# Patient Record
Sex: FEMALE | Race: WHITE | Employment: OTHER | URBAN - METROPOLITAN AREA
[De-identification: names, ages, dates, MRNs, and addresses within clinical notes are randomized per-mention and may not be internally consistent; named-entity substitution may affect disease eponyms.]

---

## 2018-07-12 ENCOUNTER — HOSPITAL ENCOUNTER (EMERGENCY)
Age: 68
Discharge: HOME | End: 2018-07-12
Payer: MEDICARE

## 2018-07-12 VITALS
DIASTOLIC BLOOD PRESSURE: 80 MMHG | HEART RATE: 78 BPM | RESPIRATION RATE: 14 BRPM | OXYGEN SATURATION: 97 % | HEIGHT: 66 IN | TEMPERATURE: 98.3 F | WEIGHT: 140 LBS | BODY MASS INDEX: 22.5 KG/M2 | SYSTOLIC BLOOD PRESSURE: 128 MMHG

## 2018-07-12 DIAGNOSIS — H10.33 ACUTE BACTERIAL CONJUNCTIVITIS OF BOTH EYES: Primary | ICD-10-CM

## 2018-07-12 PROCEDURE — 99282 EMERGENCY DEPT VISIT SF MDM: CPT

## 2018-07-12 RX ORDER — TOBRAMYCIN AND DEXAMETHASONE 3; 1 MG/ML; MG/ML
SUSPENSION/ DROPS OPHTHALMIC
Qty: 1 BOTTLE | Refills: 0 | Status: SHIPPED | OUTPATIENT
Start: 2018-07-12 | End: 2018-07-22

## 2018-07-13 NOTE — ED PROVIDER NOTES
[facility]       Department of Emergency Medicine   ED  Provider Note  Admit Date/RoomTime: 7/12/2018  8:01 PM  ED Room: 08/08  Chief Complaint:   Eye Problem (left eye redness )    History of Present Illness   Source of history provided by:  caregiver / friend. History/Exam Limitations: developmentally delayedAravind Neal is a 80 y.o. old female presenting to the emergency department by private vehicle and accompanied by caregiver, with sudden onset discharge and tearing to both eyes, which began 1 day(s) prior to arrival.  Since onset her symptoms have been worsening and mild in severity. Associated signs & symptoms of:  none. The patients tetanus status is unknown. Mechanism: NONE    []  FB Exposure     []  Chemical Exposure     []  Direct Trauma     []  High Speed Machinery Use     []  Welding      Circumstances:    []  Contact Lens Use     []  Recent URI Sx's     [x]  Spontaneous Onset     []  Close Contact w/similar Sx's     []  Work Related     History of: NONE    []   Glaucoma     []   Recent Eye Surgery     ROS    Pertinent positives and negatives are stated within HPI, all other systems reviewed and are negative. Past Surgical History:  has no past surgical history on file. Social History:  reports that she has never smoked. She has never used smokeless tobacco. She reports that she does not drink alcohol or use drugs. Family History: family history is not on file. Allergies: Zocor [simvastatin]    Physical Exam           ED Triage Vitals   BP Temp Temp Source Pulse Resp SpO2 Height Weight   07/12/18 2017 07/12/18 2017 07/12/18 2017 07/12/18 2017 07/12/18 2017 07/12/18 2017 07/12/18 2004 07/12/18 2004   128/80 98.3 °F (36.8 °C) Oral 78 14 97 % 5' 6\" (1.676 m) 140 lb (63.5 kg)      Oxygen Saturation Interpretation: Normal.    Constitutional:  Alert, development consistent with age. HENT:  NC/NT. Airway patent. Neck:  Normal ROM. Supple.   Eyes:         Pupils: equal, round, of 7/12/2018  9:06 PM      START taking these medications    Details   tobramycin-dexamethasone (TOBRADEX) 0.3-0.1 % ophthalmic suspension Use 2 drops to affected eyes every 4 hrs while wake x 5 days, Disp-1 Bottle, R-0Print           Electronically signed by Karyle Locker, APRN - CNP   DD: 7/12/18  **This report was transcribed using voice recognition software. Every effort was made to ensure accuracy; however, inadvertent computerized transcription errors may be present.   END OF ED PROVIDER NOTE       DIAMOND Lynn CNP  07/12/18 0100

## 2018-08-13 ENCOUNTER — APPOINTMENT (OUTPATIENT)
Dept: GENERAL RADIOLOGY | Age: 68
End: 2018-08-13
Payer: MEDICARE

## 2018-08-13 ENCOUNTER — HOSPITAL ENCOUNTER (EMERGENCY)
Age: 68
Discharge: HOME | End: 2018-08-13
Attending: EMERGENCY MEDICINE
Payer: MEDICARE

## 2018-08-13 VITALS
HEIGHT: 66 IN | SYSTOLIC BLOOD PRESSURE: 126 MMHG | WEIGHT: 140 LBS | HEART RATE: 74 BPM | DIASTOLIC BLOOD PRESSURE: 72 MMHG | TEMPERATURE: 97.6 F | BODY MASS INDEX: 22.5 KG/M2 | RESPIRATION RATE: 16 BRPM | OXYGEN SATURATION: 97 %

## 2018-08-13 DIAGNOSIS — R07.9 CHEST PAIN, UNSPECIFIED TYPE: Primary | ICD-10-CM

## 2018-08-13 LAB
ANION GAP SERPL CALCULATED.3IONS-SCNC: 6 MMOL/L (ref 7–16)
BASOPHILS ABSOLUTE: 0.03 E9/L (ref 0–0.2)
BASOPHILS RELATIVE PERCENT: 0.8 % (ref 0–2)
BUN BLDV-MCNC: 10 MG/DL (ref 8–23)
CALCIUM SERPL-MCNC: 8.6 MG/DL (ref 8.6–10.2)
CHLORIDE BLD-SCNC: 96 MMOL/L (ref 98–107)
CO2: 32 MMOL/L (ref 22–29)
CREAT SERPL-MCNC: 0.6 MG/DL (ref 0.5–1)
EKG ATRIAL RATE: 71 BPM
EKG P AXIS: 52 DEGREES
EKG P-R INTERVAL: 164 MS
EKG Q-T INTERVAL: 416 MS
EKG QRS DURATION: 104 MS
EKG QTC CALCULATION (BAZETT): 452 MS
EKG R AXIS: 28 DEGREES
EKG T AXIS: 13 DEGREES
EKG VENTRICULAR RATE: 71 BPM
EOSINOPHILS ABSOLUTE: 0.18 E9/L (ref 0.05–0.5)
EOSINOPHILS RELATIVE PERCENT: 4.6 % (ref 0–6)
GFR AFRICAN AMERICAN: >60
GFR NON-AFRICAN AMERICAN: >60 ML/MIN/1.73
GLUCOSE BLD-MCNC: 119 MG/DL (ref 74–109)
HCT VFR BLD CALC: 33.4 % (ref 34–48)
HEMOGLOBIN: 11.2 G/DL (ref 11.5–15.5)
IMMATURE GRANULOCYTES #: 0.01 E9/L
IMMATURE GRANULOCYTES %: 0.3 % (ref 0–5)
LYMPHOCYTES ABSOLUTE: 1.89 E9/L (ref 1.5–4)
LYMPHOCYTES RELATIVE PERCENT: 48.6 % (ref 20–42)
MCH RBC QN AUTO: 34.6 PG (ref 26–35)
MCHC RBC AUTO-ENTMCNC: 33.5 % (ref 32–34.5)
MCV RBC AUTO: 103.1 FL (ref 80–99.9)
MONOCYTES ABSOLUTE: 0.6 E9/L (ref 0.1–0.95)
MONOCYTES RELATIVE PERCENT: 15.4 % (ref 2–12)
NEUTROPHILS ABSOLUTE: 1.18 E9/L (ref 1.8–7.3)
NEUTROPHILS RELATIVE PERCENT: 30.3 % (ref 43–80)
PDW BLD-RTO: 13.4 FL (ref 11.5–15)
PLATELET # BLD: 121 E9/L (ref 130–450)
PMV BLD AUTO: 9.4 FL (ref 7–12)
POTASSIUM SERPL-SCNC: 4.5 MMOL/L (ref 3.5–5)
RBC # BLD: 3.24 E12/L (ref 3.5–5.5)
SODIUM BLD-SCNC: 134 MMOL/L (ref 132–146)
TROPONIN: <0.01 NG/ML (ref 0–0.03)
WBC # BLD: 3.9 E9/L (ref 4.5–11.5)

## 2018-08-13 PROCEDURE — 84484 ASSAY OF TROPONIN QUANT: CPT

## 2018-08-13 PROCEDURE — 71045 X-RAY EXAM CHEST 1 VIEW: CPT

## 2018-08-13 PROCEDURE — 80048 BASIC METABOLIC PNL TOTAL CA: CPT

## 2018-08-13 PROCEDURE — 99285 EMERGENCY DEPT VISIT HI MDM: CPT

## 2018-08-13 PROCEDURE — 85025 COMPLETE CBC W/AUTO DIFF WBC: CPT

## 2018-08-13 RX ORDER — GUAIFENESIN 100 MG/5ML
100 SYRUP ORAL 3 TIMES DAILY PRN
COMMUNITY

## 2018-08-13 RX ORDER — ESCITALOPRAM OXALATE 10 MG/1
10 TABLET ORAL DAILY
COMMUNITY

## 2018-08-13 NOTE — ED NOTES
Bed: 03  Expected date:   Expected time:   Means of arrival:   Comments:  EMS/chest pain     Jean Rivera RN  08/13/18 4161

## 2018-08-13 NOTE — ED PROVIDER NOTES
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 Per EMS, the patient lives at a group home and they state that the patient complained of chest pain. A complete review of systems and HPI are limited secondary to the patient mental status. The history is provided by the patient and the EMS personnel. No  was used. Chest Pain   Pain location:  Unable to specify  Onset quality:  Unable to specify  Timing:  Unable to specify      Review of Systems   Unable to perform ROS: Patient nonverbal   Cardiovascular: Positive for chest pain. Physical Exam   Constitutional: She appears well-developed and well-nourished. No distress. HENT:   Head: Normocephalic and atraumatic. Mouth/Throat: Oropharynx is clear and moist.   Eyes: Pupils are equal, round, and reactive to light. EOM are normal.   Neck: Normal range of motion. Neck supple. No JVD present. Cardiovascular: Normal rate and regular rhythm. No murmur heard. Pulmonary/Chest: Effort normal and breath sounds normal. No respiratory distress. She has no wheezes. She has no rales. Abdominal: Soft. She exhibits no distension. There is no tenderness. There is no rebound and no guarding. Musculoskeletal: She exhibits no edema. Lymphadenopathy:     She has no cervical adenopathy. Neurological:   Awake but nonverbal, moves all four extremities spontanously   Skin: Skin is warm and dry. Psychiatric: She has a normal mood and affect. Her behavior is normal. Thought content normal.   Nursing note and vitals reviewed. Procedures    MDM  Number of Diagnoses or Management Options  Chest pain, unspecified type:   Diagnosis management comments: This is a 80year old female with PMH of MR and Hypothyroidism who presented to the ED for chest pain after she told her nurse she had chest pain.  The patient was extremely well appearing and was

## 2018-08-13 NOTE — ED NOTES
Reviewed discharge instructions with patient, discussed medications and addressed all patient and caregiver questions/concerns. Caregiver verbalizes understanding.      Marci Rosenthal RN  08/13/18 4275

## 2018-09-08 ENCOUNTER — HOSPITAL ENCOUNTER (EMERGENCY)
Age: 68
Discharge: HOME | End: 2018-09-08
Attending: EMERGENCY MEDICINE
Payer: MEDICARE

## 2018-09-08 ENCOUNTER — APPOINTMENT (OUTPATIENT)
Dept: GENERAL RADIOLOGY | Age: 68
End: 2018-09-08
Payer: MEDICARE

## 2018-09-08 VITALS
BODY MASS INDEX: 24.21 KG/M2 | RESPIRATION RATE: 16 BRPM | SYSTOLIC BLOOD PRESSURE: 140 MMHG | HEART RATE: 67 BPM | DIASTOLIC BLOOD PRESSURE: 73 MMHG | WEIGHT: 150 LBS | TEMPERATURE: 97.2 F | OXYGEN SATURATION: 96 %

## 2018-09-08 DIAGNOSIS — S93.401A SPRAIN OF RIGHT ANKLE, UNSPECIFIED LIGAMENT, INITIAL ENCOUNTER: Primary | ICD-10-CM

## 2018-09-08 PROCEDURE — 73610 X-RAY EXAM OF ANKLE: CPT

## 2018-09-08 PROCEDURE — 6370000000 HC RX 637 (ALT 250 FOR IP): Performed by: EMERGENCY MEDICINE

## 2018-09-08 PROCEDURE — 99283 EMERGENCY DEPT VISIT LOW MDM: CPT

## 2018-09-08 RX ORDER — IBUPROFEN 400 MG/1
600 TABLET ORAL ONCE
Status: COMPLETED | OUTPATIENT
Start: 2018-09-08 | End: 2018-09-08

## 2018-09-08 RX ADMIN — IBUPROFEN 600 MG: 400 TABLET ORAL at 10:01

## 2018-09-08 ASSESSMENT — ENCOUNTER SYMPTOMS
NAUSEA: 0
SORE THROAT: 0
RHINORRHEA: 0
BLOOD IN STOOL: 0
VOMITING: 0
CHEST TIGHTNESS: 0
SHORTNESS OF BREATH: 0
DIARRHEA: 0
COUGH: 0
TROUBLE SWALLOWING: 0
WHEEZING: 0
CONSTIPATION: 0
ABDOMINAL PAIN: 0

## 2018-09-08 ASSESSMENT — PAIN SCALES - GENERAL: PAINLEVEL_OUTOF10: 6

## 2018-09-08 ASSESSMENT — PAIN DESCRIPTION - FREQUENCY: FREQUENCY: CONTINUOUS

## 2018-09-08 ASSESSMENT — PAIN DESCRIPTION - LOCATION: LOCATION: ANKLE

## 2018-09-08 ASSESSMENT — PAIN SCALES - WONG BAKER: WONGBAKER_NUMERICALRESPONSE: 6

## 2018-09-08 ASSESSMENT — PAIN DESCRIPTION - ORIENTATION: ORIENTATION: RIGHT

## 2018-09-08 ASSESSMENT — PAIN DESCRIPTION - PAIN TYPE: TYPE: ACUTE PAIN

## 2020-02-06 ENCOUNTER — APPOINTMENT (OUTPATIENT)
Dept: CT IMAGING | Age: 70
End: 2020-02-06
Payer: MEDICARE

## 2020-02-06 ENCOUNTER — HOSPITAL ENCOUNTER (OUTPATIENT)
Age: 70
Setting detail: OBSERVATION
Discharge: HOME | End: 2020-02-08
Attending: EMERGENCY MEDICINE | Admitting: INTERNAL MEDICINE
Payer: MEDICARE

## 2020-02-06 ENCOUNTER — APPOINTMENT (OUTPATIENT)
Dept: GENERAL RADIOLOGY | Age: 70
End: 2020-02-06
Payer: MEDICARE

## 2020-02-06 PROBLEM — R09.02 HYPOXIA: Status: ACTIVE | Noted: 2020-02-06

## 2020-02-06 PROBLEM — N17.9 AKI (ACUTE KIDNEY INJURY) (HCC): Status: ACTIVE | Noted: 2020-02-06

## 2020-02-06 LAB
ALBUMIN SERPL-MCNC: 3.2 G/DL (ref 3.5–5.2)
ALP BLD-CCNC: 85 U/L (ref 35–104)
ALT SERPL-CCNC: 25 U/L (ref 0–32)
ANION GAP SERPL CALCULATED.3IONS-SCNC: 10 MMOL/L (ref 7–16)
ANISOCYTOSIS: ABNORMAL
AST SERPL-CCNC: 89 U/L (ref 0–31)
ATYPICAL LYMPHOCYTE RELATIVE PERCENT: 0.9 % (ref 0–4)
BASOPHILS ABSOLUTE: 0 E9/L (ref 0–0.2)
BASOPHILS RELATIVE PERCENT: 0.2 % (ref 0–2)
BILIRUB SERPL-MCNC: <0.2 MG/DL (ref 0–1.2)
BUN BLDV-MCNC: 38 MG/DL (ref 8–23)
BURR CELLS: ABNORMAL
CALCIUM SERPL-MCNC: 8.2 MG/DL (ref 8.6–10.2)
CHLORIDE BLD-SCNC: 99 MMOL/L (ref 98–107)
CO2: 29 MMOL/L (ref 22–29)
CREAT SERPL-MCNC: 1.3 MG/DL (ref 0.5–1)
EOSINOPHILS ABSOLUTE: 0 E9/L (ref 0.05–0.5)
EOSINOPHILS RELATIVE PERCENT: 0 % (ref 0–6)
GFR AFRICAN AMERICAN: 49
GFR NON-AFRICAN AMERICAN: 41 ML/MIN/1.73
GLUCOSE BLD-MCNC: 120 MG/DL (ref 74–99)
HCT VFR BLD CALC: 31.8 % (ref 34–48)
HEMOGLOBIN: 11.7 G/DL (ref 11.5–15.5)
LACTIC ACID: 1 MMOL/L (ref 0.5–2.2)
LYMPHOCYTES ABSOLUTE: 1.61 E9/L (ref 1.5–4)
LYMPHOCYTES RELATIVE PERCENT: 34.2 % (ref 20–42)
MCH RBC QN AUTO: 39.8 PG (ref 26–35)
MCHC RBC AUTO-ENTMCNC: 36.8 % (ref 32–34.5)
MCV RBC AUTO: 108.2 FL (ref 80–99.9)
MONOCYTES ABSOLUTE: 0.51 E9/L (ref 0.1–0.95)
MONOCYTES RELATIVE PERCENT: 11.4 % (ref 2–12)
NEUTROPHILS ABSOLUTE: 2.48 E9/L (ref 1.8–7.3)
NEUTROPHILS RELATIVE PERCENT: 53.5 % (ref 43–80)
OVALOCYTES: ABNORMAL
PDW BLD-RTO: 14.8 FL (ref 11.5–15)
PLATELET # BLD: 57 E9/L (ref 130–450)
PLATELET CONFIRMATION: NORMAL
PMV BLD AUTO: 10.4 FL (ref 7–12)
POIKILOCYTES: ABNORMAL
POTASSIUM REFLEX MAGNESIUM: 5 MMOL/L (ref 3.5–5)
RBC # BLD: 2.94 E12/L (ref 3.5–5.5)
SODIUM BLD-SCNC: 138 MMOL/L (ref 132–146)
TOTAL PROTEIN: 6.3 G/DL (ref 6.4–8.3)
TROPONIN: <0.01 NG/ML (ref 0–0.03)
VALPROIC ACID LEVEL: 34 MCG/ML (ref 50–100)
WBC # BLD: 4.6 E9/L (ref 4.5–11.5)

## 2020-02-06 PROCEDURE — 71275 CT ANGIOGRAPHY CHEST: CPT

## 2020-02-06 PROCEDURE — 2580000003 HC RX 258: Performed by: NURSE PRACTITIONER

## 2020-02-06 PROCEDURE — 87502 INFLUENZA DNA AMP PROBE: CPT

## 2020-02-06 PROCEDURE — 84484 ASSAY OF TROPONIN QUANT: CPT

## 2020-02-06 PROCEDURE — 85025 COMPLETE CBC W/AUTO DIFF WBC: CPT

## 2020-02-06 PROCEDURE — 2580000003 HC RX 258: Performed by: PHYSICIAN ASSISTANT

## 2020-02-06 PROCEDURE — 83605 ASSAY OF LACTIC ACID: CPT

## 2020-02-06 PROCEDURE — 6370000000 HC RX 637 (ALT 250 FOR IP): Performed by: PHYSICIAN ASSISTANT

## 2020-02-06 PROCEDURE — 2580000003 HC RX 258: Performed by: RADIOLOGY

## 2020-02-06 PROCEDURE — 80053 COMPREHEN METABOLIC PANEL: CPT

## 2020-02-06 PROCEDURE — 36415 COLL VENOUS BLD VENIPUNCTURE: CPT

## 2020-02-06 PROCEDURE — 6360000002 HC RX W HCPCS: Performed by: PHYSICIAN ASSISTANT

## 2020-02-06 PROCEDURE — 71046 X-RAY EXAM CHEST 2 VIEWS: CPT

## 2020-02-06 PROCEDURE — G0378 HOSPITAL OBSERVATION PER HR: HCPCS

## 2020-02-06 PROCEDURE — 6360000004 HC RX CONTRAST MEDICATION: Performed by: RADIOLOGY

## 2020-02-06 PROCEDURE — 96374 THER/PROPH/DIAG INJ IV PUSH: CPT

## 2020-02-06 PROCEDURE — 80164 ASSAY DIPROPYLACETIC ACD TOT: CPT

## 2020-02-06 PROCEDURE — 93005 ELECTROCARDIOGRAM TRACING: CPT | Performed by: NURSE PRACTITIONER

## 2020-02-06 PROCEDURE — 99285 EMERGENCY DEPT VISIT HI MDM: CPT

## 2020-02-06 RX ORDER — LEVOTHYROXINE SODIUM 0.1 MG/1
100 TABLET ORAL DAILY
Status: DISCONTINUED | OUTPATIENT
Start: 2020-02-07 | End: 2020-02-08 | Stop reason: HOSPADM

## 2020-02-06 RX ORDER — SODIUM CHLORIDE 9 MG/ML
INJECTION, SOLUTION INTRAVENOUS CONTINUOUS
Status: DISCONTINUED | OUTPATIENT
Start: 2020-02-06 | End: 2020-02-08 | Stop reason: HOSPADM

## 2020-02-06 RX ORDER — GUAIFENESIN 100 MG/5ML
100 SOLUTION ORAL 3 TIMES DAILY PRN
Status: DISCONTINUED | OUTPATIENT
Start: 2020-02-06 | End: 2020-02-08 | Stop reason: HOSPADM

## 2020-02-06 RX ORDER — DIVALPROEX SODIUM 250 MG/1
500 TABLET, DELAYED RELEASE ORAL NIGHTLY
Status: DISCONTINUED | OUTPATIENT
Start: 2020-02-06 | End: 2020-02-08 | Stop reason: HOSPADM

## 2020-02-06 RX ORDER — ONDANSETRON 2 MG/ML
4 INJECTION INTRAMUSCULAR; INTRAVENOUS EVERY 6 HOURS PRN
Status: DISCONTINUED | OUTPATIENT
Start: 2020-02-06 | End: 2020-02-08 | Stop reason: HOSPADM

## 2020-02-06 RX ORDER — ESCITALOPRAM OXALATE 10 MG/1
10 TABLET ORAL DAILY
Status: DISCONTINUED | OUTPATIENT
Start: 2020-02-06 | End: 2020-02-08 | Stop reason: HOSPADM

## 2020-02-06 RX ORDER — 0.9 % SODIUM CHLORIDE 0.9 %
1000 INTRAVENOUS SOLUTION INTRAVENOUS ONCE
Status: COMPLETED | OUTPATIENT
Start: 2020-02-06 | End: 2020-02-06

## 2020-02-06 RX ORDER — DOXYCYCLINE HYCLATE 100 MG/1
100 CAPSULE ORAL 2 TIMES DAILY
Status: ON HOLD | COMMUNITY
End: 2020-02-08 | Stop reason: HOSPADM

## 2020-02-06 RX ORDER — SODIUM CHLORIDE 0.9 % (FLUSH) 0.9 %
10 SYRINGE (ML) INJECTION ONCE
Status: COMPLETED | OUTPATIENT
Start: 2020-02-06 | End: 2020-02-06

## 2020-02-06 RX ORDER — PREDNISONE 20 MG/1
40 TABLET ORAL DAILY
Status: DISCONTINUED | OUTPATIENT
Start: 2020-02-09 | End: 2020-02-08 | Stop reason: HOSPADM

## 2020-02-06 RX ORDER — IPRATROPIUM BROMIDE AND ALBUTEROL SULFATE 2.5; .5 MG/3ML; MG/3ML
1 SOLUTION RESPIRATORY (INHALATION)
Status: DISCONTINUED | OUTPATIENT
Start: 2020-02-07 | End: 2020-02-08 | Stop reason: HOSPADM

## 2020-02-06 RX ORDER — ACETAMINOPHEN 325 MG/1
650 TABLET ORAL EVERY 4 HOURS PRN
Status: DISCONTINUED | OUTPATIENT
Start: 2020-02-06 | End: 2020-02-08 | Stop reason: HOSPADM

## 2020-02-06 RX ORDER — SODIUM CHLORIDE 0.9 % (FLUSH) 0.9 %
10 SYRINGE (ML) INJECTION EVERY 12 HOURS SCHEDULED
Status: DISCONTINUED | OUTPATIENT
Start: 2020-02-06 | End: 2020-02-08 | Stop reason: HOSPADM

## 2020-02-06 RX ORDER — QUETIAPINE FUMARATE 200 MG/1
400 TABLET, FILM COATED ORAL NIGHTLY
Status: DISCONTINUED | OUTPATIENT
Start: 2020-02-06 | End: 2020-02-08 | Stop reason: HOSPADM

## 2020-02-06 RX ORDER — EZETIMIBE 10 MG/1
10 TABLET ORAL DAILY
Status: DISCONTINUED | OUTPATIENT
Start: 2020-02-06 | End: 2020-02-08 | Stop reason: HOSPADM

## 2020-02-06 RX ORDER — METHYLPREDNISOLONE SODIUM SUCCINATE 125 MG/2ML
40 INJECTION, POWDER, LYOPHILIZED, FOR SOLUTION INTRAMUSCULAR; INTRAVENOUS EVERY 8 HOURS
Status: COMPLETED | OUTPATIENT
Start: 2020-02-06 | End: 2020-02-08

## 2020-02-06 RX ORDER — SODIUM CHLORIDE 0.9 % (FLUSH) 0.9 %
10 SYRINGE (ML) INJECTION PRN
Status: DISCONTINUED | OUTPATIENT
Start: 2020-02-06 | End: 2020-02-08 | Stop reason: HOSPADM

## 2020-02-06 RX ADMIN — METHYLPREDNISOLONE SODIUM SUCCINATE 40 MG: 125 INJECTION, POWDER, FOR SOLUTION INTRAMUSCULAR; INTRAVENOUS at 22:04

## 2020-02-06 RX ADMIN — DIVALPROEX SODIUM 500 MG: 250 TABLET, DELAYED RELEASE ORAL at 22:04

## 2020-02-06 RX ADMIN — SODIUM CHLORIDE 1000 ML: 9 INJECTION, SOLUTION INTRAVENOUS at 14:15

## 2020-02-06 RX ADMIN — QUETIAPINE FUMARATE 400 MG: 200 TABLET ORAL at 22:04

## 2020-02-06 RX ADMIN — IOPAMIDOL 75 ML: 755 INJECTION, SOLUTION INTRAVENOUS at 16:17

## 2020-02-06 RX ADMIN — Medication 10 ML: at 16:17

## 2020-02-06 RX ADMIN — SODIUM CHLORIDE: 9 INJECTION, SOLUTION INTRAVENOUS at 22:04

## 2020-02-06 RX ADMIN — SODIUM CHLORIDE, PRESERVATIVE FREE 10 ML: 5 INJECTION INTRAVENOUS at 22:05

## 2020-02-06 RX ADMIN — ESCITALOPRAM 10 MG: 10 TABLET, FILM COATED ORAL at 22:04

## 2020-02-06 ASSESSMENT — PAIN SCALES - GENERAL
PAINLEVEL_OUTOF10: 0
PAINLEVEL_OUTOF10: 0

## 2020-02-06 NOTE — ED PROVIDER NOTES
Eosinophils % 0.0 0.0 - 6.0 %    Basophils % 0.2 0.0 - 2.0 %    Neutrophils Absolute 2.48 1.80 - 7.30 E9/L    Lymphocytes Absolute 1.61 1.50 - 4.00 E9/L    Monocytes Absolute 0.51 0.10 - 0.95 E9/L    Eosinophils Absolute 0.00 (L) 0.05 - 0.50 E9/L    Basophils Absolute 0.00 0.00 - 0.20 E9/L    Atypical Lymphocytes Relative 0.9 0.0 - 4.0 %    Anisocytosis 1+     Poikilocytes 2+     Primo Cells 1+     Ovalocytes 1+    Troponin   Result Value Ref Range    Troponin <0.01 0.00 - 0.03 ng/mL   Lactic Acid, Plasma   Result Value Ref Range    Lactic Acid 1.0 0.5 - 2.2 mmol/L   Valproic acid level, total   Result Value Ref Range    Valproic Acid Lvl 34 (L) 50 - 100 mcg/mL   Platelet Confirmation   Result Value Ref Range    Platelet Confirmation CONFIRMED    EKG 12 Lead   Result Value Ref Range    Ventricular Rate 75 BPM    Atrial Rate 75 BPM    P-R Interval 104 ms    QRS Duration 116 ms    Q-T Interval 442 ms    QTc Calculation (Bazett) 493 ms    P Axis 3 degrees    R Axis 44 degrees    T Axis 5 degrees       RADIOLOGY:  Interpreted by Radiologist.  CTA CHEST W CONTRAST   Final Result   No central pulmonary embolism or aortic dissection. Cardiomegaly with coronary artery calcification with the atelectasis   and groundglass opacities in the right perihilar region extending to   right upper lobe and right lower lobe likely mild CHF. 4 mm nodule in the left lower lobe. Consider surveillance according to   Fleischner Society guidelines. XR CHEST STANDARD (2 VW)   Final Result   Cardiomegaly. EKG:  This EKG is signed and interpreted by the EP. Time: 1421  Rate: 75  Rhythm: Sinus  Interpretation: Sinus rhythm with short RI with occasional PVCs  Comparison: None      ------------------------- NURSING NOTES AND VITALS REVIEWED ---------------------------   The nursing notes within the ED encounter and vital signs as below have been reviewed by myself.   /74   Pulse 71   Temp 96.8 °F (36 °C)   Resp 14   SpO2 96%   Oxygen Saturation Interpretation: Normal    The patients available past medical records and past encounters were reviewed. ------------------------------ ED COURSE/MEDICAL DECISION MAKING----------------------  Medications   0.9 % sodium chloride bolus (0 mLs Intravenous Stopped 2/6/20 1513)   sodium chloride flush 0.9 % injection 10 mL (10 mLs Intravenous Given 2/6/20 1617)   iopamidol (ISOVUE-370) 76 % injection 75 mL (75 mLs Intravenous Given 2/6/20 1617)         ED COURSE:   She has been examined by myself and Dr. Kimberli Morris. Labs, EKG, chest x-ray, CTA chest have been ordered    Medical Decision Making:     Caregiver states patient has been recently been being treated for a cough and cold. States she is currently taking antibiotic but she is not sure what the name of it is. This a.m. when she tried to get her up to go to the bathroom the patient was incontinent of urine on herself had an episode of dark looking diarrhea stool. Caregiver states this is not normal for the patient normally she is ambulatory verbal and able to assist with her ADLs. States when they checked her pulse ox at the nursing home she was 83% on room air and the patient does not normally wear oxygen at home. CTA of her chest shows no PE or aortic dissection. Does show a 4mm nodule in the left lower lobe has some atelectasis and groundglass opacities in the right perihilar region. 1720 I did speak to Joellen Ha, [facility], patient will be admitted observation telemetry under Dr. Quintana Other service. Caregiver has been updated on plan to admit patient for hypoxia and further evaluation.         This patient's ED course included: a personal history and physicial examination, re-evaluation prior to disposition, multiple bedside re-evaluations, cardiac monitoring and continuous pulse oximetry    This patient has remained hemodynamically stable, improved and been closely monitored during their ED course. Re-Evaluations:             1500  Re-evaluation. Patients symptoms are improving    Re-examination  2/6/20   3:03 PM          Vital Signs:   Vitals:    02/06/20 1211 02/06/20 1416   BP: 91/60 134/74   Pulse: 79 71   Resp: 16 14   Temp: 96.8 °F (36 °C)    SpO2: 99% 96%     Card/Pulm:  Rhythm: normal rate. Heart Sounds: Normal S1, S2 and no murmurs, gallops, or rubs. clear to auscultation, no wheezes or rales and unlabored breathing. Capillary Refill: normal.  Radial Pulse:  present 2+ and equal.  Skin:  Dry and Warm. Consultations:             none    Critical Care: none        Counseling: The emergency provider has spoken with the patient and caregiver and discussed todays results, in addition to providing specific details for the plan of care and counseling regarding the diagnosis and prognosis. Questions are answered at this time and they are agreeable with the plan.       --------------------------------- IMPRESSION AND DISPOSITION ---------------------------------    IMPRESSION  1. Hypoxia        DISPOSITION  Disposition: Admit to telemetry  Patient condition is stable    NOTE: This report was transcribed using voice recognition software.  Every effort was made to ensure accuracy; however, inadvertent computerized transcription errors may be present     DIAMOND Whittaker - ADRIANNA  02/06/20 9224

## 2020-02-06 NOTE — ED NOTES
Bed: 38  Expected date:   Expected time:   Means of arrival:   Comments:  EMS     Maty Alonso RN  02/06/20 6868

## 2020-02-07 ENCOUNTER — APPOINTMENT (OUTPATIENT)
Dept: GENERAL RADIOLOGY | Age: 70
End: 2020-02-07
Payer: MEDICARE

## 2020-02-07 LAB
ANION GAP SERPL CALCULATED.3IONS-SCNC: 12 MMOL/L (ref 7–16)
BASOPHILS ABSOLUTE: 0 E9/L (ref 0–0.2)
BASOPHILS RELATIVE PERCENT: 0 % (ref 0–2)
BUN BLDV-MCNC: 24 MG/DL (ref 8–23)
CALCIUM SERPL-MCNC: 8 MG/DL (ref 8.6–10.2)
CHLORIDE BLD-SCNC: 103 MMOL/L (ref 98–107)
CO2: 24 MMOL/L (ref 22–29)
CREAT SERPL-MCNC: 0.8 MG/DL (ref 0.5–1)
EKG ATRIAL RATE: 75 BPM
EKG P AXIS: 3 DEGREES
EKG P-R INTERVAL: 104 MS
EKG Q-T INTERVAL: 442 MS
EKG QRS DURATION: 116 MS
EKG QTC CALCULATION (BAZETT): 493 MS
EKG R AXIS: 44 DEGREES
EKG T AXIS: 5 DEGREES
EKG VENTRICULAR RATE: 75 BPM
EOSINOPHILS ABSOLUTE: 0 E9/L (ref 0.05–0.5)
EOSINOPHILS RELATIVE PERCENT: 0 % (ref 0–6)
GFR AFRICAN AMERICAN: >60
GFR NON-AFRICAN AMERICAN: >60 ML/MIN/1.73
GLUCOSE BLD-MCNC: 180 MG/DL (ref 74–99)
HCT VFR BLD CALC: 28.6 % (ref 34–48)
HEMOGLOBIN: 10.6 G/DL (ref 11.5–15.5)
IMMATURE GRANULOCYTES #: 0 E9/L
IMMATURE GRANULOCYTES %: 0 % (ref 0–5)
INFLUENZA A BY PCR: NOT DETECTED
INFLUENZA B BY PCR: DETECTED
LYMPHOCYTES ABSOLUTE: 0.94 E9/L (ref 1.5–4)
LYMPHOCYTES RELATIVE PERCENT: 44.5 % (ref 20–42)
MAGNESIUM: 2 MG/DL (ref 1.6–2.6)
MCH RBC QN AUTO: 39.3 PG (ref 26–35)
MCHC RBC AUTO-ENTMCNC: 37.1 % (ref 32–34.5)
MCV RBC AUTO: 105.9 FL (ref 80–99.9)
MONOCYTES ABSOLUTE: 0.1 E9/L (ref 0.1–0.95)
MONOCYTES RELATIVE PERCENT: 4.7 % (ref 2–12)
NEUTROPHILS ABSOLUTE: 1.07 E9/L (ref 1.8–7.3)
NEUTROPHILS RELATIVE PERCENT: 50.8 % (ref 43–80)
PDW BLD-RTO: 14.6 FL (ref 11.5–15)
PLATELET # BLD: 47 E9/L (ref 130–450)
PLATELET CONFIRMATION: NORMAL
PMV BLD AUTO: 10.3 FL (ref 7–12)
POTASSIUM REFLEX MAGNESIUM: 3.5 MMOL/L (ref 3.5–5)
PROCALCITONIN: 0.17 NG/ML (ref 0–0.08)
RBC # BLD: 2.7 E12/L (ref 3.5–5.5)
SODIUM BLD-SCNC: 139 MMOL/L (ref 132–146)
WBC # BLD: 2.1 E9/L (ref 4.5–11.5)

## 2020-02-07 PROCEDURE — G0378 HOSPITAL OBSERVATION PER HR: HCPCS

## 2020-02-07 PROCEDURE — 97161 PT EVAL LOW COMPLEX 20 MIN: CPT

## 2020-02-07 PROCEDURE — 6370000000 HC RX 637 (ALT 250 FOR IP): Performed by: PHYSICIAN ASSISTANT

## 2020-02-07 PROCEDURE — 84145 PROCALCITONIN (PCT): CPT

## 2020-02-07 PROCEDURE — 83735 ASSAY OF MAGNESIUM: CPT

## 2020-02-07 PROCEDURE — 96376 TX/PRO/DX INJ SAME DRUG ADON: CPT

## 2020-02-07 PROCEDURE — 71045 X-RAY EXAM CHEST 1 VIEW: CPT

## 2020-02-07 PROCEDURE — 6360000002 HC RX W HCPCS: Performed by: PHYSICIAN ASSISTANT

## 2020-02-07 PROCEDURE — 85025 COMPLETE CBC W/AUTO DIFF WBC: CPT

## 2020-02-07 PROCEDURE — 93010 ELECTROCARDIOGRAM REPORT: CPT | Performed by: INTERNAL MEDICINE

## 2020-02-07 PROCEDURE — 97165 OT EVAL LOW COMPLEX 30 MIN: CPT

## 2020-02-07 PROCEDURE — 94640 AIRWAY INHALATION TREATMENT: CPT

## 2020-02-07 PROCEDURE — 6370000000 HC RX 637 (ALT 250 FOR IP): Performed by: INTERNAL MEDICINE

## 2020-02-07 PROCEDURE — 80048 BASIC METABOLIC PNL TOTAL CA: CPT

## 2020-02-07 PROCEDURE — 36415 COLL VENOUS BLD VENIPUNCTURE: CPT

## 2020-02-07 PROCEDURE — 94664 DEMO&/EVAL PT USE INHALER: CPT

## 2020-02-07 PROCEDURE — 2580000003 HC RX 258: Performed by: PHYSICIAN ASSISTANT

## 2020-02-07 RX ORDER — OSELTAMIVIR PHOSPHATE 75 MG/1
75 CAPSULE ORAL 2 TIMES DAILY
Status: DISCONTINUED | OUTPATIENT
Start: 2020-02-07 | End: 2020-02-08 | Stop reason: HOSPADM

## 2020-02-07 RX ADMIN — SODIUM CHLORIDE, PRESERVATIVE FREE 10 ML: 5 INJECTION INTRAVENOUS at 09:02

## 2020-02-07 RX ADMIN — LEVOTHYROXINE SODIUM 100 MCG: 0.1 TABLET ORAL at 06:44

## 2020-02-07 RX ADMIN — OSELTAMIVIR PHOSPHATE 75 MG: 75 CAPSULE ORAL at 11:45

## 2020-02-07 RX ADMIN — METHYLPREDNISOLONE SODIUM SUCCINATE 40 MG: 125 INJECTION, POWDER, FOR SOLUTION INTRAMUSCULAR; INTRAVENOUS at 15:22

## 2020-02-07 RX ADMIN — IPRATROPIUM BROMIDE AND ALBUTEROL SULFATE 1 AMPULE: 2.5; .5 SOLUTION RESPIRATORY (INHALATION) at 21:46

## 2020-02-07 RX ADMIN — EZETIMIBE 10 MG: 10 TABLET ORAL at 09:02

## 2020-02-07 RX ADMIN — IPRATROPIUM BROMIDE AND ALBUTEROL SULFATE 1 AMPULE: 2.5; .5 SOLUTION RESPIRATORY (INHALATION) at 09:56

## 2020-02-07 RX ADMIN — IPRATROPIUM BROMIDE AND ALBUTEROL SULFATE 1 AMPULE: 2.5; .5 SOLUTION RESPIRATORY (INHALATION) at 18:23

## 2020-02-07 RX ADMIN — IPRATROPIUM BROMIDE AND ALBUTEROL SULFATE 1 AMPULE: 2.5; .5 SOLUTION RESPIRATORY (INHALATION) at 14:14

## 2020-02-07 RX ADMIN — ESCITALOPRAM 10 MG: 10 TABLET, FILM COATED ORAL at 09:02

## 2020-02-07 RX ADMIN — METHYLPREDNISOLONE SODIUM SUCCINATE 40 MG: 125 INJECTION, POWDER, FOR SOLUTION INTRAMUSCULAR; INTRAVENOUS at 03:44

## 2020-02-07 ASSESSMENT — PAIN SCALES - GENERAL
PAINLEVEL_OUTOF10: 0
PAINLEVEL_OUTOF10: 0

## 2020-02-07 NOTE — PROGRESS NOTES
t [id number] 5 0   Neuro Re-ed 36520     Group Therapy      Orthotic manage/training  69937     Non-Billable Time     Total Timed Treatment 5 0         [place], [place], OTR/L #[id number]

## 2020-02-07 NOTE — H&P
Miguel Palacio M.D. History and Physical      CHIEF COMPLAINT:  Hypoxia     Reason for Admission:  As above     History Obtained From: emr     HISTORY OF PRESENT ILLNESS:      The patient is a 77 y.o. female of Cedric Clemens DO with significant past medical history of htn and hypothyroid  who presents with low pulse ox at the group home. found to have a saturation of 83% on room air. Patient denies chest pain, shortness of breath, or abdominal pain but she is not very forthcoming with any information. She denies nausea, vomiting, diarrhea. On my eval, patient  does answer some questions, appears comfortable present on 1 L no caretakers at the bedside. BP (!) 115/55   Pulse 72   Temp 97.8 °F (36.6 °C) (Temporal)   Resp 18   Ht 5' 5\" (1.651 m) Comment: just a guess  Wt 156 lb (70.8 kg)   SpO2 98%   BMI 25.96 kg/m²      Past Medical History:        Diagnosis Date    Anxiety     Corneal scarring     Fetal alcohol syndrome     Hypercholesteremia     Hypothyroidism     Onychomycosis      Past Surgical History:    No past surgical history on file. Medications Prior to Admission:    Medications Prior to Admission: doxycycline hyclate (VIBRAMYCIN) 100 MG capsule, Take 100 mg by mouth 2 times daily Started 2/5/20: Prescribed 7 days Only taken 2 pills. guaiFENesin (ROBITUSSIN) 100 MG/5ML syrup, Take 100 mg by mouth 3 times daily as needed for Cough  escitalopram (LEXAPRO) 10 MG tablet, Take 10 mg by mouth daily  Fe Cbn-Fe Gluc-FA-B12-C-DSS (FERRALET 90 PO), Take 90 tablets by mouth Daily  acetaminophen (TYLENOL) 325 MG tablet, Take 650 mg by mouth every 4 hours as needed. Indications: Headache, Pain, elevated temp of 100 or above, menstrual cramps, headache  loperamide (IMODIUM) 1 MG/5ML solution, Take  by mouth See Admin Instructions.  20 ml prn after 1st loose BM, 10 ml prn after 2nd/3rd loose BM  bisacodyl (DULCOLAX) 5 MG EC tablet, Take 10 mg by mouth every morning. divalproex (DEPAKOTE) 500 MG DR tablet, Take 500 mg by mouth nightly. niacin (NIASPAN) 500 MG CR tablet, Take 1,000 mg by mouth nightly. SA  ezetimibe (ZETIA) 10 MG tablet, Take 10 mg by mouth daily. QUEtiapine (SEROQUEL) 200 MG tablet, Take 400 mg by mouth nightly. ARTIFICIAL TEAR OP, Apply 1-2 drops to eye as needed. Right or left eye   Indications: Dry Eyes (Inactive)  Bismuth Subsalicylate (PINK BISMUTH PO), Take 30 mLs by mouth See Admin Instructions. Every hour as needed x 4. Call if vomits   Indications: Nausea, upset stomach  azelastine (ASTELIN) 137 MCG/SPRAY nasal spray, 2 sprays by Each Nare route 2 times daily. levothyroxine (SYNTHROID) 100 MCG tablet, Take 100 mcg by mouth Daily. esomeprazole (NEXIUM) 40 MG capsule, Take 40 mg by mouth daily. B Complex-C-Folic Acid (VOL-CARE RX PO), Take 1 tablet by mouth daily  multivitamin (THERAGRAN) per tablet, Take 1 tablet by mouth nightly. Nutritional Supplements (ENSURE PLUS) LIQD, Take 1 Can by mouth 3 times daily. After meals    Allergies:  Zocor [simvastatin]    Social History:   TOBACCO:   reports that she has never smoked. She has never used smokeless tobacco.  ETOH:   reports no history of alcohol use. MARITAL STATUS:    OCCUPATION:      Family History:   No family history on file.     REVIEW OF SYSTEMS:    General ROS:unable   Hematological and Lymphatic ROS: negative  Endocrine ROS: negative  Respiratory ROS: no cough, shortness of breath, or wheezing  Cardiovascular ROS: no chest pain or dyspnea on exertion  Gastrointestinal ROS: no abdominal pain, change in bowel habits, or black or bloody stools  Genito-Urinary ROS: no dysuria, trouble voiding, or hematuria  Neurological ROS: no TIA or stroke symptoms  negative    Vitals:  BP (!) 115/55   Pulse 72   Temp 97.8 °F (36.6 °C) (Temporal)   Resp 18   Ht 5' 5\" (1.651 m) Comment: just a guess  Wt 156 lb (70.8 kg)   SpO2 98%   BMI 25.96 kg/m²     PHYSICAL EXAM:  General:  Awake, alert, oriented X 3. Well developed, well nourished, well groomed. No apparent distress. HEENT:  Normocephalic, atraumatic. Pupils equal, round, reactive to light. No scleral icterus. No conjunctival injection. Normal lips, teeth, and gums. No nasal discharge. Neck:  Supple  Heart:  RRR, no murmurs, gallops, rubs, carotid upstroke normal, no carotid bruits  Lungs:  CTA bilaterally, bilat symmetrical expansion, no wheeze, rales, or rhonchi  Abdomen: Bowel sounds present, soft, nontender, no masses, no organomegaly, no peritoneal signs  Extremities:  No clubbing, cyanosis, or edema  Skin:  Warm and dry, no open lesions or rash  Neuro:  Cranial nerves 2-12 intact, no focal deficits  Breast: deferred  Rectal: deferred  Genitalia:  deferred      DATA:     Recent Labs     02/06/20  1245 02/07/20  0526   WBC 4.6 2.1*   HGB 11.7 10.6*   PLT 57* 47*     Recent Labs     02/06/20  1245 02/07/20  0526    139   K 5.0 3.5   BUN 38* 24*   CREATININE 1.3* 0.8     Recent Labs     02/06/20  1245   PROT 6.3*     Recent Labs     02/06/20  1245   AST 89*   ALT 25   ALKPHOS 85   BILITOT <0.2     No results for input(s): BNP in the last 72 hours. Recent Labs     02/06/20  1245   TROPONINI <0.01       ASSESSMENT:      Principal Problem:    Hypoxia  Active Problems:    DARIO (acute kidney injury) ([facility] [address]  Resolved Problems:    * No resolved hospital problems.  *        PLAN:    Admit to tele [facility] eval of hypoxemia   CTA chest with opacities   influenza a - start tamiflu 75 p.o. twice daily  Empiric abx therapy   Antitussive   supportive care   Check strep / legionella   Pulmonology following  Insulin sliding scale    DVT Proph   Pt/ot  Dc plan           Arianne Ortez MD  2/7/2020  8:12 AM

## 2020-02-07 NOTE — CONSULTS
Leno Pedroza M.D.,[facility]  Makayla Mosquera D.O., F.A.C.O.I., uJne Rabago M.D. Louvella Romberg, M.D., Teresa France M.D. Patient:  Halie Saini 77 y.o. female MRN: [id number]     Date of Service: 2/7/2020      PULMONARY CONSULTATION    Reason for Consultation: hypoxia  Referring Physician: Bon Lu PA-C  Communication with the referring physician will be sent via the electronic medical record. Chief Complaint: AMS    CODE STATUS: FULL    SUBJECTIVE:  HPI:  Halie Saini is a 77 y.o.  female who we are asked to see in consultation today for acute respiratory failure with hypoxia. She was found at her group home lethargic laying supine. Oxygen levels 83% on room air. She was placed on 6 liters oxygen NC with improvement in respiratory symptoms. She has a past medical hx significant for MRDD non verbal, therefore information has been obtained from extensive review of her medical record. She carries diagnosis of fetal alcohol syndrome, HTN, hypothyroidism. No prior hx of asthma or copd. No hx of blood clots or stroke. She takes no medication at home for her lungs. She presented to the ED on 2/6/2020 via EMS for further evaluation. Rapid flu testing  +influenza B infection. No fevers documented. CXR obtained with evidence of cardiomegaly. No effusions identified. CTA chest with no central pulmonary embolism or aortic dissection. 4 mm nodule identified in the left lower lobe. No pleural effusions. Bibasilar atelectasis identified with ground glass opacity in the right upper and lower lobe noted. WBCs 2.1, platelets today 09,616. BUN 24, creat down to 0.8 from 1.3 after IV hydration, LA 1.0,  Troponin <0.01.  Oxygen levels have improved since admission, currently weaned to 1 liters NC 93-94% at rest.         Past Medical History:   Diagnosis Date    Anxiety     Corneal scarring     Fetal alcohol syndrome     Hypercholesteremia     Hypothyroidism     Onychomycosis MG/5ML syrup, Take 100 mg by mouth 3 times daily as needed for Cough  escitalopram (LEXAPRO) 10 MG tablet, Take 10 mg by mouth daily  Fe Cbn-Fe Gluc-FA-B12-C-DSS (FERRALET 90 PO), Take 90 tablets by mouth Daily  acetaminophen (TYLENOL) 325 MG tablet, Take 650 mg by mouth every 4 hours as needed. Indications: Headache, Pain, elevated temp of 100 or above, menstrual cramps, headache  loperamide (IMODIUM) 1 MG/5ML solution, Take  by mouth See Admin Instructions. 20 ml prn after 1st loose BM, 10 ml prn after 2nd/3rd loose BM  bisacodyl (DULCOLAX) 5 MG EC tablet, Take 10 mg by mouth every morning. divalproex (DEPAKOTE) 500 MG DR tablet, Take 500 mg by mouth nightly. niacin (NIASPAN) 500 MG CR tablet, Take 1,000 mg by mouth nightly. SA  ezetimibe (ZETIA) 10 MG tablet, Take 10 mg by mouth daily. QUEtiapine (SEROQUEL) 200 MG tablet, Take 400 mg by mouth nightly. ARTIFICIAL TEAR OP, Apply 1-2 drops to eye as needed. Right or left eye   Indications: Dry Eyes (Inactive)  Bismuth Subsalicylate (PINK BISMUTH PO), Take 30 mLs by mouth See Admin Instructions. Every hour as needed x 4. Call if vomits   Indications: Nausea, upset stomach  azelastine (ASTELIN) 137 MCG/SPRAY nasal spray, 2 sprays by Each Nare route 2 times daily. levothyroxine (SYNTHROID) 100 MCG tablet, Take 100 mcg by mouth Daily. esomeprazole (NEXIUM) 40 MG capsule, Take 40 mg by mouth daily. B Complex-C-Folic Acid (VOL-CARE RX PO), Take 1 tablet by mouth daily  multivitamin (THERAGRAN) per tablet, Take 1 tablet by mouth nightly. Nutritional Supplements (ENSURE PLUS) LIQD, Take 1 Can by mouth 3 times daily.  After meals    CURRENT MEDS :  Scheduled Meds:   oseltamivir  75 mg Oral BID    divalproex  500 mg Oral Nightly    escitalopram  10 mg Oral Daily    ezetimibe  10 mg Oral Daily    levothyroxine  100 mcg Oral Daily    QUEtiapine  400 mg Oral Nightly    sodium chloride flush  10 mL Intravenous 2 times per day    enoxaparin  40 mg Subcutaneous respiratory viral infection  3. 4 mm lung nodule left lower lobe  4. Mild DARIO, dehydration   5. Neutropenia ,- MILD CLASS 1 - secondary to infection   6. thrombocytopenia - secondary to infection   7. HTN  8. Hypothyroidism  9. MRDD non verbal  10. Fetal alcohol syndrome    Plan:  1. Oxygen therapy down to 1-2  liters NC wean to keep >92%  2. Ambulatory O2 testing prior to dc  3. Tamiflu x 5 days  4. Await final cultures sputum culture, bacterial urine Ags, procalcitonin,  hold abx for now  5. Continue IV fluids for now, monitor renal function improved today  6. Corticosteroids with taper to oral as symptoms improve   7. Bronchodilators-Duonebs every 4 hrs w/a  8. DVT, GI prophylaxis   9. Speech therapy eval    Thank you for allowing me to participate in the care of [facility]. Please feel free to call with questions. This plan of care was reviewed in collaboration with Dr. Yasmine Jennings    Electronically signed by DIAMOND Whitley CNP on 2/7/2020 at 10:58 AM    I personally saw, examined, and cared for the patient. Labs, medications, radiographs reviewed. I agree with history exam and plans detailed in NP note. Droplet and contact isolation   CXR illustrates no pneumonia so there is no reason to treat for concomitant HAP. Stop lovenox and add SCD as the platelet count has dropped < 50. Doubt HIT   Check peripheral smear. The neutropenia and thrombocytopenia is likely from the viral infection. Monitor closely   Follow cxr, aspiration precautions   BDR and steroids     Kaylynn Howe M.D.    Pulmonary/Critical Care Medicine

## 2020-02-07 NOTE — PROGRESS NOTES
New admission received from ED. VS, Initial assessment performed and documented. Skin assessment was completed and confirmed with another RN. Skin assessment paperwork was completed and placed in the soft chart. Orientated patient to room and nurse call light. 2010 Unable to reach Legal guardian or roxana ford in the contact information for pt med list. Left VM with legal guardian. Attempted to call facility unable to reach correct phone number. [address] number: [phone]   [phone] [facility]  Will fax information in the AM.  Reached updated med list and no advance directives per [facility]. Full code. Will confirm with legal guardian.

## 2020-02-07 NOTE — PROGRESS NOTES
Messaged Dr. Pa Portillo covering for Dr. Brigido Austin re: new consult from Dr. Franca Chavira.        Lindsay Walker

## 2020-02-08 VITALS
RESPIRATION RATE: 18 BRPM | HEART RATE: 83 BPM | HEIGHT: 65 IN | TEMPERATURE: 98.7 F | WEIGHT: 156 LBS | BODY MASS INDEX: 25.99 KG/M2 | SYSTOLIC BLOOD PRESSURE: 122 MMHG | OXYGEN SATURATION: 92 % | DIASTOLIC BLOOD PRESSURE: 59 MMHG

## 2020-02-08 PROBLEM — J96.01 ACUTE HYPOXEMIC RESPIRATORY FAILURE (HCC): Status: ACTIVE | Noted: 2020-02-06

## 2020-02-08 PROBLEM — J11.1 INFLUENZA: Status: ACTIVE | Noted: 2020-02-08

## 2020-02-08 PROBLEM — D61.818 PANCYTOPENIA (HCC): Status: ACTIVE | Noted: 2020-02-08

## 2020-02-08 LAB
ANION GAP SERPL CALCULATED.3IONS-SCNC: 15 MMOL/L (ref 7–16)
ANISOCYTOSIS: ABNORMAL
BASOPHILS ABSOLUTE: 0 E9/L (ref 0–0.2)
BASOPHILS RELATIVE PERCENT: 0 % (ref 0–2)
BUN BLDV-MCNC: 20 MG/DL (ref 8–23)
CALCIUM SERPL-MCNC: 8 MG/DL (ref 8.6–10.2)
CHLORIDE BLD-SCNC: 107 MMOL/L (ref 98–107)
CO2: 22 MMOL/L (ref 22–29)
CREAT SERPL-MCNC: 0.8 MG/DL (ref 0.5–1)
EOSINOPHILS ABSOLUTE: 0 E9/L (ref 0.05–0.5)
EOSINOPHILS RELATIVE PERCENT: 0 % (ref 0–6)
GFR AFRICAN AMERICAN: >60
GFR NON-AFRICAN AMERICAN: >60 ML/MIN/1.73
GLUCOSE BLD-MCNC: 171 MG/DL (ref 74–99)
HCT VFR BLD CALC: 29.7 % (ref 34–48)
HEMOGLOBIN: 10.9 G/DL (ref 11.5–15.5)
L. PNEUMOPHILA SEROGP 1 UR AG: NORMAL
LYMPHOCYTES ABSOLUTE: 1.02 E9/L (ref 1.5–4)
LYMPHOCYTES RELATIVE PERCENT: 35 % (ref 20–42)
MCH RBC QN AUTO: 38.9 PG (ref 26–35)
MCHC RBC AUTO-ENTMCNC: 36.7 % (ref 32–34.5)
MCV RBC AUTO: 106.1 FL (ref 80–99.9)
MONOCYTES ABSOLUTE: 0.09 E9/L (ref 0.1–0.95)
MONOCYTES RELATIVE PERCENT: 3 % (ref 2–12)
NEUTROPHILS ABSOLUTE: 1.8 E9/L (ref 1.8–7.3)
NEUTROPHILS RELATIVE PERCENT: 62 % (ref 43–80)
PDW BLD-RTO: 14.5 FL (ref 11.5–15)
PLATELET # BLD: 45 E9/L (ref 130–450)
PLATELET CONFIRMATION: NORMAL
PMV BLD AUTO: 10.2 FL (ref 7–12)
POTASSIUM SERPL-SCNC: 3.4 MMOL/L (ref 3.5–5)
RBC # BLD: 2.8 E12/L (ref 3.5–5.5)
SODIUM BLD-SCNC: 144 MMOL/L (ref 132–146)
STREP PNEUMONIAE ANTIGEN, URINE: NORMAL
WBC # BLD: 2.9 E9/L (ref 4.5–11.5)

## 2020-02-08 PROCEDURE — 87450 HC DIRECT STREP B ANTIGEN: CPT

## 2020-02-08 PROCEDURE — G0378 HOSPITAL OBSERVATION PER HR: HCPCS

## 2020-02-08 PROCEDURE — 85025 COMPLETE CBC W/AUTO DIFF WBC: CPT

## 2020-02-08 PROCEDURE — 36415 COLL VENOUS BLD VENIPUNCTURE: CPT

## 2020-02-08 PROCEDURE — 6370000000 HC RX 637 (ALT 250 FOR IP): Performed by: PHYSICIAN ASSISTANT

## 2020-02-08 PROCEDURE — 92610 EVALUATE SWALLOWING FUNCTION: CPT | Performed by: SPEECH-LANGUAGE PATHOLOGIST

## 2020-02-08 PROCEDURE — 6360000002 HC RX W HCPCS: Performed by: PHYSICIAN ASSISTANT

## 2020-02-08 PROCEDURE — 94760 N-INVAS EAR/PLS OXIMETRY 1: CPT

## 2020-02-08 PROCEDURE — 96376 TX/PRO/DX INJ SAME DRUG ADON: CPT

## 2020-02-08 PROCEDURE — 6370000000 HC RX 637 (ALT 250 FOR IP): Performed by: INTERNAL MEDICINE

## 2020-02-08 PROCEDURE — 80048 BASIC METABOLIC PNL TOTAL CA: CPT

## 2020-02-08 PROCEDURE — 94640 AIRWAY INHALATION TREATMENT: CPT

## 2020-02-08 PROCEDURE — 2580000003 HC RX 258: Performed by: PHYSICIAN ASSISTANT

## 2020-02-08 RX ORDER — OSELTAMIVIR PHOSPHATE 75 MG/1
75 CAPSULE ORAL 2 TIMES DAILY
Qty: 6 CAPSULE | Refills: 0 | Status: SHIPPED | OUTPATIENT
Start: 2020-02-08 | End: 2020-02-11

## 2020-02-08 RX ORDER — PREDNISONE 20 MG/1
40 TABLET ORAL DAILY
Qty: 6 TABLET | Refills: 0 | Status: SHIPPED | OUTPATIENT
Start: 2020-02-09 | End: 2020-02-12

## 2020-02-08 RX ADMIN — SODIUM CHLORIDE, PRESERVATIVE FREE 10 ML: 5 INJECTION INTRAVENOUS at 00:57

## 2020-02-08 RX ADMIN — ESCITALOPRAM 10 MG: 10 TABLET, FILM COATED ORAL at 10:09

## 2020-02-08 RX ADMIN — LEVOTHYROXINE SODIUM 100 MCG: 0.1 TABLET ORAL at 06:42

## 2020-02-08 RX ADMIN — QUETIAPINE FUMARATE 400 MG: 200 TABLET ORAL at 00:56

## 2020-02-08 RX ADMIN — OSELTAMIVIR PHOSPHATE 75 MG: 75 CAPSULE ORAL at 00:56

## 2020-02-08 RX ADMIN — OSELTAMIVIR PHOSPHATE 75 MG: 75 CAPSULE ORAL at 10:09

## 2020-02-08 RX ADMIN — METHYLPREDNISOLONE SODIUM SUCCINATE 40 MG: 125 INJECTION, POWDER, FOR SOLUTION INTRAMUSCULAR; INTRAVENOUS at 06:42

## 2020-02-08 RX ADMIN — METHYLPREDNISOLONE SODIUM SUCCINATE 40 MG: 125 INJECTION, POWDER, FOR SOLUTION INTRAMUSCULAR; INTRAVENOUS at 00:57

## 2020-02-08 RX ADMIN — METHYLPREDNISOLONE SODIUM SUCCINATE 40 MG: 125 INJECTION, POWDER, FOR SOLUTION INTRAMUSCULAR; INTRAVENOUS at 13:00

## 2020-02-08 RX ADMIN — DIVALPROEX SODIUM 500 MG: 250 TABLET, DELAYED RELEASE ORAL at 00:56

## 2020-02-08 RX ADMIN — ACETAMINOPHEN 650 MG: 325 TABLET, FILM COATED ORAL at 01:03

## 2020-02-08 RX ADMIN — EZETIMIBE 10 MG: 10 TABLET ORAL at 10:09

## 2020-02-08 RX ADMIN — IPRATROPIUM BROMIDE AND ALBUTEROL SULFATE 1 AMPULE: 2.5; .5 SOLUTION RESPIRATORY (INHALATION) at 16:39

## 2020-02-08 RX ADMIN — IPRATROPIUM BROMIDE AND ALBUTEROL SULFATE 1 AMPULE: 2.5; .5 SOLUTION RESPIRATORY (INHALATION) at 11:35

## 2020-02-08 RX ADMIN — SODIUM CHLORIDE: 9 INJECTION, SOLUTION INTRAVENOUS at 09:09

## 2020-02-08 ASSESSMENT — PAIN SCALES - GENERAL
PAINLEVEL_OUTOF10: 5
PAINLEVEL_OUTOF10: 0

## 2020-02-08 NOTE — DISCHARGE SUMMARY
Physician Discharge Summary     Patient ID:  Chao Moran  [id number]  17 y.o.  1950    Admit date: 2/6/2020    Discharge date and time:  02/08/20     Admission Diagnoses:   Patient Active Problem List   Diagnosis    Acute [facility]emic [facility] failure ([facility]    DARIO (acute kidney injury) ([facility]    Pancytopenia ([facility]    Influenza       Discharge Diagnoses:   Principal Problem:    Acute hypoxemic respiratory failure ([facility]  Active Problems:    DARIO (acute kidney injury) ([facility]    Pancytopenia ([facility]    Influenza  Resolved Problems:    * No resolved hospital problems. *       Consults: pulmonary/intensive care    Procedures: none    Hospital Course:   Patient presented with influenza and hypoxemia. She was treated with tamiflu. Weaned off O2 successfully. She's had mild pancytopenia (WBC 2.9, Hgb 10.9, Plt 45) which may be viral bone marrow suppression. The cytopenias are improving and she is not neutropenic. I will discharge her back to her group home and have her complete 5 days of tamiflu and prednisone 40 mg/day.     Discharge Exam:  Vitals:    02/07/20 1518 02/07/20 2117 02/08/20 0431 02/08/20 0900   BP: (!) 157/71 138/62 138/64 (!) 122/59   Pulse: 104 76 75 83   Resp: 19 20 18 18   Temp: 98.7 °F (37.1 °C) 98.2 °F (36.8 °C) 98.9 °F (37.2 °C) 98.7 °F (37.1 °C)   TempSrc: Temporal Temporal Temporal Temporal   SpO2: 96% 92% 93% 92%   Weight:       Height:            Gen: Super pleasant female in NAD  Cardiac: RRR no m/r/g  Lungs: WOB normal, breathing comfortably on RA, CTAB  Abd: Soft NT ND no R/G    Condition:  Stable    Disposition: home    Patient Instructions:   Current Discharge Medication List      START taking these medications    Details   oseltamivir (TAMIFLU) 75 MG capsule Take 1 capsule by mouth 2 times daily for 3 days  Qty: 6 capsule, Refills: 0      predniSONE (DELTASONE) 20 MG tablet Take 2 tablets by mouth daily for 3 days  Qty: 6 tablet, Refills: 0         CONTINUE these medications which have NOT CHANGED    Details   guaiFENesin (ROBITUSSIN) 100 MG/5ML syrup Take 100 mg by mouth 3 times daily as needed for Cough      escitalopram (LEXAPRO) 10 MG tablet Take 10 mg by mouth daily      Fe Cbn-Fe Gluc-FA-B12-C-DSS (FERRALET 90 PO) Take 90 tablets by mouth Daily      acetaminophen (TYLENOL) 325 MG tablet Take 650 mg by mouth every 4 hours as needed. Indications: Headache, Pain, elevated temp of 100 or above, menstrual cramps, headache      loperamide (IMODIUM) 1 MG/5ML solution Take  by mouth See Admin Instructions. 20 ml prn after 1st loose BM, 10 ml prn after 2nd/3rd loose BM      bisacodyl (DULCOLAX) 5 MG EC tablet Take 10 mg by mouth every morning. divalproex (DEPAKOTE) 500 MG DR tablet Take 500 mg by mouth nightly. niacin (NIASPAN) 500 MG CR tablet Take 1,000 mg by mouth nightly. SA      ezetimibe (ZETIA) 10 MG tablet Take 10 mg by mouth daily. QUEtiapine (SEROQUEL) 200 MG tablet Take 400 mg by mouth nightly. ARTIFICIAL TEAR OP Apply 1-2 drops to eye as needed. Right or left eye   Indications: Dry Eyes (Inactive)      Bismuth Subsalicylate (PINK BISMUTH PO) Take 30 mLs by mouth See Admin Instructions. Every hour as needed x 4. Call if vomits   Indications: Nausea, upset stomach      azelastine (ASTELIN) 137 MCG/SPRAY nasal spray 2 sprays by Each Nare route 2 times daily. levothyroxine (SYNTHROID) 100 MCG tablet Take 100 mcg by mouth Daily. esomeprazole (NEXIUM) 40 MG capsule Take 40 mg by mouth daily. B Complex-C-Folic Acid (VOL-CARE RX PO) Take 1 tablet by mouth daily      multivitamin (THERAGRAN) per tablet Take 1 tablet by mouth nightly. Nutritional Supplements (ENSURE PLUS) LIQD Take 1 Can by mouth 3 times daily. After meals         STOP taking these medications       doxycycline hyclate (VIBRAMYCIN) 100 MG capsule Comments:   Reason for Stopping:             Activity: activity as tolerated  Diet: regular diet    Follow-up with PCP in 1 week.     Note that

## 2020-02-08 NOTE — PROGRESS NOTES
SPEECH/LANGUAGE PATHOLOGY  CLINICAL ASSESSMENT OF SWALLOWING FUNCTION    PATIENT NAME:  Travis Adhikari      :  1950      TODAY'S DATE:  2020    SUMMARY OF EVALUATION     DYSPHAGIA DIAGNOSIS:  Oral dysphagia-no overt s/s of aspiration observed, however silent aspiration can not be r/o bedside      DIET RECOMMENDATIONS:  Dysphagia 2,  Mechanical Soft (Minced & Moist) solids with  thin liquids as tolerated     FEEDING RECOMMENDATIONS:       Assistance level:  Stand by assistance is needed during all oral intake      Compensatory strategies recommended: Small bites/sips and Alternate solids and liquids    THERAPY RECOMMENDATIONS:      Dysphagia therapy is recommended 3-5 times per week with emphasis on the following and To monitor oral intake during meals and make recommendations for diet changes as appropriate                  PROCEDURE     Consistencies Administered During the Evaluation   Liquids: thin liquid   Solids:  pureed foods and solid foods      Method of Intake:   cup, straw, spoon  Self fed, Fed by clinician      Position:   Seated, upright                  RESULTS     Oral Stage:         Decreased mastication due to:  poor/missing dentition   and decreased lingual control, Delayed A-P transit due to: reduced lingual strength  and cognitive function  and Oral residuals were noted :  throughout the oral cavity. Impulsivity and speaking during oral phase noted. Pharyngeal Stage:      No signs of aspiration were noted during this evaluation however, silent aspiration cannot be ruled out at bedside. If silent aspiration is suspected, a Videofluoroscopic Study of Swallowing (MBS) is recommended and requires a physician order. Prognosis for improvements is fair  This plan will be re-evaluated and revised in 1 week  if warranted.    Patient stated goals: Did not state  Treatment goals discussed with Patient      [x]The admitting diagnosis and active problem list, as listed below

## 2020-02-08 NOTE — DISCHARGE INSTR - COC
Continuity of Care Form    Patient Name: Shine Houes   :  1950  MRN:  [id number]    Admit date:  2020  Discharge date:  2020    Code Status Order: Full Code   Advance Directives:   [facility] Documentation     Date/Time Healthcare Directive Type of Healthcare Directive Copy in [facility] [address] Agent's Name Healthcare Agent's Phone Number    [phone]  No, patient does not have an advance directive for healthcare treatment -- -- -- -- --          Admitting Physician:  Jessica Arias MD  PCP: Ranjit Yancey DO    Discharging Nurse: June Molina Unit/Room#: 7168/9565-W  Discharging Unit Phone Number: [phone]    Emergency Contact:   Extended Emergency Contact Information  Primary Emergency Contact: Saman Voss  Address: [address] [place] [address], [address] [place] [address] Phone: [phone]  Mobile Phone: [phone]  Relation: Legal Guardian  Secondary Emergency Contact: Sarah Massey  Address: Tera Giraldo, [address] [place] [address] Phone: [phone]  Mobile Phone: [phone]  Relation: Other    Past Surgical History:  No past surgical history on file. Immunization History: There is no immunization history on file for this patient.     Active Problems:  Patient Active Problem List   Diagnosis Code    Acute hypoxemic respiratory failure (HCC) J96.01    DARIO (acute kidney injury) ([facility] N17.9    Pancytopenia ([facility] D61.818    Influenza J11.1       Isolation/Infection:   Isolation          Droplet        Patient Infection Status     Infection Onset Added Last Indicated Last Indicated By Review Planned Expiration Resolved Resolved By    INFLUENZA 20 Rapid influenza A/B antigens 20            Nurse Assessment:  Last Vital Signs: BP (!) 122/59   Pulse 83   Temp 98.7 °F (37.1 °C) (Temporal)   Resp 18   Ht 5' 5\" (1.651 m)

## 2020-02-08 NOTE — CARE COORDINATION
Patient for discharge today. Attempted to reach Susana twice and left two voicemails. Called group home and spoke with aide. She cannot arrange transportation but will try to reach out to [facility] as well. Spoke with , Ron Pete, they will be here at 4pm to transport patient back home. I did let group home know patient will not require oxygen. No other report needed per Ron Pete. Kyra Mario notified of discharge via voicemail.

## 2020-02-08 NOTE — PROGRESS NOTES
Daily    ezetimibe  10 mg Oral Daily    levothyroxine  100 mcg Oral Daily    QUEtiapine  400 mg Oral Nightly    sodium chloride flush  10 mL Intravenous 2 times per day    ipratropium-albuterol  1 ampule Inhalation Q4H WA    methylPREDNISolone  40 mg Intravenous Q8H    Followed by   Carlos Gregory ON 2/9/2020] predniSONE  40 mg Oral Daily       Physical Exam:  General Appearance: appears comfortable in no acute distress. HEENT: Normocephalic atraumatic without obvious abnormality   Neck: Lips, mucosa, and tongue normal.  Supple, symmetrical, trachea midline, no adenopathy;thyroid:  no enlargement/tenderness/nodules or JVD. Lung: Breath sounds CTA, diminished. Respirations   unlabored. Symmetrical expansion. Heart: RRR, normal S1, S2. No MRG  Abdomen: Soft, NT, ND. BS present x 4 quadrants. No bruit or organomegaly. Extremities: Pedal pulses 2+ symmetric b/l. Extremities normal, no cyanosis, clubbing, or edema. Musculokeletal: No joint swelling, no muscle tenderness. ROM normal in all joints of extremities. Neurologic: Mental status: Alert and Oriented X3 . Pertinent/ New Labs and Imaging Studies     Imaging Personally Reviewed:  2/7  No evidence of organized pneumonia or pleural effusion, and   cardiomegaly is present without evidence of acute decompensation.       There is emphysematous pulmonary hyperinflation with mild   peribronchial interstitial density in the lower lungs.          ECHO  None on file    Labs:  Lab Results   Component Value Date    WBC 2.9 02/08/2020    HGB 10.9 02/08/2020    HCT 29.7 02/08/2020    .1 02/08/2020    MCH 38.9 02/08/2020    MCHC 36.7 02/08/2020    RDW 14.5 02/08/2020    PLT 45 02/08/2020    MPV 10.2 02/08/2020     Lab Results   Component Value Date     02/08/2020    K 3.4 02/08/2020    K 3.5 02/07/2020     02/08/2020    CO2 22 02/08/2020    BUN 20 02/08/2020    CREATININE 0.8 02/08/2020    LABALBU 3.2 02/06/2020    CALCIUM 8.0 02/08/2020    GFRAA >60 02/08/2020    LABGLOM >60 02/08/2020     No results found for: PROTIME, INR  No results for input(s): PROBNP in the last 72 hours. Recent Labs     02/07/20  0526   PROCAL 0.17*     This SmartLink has not been configured with any valid records. Micro:  No results for input(s): CULTRESP in the last 72 hours. No results for input(s): LABGRAM in the last 72 hours. No results for input(s): LEGUR in the last 72 hours. Recent Labs     02/08/20  0400   STREPNEUMAGU Presumptive NEGATIVE for Pneumococcal pneumonia, suggesting  no current or recent pneumococcal infection. Infection due  to S. pneumoniae cannot be ruled out since the antigen present  in the sample may be below the detection limit of the test.       Recent Labs     02/08/20  0400   LP1UAG Presumptive Negative -suggesting no recent or current infections  with Legionella pneumophila serogroup 1. Infection to Legionella cannot be ruled out since other serogroups  and species may cause infection, antigen may not be present in  early infection, or level of antigen may be below the  detection limit. Normal Range: Presumptive Negative            Assessment:    1. Acute respiratory failure with hypoxia  2. Influenza B respiratory viral infection  3. 4 mm lung nodule left lower lobe  4. Mild DARIO, dehydration   5. Neutropenia ,- MILD CLASS 1 - secondary to infection   6. thrombocytopenia - secondary to infection   7. HTN  8. Hypothyroidism  9. MRDD non verbal  10. Fetal alcohol syndrome      Plan:   1. Currently on room air, oxygen therapy as needed  2. Will need ambulatory pulse ox testing prior to discharge  3. tamiflu x5 days  4. Sputum culture: pending, bacterial antigens: negative , procalcitonin 0.17  5. Taper steroids with improvement  6. Continue bronchodiltaors: Duonebs q4 hours  7. GI/DVT prophylaxis  8.  Speech eval: recommend dysphagia therapy      This plan of care was reviewed in collaboration with    Electronically signed by Maxcine Osgood, APRN - CNP on 2/8/2020 at 10:29 AM    I personally saw, examined, and cared for the patient. Labs, medications, radiographs reviewed.  I agree with history exam and plans detailed in NP note with the following additions:    Okay for d/c  Lungs clear    Electronically signed by Mark Roberto MD on 2/8/2020 at 2:06 PM

## 2022-08-15 ENCOUNTER — APPOINTMENT (OUTPATIENT)
Dept: CT IMAGING | Age: 72
End: 2022-08-15
Payer: MEDICARE

## 2022-08-15 ENCOUNTER — HOSPITAL ENCOUNTER (EMERGENCY)
Age: 72
Discharge: HOME | End: 2022-08-15
Attending: EMERGENCY MEDICINE
Payer: MEDICARE

## 2022-08-15 VITALS
SYSTOLIC BLOOD PRESSURE: 127 MMHG | OXYGEN SATURATION: 94 % | HEART RATE: 90 BPM | DIASTOLIC BLOOD PRESSURE: 66 MMHG | BODY MASS INDEX: 25.63 KG/M2 | WEIGHT: 154 LBS | RESPIRATION RATE: 16 BRPM | TEMPERATURE: 97.8 F

## 2022-08-15 DIAGNOSIS — S09.90XA CLOSED HEAD INJURY, INITIAL ENCOUNTER: Primary | ICD-10-CM
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
## 2022-08-15 PROCEDURE — 72125 CT NECK SPINE W/O DYE: CPT

## 2022-08-15 PROCEDURE — 70450 CT HEAD/BRAIN W/O DYE: CPT

## 2022-08-15 PROCEDURE — 99284 EMERGENCY DEPT VISIT MOD MDM: CPT

## 2022-08-15 ASSESSMENT — PAIN - FUNCTIONAL ASSESSMENT: PAIN_FUNCTIONAL_ASSESSMENT: NONE - DENIES PAIN

## 2022-08-15 NOTE — ED PROVIDER NOTES
HPI:  8/15/22,   Time: 5:47 PM EDT       Amanda Antonio is a 71 y.o. female presenting to the ED for mech fall/hit head, beginning 1 hr ago. The complaint has been intermittent, mild in severity, and worsened by nothing. Mech fall, no loc, lump on back of head. Bib private vehicle. Hx from caregiver, limited due to chronic med issues. No thinners    Review of Systems:   Pertinent positives and negatives are stated within HPI, all other systems reviewed and are negative.          --------------------------------------------- PAST HISTORY ---------------------------------------------  Past Medical History:  has a past medical history of Anxiety, Corneal scarring, Fetal alcohol syndrome, Hypercholesteremia, Hypothyroidism, and Onychomycosis. Past Surgical History:  has no past surgical history on file. Social History:  reports that she has never smoked. She has never used smokeless tobacco. She reports that she does not drink alcohol and does not use drugs. Family History: family history is not on file. The patients home medications have been reviewed. Allergies: Zocor [simvastatin]        ---------------------------------------------------PHYSICAL EXAM--------------------------------------    Constitutional/General: Alert, non verbal  Head: Normocephalic and occipital hematoma  Eyes: PERRL, EOMI, conjunctive normal, sclera non icteric  Mouth: Oropharynx clear, handling secretions,   Neck: Supple, full ROM, s  Respiratory:  Not in respiratory distress  Cardiovascular:  . 2+ distal pulses  Chest: No chest wall tenderness  GI:  Abdomen Soft, Non tender, Non distended. +BS. No organomegaly, no palpable masses,  No rebound, guarding, or rigidity. Musculoskeletal: Moves all extremities x 4. Warm and well perfused,   Integument: skin warm and dry. No rashes.    Lymphatic: no lymphadenopathy noted  Neurologic: GCS 11, no focal deficits,       -------------------------------------------------- RESULTS -------------------------------------------------  I have personally reviewed all laboratory and imaging results for this patient. Results are listed below. LABS:  No results found for this visit on 08/15/22. RADIOLOGY:  Interpreted by Radiologist.  CT Head WO Contrast   Final Result   No acute intracranial abnormality. There is age-appropriate atrophy and   small-vessel ischemic disease. CT cervical spine. There is no acute displaced fracture in the cervical spine. The prevertebral   soft tissues are normal.  Degenerative changes are identified from C3-T1 with   osteophytes and multilevel disc bulges. There is calcification and stenosis   of the carotid arteries. Impression      No acute fractures. Diffuse degenerative changes from C3-T1. CT Cervical Spine WO Contrast   Final Result   No acute intracranial abnormality. There is age-appropriate atrophy and   small-vessel ischemic disease. CT cervical spine. There is no acute displaced fracture in the cervical spine. The prevertebral   soft tissues are normal.  Degenerative changes are identified from C3-T1 with   osteophytes and multilevel disc bulges. There is calcification and stenosis   of the carotid arteries. Impression      No acute fractures. Diffuse degenerative changes from C3-T1. EKG:  This EKG is signed and interpreted by the EP. Time:   Rate:   Rhythm:   Interpretation:   Comparison:       ------------------------- NURSING NOTES AND VITALS REVIEWED ---------------------------   The nursing notes within the ED encounter and vital signs as below have been reviewed by myself. /66   Pulse 90   Temp 97.8 °F (36.6 °C)   Resp 16   Wt 154 lb (69.9 kg)   SpO2 94%   BMI 25.63 kg/m²   Oxygen Saturation Interpretation: Normal    The patients available past medical records and past encounters were reviewed.         ------------------------------ ED COURSE/MEDICAL DECISION MAKING----------------------  Medications - No data to display      ED COURSE:       Medical Decision Making:    Imaging jonathan barron with [facility]      This patient's ED course included: a personal history and physicial examination    This patient has remained hemodynamically stable during their ED course. Counseling: The emergency provider has spoken with the caregiver and discussed todays results, in addition to providing specific details for the plan of care and counseling regarding the diagnosis and prognosis. Questions are answered at this time and they are agreeable with the plan.       --------------------------------- IMPRESSION AND DISPOSITION ---------------------------------    IMPRESSION  1. Closed head injury, initial encounter    2. Fall on same level from slipping, tripping or stumbling, initial encounter        DISPOSITION  Disposition: Discharge to group home  Patient condition is stable    NOTE: This report was transcribed using voice recognition software.  Every effort was made to ensure accuracy; however, inadvertent computerized transcription errors may be present        Charla Barakat MD  08/15/22 2025

## 2023-08-01 ENCOUNTER — APPOINTMENT (OUTPATIENT)
Dept: CT IMAGING | Age: 73
End: 2023-08-01
Payer: MEDICARE

## 2023-08-01 ENCOUNTER — HOSPITAL ENCOUNTER (INPATIENT)
Age: 73
LOS: 3 days | Discharge: HOME | End: 2023-08-04
Attending: EMERGENCY MEDICINE | Admitting: INTERNAL MEDICINE
Payer: MEDICARE

## 2023-08-01 ENCOUNTER — APPOINTMENT (OUTPATIENT)
Dept: GENERAL RADIOLOGY | Age: 73
End: 2023-08-01
Payer: MEDICARE

## 2023-08-01 DIAGNOSIS — S72.002A CLOSED FRACTURE OF NECK OF LEFT FEMUR, INITIAL ENCOUNTER (HCC): Primary | ICD-10-CM

## 2023-08-01 PROBLEM — E03.9 HYPOTHYROID: Status: ACTIVE | Noted: 2023-08-01

## 2023-08-01 PROBLEM — M25.552 LEFT HIP PAIN: Status: ACTIVE | Noted: 2023-08-01

## 2023-08-01 LAB
ALBUMIN SERPL-MCNC: 3.6 G/DL (ref 3.5–5.2)
ALP SERPL-CCNC: 111 U/L (ref 35–104)
ALT SERPL-CCNC: 20 U/L (ref 0–32)
ANION GAP SERPL CALCULATED.3IONS-SCNC: 8 MMOL/L (ref 7–16)
AST SERPL-CCNC: 32 U/L (ref 0–31)
BASOPHILS # BLD: 0.03 K/UL (ref 0–0.2)
BASOPHILS NFR BLD: 1 % (ref 0–2)
BILIRUB SERPL-MCNC: 0.3 MG/DL (ref 0–1.2)
BUN SERPL-MCNC: 21 MG/DL (ref 6–23)
CALCIUM SERPL-MCNC: 8.7 MG/DL (ref 8.6–10.2)
CHLORIDE SERPL-SCNC: 96 MMOL/L (ref 98–107)
CO2 SERPL-SCNC: 30 MMOL/L (ref 22–29)
CREAT SERPL-MCNC: 0.8 MG/DL (ref 0.5–1)
EOSINOPHIL # BLD: 0.15 K/UL (ref 0.05–0.5)
EOSINOPHILS RELATIVE PERCENT: 4 % (ref 0–6)
ERYTHROCYTE [DISTWIDTH] IN BLOOD BY AUTOMATED COUNT: 14.6 % (ref 11.5–15)
GFR SERPL CREATININE-BSD FRML MDRD: >60 ML/MIN/1.73M2
GLUCOSE SERPL-MCNC: 130 MG/DL (ref 74–99)
HCT VFR BLD AUTO: 31.4 % (ref 34–48)
HGB BLD-MCNC: 10.1 G/DL (ref 11.5–15.5)
IMM GRANULOCYTES # BLD AUTO: <0.03 K/UL (ref 0–0.58)
IMM GRANULOCYTES NFR BLD: 0 % (ref 0–5)
LYMPHOCYTES NFR BLD: 1.49 K/UL (ref 1.5–4)
LYMPHOCYTES RELATIVE PERCENT: 40 % (ref 20–42)
MCH RBC QN AUTO: 35.8 PG (ref 26–35)
MCHC RBC AUTO-ENTMCNC: 32.2 G/DL (ref 32–34.5)
MCV RBC AUTO: 111.3 FL (ref 80–99.9)
MONOCYTES NFR BLD: 0.5 K/UL (ref 0.1–0.95)
MONOCYTES NFR BLD: 13 % (ref 2–12)
NEUTROPHILS NFR BLD: 42 % (ref 43–80)
NEUTS SEG NFR BLD: 1.56 K/UL (ref 1.8–7.3)
PLATELET CONFIRMATION: NORMAL
PLATELET ESTIMATE: ABNORMAL
PLATELET, FLUORESCENCE: 91 K/UL (ref 130–450)
PMV BLD AUTO: 10.2 FL (ref 7–12)
POTASSIUM SERPL-SCNC: 3.8 MMOL/L (ref 3.5–5)
PROT SERPL-MCNC: 5.8 G/DL (ref 6.4–8.3)
RBC # BLD AUTO: 2.82 M/UL (ref 3.5–5.5)
RBC # BLD: ABNORMAL 10*6/UL
REASON FOR REJECTION: NORMAL
REASON FOR REJECTION: NORMAL
SODIUM SERPL-SCNC: 134 MMOL/L (ref 132–146)
SPECIMEN SOURCE: NORMAL
SPECIMEN SOURCE: NORMAL
TROPONIN I SERPL HS-MCNC: 24 NG/L (ref 0–9)
WBC OTHER # BLD: 4 K/UL (ref 4.5–11.5)
ZZ NTE CLEAN UP: ORDERED TEST: NORMAL
ZZ NTE CLEAN UP: ORDERED TEST: NORMAL

## 2023-08-01 PROCEDURE — 2580000003 HC RX 258: Performed by: NURSE PRACTITIONER

## 2023-08-01 PROCEDURE — 6360000002 HC RX W HCPCS

## 2023-08-01 PROCEDURE — 1200000000 HC SEMI PRIVATE

## 2023-08-01 PROCEDURE — 85025 COMPLETE CBC W/AUTO DIFF WBC: CPT

## 2023-08-01 PROCEDURE — 96374 THER/PROPH/DIAG INJ IV PUSH: CPT

## 2023-08-01 PROCEDURE — 84484 ASSAY OF TROPONIN QUANT: CPT

## 2023-08-01 PROCEDURE — 80053 COMPREHEN METABOLIC PANEL: CPT

## 2023-08-01 PROCEDURE — 72125 CT NECK SPINE W/O DYE: CPT

## 2023-08-01 PROCEDURE — 99222 1ST HOSP IP/OBS MODERATE 55: CPT | Performed by: STUDENT IN AN ORGANIZED HEALTH CARE EDUCATION/TRAINING PROGRAM

## 2023-08-01 PROCEDURE — 70450 CT HEAD/BRAIN W/O DYE: CPT

## 2023-08-01 PROCEDURE — 6370000000 HC RX 637 (ALT 250 FOR IP): Performed by: NURSE PRACTITIONER

## 2023-08-01 PROCEDURE — 93005 ELECTROCARDIOGRAM TRACING: CPT

## 2023-08-01 PROCEDURE — 72192 CT PELVIS W/O DYE: CPT

## 2023-08-01 PROCEDURE — 73502 X-RAY EXAM HIP UNI 2-3 VIEWS: CPT

## 2023-08-01 PROCEDURE — APPSS60 APP SPLIT SHARED TIME 46-60 MINUTES: Performed by: NURSE PRACTITIONER

## 2023-08-01 PROCEDURE — 99285 EMERGENCY DEPT VISIT HI MDM: CPT

## 2023-08-01 RX ORDER — ACETAMINOPHEN 650 MG/1
650 SUPPOSITORY RECTAL EVERY 6 HOURS PRN
Status: DISCONTINUED | OUTPATIENT
Start: 2023-08-01 | End: 2023-08-04 | Stop reason: HOSPADM

## 2023-08-01 RX ORDER — SODIUM CHLORIDE 0.9 % (FLUSH) 0.9 %
5-40 SYRINGE (ML) INJECTION PRN
Status: DISCONTINUED | OUTPATIENT
Start: 2023-08-01 | End: 2023-08-04 | Stop reason: HOSPADM

## 2023-08-01 RX ORDER — ONDANSETRON 2 MG/ML
4 INJECTION INTRAMUSCULAR; INTRAVENOUS EVERY 6 HOURS PRN
Status: DISCONTINUED | OUTPATIENT
Start: 2023-08-01 | End: 2023-08-04 | Stop reason: HOSPADM

## 2023-08-01 RX ORDER — SENNOSIDES A AND B 8.6 MG/1
1 TABLET, FILM COATED ORAL EVERY MORNING
COMMUNITY

## 2023-08-01 RX ORDER — SODIUM CHLORIDE 9 MG/ML
INJECTION, SOLUTION INTRAVENOUS CONTINUOUS
Status: DISCONTINUED | OUTPATIENT
Start: 2023-08-01 | End: 2023-08-04

## 2023-08-01 RX ORDER — LANOLIN ALCOHOL/MO/W.PET/CERES
1000 CREAM (GRAM) TOPICAL NIGHTLY
Status: DISCONTINUED | OUTPATIENT
Start: 2023-08-01 | End: 2023-08-04 | Stop reason: HOSPADM

## 2023-08-01 RX ORDER — ATORVASTATIN CALCIUM 10 MG/1
10 TABLET, FILM COATED ORAL NIGHTLY
Status: DISCONTINUED | OUTPATIENT
Start: 2023-08-01 | End: 2023-08-04 | Stop reason: HOSPADM

## 2023-08-01 RX ORDER — DIVALPROEX SODIUM 250 MG/1
500 TABLET, DELAYED RELEASE ORAL NIGHTLY
Status: DISCONTINUED | OUTPATIENT
Start: 2023-08-01 | End: 2023-08-04 | Stop reason: HOSPADM

## 2023-08-01 RX ORDER — DOCUSATE SODIUM 100 MG/1
100 CAPSULE, LIQUID FILLED ORAL EVERY MORNING
COMMUNITY

## 2023-08-01 RX ORDER — ATORVASTATIN CALCIUM 10 MG/1
10 TABLET, FILM COATED ORAL NIGHTLY
COMMUNITY

## 2023-08-01 RX ORDER — POLYETHYLENE GLYCOL 3350 17 G/17G
17 POWDER, FOR SOLUTION ORAL DAILY PRN
Status: DISCONTINUED | OUTPATIENT
Start: 2023-08-01 | End: 2023-08-02

## 2023-08-01 RX ORDER — OMEPRAZOLE 20 MG/1
20 CAPSULE, DELAYED RELEASE ORAL EVERY MORNING
COMMUNITY

## 2023-08-01 RX ORDER — SODIUM CHLORIDE 9 MG/ML
INJECTION, SOLUTION INTRAVENOUS PRN
Status: DISCONTINUED | OUTPATIENT
Start: 2023-08-01 | End: 2023-08-04 | Stop reason: HOSPADM

## 2023-08-01 RX ORDER — FENTANYL CITRATE 50 UG/ML
25 INJECTION, SOLUTION INTRAMUSCULAR; INTRAVENOUS ONCE
Status: COMPLETED | OUTPATIENT
Start: 2023-08-01 | End: 2023-08-01

## 2023-08-01 RX ORDER — SENNOSIDES A AND B 8.6 MG/1
1 TABLET, FILM COATED ORAL EVERY MORNING
Status: DISCONTINUED | OUTPATIENT
Start: 2023-08-02 | End: 2023-08-04 | Stop reason: HOSPADM

## 2023-08-01 RX ORDER — LEVOTHYROXINE SODIUM 0.1 MG/1
100 TABLET ORAL
Status: DISCONTINUED | OUTPATIENT
Start: 2023-08-02 | End: 2023-08-04 | Stop reason: HOSPADM

## 2023-08-01 RX ORDER — EZETIMIBE 10 MG/1
10 TABLET ORAL EVERY MORNING
Status: DISCONTINUED | OUTPATIENT
Start: 2023-08-02 | End: 2023-08-04 | Stop reason: HOSPADM

## 2023-08-01 RX ORDER — ERGOCALCIFEROL 1.25 MG/1
50000 CAPSULE ORAL WEEKLY
Status: DISCONTINUED | OUTPATIENT
Start: 2023-08-04 | End: 2023-08-04 | Stop reason: HOSPADM

## 2023-08-01 RX ORDER — QUETIAPINE FUMARATE 100 MG/1
400 TABLET, FILM COATED ORAL NIGHTLY
Status: DISCONTINUED | OUTPATIENT
Start: 2023-08-01 | End: 2023-08-04 | Stop reason: HOSPADM

## 2023-08-01 RX ORDER — ONDANSETRON 4 MG/1
4 TABLET, ORALLY DISINTEGRATING ORAL EVERY 8 HOURS PRN
Status: DISCONTINUED | OUTPATIENT
Start: 2023-08-01 | End: 2023-08-04 | Stop reason: HOSPADM

## 2023-08-01 RX ORDER — ESCITALOPRAM OXALATE 10 MG/1
10 TABLET ORAL NIGHTLY
Status: DISCONTINUED | OUTPATIENT
Start: 2023-08-01 | End: 2023-08-04 | Stop reason: HOSPADM

## 2023-08-01 RX ORDER — M-VIT,TX,IRON,MINS/CALC/FOLIC 27MG-0.4MG
1 TABLET ORAL EVERY EVENING
Status: DISCONTINUED | OUTPATIENT
Start: 2023-08-01 | End: 2023-08-04 | Stop reason: HOSPADM

## 2023-08-01 RX ORDER — DOCUSATE SODIUM 100 MG/1
100 CAPSULE, LIQUID FILLED ORAL EVERY MORNING
Status: DISCONTINUED | OUTPATIENT
Start: 2023-08-02 | End: 2023-08-04 | Stop reason: HOSPADM

## 2023-08-01 RX ORDER — VIT B COMP NO.3/FOLIC/C/BIOTIN 1 MG-60 MG
1 TABLET ORAL NIGHTLY
COMMUNITY

## 2023-08-01 RX ORDER — NEPHROCAP 1 MG
1 CAP ORAL NIGHTLY
Status: DISCONTINUED | OUTPATIENT
Start: 2023-08-01 | End: 2023-08-04 | Stop reason: HOSPADM

## 2023-08-01 RX ORDER — CHLORPHENIRAMINE MALEATE 4 MG/1
4 TABLET ORAL EVERY 12 HOURS PRN
COMMUNITY

## 2023-08-01 RX ORDER — ACETAMINOPHEN 325 MG/1
650 TABLET ORAL EVERY 6 HOURS PRN
Status: DISCONTINUED | OUTPATIENT
Start: 2023-08-01 | End: 2023-08-04 | Stop reason: HOSPADM

## 2023-08-01 RX ORDER — SODIUM CHLORIDE 0.9 % (FLUSH) 0.9 %
5-40 SYRINGE (ML) INJECTION EVERY 12 HOURS SCHEDULED
Status: DISCONTINUED | OUTPATIENT
Start: 2023-08-01 | End: 2023-08-04 | Stop reason: HOSPADM

## 2023-08-01 RX ORDER — ERGOCALCIFEROL 1.25 MG/1
50000 CAPSULE ORAL WEEKLY
COMMUNITY

## 2023-08-01 RX ORDER — CHLORPHENIRAMINE MALEATE 4 MG/1
4 TABLET ORAL EVERY 12 HOURS PRN
Status: DISCONTINUED | OUTPATIENT
Start: 2023-08-01 | End: 2023-08-04 | Stop reason: HOSPADM

## 2023-08-01 RX ORDER — PANTOPRAZOLE SODIUM 40 MG/1
40 TABLET, DELAYED RELEASE ORAL
Status: DISCONTINUED | OUTPATIENT
Start: 2023-08-02 | End: 2023-08-04 | Stop reason: HOSPADM

## 2023-08-01 RX ORDER — AZELASTINE 1 MG/ML
2 SPRAY, METERED NASAL 2 TIMES DAILY
Status: DISCONTINUED | OUTPATIENT
Start: 2023-08-01 | End: 2023-08-01 | Stop reason: RX

## 2023-08-01 RX ORDER — NIACIN 1000 MG
1000 TABLET, EXTENDED RELEASE ORAL NIGHTLY
COMMUNITY

## 2023-08-01 RX ADMIN — DIVALPROEX SODIUM 500 MG: 250 TABLET, DELAYED RELEASE ORAL at 20:48

## 2023-08-01 RX ADMIN — SODIUM CHLORIDE: 9 INJECTION, SOLUTION INTRAVENOUS at 22:17

## 2023-08-01 RX ADMIN — Medication 1000 MG: at 20:49

## 2023-08-01 RX ADMIN — ESCITALOPRAM OXALATE 10 MG: 10 TABLET ORAL at 20:48

## 2023-08-01 RX ADMIN — QUETIAPINE FUMARATE 400 MG: 100 TABLET ORAL at 20:48

## 2023-08-01 RX ADMIN — MULTIPLE VITAMINS W/ MINERALS TAB 1 TABLET: TAB at 20:49

## 2023-08-01 RX ADMIN — NEPHROCAP 1 MG: 1 CAP ORAL at 20:48

## 2023-08-01 RX ADMIN — SODIUM CHLORIDE, PRESERVATIVE FREE 10 ML: 5 INJECTION INTRAVENOUS at 22:18

## 2023-08-01 RX ADMIN — FENTANYL CITRATE 25 MCG: 50 INJECTION, SOLUTION INTRAMUSCULAR; INTRAVENOUS at 13:53

## 2023-08-01 RX ADMIN — ATORVASTATIN CALCIUM 10 MG: 10 TABLET, FILM COATED ORAL at 20:48

## 2023-08-01 ASSESSMENT — PAIN SCALES - GENERAL: PAINLEVEL_OUTOF10: 2

## 2023-08-01 NOTE — PROGRESS NOTES
Orthopedic Surgery Quick Note    Consult noted  Admit to IM   Pain control per primary   NPO after midnight   Hold anticoagulation   Formal consult to follow     [facility]

## 2023-08-01 NOTE — ED PROVIDER NOTES
Ed Shana is a 68 y.o. female    HPI  Ed Shana is a 68 y.o. female presenting to the ED for Fall (MR gann was at adult  when she fell landing on left hip ) and Hip Pain (Left hip pain from fall, leg appears shorter than other leg)    History comes primarily from EMS and the Medical Record. Patient has a history of MR and was at her adult  when she reportedly fell landing on her left hip. She has been complaining of left hip pain. Per EMS, the patient's left leg appears shorter than the right. Patient is unable to provide history but does complain that she has left hip/leg pain. No other concerns were noted. Patient's guardian,  and/or other contacts have been unable to be reached at this time. ROS  Full review of systems completed. Pertinent positives and negatives per the HPI, unless otherwise stated ROS is negative. Physical Exam  Vitals and nursing note reviewed. Constitutional:       General: She is not in acute distress. Appearance: Normal appearance. She is normal weight. She is not ill-appearing. HENT:      Head: Normocephalic and atraumatic. Right Ear: External ear normal.      Left Ear: External ear normal.      Nose: Nose normal. No rhinorrhea. Mouth/Throat:      Mouth: Mucous membranes are moist.      Pharynx: Oropharynx is clear. Eyes:      Extraocular Movements: Extraocular movements intact. Conjunctiva/sclera: Conjunctivae normal.      Pupils: Pupils are equal, round, and reactive to light. Cardiovascular:      Rate and Rhythm: Normal rate and regular rhythm. Pulses: Normal pulses. Heart sounds: Normal heart sounds. No murmur heard. Pulmonary:      Effort: Pulmonary effort is normal. No respiratory distress. Breath sounds: Normal breath sounds. No wheezing. Abdominal:      General: Bowel sounds are normal. There is no distension. Palpations: Abdomen is soft. Tenderness:  There is no abdominal

## 2023-08-01 NOTE — ED NOTES
Employee from [facility] at bedside at this time.  Updated on pt condition     Ilsa Grant  08/01/23 7375

## 2023-08-02 ENCOUNTER — APPOINTMENT (OUTPATIENT)
Dept: GENERAL RADIOLOGY | Age: 73
End: 2023-08-02
Payer: MEDICARE

## 2023-08-02 ENCOUNTER — ANESTHESIA (OUTPATIENT)
Dept: OPERATING ROOM | Age: 73
End: 2023-08-02
Payer: MEDICARE

## 2023-08-02 ENCOUNTER — ANESTHESIA EVENT (OUTPATIENT)
Dept: OPERATING ROOM | Age: 73
End: 2023-08-02
Payer: MEDICARE

## 2023-08-02 LAB
ANION GAP SERPL CALCULATED.3IONS-SCNC: 13 MMOL/L (ref 7–16)
BASOPHILS # BLD: 0.01 K/UL (ref 0–0.2)
BASOPHILS NFR BLD: 0 % (ref 0–2)
BILIRUB UR QL STRIP: NEGATIVE
BUN SERPL-MCNC: 18 MG/DL (ref 6–23)
CALCIUM SERPL-MCNC: 8.5 MG/DL (ref 8.6–10.2)
CHLORIDE SERPL-SCNC: 99 MMOL/L (ref 98–107)
CLARITY UR: CLEAR
CO2 SERPL-SCNC: 26 MMOL/L (ref 22–29)
COLOR UR: YELLOW
CREAT SERPL-MCNC: 0.7 MG/DL (ref 0.5–1)
EKG ATRIAL RATE: 83 BPM
EKG P AXIS: 61 DEGREES
EKG P-R INTERVAL: 152 MS
EKG Q-T INTERVAL: 394 MS
EKG QRS DURATION: 112 MS
EKG QTC CALCULATION (BAZETT): 462 MS
EKG R AXIS: 30 DEGREES
EKG T AXIS: 38 DEGREES
EKG VENTRICULAR RATE: 83 BPM
EOSINOPHIL # BLD: 0.02 K/UL (ref 0.05–0.5)
EOSINOPHILS RELATIVE PERCENT: 1 % (ref 0–6)
ERYTHROCYTE [DISTWIDTH] IN BLOOD BY AUTOMATED COUNT: 14.3 % (ref 11.5–15)
GFR SERPL CREATININE-BSD FRML MDRD: >60 ML/MIN/1.73M2
GLUCOSE SERPL-MCNC: 126 MG/DL (ref 74–99)
GLUCOSE UR STRIP-MCNC: NEGATIVE MG/DL
HCT VFR BLD AUTO: 26 % (ref 34–48)
HGB BLD-MCNC: 10.3 G/DL (ref 11.5–15.5)
HGB UR QL STRIP.AUTO: NEGATIVE
IMM GRANULOCYTES # BLD AUTO: <0.03 K/UL (ref 0–0.58)
IMM GRANULOCYTES NFR BLD: 0 % (ref 0–5)
KETONES UR STRIP-MCNC: 15 MG/DL
LEUKOCYTE ESTERASE UR QL STRIP: NEGATIVE
LYMPHOCYTES NFR BLD: 1.27 K/UL (ref 1.5–4)
LYMPHOCYTES RELATIVE PERCENT: 30 % (ref 20–42)
MCH RBC QN AUTO: 43.8 PG (ref 26–35)
MCHC RBC AUTO-ENTMCNC: 39.6 G/DL (ref 32–34.5)
MCV RBC AUTO: 110.6 FL (ref 80–99.9)
MONOCYTES NFR BLD: 0.42 K/UL (ref 0.1–0.95)
MONOCYTES NFR BLD: 10 % (ref 2–12)
NEUTROPHILS NFR BLD: 59 % (ref 43–80)
NEUTS SEG NFR BLD: 2.53 K/UL (ref 1.8–7.3)
NITRITE UR QL STRIP: NEGATIVE
PH UR STRIP: 7 [PH] (ref 5–9)
PLATELET # BLD AUTO: 65 K/UL (ref 130–450)
PLATELET CONFIRMATION: NORMAL
PMV BLD AUTO: 9.5 FL (ref 7–12)
POTASSIUM SERPL-SCNC: 4 MMOL/L (ref 3.5–5)
PROT UR STRIP-MCNC: NEGATIVE MG/DL
RBC # BLD AUTO: 2.35 M/UL (ref 3.5–5.5)
RBC #/AREA URNS HPF: ABNORMAL /HPF
SODIUM SERPL-SCNC: 138 MMOL/L (ref 132–146)
SP GR UR STRIP: 1.01 (ref 1–1.03)
UROBILINOGEN UR STRIP-ACNC: >8 EU/DL (ref 0–1)
WBC #/AREA URNS HPF: ABNORMAL /HPF
WBC OTHER # BLD: 4.3 K/UL (ref 4.5–11.5)

## 2023-08-02 PROCEDURE — 2500000003 HC RX 250 WO HCPCS: Performed by: STUDENT IN AN ORGANIZED HEALTH CARE EDUCATION/TRAINING PROGRAM

## 2023-08-02 PROCEDURE — 2580000003 HC RX 258: Performed by: ORTHOPAEDIC SURGERY

## 2023-08-02 PROCEDURE — 0QS704Z REPOSITION LEFT UPPER FEMUR WITH INTERNAL FIXATION DEVICE, OPEN APPROACH: ICD-10-PCS | Performed by: STUDENT IN AN ORGANIZED HEALTH CARE EDUCATION/TRAINING PROGRAM

## 2023-08-02 PROCEDURE — 3600000014 HC SURGERY LEVEL 4 ADDTL 15MIN: Performed by: STUDENT IN AN ORGANIZED HEALTH CARE EDUCATION/TRAINING PROGRAM

## 2023-08-02 PROCEDURE — 51702 INSERT TEMP BLADDER CATH: CPT

## 2023-08-02 PROCEDURE — 6360000002 HC RX W HCPCS: Performed by: NURSE ANESTHETIST, CERTIFIED REGISTERED

## 2023-08-02 PROCEDURE — 6360000002 HC RX W HCPCS: Performed by: INTERNAL MEDICINE

## 2023-08-02 PROCEDURE — 2580000003 HC RX 258: Performed by: NURSE PRACTITIONER

## 2023-08-02 PROCEDURE — 2500000003 HC RX 250 WO HCPCS: Performed by: ORTHOPAEDIC SURGERY

## 2023-08-02 PROCEDURE — 6370000000 HC RX 637 (ALT 250 FOR IP): Performed by: NURSE PRACTITIONER

## 2023-08-02 PROCEDURE — 80048 BASIC METABOLIC PNL TOTAL CA: CPT

## 2023-08-02 PROCEDURE — 51798 US URINE CAPACITY MEASURE: CPT

## 2023-08-02 PROCEDURE — 3600000004 HC SURGERY LEVEL 4 BASE: Performed by: STUDENT IN AN ORGANIZED HEALTH CARE EDUCATION/TRAINING PROGRAM

## 2023-08-02 PROCEDURE — 2580000003 HC RX 258: Performed by: NURSE ANESTHETIST, CERTIFIED REGISTERED

## 2023-08-02 PROCEDURE — 2580000003 HC RX 258: Performed by: STUDENT IN AN ORGANIZED HEALTH CARE EDUCATION/TRAINING PROGRAM

## 2023-08-02 PROCEDURE — 2500000003 HC RX 250 WO HCPCS: Performed by: NURSE ANESTHETIST, CERTIFIED REGISTERED

## 2023-08-02 PROCEDURE — 99222 1ST HOSP IP/OBS MODERATE 55: CPT | Performed by: INTERNAL MEDICINE

## 2023-08-02 PROCEDURE — 7100000001 HC PACU RECOVERY - ADDTL 15 MIN: Performed by: STUDENT IN AN ORGANIZED HEALTH CARE EDUCATION/TRAINING PROGRAM

## 2023-08-02 PROCEDURE — 81001 URINALYSIS AUTO W/SCOPE: CPT

## 2023-08-02 PROCEDURE — 6370000000 HC RX 637 (ALT 250 FOR IP): Performed by: ORTHOPAEDIC SURGERY

## 2023-08-02 PROCEDURE — 6360000002 HC RX W HCPCS: Performed by: STUDENT IN AN ORGANIZED HEALTH CARE EDUCATION/TRAINING PROGRAM

## 2023-08-02 PROCEDURE — 93010 ELECTROCARDIOGRAM REPORT: CPT | Performed by: INTERNAL MEDICINE

## 2023-08-02 PROCEDURE — 85025 COMPLETE CBC W/AUTO DIFF WBC: CPT

## 2023-08-02 PROCEDURE — 2720000010 HC SURG SUPPLY STERILE: Performed by: STUDENT IN AN ORGANIZED HEALTH CARE EDUCATION/TRAINING PROGRAM

## 2023-08-02 PROCEDURE — 2709999900 HC NON-CHARGEABLE SUPPLY: Performed by: STUDENT IN AN ORGANIZED HEALTH CARE EDUCATION/TRAINING PROGRAM

## 2023-08-02 PROCEDURE — 7100000000 HC PACU RECOVERY - FIRST 15 MIN: Performed by: STUDENT IN AN ORGANIZED HEALTH CARE EDUCATION/TRAINING PROGRAM

## 2023-08-02 PROCEDURE — C1713 ANCHOR/SCREW BN/BN,TIS/BN: HCPCS | Performed by: STUDENT IN AN ORGANIZED HEALTH CARE EDUCATION/TRAINING PROGRAM

## 2023-08-02 PROCEDURE — 3700000000 HC ANESTHESIA ATTENDED CARE: Performed by: STUDENT IN AN ORGANIZED HEALTH CARE EDUCATION/TRAINING PROGRAM

## 2023-08-02 PROCEDURE — 27236 TREAT THIGH FRACTURE: CPT | Performed by: STUDENT IN AN ORGANIZED HEALTH CARE EDUCATION/TRAINING PROGRAM

## 2023-08-02 PROCEDURE — C1769 GUIDE WIRE: HCPCS | Performed by: STUDENT IN AN ORGANIZED HEALTH CARE EDUCATION/TRAINING PROGRAM

## 2023-08-02 PROCEDURE — C9399 UNCLASSIFIED DRUGS OR BIOLOG: HCPCS | Performed by: NURSE ANESTHETIST, CERTIFIED REGISTERED

## 2023-08-02 PROCEDURE — 6360000002 HC RX W HCPCS: Performed by: ANESTHESIOLOGY

## 2023-08-02 PROCEDURE — 3700000001 HC ADD 15 MINUTES (ANESTHESIA): Performed by: STUDENT IN AN ORGANIZED HEALTH CARE EDUCATION/TRAINING PROGRAM

## 2023-08-02 PROCEDURE — 6360000002 HC RX W HCPCS: Performed by: NURSE PRACTITIONER

## 2023-08-02 PROCEDURE — 2060000000 HC ICU INTERMEDIATE R&B

## 2023-08-02 DEVICE — SCREW BONE L40MM DIA5MM ST LCK W/ T25 STARDRV: Type: IMPLANTABLE DEVICE | Site: HIP | Status: FUNCTIONAL

## 2023-08-02 DEVICE — PLATE BONE 1 H TI ALLOY FOR FEM NK SYS: Type: IMPLANTABLE DEVICE | Site: HIP | Status: FUNCTIONAL

## 2023-08-02 DEVICE — SCREW BONE L85MM DIA6.4MM BLU TI ALLOY ANTIROTATION T25: Type: IMPLANTABLE DEVICE | Site: HIP | Status: FUNCTIONAL

## 2023-08-02 DEVICE — BOLT BONE L85MM DIA10MM GLD TI ALLOY FOR FEM NK SYS: Type: IMPLANTABLE DEVICE | Site: HIP | Status: FUNCTIONAL

## 2023-08-02 RX ORDER — OXYCODONE HYDROCHLORIDE AND ACETAMINOPHEN 5; 325 MG/1; MG/1
1 TABLET ORAL EVERY 6 HOURS PRN
Qty: 28 TABLET | Refills: 0 | Status: SHIPPED | OUTPATIENT
Start: 2023-08-02 | End: 2023-08-09

## 2023-08-02 RX ORDER — MORPHINE SULFATE 2 MG/ML
2 INJECTION, SOLUTION INTRAMUSCULAR; INTRAVENOUS
Status: DISCONTINUED | OUTPATIENT
Start: 2023-08-02 | End: 2023-08-02

## 2023-08-02 RX ORDER — MORPHINE SULFATE 2 MG/ML
2 INJECTION, SOLUTION INTRAMUSCULAR; INTRAVENOUS EVERY 4 HOURS PRN
Status: DISCONTINUED | OUTPATIENT
Start: 2023-08-02 | End: 2023-08-04 | Stop reason: HOSPADM

## 2023-08-02 RX ORDER — ASPIRIN 325 MG
325 TABLET ORAL 2 TIMES DAILY
Qty: 60 TABLET | Refills: 0 | Status: SHIPPED | OUTPATIENT
Start: 2023-08-02 | End: 2023-09-01

## 2023-08-02 RX ORDER — MORPHINE SULFATE 2 MG/ML
2 INJECTION, SOLUTION INTRAMUSCULAR; INTRAVENOUS ONCE
Status: COMPLETED | OUTPATIENT
Start: 2023-08-02 | End: 2023-08-02

## 2023-08-02 RX ORDER — ROCURONIUM BROMIDE 10 MG/ML
INJECTION, SOLUTION INTRAVENOUS PRN
Status: DISCONTINUED | OUTPATIENT
Start: 2023-08-02 | End: 2023-08-02 | Stop reason: SDUPTHER

## 2023-08-02 RX ORDER — ASPIRIN 325 MG
325 TABLET, DELAYED RELEASE (ENTERIC COATED) ORAL 2 TIMES DAILY
Status: DISCONTINUED | OUTPATIENT
Start: 2023-08-03 | End: 2023-08-04 | Stop reason: HOSPADM

## 2023-08-02 RX ORDER — SODIUM CHLORIDE 0.9 % (FLUSH) 0.9 %
5-40 SYRINGE (ML) INJECTION PRN
Status: DISCONTINUED | OUTPATIENT
Start: 2023-08-02 | End: 2023-08-02 | Stop reason: HOSPADM

## 2023-08-02 RX ORDER — OXYCODONE HYDROCHLORIDE AND ACETAMINOPHEN 5; 325 MG/1; MG/1
1 TABLET ORAL EVERY 6 HOURS PRN
Qty: 28 TABLET | Refills: 0 | Status: SHIPPED | OUTPATIENT
Start: 2023-08-02 | End: 2023-08-02 | Stop reason: SDUPTHER

## 2023-08-02 RX ORDER — ASPIRIN 325 MG
325 TABLET ORAL 2 TIMES DAILY
Qty: 60 TABLET | Refills: 0 | Status: SHIPPED | OUTPATIENT
Start: 2023-08-02 | End: 2023-08-02 | Stop reason: SDUPTHER

## 2023-08-02 RX ORDER — PROPOFOL 10 MG/ML
INJECTION, EMULSION INTRAVENOUS PRN
Status: DISCONTINUED | OUTPATIENT
Start: 2023-08-02 | End: 2023-08-02 | Stop reason: SDUPTHER

## 2023-08-02 RX ORDER — LIDOCAINE HYDROCHLORIDE 20 MG/ML
INJECTION, SOLUTION EPIDURAL; INFILTRATION; INTRACAUDAL; PERINEURAL PRN
Status: DISCONTINUED | OUTPATIENT
Start: 2023-08-02 | End: 2023-08-02 | Stop reason: SDUPTHER

## 2023-08-02 RX ORDER — FENTANYL CITRATE 50 UG/ML
25 INJECTION, SOLUTION INTRAMUSCULAR; INTRAVENOUS EVERY 5 MIN PRN
Status: DISCONTINUED | OUTPATIENT
Start: 2023-08-02 | End: 2023-08-02 | Stop reason: HOSPADM

## 2023-08-02 RX ORDER — OXYCODONE HYDROCHLORIDE 5 MG/1
5 TABLET ORAL EVERY 6 HOURS PRN
Status: DISCONTINUED | OUTPATIENT
Start: 2023-08-02 | End: 2023-08-04 | Stop reason: HOSPADM

## 2023-08-02 RX ORDER — FENTANYL CITRATE 50 UG/ML
INJECTION, SOLUTION INTRAMUSCULAR; INTRAVENOUS PRN
Status: DISCONTINUED | OUTPATIENT
Start: 2023-08-02 | End: 2023-08-02 | Stop reason: SDUPTHER

## 2023-08-02 RX ORDER — SODIUM CHLORIDE 0.9 % (FLUSH) 0.9 %
5-40 SYRINGE (ML) INJECTION EVERY 12 HOURS SCHEDULED
Status: DISCONTINUED | OUTPATIENT
Start: 2023-08-02 | End: 2023-08-02 | Stop reason: HOSPADM

## 2023-08-02 RX ORDER — SODIUM CHLORIDE 9 MG/ML
INJECTION, SOLUTION INTRAVENOUS CONTINUOUS PRN
Status: DISCONTINUED | OUTPATIENT
Start: 2023-08-02 | End: 2023-08-02 | Stop reason: SDUPTHER

## 2023-08-02 RX ORDER — 0.9 % SODIUM CHLORIDE 0.9 %
500 INTRAVENOUS SOLUTION INTRAVENOUS ONCE
Status: COMPLETED | OUTPATIENT
Start: 2023-08-03 | End: 2023-08-03

## 2023-08-02 RX ORDER — FENTANYL CITRATE 50 UG/ML
50 INJECTION, SOLUTION INTRAMUSCULAR; INTRAVENOUS EVERY 5 MIN PRN
Status: DISCONTINUED | OUTPATIENT
Start: 2023-08-02 | End: 2023-08-02 | Stop reason: HOSPADM

## 2023-08-02 RX ORDER — POLYETHYLENE GLYCOL 3350 17 G/17G
17 POWDER, FOR SOLUTION ORAL DAILY PRN
Status: DISCONTINUED | OUTPATIENT
Start: 2023-08-02 | End: 2023-08-04 | Stop reason: HOSPADM

## 2023-08-02 RX ORDER — SODIUM CHLORIDE 9 MG/ML
INJECTION, SOLUTION INTRAVENOUS PRN
Status: DISCONTINUED | OUTPATIENT
Start: 2023-08-02 | End: 2023-08-02 | Stop reason: HOSPADM

## 2023-08-02 RX ADMIN — SUGAMMADEX 200 MG: 100 INJECTION, SOLUTION INTRAVENOUS at 17:38

## 2023-08-02 RX ADMIN — ROCURONIUM BROMIDE 30 MG: 10 INJECTION, SOLUTION INTRAVENOUS at 16:31

## 2023-08-02 RX ADMIN — PHENYLEPHRINE HYDROCHLORIDE 100 MCG: 10 INJECTION INTRAVENOUS at 17:23

## 2023-08-02 RX ADMIN — SODIUM CHLORIDE: 9 INJECTION, SOLUTION INTRAVENOUS at 16:20

## 2023-08-02 RX ADMIN — TRANEXAMIC ACID 1000 MG: 100 INJECTION, SOLUTION INTRAVENOUS at 16:59

## 2023-08-02 RX ADMIN — ESCITALOPRAM OXALATE 10 MG: 10 TABLET ORAL at 20:53

## 2023-08-02 RX ADMIN — SODIUM CHLORIDE 500 ML: 9 INJECTION, SOLUTION INTRAVENOUS at 23:54

## 2023-08-02 RX ADMIN — FENTANYL CITRATE 25 MCG: 50 INJECTION, SOLUTION INTRAMUSCULAR; INTRAVENOUS at 16:54

## 2023-08-02 RX ADMIN — Medication 1000 MG: at 20:55

## 2023-08-02 RX ADMIN — FENTANYL CITRATE 25 MCG: 50 INJECTION, SOLUTION INTRAMUSCULAR; INTRAVENOUS at 16:47

## 2023-08-02 RX ADMIN — PHENYLEPHRINE HYDROCHLORIDE 100 MCG: 10 INJECTION INTRAVENOUS at 17:13

## 2023-08-02 RX ADMIN — LIDOCAINE HYDROCHLORIDE 40 MG: 20 INJECTION, SOLUTION EPIDURAL; INFILTRATION; INTRACAUDAL; PERINEURAL at 16:31

## 2023-08-02 RX ADMIN — SODIUM CHLORIDE, PRESERVATIVE FREE 10 ML: 5 INJECTION INTRAVENOUS at 20:56

## 2023-08-02 RX ADMIN — ATORVASTATIN CALCIUM 10 MG: 10 TABLET, FILM COATED ORAL at 20:54

## 2023-08-02 RX ADMIN — MORPHINE SULFATE 2 MG: 2 INJECTION, SOLUTION INTRAMUSCULAR; INTRAVENOUS at 03:40

## 2023-08-02 RX ADMIN — MORPHINE SULFATE 2 MG: 2 INJECTION, SOLUTION INTRAMUSCULAR; INTRAVENOUS at 13:20

## 2023-08-02 RX ADMIN — WATER 2000 MG: 1 INJECTION INTRAMUSCULAR; INTRAVENOUS; SUBCUTANEOUS at 16:30

## 2023-08-02 RX ADMIN — FENTANYL CITRATE 50 MCG: 50 INJECTION, SOLUTION INTRAMUSCULAR; INTRAVENOUS at 18:09

## 2023-08-02 RX ADMIN — NEPHROCAP 1 MG: 1 CAP ORAL at 20:55

## 2023-08-02 RX ADMIN — DIVALPROEX SODIUM 500 MG: 250 TABLET, DELAYED RELEASE ORAL at 20:54

## 2023-08-02 RX ADMIN — FENTANYL CITRATE 50 MCG: 50 INJECTION, SOLUTION INTRAMUSCULAR; INTRAVENOUS at 17:46

## 2023-08-02 RX ADMIN — OXYCODONE HYDROCHLORIDE 5 MG: 5 TABLET ORAL at 20:53

## 2023-08-02 RX ADMIN — PROPOFOL 100 MG: 10 INJECTION, EMULSION INTRAVENOUS at 16:31

## 2023-08-02 RX ADMIN — TRANEXAMIC ACID 1000 MG: 100 INJECTION, SOLUTION INTRAVENOUS at 19:25

## 2023-08-02 RX ADMIN — SODIUM CHLORIDE: 9 INJECTION, SOLUTION INTRAVENOUS at 20:59

## 2023-08-02 RX ADMIN — MULTIPLE VITAMINS W/ MINERALS TAB 1 TABLET: TAB at 20:54

## 2023-08-02 RX ADMIN — QUETIAPINE FUMARATE 400 MG: 100 TABLET ORAL at 20:53

## 2023-08-02 ASSESSMENT — PAIN SCALES - PAIN ASSESSMENT IN ADVANCED DEMENTIA (PAINAD)
NEGVOCALIZATION: 1
FACIALEXPRESSION: 0
BODYLANGUAGE: 2
NEGVOCALIZATION: 2
BREATHING: 1
BODYLANGUAGE: 1
CONSOLABILITY: 0
TOTALSCORE: 2
BREATHING: 2
FACIALEXPRESSION: 2
TOTALSCORE: 10
FACIALEXPRESSION: 2
BREATHING: 2
NEGVOCALIZATION: 0
BODYLANGUAGE: 2
CONSOLABILITY: 2
CONSOLABILITY: 2
TOTALSCORE: 9

## 2023-08-02 ASSESSMENT — PAIN SCALES - GENERAL
PAINLEVEL_OUTOF10: 9
PAINLEVEL_OUTOF10: 0
PAINLEVEL_OUTOF10: 2
PAINLEVEL_OUTOF10: 10
PAINLEVEL_OUTOF10: 7

## 2023-08-02 ASSESSMENT — PAIN DESCRIPTION - ORIENTATION: ORIENTATION: LEFT

## 2023-08-02 ASSESSMENT — PAIN DESCRIPTION - LOCATION: LOCATION: HIP

## 2023-08-02 ASSESSMENT — PAIN - FUNCTIONAL ASSESSMENT: PAIN_FUNCTIONAL_ASSESSMENT: WONG-BAKER FACES

## 2023-08-02 NOTE — ACP (ADVANCE CARE PLANNING)
Advance Care Planning   Healthcare Decision Maker:    Primary Decision Maker: Gabriel Hernandez - [phone]    Click here to complete Healthcare Decision Makers including selection of the Healthcare Decision Maker Relationship (ie \"Primary\"). Today we documented Decision Maker(s) consistent with ACP documents on file.

## 2023-08-02 NOTE — PROGRESS NOTES
4 Eyes Skin Assessment     NAME:  Casi Cisse  YOB: 1950  MEDICAL RECORD NUMBER:  [id number]    The patient is being assessed for  Admission    I agree that at least one RN has performed a thorough Head to Toe Skin Assessment on the patient. ALL assessment sites listed below have been assessed. Areas assessed by both nurses:    Head, Face, Ears, Shoulders, Back, Chest, Arms, Elbows, Hands, Sacrum. Buttock, Coccyx, Ischium, and Legs. Feet and Heels        Does the Patient have a Wound?  No noted wound(s)       Gamal Prevention initiated by RN: Yes  Wound Care Orders initiated by RN: No    Pressure Injury (Stage 3,4, Unstageable, DTI, NWPT, and Complex wounds) if present, place Wound referral order by RN under : No    New Ostomies, if present place, Ostomy referral order under : No     Nurse 1 eSignature: Electronically signed by Cassie Hernandez RN on 8/1/23 at 10:36 PM EDT    **SHARE this note so that the co-signing nurse can place an eSignature**    Nurse 2 eSignature: Electronically signed by Chica Mccauley RN on 8/1/23 at 11:16 PM EDT

## 2023-08-02 NOTE — CARE COORDINATION
Social Work/Discharge Planning:  Called patient Jose Liu (ph: [phone]) with [facility] and completed initial assessment. Explained Social Work role and discussed transition of care/discharge planning. Patient lives with another individual in a one story house at [facility] with twenty-four hour caregivers. PTA she was independent with no adaptive device. Patient admitted with left femoral neck fracture and to have surgery. Karson Gooden states discharge planner will need to have discussion with Melody Kim, Director at [facility] to confirm if patient can return to the home or will need rehab at discharge following surgery. Karson Gooden does not have a facility preference at this time if patient will require rehab at discharge. Informed Analia that  will follow up her to further discuss discharge planning when patient is medically stable. Will continue to follow and assist with discharge planning.   Electronically signed by NORIS Edmondson on 8/2/2023 at 2:40 PM

## 2023-08-02 NOTE — BRIEF OP NOTE
Brief Postoperative Note      Patient: Pamela So  YOB: 1950  MRN: [id number]    Date of Procedure: 8/2/2023    Pre-Op Diagnosis Codes:     1. Closed nondisplaced fracture of the left femoral neck    Post-Op Diagnosis: Same       Procedure(s):  OPEN REDUCTION INTERNAL FIXATION LEFT HIP    Surgeon(s):  Azul Carnes DO    Assistant:  Resident: Vandana Leslie DO; Amira Moya DO    Anesthesia: General    Estimated Blood Loss (mL): 809     Complications: None    Specimens:   * No specimens in log *    Implants:  Implant Name Type Inv. Item Serial No.  Lot No. LRB No. Used Action   PLATE BONE 1 H TI ALLOY FOR FEM NK SYS - [id number]  PLATE BONE 1 H TI ALLOY FOR FEM NK SYS  DEPUY LE TOTE [id number] Left 1 Implanted   BOLT BONE L85MM [id number] GLD TI ALLOY FOR FEM NK SYS - [id number]  BOLT BONE L85MM [id number] GLD TI ALLOY FOR FEM NK SYS  DEPEmgo [id number] Left 1 Implanted   SCREW BONE L40MM DIA5MM ST LCK W/ T25 STARDRV - [id number]  SCREW BONE L40MM DIA5MM ST LCK W/ T25 STARDRV  DEPUY SYNTHES USA-WD [id number] Left 1 Implanted   SCREW BONE L85MM DIA6.4MM JULIA TI ALLOY ANTIROTATION T25 - [id number]  SCREW BONE L85MM DIA6.4MM JULIA TI ALLOY ANTIROTATION T25  DEPUY SYNTHES USA-WD [id number] Left 1 Implanted         Drains:   Urinary Catheter 08/02/23 2 Way (Active)   $ Urethral catheter insertion $ Not inserted for procedure 08/02/23 0650   Catheter Indications Urinary retention (acute or chronic), continuous bladder irrigation or bladder outlet obstruction 08/02/23 1000   Site Assessment No urethral drainage 08/02/23 1000   Urine Color Yellow 08/02/23 1000   Urine Appearance Clear 08/02/23 1000   Collection Container Standard 08/02/23 1000   Securement Method Leg strap 08/02/23 1000   Catheter Care  Soap and water 08/02/23 1000   Catheter Best Practices  Drainage tube clipped to bed;Catheter secured to thigh; Tamper seal intact; Bag below bladder;Drainage bag less than half full;Lack of

## 2023-08-02 NOTE — PLAN OF CARE
Problem: ABCDS Injury Assessment  Goal: Absence of physical injury  Outcome: Progressing     Problem: Discharge Planning  Goal: Discharge to home or other facility with appropriate resources  Outcome: Progressing  Flowsheets (Taken 8/1/2023 2045)  Discharge to home or other facility with appropriate resources: Refer to discharge planning if patient needs post-hospital services based on physician order or complex needs related to functional status, cognitive ability or social support system     Problem: Confusion  Goal: Confusion, delirium, dementia, or psychosis is improved or at baseline  Description: INTERVENTIONS:  1. Assess for possible contributors to thought disturbance, including medications, impaired vision or hearing, underlying metabolic abnormalities, dehydration, psychiatric diagnoses, and notify attending LIP  2. [place] high risk fall precautions, as indicated  3. Provide frequent short contacts to provide reality reorientation, refocusing and direction  4. Decrease environmental stimuli, including noise as appropriate  5. Monitor and intervene to maintain adequate nutrition, hydration, elimination, sleep and activity  6. If unable to ensure safety without constant attention obtain sitter and review sitter guidelines with assigned personnel  7. Initiate Psychosocial CNS and Spiritual Care consult, as indicated  Outcome: Progressing  Flowsheets (Taken 8/1/2023 2045)  Effect of thought disturbance (confusion, delirium, dementia, or psychosis) are managed with adequate functional status: Decrease environmental stimuli, including noise as appropriate     Problem: Pain  Goal: Verbalizes/displays adequate comfort level or baseline comfort level  Outcome: Progressing     Problem: Skin/Tissue Integrity  Goal: Absence of new skin breakdown  Description: 1. Monitor for areas of redness and/or skin breakdown  2. Assess vascular access sites hourly  3.   Every 4-6 hours minimum:  Change oxygen saturation

## 2023-08-02 NOTE — PROGRESS NOTES
Orthopedic surgery interval H&P update:    Patient seen and examined at bedside. She is still alert to person and in no acute distress. Minus was placed on her left hip. Informed signs obtained and signed placed in chart. Plan today is open reduction internal fixation of left minimally displaced femoral neck fracture with an FNS. Her platelets are around 65 which is above the cutoff of 50 for minimally invasive surgery. She is at a risk of bleeding. Risks, benefits, and alternatives were discussed with the patient and their family. Risks include but are not limited to infection, blood loss, damage to neurovascular and musculoskeletal structures, malunion, nonunion, symptomatic hardware, need for further surgery, possible arthritis despite surgical stabilization, stiffness, and catastrophic anesthesia complications. They understand and wish to proceed. There is a significant risk of hardware failure and avascular necrosis requiring conversion to arthroplasty but given her mental status and her medical comorbidities I think attending to fix this fracture and allowing her to weight-bear as best option. We will see how this heals. She will follow-up in my office 2 weeks after surgery. She will receive tranexamic acid preoperatively to help with bleeding.     [facility]   Orthopaedic Surgery   8/2/2023  4:18 PM

## 2023-08-02 NOTE — PROGRESS NOTES
Pharmacy Note    This patient was ordered Azelastine nasal spray. Per the [facility], this medication is non-formulary and not stocked by pharmacy for the reason indicated below. The medication can be reordered at discharge.      Medications in which risks outweigh benefits during hospitalization:           -  oral bisphosphonates         -  raloxifene (Evista)        -  SGLT2 inhibitors (ordered in the hospital for an indication other than heart failure or chronic kidney disease)    Medications that lack necessity during an acute hospital stay:        -  nasal antihistamines        -  nasal ipratropium 0.03% and 0.06%        -  nasal miacalcin        -  acyclovir topical cream/ointment orders for herpes labialis (cold sores)    Thsnk you

## 2023-08-02 NOTE — PROGRESS NOTES
Comprehensive Nutrition Assessment    Type and Reason for Visit:  Initial, Positive Nutrition Screen    Nutrition Recommendations/Plan:    Continue NPO. Recommend to ADAT to Minced & Moist per home per caregiver request.  Will add Standard ONS BID once diet advanced as discussed w/caregivers. Will monitor while inpatient. Malnutrition Assessment:  Malnutrition Status: At risk for malnutrition (Comment) (select eater- poor intake nutrition quality foods.) (08/02/23 3691)    Context:  Acute Illness     Findings of the 6 clinical characteristics of malnutrition:  Energy Intake:  75% or less of estimated energy requirements for 7 or more days  Weight Loss:  Unable to assess (no actual wts <1 yr in EMR)     Body Fat Loss:  No significant body fat loss     Muscle Mass Loss:  No significant muscle mass loss    Fluid Accumulation:  No significant fluid accumulation     Strength:  Not Performed    Nutrition Assessment:    Comes from home w/24 hr caregivers. Pt in OR-ORIF of left minimally displaced femoral neck fracture planned. Pt seen at lunch with 2 caregivers at bedside. 14-23# unintentional wt loss  & appetite reported on admission. Caregiver states pt select eater  preference for sweets & coffee- not other nut'l foods. Currently NPO for surgery. She was on a [facility] soft diet (Minced & moist) & they are in agreement for STD ONS BID to optimize nutrient intake. Rec ADAT when medically feasible. Will monitor.     Nutrition Related Findings:    Alert, poor judgment, edentulous (she is on mech soft diet at home), active BS,  weakness, tr edema LLE, no difficulty swallowing per caregiver, select eater (prefers sweets & coffee), , AST 32, low BMI Wound Type: None (ecchymosis)       Current Nutrition Intake & Therapies:    Average Meal Intake: NPO  Average Supplements Intake: None Ordered  Diet NPO    Anthropometric Measures:  Height: 5' 5\" (165.1 cm)  Ideal Body Weight (IBW): 125 lbs (57 kg)    Admission

## 2023-08-02 NOTE — PROGRESS NOTES
Phone consent given to myself and Junior Moss RN by [facility] medical specialist for [facility] @ [phone]. Dr. Ashish Lozada notified that phone consent will be needed for Anesthesiology as well.

## 2023-08-02 NOTE — PROGRESS NOTES
Pt seen and examined by night physician who admitted pt and billed earlier today.      Chart reviewed and updated by nursing

## 2023-08-02 NOTE — PATIENT CARE CONFERENCE
P Quality Flow/Interdisciplinary Rounds Progress Note        Quality Flow Rounds held on August 2, 2023    Disciplines Attending:  Bedside Nurse, , , and Nursing Unit Leadership    Hany Moreland was admitted on 8/1/2023 12:18 PM    Anticipated Discharge Date:       Disposition:    Gamal Score:  Gamal Scale Score: 14    Readmission Risk              Risk of Unplanned Readmission:  15           Discussed patient goal for the day, patient clinical progression, and barriers to discharge. The following Goal(s) of the Day/Commitment(s) have been identified:  awaiting left hip orif for today. Manage pain and safety.  Discharge planning for home vs rehab      Gloria Haq RN  August 2, 2023

## 2023-08-02 NOTE — OP NOTE
Operative Note      Patient: Mary Beth Anderson  YOB: 1950  MRN: [id number]    Date of Procedure: 8/2/2023    Pre-Op Diagnosis Codes:     1. Closed nondisplaced fracture of the left femoral neck    Post-Op Diagnosis: Same       Procedure(s):  OPEN REDUCTION INTERNAL FIXATION LEFT HIP    Surgeon(s):  Irvin Murphy DO    Assistant:   Resident: Vanessa Anne DO; Lolis Jorge DO    Anesthesia: General    Estimated Blood Loss (mL): 097     Complications: None    Specimens:   * No specimens in log *    Implants:  Implant Name Type Inv. Item Serial No.  Lot No. LRB No. Used Action   PLATE BONE 1 H TI ALLOY FOR FEM NK SYS - [id number]  PLATE BONE 1 H TI ALLOY FOR FEM NK SYS  DEPUY SYNTHES USA-WD [id number] Left 1 Implanted   BOLT BONE L85MM [id number] GLD TI ALLOY FOR FEM NK SYS - [id number]  BOLT BONE L85MM [id number] GLD TI ALLOY FOR FEM NK SYS  DEPUY Magnet SystemsWD [id number] Left 1 Implanted   SCREW BONE L40MM DIA5MM ST LCK W/ T25 STARDRV - [id number]  SCREW BONE L40MM DIA5MM ST LCK W/ T25 STARDRV  DEPUY SYNTHES USA-WD [id number] Left 1 Implanted   SCREW BONE L85MM DIA6.4MM JULIA TI ALLOY ANTIROTATION T25 - [id number]  SCREW BONE L85MM DIA6.4MM JULIA TI ALLOY ANTIROTATION T25  DEPUY SYNTHES USA-WD [id number] Left 1 Implanted         Drains:   Urinary Catheter 08/02/23 2 Way (Active)   $ Urethral catheter insertion $ Not inserted for procedure 08/02/23 0650   Catheter Indications Urinary retention (acute or chronic), continuous bladder irrigation or bladder outlet obstruction 08/02/23 1000   Site Assessment No urethral drainage 08/02/23 1000   Urine Color Yellow 08/02/23 1000   Urine Appearance Clear 08/02/23 1000   Collection Container Standard 08/02/23 1000   Securement Method Leg strap 08/02/23 1000   Catheter Care  Soap and water 08/02/23 1000   Catheter Best Practices  Drainage tube clipped to bed;Catheter secured to thigh; Tamper seal intact; Bag below bladder;Drainage bag less than half full;Lack of dependent

## 2023-08-02 NOTE — ANESTHESIA POSTPROCEDURE EVALUATION
Department of Anesthesiology  Postprocedure Note    Patient: Breanne De  MRN: [id number]  YOB: 1950  Date of evaluation: 8/2/2023      Procedure Summary     Date: 08/02/23 Room / Location: [facility] OR 02 / [facility]    Anesthesia Start: 1627 Anesthesia Stop: 8679    Procedure: OPEN REDUCTION INTERNAL FIXATION LEFT HIP (Left: Hip) Diagnosis:       Closed nondisplaced subtrochanteric fracture of left femur, initial encounter ([address])      (Closed nondisplaced subtrochanteric fracture of left femur, initial encounter ([address]) [S52.34RH])    Surgeons: Apple Quinones DO Responsible Provider: Marlene Lawrence MD    Anesthesia Type: general ASA Status: 3          Anesthesia Type: No value filed.     Gen Phase I: Gen Score: 9    Gen Phase II:        Anesthesia Post Evaluation    Patient location during evaluation: PACU  Patient participation: complete - patient participated  Level of consciousness: awake  Pain score: 1  Airway patency: patent  Nausea & Vomiting: no nausea and no vomiting  Complications: no  Cardiovascular status: hemodynamically stable  Respiratory status: acceptable, spontaneous ventilation and nonlabored ventilation  Hydration status: euvolemic  Pain management: adequate

## 2023-08-03 ENCOUNTER — APPOINTMENT (OUTPATIENT)
Dept: GENERAL RADIOLOGY | Age: 73
End: 2023-08-03
Payer: MEDICARE

## 2023-08-03 LAB
ANION GAP SERPL CALCULATED.3IONS-SCNC: 13 MMOL/L (ref 7–16)
BASOPHILS # BLD: 0.03 K/UL (ref 0–0.2)
BASOPHILS NFR BLD: 1 % (ref 0–2)
BUN SERPL-MCNC: 23 MG/DL (ref 6–23)
CALCIUM SERPL-MCNC: 8 MG/DL (ref 8.6–10.2)
CHLORIDE SERPL-SCNC: 103 MMOL/L (ref 98–107)
CO2 SERPL-SCNC: 23 MMOL/L (ref 22–29)
CREAT SERPL-MCNC: 0.8 MG/DL (ref 0.5–1)
EOSINOPHIL # BLD: 0.05 K/UL (ref 0.05–0.5)
EOSINOPHILS RELATIVE PERCENT: 1 % (ref 0–6)
ERYTHROCYTE [DISTWIDTH] IN BLOOD BY AUTOMATED COUNT: 14.7 % (ref 11.5–15)
GFR SERPL CREATININE-BSD FRML MDRD: >60 ML/MIN/1.73M2
GLUCOSE SERPL-MCNC: 90 MG/DL (ref 74–99)
HCT VFR BLD AUTO: 27.1 % (ref 34–48)
HGB BLD-MCNC: 8.9 G/DL (ref 11.5–15.5)
IMM GRANULOCYTES # BLD AUTO: <0.03 K/UL (ref 0–0.58)
IMM GRANULOCYTES NFR BLD: 1 % (ref 0–5)
LYMPHOCYTES NFR BLD: 1.26 K/UL (ref 1.5–4)
LYMPHOCYTES RELATIVE PERCENT: 33 % (ref 20–42)
MCH RBC QN AUTO: 37.7 PG (ref 26–35)
MCHC RBC AUTO-ENTMCNC: 32.8 G/DL (ref 32–34.5)
MCV RBC AUTO: 114.8 FL (ref 80–99.9)
MONOCYTES NFR BLD: 0.46 K/UL (ref 0.1–0.95)
MONOCYTES NFR BLD: 12 % (ref 2–12)
NEUTROPHILS NFR BLD: 53 % (ref 43–80)
NEUTS SEG NFR BLD: 2.01 K/UL (ref 1.8–7.3)
PLATELET # BLD AUTO: 64 K/UL (ref 130–450)
PLATELET CONFIRMATION: NORMAL
PMV BLD AUTO: 9.6 FL (ref 7–12)
POTASSIUM SERPL-SCNC: 4.4 MMOL/L (ref 3.5–5)
RBC # BLD AUTO: 2.36 M/UL (ref 3.5–5.5)
RBC # BLD: ABNORMAL 10*6/UL
SODIUM SERPL-SCNC: 139 MMOL/L (ref 132–146)
WBC OTHER # BLD: 3.8 K/UL (ref 4.5–11.5)

## 2023-08-03 PROCEDURE — 2580000003 HC RX 258: Performed by: ORTHOPAEDIC SURGERY

## 2023-08-03 PROCEDURE — 2060000000 HC ICU INTERMEDIATE R&B

## 2023-08-03 PROCEDURE — 80048 BASIC METABOLIC PNL TOTAL CA: CPT

## 2023-08-03 PROCEDURE — 51798 US URINE CAPACITY MEASURE: CPT

## 2023-08-03 PROCEDURE — 2700000000 HC OXYGEN THERAPY PER DAY

## 2023-08-03 PROCEDURE — 97530 THERAPEUTIC ACTIVITIES: CPT

## 2023-08-03 PROCEDURE — 6360000002 HC RX W HCPCS: Performed by: ORTHOPAEDIC SURGERY

## 2023-08-03 PROCEDURE — 97161 PT EVAL LOW COMPLEX 20 MIN: CPT

## 2023-08-03 PROCEDURE — 6370000000 HC RX 637 (ALT 250 FOR IP): Performed by: ORTHOPAEDIC SURGERY

## 2023-08-03 PROCEDURE — 6370000000 HC RX 637 (ALT 250 FOR IP): Performed by: NURSE PRACTITIONER

## 2023-08-03 PROCEDURE — 97165 OT EVAL LOW COMPLEX 30 MIN: CPT

## 2023-08-03 PROCEDURE — 99232 SBSQ HOSP IP/OBS MODERATE 35: CPT | Performed by: INTERNAL MEDICINE

## 2023-08-03 PROCEDURE — 51701 INSERT BLADDER CATHETER: CPT

## 2023-08-03 PROCEDURE — 73502 X-RAY EXAM HIP UNI 2-3 VIEWS: CPT

## 2023-08-03 PROCEDURE — 2580000003 HC RX 258: Performed by: NURSE PRACTITIONER

## 2023-08-03 PROCEDURE — 85025 COMPLETE CBC W/AUTO DIFF WBC: CPT

## 2023-08-03 PROCEDURE — 97110 THERAPEUTIC EXERCISES: CPT

## 2023-08-03 RX ADMIN — Medication 1000 MG: at 21:45

## 2023-08-03 RX ADMIN — LEVOTHYROXINE SODIUM 100 MCG: 100 TABLET ORAL at 06:14

## 2023-08-03 RX ADMIN — NEPHROCAP 1 MG: 1 CAP ORAL at 21:45

## 2023-08-03 RX ADMIN — WATER 1000 MG: 1 INJECTION INTRAMUSCULAR; INTRAVENOUS; SUBCUTANEOUS at 01:34

## 2023-08-03 RX ADMIN — ASPIRIN 325 MG: 325 TABLET, COATED ORAL at 09:04

## 2023-08-03 RX ADMIN — ESCITALOPRAM OXALATE 10 MG: 10 TABLET ORAL at 21:43

## 2023-08-03 RX ADMIN — MORPHINE SULFATE 2 MG: 2 INJECTION, SOLUTION INTRAMUSCULAR; INTRAVENOUS at 20:33

## 2023-08-03 RX ADMIN — SENNOSIDES 8.6 MG: 8.6 TABLET, FILM COATED ORAL at 09:04

## 2023-08-03 RX ADMIN — MULTIPLE VITAMINS W/ MINERALS TAB 1 TABLET: TAB at 17:55

## 2023-08-03 RX ADMIN — OXYCODONE HYDROCHLORIDE 5 MG: 5 TABLET ORAL at 17:03

## 2023-08-03 RX ADMIN — DOCUSATE SODIUM 100 MG: 100 CAPSULE, LIQUID FILLED ORAL at 09:04

## 2023-08-03 RX ADMIN — SODIUM CHLORIDE, PRESERVATIVE FREE 10 ML: 5 INJECTION INTRAVENOUS at 20:34

## 2023-08-03 RX ADMIN — PANTOPRAZOLE SODIUM 40 MG: 40 TABLET, DELAYED RELEASE ORAL at 06:14

## 2023-08-03 RX ADMIN — OXYCODONE HYDROCHLORIDE 5 MG: 5 TABLET ORAL at 08:01

## 2023-08-03 RX ADMIN — DIVALPROEX SODIUM 500 MG: 250 TABLET, DELAYED RELEASE ORAL at 21:45

## 2023-08-03 RX ADMIN — EZETIMIBE 10 MG: 10 TABLET ORAL at 09:04

## 2023-08-03 RX ADMIN — WATER 1000 MG: 1 INJECTION INTRAMUSCULAR; INTRAVENOUS; SUBCUTANEOUS at 09:04

## 2023-08-03 RX ADMIN — QUETIAPINE FUMARATE 400 MG: 100 TABLET ORAL at 21:43

## 2023-08-03 RX ADMIN — ATORVASTATIN CALCIUM 10 MG: 10 TABLET, FILM COATED ORAL at 21:44

## 2023-08-03 RX ADMIN — ASPIRIN 325 MG: 325 TABLET, COATED ORAL at 21:43

## 2023-08-03 ASSESSMENT — PAIN SCALES - GENERAL
PAINLEVEL_OUTOF10: 10
PAINLEVEL_OUTOF10: 7
PAINLEVEL_OUTOF10: 10
PAINLEVEL_OUTOF10: 8
PAINLEVEL_OUTOF10: 1
PAINLEVEL_OUTOF10: 7

## 2023-08-03 ASSESSMENT — PAIN SCALES - PAIN ASSESSMENT IN ADVANCED DEMENTIA (PAINAD)
BODYLANGUAGE: 0
NEGVOCALIZATION: 2
BREATHING: 2
TOTALSCORE: 1
NEGVOCALIZATION: 0
CONSOLABILITY: 0
CONSOLABILITY: 2
TOTALSCORE: 10
FACIALEXPRESSION: 2
BODYLANGUAGE: 2
BREATHING: 1
FACIALEXPRESSION: 0

## 2023-08-03 ASSESSMENT — PAIN DESCRIPTION - LOCATION: LOCATION: HIP

## 2023-08-03 ASSESSMENT — PAIN DESCRIPTION - ORIENTATION: ORIENTATION: LEFT

## 2023-08-03 ASSESSMENT — PAIN - FUNCTIONAL ASSESSMENT: PAIN_FUNCTIONAL_ASSESSMENT: ACTIVITIES ARE NOT PREVENTED

## 2023-08-03 ASSESSMENT — PAIN DESCRIPTION - DESCRIPTORS: DESCRIPTORS: DISCOMFORT

## 2023-08-03 NOTE — PLAN OF CARE
Problem: ABCDS Injury Assessment  Goal: Absence of physical injury  Outcome: Progressing     Problem: Discharge Planning  Goal: Discharge to home or other facility with appropriate resources  Outcome: Progressing     Problem: Confusion  Goal: Confusion, delirium, dementia, or psychosis is improved or at baseline  Description: INTERVENTIONS:  1. Assess for possible contributors to thought disturbance, including medications, impaired vision or hearing, underlying metabolic abnormalities, dehydration, psychiatric diagnoses, and notify attending LIP  2. [place] high risk fall precautions, as indicated  3. Provide frequent short contacts to provide reality reorientation, refocusing and direction  4. Decrease environmental stimuli, including noise as appropriate  5. Monitor and intervene to maintain adequate nutrition, hydration, elimination, sleep and activity  6. If unable to ensure safety without constant attention obtain sitter and review sitter guidelines with assigned personnel  7. Initiate Psychosocial CNS and Spiritual Care consult, as indicated  Outcome: Progressing     Problem: Pain  Goal: Verbalizes/displays adequate comfort level or baseline comfort level  Outcome: Progressing     Problem: Skin/Tissue Integrity  Goal: Absence of new skin breakdown  Description: 1. Monitor for areas of redness and/or skin breakdown  2. Assess vascular access sites hourly  3. Every 4-6 hours minimum:  Change oxygen saturation probe site  4. Every 4-6 hours:  If on nasal continuous positive airway pressure, respiratory therapy assess nares and determine need for appliance change or resting period.   Outcome: Progressing     Problem: Safety - Adult  Goal: Free from fall injury  Outcome: Progressing

## 2023-08-03 NOTE — PROGRESS NOTES
Post ambulation hip x-rays do not demonstrate any signs of complication. Maintained alignment of her femoral neck fracture with appropriate hardware fixation.       [facility]   Orthopaedic Surgery   8/3/2023  11:15 AM

## 2023-08-03 NOTE — PROGRESS NOTES
Physical Therapy  Facility/Department: [facility] MED SURG  Physical Therapy Initial Assessment    Name: Nikole Singh  : 1950  MRN: [id number]  Date of Service: 8/3/2023      Attending Provider:  Chelle Calero MD    Evaluating PT:  Hazel Bauer. Antoinette Burkitt., P.T. Room #:  2641/7249-O  Diagnosis:  Closed fracture of neck of left femur, initial encounter ([address]) [S72.002A]  Nondisplaced fracture of neck of left femur ([address]) [S72.002A]  Pertinent PMHx/PSHx:  developmental delay  Procedure/Surgery:  23 L hip ORIF  Precautions:  WBAT LLE, falls, bed/chair alarm  Equipment Needs: TBA    SUBJECTIVE:  Per chart as pt was unable to answer questions appropriately at this time:   Pt lives with another person and has 24 hour care givers. Pt ambulated with no AD PTA. OBJECTIVE:   Initial Evaluation  Date: 8/3/23 Treatment Short Term/ Long Term   Goals   Was pt agreeable to Eval/treatment? yes     Does pt have pain? No c/o pain at rest, but c/o L hip pain with movement      Bed Mobility  Rolling: MOD A  Supine to sit: MAX A  Sit to supine: NA  Scooting: MAX A  supervision   Transfers Sit to stand: MOD A  Stand to sit: MOD A  Stand pivot: MOD A with ww  SBA   Ambulation   3 feet with ww MOD A  150 feet with ww SBA   Stair negotiation: ascended and descended NA  4 steps with 2 rails SBA   AM-PAC 6 Clicks 76/05       BLE ROM is WFL, except L hip and knee limited by pain. RLE strength is grossly 4/5 and LLE is grossly 2-/5 to 3-/5. Balance: sitting is CGA and standing with ww is MIN A/MOD A  Endurance: fair-    Vitals:  RN checked pt's BP prior to getting OOB and before giving her pain medication and it was 137/70 mmHg. Therapeutic Exercises:  Pt was instructed in/performed supine exercises with the LLE: ankle PF/DF AAROM 2x15 reps, heel slides and quad sets AAROM 2x10 reps, hip ABD/ADD AAROM 2x10 reps. Small ROM with heel slides and hip ABD/ADD due to pain.      Patient education  Pt educated on above

## 2023-08-03 NOTE — PROGRESS NOTES
OCCUPATIONAL THERAPY INITIAL EVALUATION    [address]   [address] [facility], [address] [place] [address]        [id number]                                                  Patient Name: Theodore Quezada    MRN: [id number]    : 1950    Room: [address] [place] [address]A     Evaluating OT: Holli Cueva OTR/L #[id number]    Referring Provider: Jl Velarde DO     Specific Provider Orders/Date: OT Eval and Treat, 8/3/23     Diagnosis:   1.  Closed fracture of neck of left femur, initial encounter [facility])         Surgery: S/p OPEN REDUCTION INTERNAL FIXATION LEFT HIP 23     Pertinent Medical History:       Past Medical History:   Diagnosis Date    Anxiety     Corneal scarring     Fetal alcohol syndrome     Hypercholesteremia     Hypothyroidism     Onychomycosis          Past Surgical History:   Procedure Laterality Date    HIP FRACTURE SURGERY Left 2023    OPEN REDUCTION INTERNAL FIXATION LEFT HIP performed by Jl Velarde DO at [facility] OR      Precautions:  Fall Risk, weight bearing as tolerated left LE, cognition      Assessment of current deficits    [x] Functional mobility  [x]ADLs  [x] Strength               [x]Cognition    [x] Functional transfers   [x] IADLs         [x] Safety Awareness   [x]Endurance    [] Fine Coordination              [x] Balance      [] Vision/perception   []Sensation     []Gross Motor Coordination  [] ROM  [] Delirium                   [] Motor Control     OT PLAN OF CARE   OT POC based on physician orders, patient diagnosis and results of clinical assessment    Frequency/Duration 2-5 days/wk for 2 weeks PRN     Specific OT Treatment Interventions to include:   * Instruction/training on adapted ADL techniques and AE recommendations to increase functional independence within precautions       * Training on energy conservation strategies, correct breathing pattern and techniques to improve independence/tolerance for self-care routine  * Functional established goals. Patient / Family Goal: N/A      Patient and/or family were instructed on functional diagnosis, prognosis/goals and OT plan of care. Demonstrated poor understanding. Eval Complexity: Low    Time In: 11:33 AM   Time Out: 11:48 AM   Total Treatment Time: 0       Min Units   OT Eval Low 97165  X  1    OT Eval Medium 95593      OT Eval High 94502      OT Re-Eval [id number]            ADL/Self Care 87870     Therapeutic Activities 06240       Therapeutic Ex 52607       Orthotic Management 61463       Manual 41009     Neuro Re-Ed 58397       Non-Billable Time        Evaluation Time additionally includes thorough review of current medical information, gathering information on past medical history/social history and prior level of function, interpretation of standardized testing/informal observation of tasks, assessment of data and development of plan of care and goals.         Evaluating OT: Pino Warren OTR/L #[id number]

## 2023-08-03 NOTE — CARE COORDINATION
Social Work/Discharge Planning:  Called patient Reji Bolivar (ph: [phone]) with [facility], but she is not in office today. Spoke with Mojgan at [facility] and she is agreeable to this worker calling [employer] to assist with snf choices for staff to visit patient. Ngoc Cantu, Director at [facility], (ph: [phone]) and she advised  to call N \[facility]\"" to assist with snf choices. Called \[facility]\"" (ph: [phone]) and provided her with an update. \[facility]\"" states she does not want a [facility] facility. Reviewed facility choices and she prefers [facility]. Informed \[facility]\"" that will check bed availability and confirm facility with [facility] tomorrow when she returns to office. \[facility]\"" is requesting an update with accepting facility. Called Jina Alvarez with [facility] and placed referral.  Will continue to follow. Electronically signed by NORIS Ding on 8/3/2023 at 11:54 AM    Addendum:  Jina Alvarez with [facility] facility unable to accept patient. Vianca Block with [facility] and no beds available. Called Celena with [facility] and no beds available. Called Marika with [facility] an confirmed no beds available. Neoma Beat with [facility] states no beds available. Neoma Beat with [facility] will review patient information for [facility] location. Referral made to [address] with [facility] and facility reviewing patient information. Will continue to follow. Electronically signed by NORIS Ding on 8/3/2023 at 3:31 PM    Addendum:  [address] [facility]. Celena from [facility] no bed available until Monday 8/7. Referral made to [facility] and she will review patient information for [employer] and [employer]. Will continue to follow.    Electronically signed by NORIS Ding on 8/3/2023 at 3:33 PM

## 2023-08-03 NOTE — PROGRESS NOTES
P Quality Flow/Interdisciplinary Rounds Progress Note        Quality Flow Rounds held on August 3, 2023    Disciplines Attending:  Bedside Nurse, , , and Nursing Unit Leadership    Nilesh Campa was admitted on 8/1/2023 12:18 PM    Anticipated Discharge Date:       Disposition:    Gamal Score:  Gamal Scale Score: 12    Readmission Risk              Risk of Unplanned Readmission:  16           Discussed patient goal for the day, patient clinical progression, and barriers to discharge.   The following Goal(s) of the Day/Commitment(s) have been identified:   Discharge John Lima RN  August 3, 2023

## 2023-08-04 VITALS
WEIGHT: 130.8 LBS | RESPIRATION RATE: 17 BRPM | HEART RATE: 95 BPM | HEIGHT: 65 IN | DIASTOLIC BLOOD PRESSURE: 59 MMHG | TEMPERATURE: 97.6 F | SYSTOLIC BLOOD PRESSURE: 129 MMHG | BODY MASS INDEX: 21.79 KG/M2 | OXYGEN SATURATION: 93 %

## 2023-08-04 LAB
ANION GAP SERPL CALCULATED.3IONS-SCNC: 8 MMOL/L (ref 7–16)
BASOPHILS # BLD: 0.02 K/UL (ref 0–0.2)
BASOPHILS NFR BLD: 0 % (ref 0–2)
BUN SERPL-MCNC: 27 MG/DL (ref 6–23)
CALCIUM SERPL-MCNC: 8.2 MG/DL (ref 8.6–10.2)
CHLORIDE SERPL-SCNC: 103 MMOL/L (ref 98–107)
CO2 SERPL-SCNC: 26 MMOL/L (ref 22–29)
CREAT SERPL-MCNC: 0.8 MG/DL (ref 0.5–1)
EOSINOPHIL # BLD: 0.13 K/UL (ref 0.05–0.5)
EOSINOPHILS RELATIVE PERCENT: 3 % (ref 0–6)
ERYTHROCYTE [DISTWIDTH] IN BLOOD BY AUTOMATED COUNT: 14.9 % (ref 11.5–15)
GFR SERPL CREATININE-BSD FRML MDRD: >60 ML/MIN/1.73M2
GLUCOSE SERPL-MCNC: 109 MG/DL (ref 74–99)
HCT VFR BLD AUTO: 25 % (ref 34–48)
HGB BLD-MCNC: 8 G/DL (ref 11.5–15.5)
IMM GRANULOCYTES # BLD AUTO: <0.03 K/UL (ref 0–0.58)
IMM GRANULOCYTES NFR BLD: 0 % (ref 0–5)
LYMPHOCYTES NFR BLD: 2.01 K/UL (ref 1.5–4)
LYMPHOCYTES RELATIVE PERCENT: 40 % (ref 20–42)
MCH RBC QN AUTO: 36 PG (ref 26–35)
MCHC RBC AUTO-ENTMCNC: 32 G/DL (ref 32–34.5)
MCV RBC AUTO: 112.6 FL (ref 80–99.9)
MONOCYTES NFR BLD: 0.76 K/UL (ref 0.1–0.95)
MONOCYTES NFR BLD: 15 % (ref 2–12)
NEUTROPHILS NFR BLD: 42 % (ref 43–80)
NEUTS SEG NFR BLD: 2.15 K/UL (ref 1.8–7.3)
PLATELET CONFIRMATION: NORMAL
PLATELET, FLUORESCENCE: 74 K/UL (ref 130–450)
PMV BLD AUTO: 9.5 FL (ref 7–12)
POTASSIUM SERPL-SCNC: 4.1 MMOL/L (ref 3.5–5)
RBC # BLD AUTO: 2.22 M/UL (ref 3.5–5.5)
RBC # BLD: ABNORMAL 10*6/UL
SODIUM SERPL-SCNC: 137 MMOL/L (ref 132–146)
WBC OTHER # BLD: 5.1 K/UL (ref 4.5–11.5)

## 2023-08-04 PROCEDURE — 97530 THERAPEUTIC ACTIVITIES: CPT

## 2023-08-04 PROCEDURE — 51798 US URINE CAPACITY MEASURE: CPT

## 2023-08-04 PROCEDURE — 2580000003 HC RX 258: Performed by: INTERNAL MEDICINE

## 2023-08-04 PROCEDURE — 99232 SBSQ HOSP IP/OBS MODERATE 35: CPT | Performed by: INTERNAL MEDICINE

## 2023-08-04 PROCEDURE — 6370000000 HC RX 637 (ALT 250 FOR IP): Performed by: NURSE PRACTITIONER

## 2023-08-04 PROCEDURE — 85025 COMPLETE CBC W/AUTO DIFF WBC: CPT

## 2023-08-04 PROCEDURE — 80048 BASIC METABOLIC PNL TOTAL CA: CPT

## 2023-08-04 PROCEDURE — 6370000000 HC RX 637 (ALT 250 FOR IP): Performed by: ORTHOPAEDIC SURGERY

## 2023-08-04 RX ORDER — SODIUM CHLORIDE 9 MG/ML
INJECTION, SOLUTION INTRAVENOUS CONTINUOUS
Status: DISCONTINUED | OUTPATIENT
Start: 2023-08-04 | End: 2023-08-04 | Stop reason: HOSPADM

## 2023-08-04 RX ADMIN — EZETIMIBE 10 MG: 10 TABLET ORAL at 08:44

## 2023-08-04 RX ADMIN — ASPIRIN 325 MG: 325 TABLET, COATED ORAL at 08:44

## 2023-08-04 RX ADMIN — DOCUSATE SODIUM 100 MG: 100 CAPSULE, LIQUID FILLED ORAL at 08:44

## 2023-08-04 RX ADMIN — SENNOSIDES 8.6 MG: 8.6 TABLET, FILM COATED ORAL at 08:44

## 2023-08-04 RX ADMIN — PANTOPRAZOLE SODIUM 40 MG: 40 TABLET, DELAYED RELEASE ORAL at 05:59

## 2023-08-04 RX ADMIN — OXYCODONE HYDROCHLORIDE 5 MG: 5 TABLET ORAL at 10:31

## 2023-08-04 RX ADMIN — ERGOCALCIFEROL 50000 UNITS: 1.25 CAPSULE ORAL at 08:44

## 2023-08-04 RX ADMIN — SODIUM CHLORIDE: 9 INJECTION, SOLUTION INTRAVENOUS at 08:41

## 2023-08-04 RX ADMIN — LEVOTHYROXINE SODIUM 100 MCG: 100 TABLET ORAL at 05:59

## 2023-08-04 ASSESSMENT — PAIN DESCRIPTION - ONSET: ONSET: ON-GOING

## 2023-08-04 ASSESSMENT — PAIN SCALES - GENERAL
PAINLEVEL_OUTOF10: 4
PAINLEVEL_OUTOF10: 5

## 2023-08-04 ASSESSMENT — PAIN DESCRIPTION - FREQUENCY: FREQUENCY: INTERMITTENT

## 2023-08-04 ASSESSMENT — PAIN DESCRIPTION - LOCATION: LOCATION: HIP

## 2023-08-04 ASSESSMENT — PAIN DESCRIPTION - PAIN TYPE: TYPE: SURGICAL PAIN

## 2023-08-04 ASSESSMENT — PAIN DESCRIPTION - DESCRIPTORS: DESCRIPTORS: ACHING;SORE

## 2023-08-04 ASSESSMENT — PAIN DESCRIPTION - ORIENTATION: ORIENTATION: LEFT

## 2023-08-04 ASSESSMENT — PAIN - FUNCTIONAL ASSESSMENT: PAIN_FUNCTIONAL_ASSESSMENT: ACTIVITIES ARE NOT PREVENTED

## 2023-08-04 NOTE — PROGRESS NOTES
Physical Therapy  Facility/Department: [facility] INTERMDIATE MED SURG  Treatment Note    Name: Harvey Laws  : 1950  MRN: [id number]  Date of Service: 2023      Attending Provider:  Vianca Sales MD    Evaluating PT:  Brenden Shelton, P.T. Room #:  5586/6636-S  Diagnosis:  Closed fracture of neck of left femur, initial encounter ([address]) [S72.002A]  Nondisplaced fracture of neck of left femur ([address]) [S72.002A]  Pertinent PMHx/PSHx:  developmental delay  Procedure/Surgery:  23 L hip ORIF  Precautions:  WBAT LLE, falls, bed/chair alarm  Equipment Needs: TBA    SUBJECTIVE:  Per chart as pt was unable to answer questions appropriately at this time:   Pt lives with another person and has 24 hour care givers. Pt ambulated with no AD PTA. OBJECTIVE:   Initial Evaluation  Date: 8/3/23 Treatment Date: 23 Short Term/ Long Term   Goals   Was pt agreeable to Eval/treatment? yes Yes    Does pt have pain? No c/o pain at rest, but c/o L hip pain with movement  No complaints/indications of pain    Bed Mobility  Rolling: MOD A  Supine to sit: MAX A  Sit to supine: NA  Scooting: MAX A Rolling: NT  Supine to sit: Max A  Sit to supine: NA  Scooting: Max A to EOB supervision   Transfers Sit to stand: MOD A  Stand to sit: MOD A  Stand pivot: MOD A with ww Sit to stand: Mod A  Stand to sit: Mod A  Stand pivot: Max A with [employer] SBA   Ambulation   3 feet with ww MOD A 3 feet to chair with [employer] with Max A 150 feet with ww SBA   Stair negotiation: ascended and descended NA NT 4 steps with 2 rails SBA   AM-PAC 6 Clicks  34/      Pt is A & O x: Pt's speech is difficult to understand. Pt is confused but can follow simple commands. Sensation: NT  Edema: NT    Patient education  Pt educated on hand placement during transfers.     Patient response to education:   Pt verbalized understanding Pt demonstrated skill Pt requires further education in this area   No With cues Reinforce      ASSESSMENT:    Comments:    Pt was in bed sleeping upon room entry; agreeable to PT treatment. Pt's speech is difficult to understand at times. Pt is confused but follows commands with visual and tactile cues. Pt completed all mobility noted above. Pt required heavy assist for all mobility. Pt was able to stand pivot to chair but required heavy assist for management of [employer]. Tactile cues used to assist with weightshifting. Pt was seated in chair. O2 sat was 97% on 2 L O2/min. Nursing arrived and instructed PT to remove pt from O2 (only wears at night). O2 sat was 97% on RA after activity. Alarm was activated. RN aware that pt is up in chair. Pt was left in chair with all needs met at conclusion of session. Treatment:  Patient practiced and was instructed in the following treatment:    Therapeutic activities:  Bed mobility: Pt was cued for technique during bed mobility transfers. Transfers: Pt was cued for hand placement during sit <> stand transfers. Pt completed multiple transfers from different surface heights (EOB x1, chair x1). Ambulation: Pt was cued for [employer] technique and sequencing when ambulating to chair. Vitals and symptoms were closely monitored throughout session. PLAN:    Patient is making Fair progress towards established goals. Will continue with current POC.       Time in: 0756  Time out: 0820    Total Treatment Time 24 minutes     CPT codes:  [] Gait training 62701 0 minutes  [] Manual therapy 77386 0 minutes  [x] Therapeutic activities 82871 24 minutes  [] Therapeutic exercises 91455 0 minutes  [] Neuromuscular reeducation 73680 0 minutes      Robbie Farias PT, DPT   [id number]

## 2023-08-04 NOTE — PLAN OF CARE
Problem: ABCDS Injury Assessment  Goal: Absence of physical injury  Outcome: Progressing     Problem: Discharge Planning  Goal: Discharge to home or other facility with appropriate resources  Outcome: Progressing     Problem: Confusion  Goal: Confusion, delirium, dementia, or psychosis is improved or at baseline  Description: INTERVENTIONS:  1. Assess for possible contributors to thought disturbance, including medications, impaired vision or hearing, underlying metabolic abnormalities, dehydration, psychiatric diagnoses, and notify attending LIP  2. [place] high risk fall precautions, as indicated  3. Provide frequent short contacts to provide reality reorientation, refocusing and direction  4. Decrease environmental stimuli, including noise as appropriate  5. Monitor and intervene to maintain adequate nutrition, hydration, elimination, sleep and activity  6. If unable to ensure safety without constant attention obtain sitter and review sitter guidelines with assigned personnel  7. Initiate Psychosocial CNS and Spiritual Care consult, as indicated  Outcome: Progressing     Problem: Pain  Goal: Verbalizes/displays adequate comfort level or baseline comfort level  Outcome: Progressing     Problem: Skin/Tissue Integrity  Goal: Absence of new skin breakdown  Description: 1. Monitor for areas of redness and/or skin breakdown  2. Assess vascular access sites hourly  3. Every 4-6 hours minimum:  Change oxygen saturation probe site  4. Every 4-6 hours:  If on nasal continuous positive airway pressure, respiratory therapy assess nares and determine need for appliance change or resting period.   Outcome: Progressing     Problem: Safety - Adult  Goal: Free from fall injury  Outcome: Progressing

## 2023-08-04 NOTE — PROGRESS NOTES
Report called to [facility] pt [facility] and on the way to the facility.

## 2023-08-04 NOTE — DISCHARGE INSTR - COC
Continuity of Care Form    Patient Name: Pedro Hoang   :  1950  MRN:  [id number]    Admit date:  2023  Discharge date:  ***    Code Status Order: Full Code   Advance Directives:   Advance Care Flowsheet Documentation       Date/Time Healthcare Directive Type of Healthcare Directive Copy in [address] Agent's Name Healthcare Agent's Phone Number    23 1453 No, patient does not have an advance directive for healthcare treatment -- -- -- -- --            Admitting Physician:  Dominga Dunne MD  PCP: India Skinner DO    Discharging Nurse: [facility] Unit/Room#: 0850/8209-E  Discharging Unit Phone Number: ***    Emergency Contact:   Extended Emergency Contact Information  Primary Emergency Contact: Analia Avila  Address: [facility], [address] [place] [address] of [address] [place] Phone: [phone]  Work Phone: [phone]  Relation: Legal Guardian  Secondary Emergency Contact: LUCI MARTINEZ  Address: [employer] [facility],  [address] [place] [address], [address] [facility]  Work Phone: [phone]  Relation: Other  Preferred language: [address] needed? No    Past Surgical History:  Past Surgical History:   Procedure Laterality Date    HIP FRACTURE SURGERY Left 2023    OPEN REDUCTION INTERNAL FIXATION LEFT HIP performed by Tye Slater DO at [facility] OR       Immunization History: There is no immunization history on file for this patient.     Active Problems:  Patient Active Problem List   Diagnosis Code    Acute hypoxemic respiratory failure (HCC) J96.01    DARIO (acute kidney injury) ([address]) N17.9    Pancytopenia (HCC) D61.818    Influenza J11.1    Nondisplaced fracture of neck of left femur ([address]) S72.002A    Left hip pain M25.552    Hypothyroid E03.9    Closed fracture of neck of left femur ([address]) S72.002A       Isolation/Infection:   Isolation

## 2023-08-04 NOTE — PROGRESS NOTES
P Quality Flow/Interdisciplinary Rounds Progress Note        Quality Flow Rounds held on August 4, 2023    Disciplines Attending:  Bedside Nurse, , , and Nursing Unit Leadership    Prasanna Waite was admitted on 8/1/2023 12:18 PM    Anticipated Discharge Date:       Disposition:    Gamal Score:  Gamal Scale Score: 19    Readmission Risk              Risk of Unplanned Readmission:  18           Discussed patient goal for the day, patient clinical progression, and barriers to discharge.   The following Goal(s) of the Day/Commitment(s) have been identified:   Discharge John Lima RN  August 4, 2023

## 2023-08-04 NOTE — PROGRESS NOTES
motor endurance, ROM, and functional strength for ADLs/functional transfers  * Therapeutic activities to facilitate/challenge dynamic balance, stand tolerance for increased safety and independence with ADLs  * Therapeutic activities to facilitate gross/fine motor skills for increased independence with ADLs  * Positioning to improve skin integrity, interaction with environment and functional independence     Recommended Adaptive Equipment: TBD       Home Living: Pt resides at a group home, 1 story. Prior Level of Function: Unable to obtain accurate PLOF from patient. Per chart, patient has 24/7 care and was independent without device for mobility. Pain Level: Pt indicating pain with movement. Cognition: Awake and alert. Upset and resistive to movement. States \"Leave me alone! \" And \"Good night! \" Limited direction following. Functional Assessment: AM-PAC Daily Activity Raw Score: 11/24    Initial Eval Status  Date: 8/3/23    Treatment Status  Date: 8/4/23  STGs = LTGs  Time frame: 10-14 days   Feeding Minimal Assist       Supervision    Grooming Moderate Assist     Max A   Supervision    UB Dressing Maximal Assist    Max A   Supervision    LB Dressing Dependent       Moderate Assist    Bathing Maximal Assist       Moderate Assist    Toileting Dependent       Moderate Assist    Bed Mobility  Supine to sit: Maximal Assist   Sit to supine: Maximal Assist   Rolling: Attempted, patient declined     Max A sit to supine   Supine to sit: Independent   Sit to supine: Independent    Functional Transfers Sit to stand: Moderate Assist   Stand to sit: Moderate Assist   Stand pivot: Moderate Assist      Transfer training with verbal cues for hand placement to improve safety. Mod A sit to stand from chair. Stand by Assist     Functional Mobility Moderate Assist with wheeled walker to improve balance 2-3 steps along side of bed. Mod A pivot using w/w for support. Poor safety awareness. Stand by Assist with use of wheeled walker    Balance Sitting:     Static: fair-    Dynamic: fair-  Standing: poor     Poor stand balance for ADL. Sitting:     Static: good    Dynamic: good  Standing: fair plus with AD    Activity Tolerance fair  minus / poor  Poor   Increase standing tolerance >2-3 minutes for improved engagement with functional transfers and indep in ADLs     Comments:  Pt sitting in the chair and wanting to return to bed. Assisted to stand and pivot to the bed. Increased pain with movement then pt somewhat agitated and wanting staff to leave her alone. Max A for positioning. Pt calmed once positioned and staff leaving the room. Education/treatment:  ADL retraining with facilitation of movement to increase self care skills. Therapeutic activity to address balance and endurance for ADL and transfers. Pt education of daily orientation and transfer safety. Pt has made limited  progress towards set goals.        Time In: 10:26  Time Out: 10:36     Min Units   Therapeutic Ex 52926     Therapeutic Activities 48878 5    ADL/Self Care 73496 5    Orthotic Management 12739     Neuro Re-Ed 79414     Non-Billable Time     TOTAL TIMED TREATMENT 10 [facility] ALYCIA/L [address]161

## 2023-08-04 NOTE — PROGRESS NOTES
Patient examined at bedside for dressing check. Patient is postop day 2 s/p open reduction internal fixation left hip on 8/2. Dressing is C/D/I. Nursing notes no overnight events or issues. She was able to ambulate a few steps yesterday which was limited due to pain. Plan:  Patient will most likely require placement in a rehab facility. She is cleared for discharge from an orthopedic standpoint. We will sign off on this patient. All discharge instructions and medications are in the chart.     Electronically signed by Rodger Mccabe DO on 8/4/2023 at 7:20 AM

## 2023-08-04 NOTE — CARE COORDINATION
Social Work/Discharge Planning:  Theresa Marrero with [address] states facility can not accept patient.  made referral to [facility] and they are unable to accept. Nabila Villarreal states they don't think patient can participate with three hours of therapy a day. Quyn[place] states [place] or [facility] can accept patient. Called patient Cris Doss (ph: [phone]) and she is off today.  spoke with covering [employer] and provided her with an update. Deonte Hakeem states she prefers [facility]. Notified Alyson Ham with [facility] and they can accept patient today. Discharge order noted. [facility] and arranged ambulance transport for 2:00pm.  Notified [employer] at [address] (ph: [phone]) and liaison Alyson Ham with [facility] of discharge time. [address]81 completed, electronic N-17 in epic and ambulance form in soft chart.   Electronically signed by NORIS Scott on 8/4/2023 at 11:32 AM

## 2023-08-08 NOTE — PROGRESS NOTES
Physician Progress Note      PATIENT:               Hany Morleand  CSN #:                  [id number]  :                       1950  ADMIT DATE:       2023 12:18 PM  [facility] DATE:        2023 2:50 PM  RESPONDING  PROVIDER #:        Misbah Pepper MD          QUERY TEXT:    Internal Medicine,    Pt admitted with left femoral neck fracture, s/p ORIF* and has blood loss   anemia documented. If possible, please document in progress notes and   discharge summary further specificity regarding the acuity of anemia:    The medical record reflects the following:  Risk Factors: s/p lt femur ORIF  Clinical Indicators: documentation of blood loss anemia  Treatment: labs, IVF, supportive care    Thank you,  Lyly Abreu RN CDS  [phone]  Options provided:  -- Anemia due to acute blood loss  -- Anemia due to postoperative blood loss  -- Other - I will add my own diagnosis  -- Disagree - Not applicable / Not valid  -- Disagree - Clinically unable to determine / Unknown  -- Refer to Clinical Documentation Reviewer    PROVIDER RESPONSE TEXT:    Patient had anemia of chronic disease. The drop in hemoglobin after surgery   represents expected blood loss due to the hip surgery she had. Query created by: Lyly Abreu on 2023 11:05 AM      QUERY TEXT:    Internal Medicine,    Patient admitted with left femoral neck fracture and has  documentation of   MRDD/developmental disability. 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 Or DSM-5: Lives independently w/ minimum   support.   Moderate criteria

## 2023-08-31 ENCOUNTER — OFFICE VISIT (OUTPATIENT)
Dept: ORTHOPEDIC SURGERY | Age: 73
End: 2023-08-31

## 2023-08-31 VITALS — WEIGHT: 130 LBS | BODY MASS INDEX: 21.66 KG/M2 | HEIGHT: 65 IN

## 2023-08-31 DIAGNOSIS — S72.25XA CLOSED NONDISPLACED SUBTROCHANTERIC FRACTURE OF LEFT FEMUR, INITIAL ENCOUNTER (HCC): Primary | ICD-10-CM

## 2023-08-31 PROCEDURE — 99024 POSTOP FOLLOW-UP VISIT: CPT | Performed by: STUDENT IN AN ORGANIZED HEALTH CARE EDUCATION/TRAINING PROGRAM

## 2023-08-31 NOTE — PROGRESS NOTES
Follow Up Post Operative Visit     Surgery: Open reduction internal fixation left femoral neck fracture  Date: 8/2/2023    Subjective:    Roula Bar is here for follow up visit s/p above procedure. She missed her postoperative follow-up appointment at 2 weeks. She has been taking aspirin for DVT prophylaxis. She is developmentally delayed and lives with someone from her facility. She is in a wheelchair today but it is unclear if she has been walking. Controlled Substances Monitoring:        Physical Exam:    Height: 5' 5\" (1.651 m), Weight - Scale: 130 lb (59 kg)    General: Alert and oriented x3, no acute distress  Cardiovascular/pulmonary: No labored breathing, peripheral perfusion intact  Musculoskeletal:    Left hip: Mild pain with range of motion. Incision well approximated with no signs of infection. Thigh soft and compressible. Unable to participate with the remainder of the exam      Imaging: AP pelvis and 2 views of the left hip and pill interpreted myself and discussed with patient. Fracture still visible. There has been no implant failure or screw cut out. No change in alignment at this point    Assessment and Plan: 1 month status post open duction internal fixation left femoral neck fracture  -Unclear if she has been walking. She can continue to be weightbearing as tolerated. She can discontinue her DVT prophylaxis at this point. We will continue to follow this along closely. There is a high risk of failure for this type of fracture. Given her medical comorbidities and mental status I thought that minimally invasive fixation would be more appropriate than hemiarthroplasty to start. If this fails she will require conversion to its some sort of hip arthroplasty. Recommend continue PT and Occupational Therapy. We will see her back in 4 weeks to repeat imaging and see how she is doing.     Mikaela Poole, DO   Orthopaedic Surgery   8/31/23   1:31 PM EDT

## 2023-08-31 NOTE — PATIENT INSTRUCTIONS
INSTRUCTIONS FOR FACILITY      Weight Bearing: Weight bearing as tolerated left lower extremity. Use assistive devices when needed. Therapy: Continue PT/OT    ROM, Strengthening, and Flexibility    Pain control: per facility physician        DVT Prophylaxis: Finish the full 30 days of aspirin. Okay to discontinue after the prescription runs out  Follow up:     Future Appointments   Date Time Provider [address] [place] [address]   8/31/2023  1:40 PM Rusty Guadalupe  [facility]       Call office with any questions or concerns.

## 2023-10-05 ENCOUNTER — OFFICE VISIT (OUTPATIENT)
Dept: ORTHOPEDIC SURGERY | Age: 73
End: 2023-10-05

## 2023-10-05 DIAGNOSIS — S72.25XA CLOSED NONDISPLACED SUBTROCHANTERIC FRACTURE OF LEFT FEMUR, INITIAL ENCOUNTER (HCC): Primary | ICD-10-CM

## 2023-10-05 PROCEDURE — 99024 POSTOP FOLLOW-UP VISIT: CPT | Performed by: STUDENT IN AN ORGANIZED HEALTH CARE EDUCATION/TRAINING PROGRAM

## 2023-10-05 NOTE — PROGRESS NOTES
Follow Up Post Operative Visit     Surgery: Open reduction internal fixation left femoral neck fracture  Date: 8/2/2023    Subjective:    Theodore Quezada is here for follow up visit s/p above procedure. She is developmentally delayed and lives in a facility. According to the caregiver she is not walking very much. She is in a wheelchair today. She does not appear to be in any pain. She has not been doing any physical therapy. Controlled Substances Monitoring:        Physical Exam:    No data recorded    General: Alert and oriented x3, no acute distress  Cardiovascular/pulmonary: No labored breathing, peripheral perfusion intact  Musculoskeletal:    Left hip: Mild pain with range of motion. Incision well approximated with no signs of infection. Thigh soft and compressible. Unable to participate with the remainder of the exam      Imaging: AP pelvis and 2 views of the left hip and pelvis interpreted myself and discussed with patient. Fracture appears to be healing in stable position. There is no abnormal lucencies. No screw cut out. No change in alignment    Assessment and Plan: 2 month status post open duction internal fixation left femoral neck fracture  -Continue weightbearing as tolerated. Again I think this is the best option for this patient. Is unclear how much she is ambulating. If this were to fail she would be revised to hemiarthroplasty. Given her condition I think this minimally invasive surgery was still the best option. She appears to have good pain control and doing well. I will see her back in 3 months for final x-ray. I would recommend aggressive physical therapy to work on strength and range of motion.   Again I think this fracture is healing and she should be able to ambulate without difficulty and get back to her baseline at some point    [address] [facility]   10/5/23  9:22 AM

## 2023-11-30 ENCOUNTER — OFFICE VISIT (OUTPATIENT)
Dept: ORTHOPEDIC SURGERY | Age: 73
End: 2023-11-30
Payer: MEDICARE

## 2023-11-30 DIAGNOSIS — S72.25XA CLOSED NONDISPLACED SUBTROCHANTERIC FRACTURE OF LEFT FEMUR, INITIAL ENCOUNTER (HCC): Primary | ICD-10-CM

## 2023-11-30 PROCEDURE — G8400 PT W/DXA NO RESULTS DOC: HCPCS | Performed by: STUDENT IN AN ORGANIZED HEALTH CARE EDUCATION/TRAINING PROGRAM

## 2023-11-30 PROCEDURE — 1090F PRES/ABSN URINE INCON ASSESS: CPT | Performed by: STUDENT IN AN ORGANIZED HEALTH CARE EDUCATION/TRAINING PROGRAM

## 2023-11-30 PROCEDURE — G8420 CALC BMI NORM PARAMETERS: HCPCS | Performed by: STUDENT IN AN ORGANIZED HEALTH CARE EDUCATION/TRAINING PROGRAM

## 2023-11-30 PROCEDURE — 99213 OFFICE O/P EST LOW 20 MIN: CPT | Performed by: STUDENT IN AN ORGANIZED HEALTH CARE EDUCATION/TRAINING PROGRAM

## 2023-11-30 PROCEDURE — 1123F ACP DISCUSS/DSCN MKR DOCD: CPT | Performed by: STUDENT IN AN ORGANIZED HEALTH CARE EDUCATION/TRAINING PROGRAM

## 2023-11-30 PROCEDURE — G8484 FLU IMMUNIZE NO ADMIN: HCPCS | Performed by: STUDENT IN AN ORGANIZED HEALTH CARE EDUCATION/TRAINING PROGRAM

## 2023-11-30 PROCEDURE — 3017F COLORECTAL CA SCREEN DOC REV: CPT | Performed by: STUDENT IN AN ORGANIZED HEALTH CARE EDUCATION/TRAINING PROGRAM

## 2023-11-30 PROCEDURE — G8427 DOCREV CUR MEDS BY ELIG CLIN: HCPCS | Performed by: STUDENT IN AN ORGANIZED HEALTH CARE EDUCATION/TRAINING PROGRAM

## 2023-11-30 PROCEDURE — 1036F TOBACCO NON-USER: CPT | Performed by: STUDENT IN AN ORGANIZED HEALTH CARE EDUCATION/TRAINING PROGRAM

## 2023-11-30 NOTE — PROGRESS NOTES
Follow Up Post Operative Visit     Surgery: Open reduction internal fixation left femoral neck fracture  Date: 8/2/2023    Subjective:    Barbara Zepeda is here for follow up visit s/p above procedure. She is developmentally delayed and lives in a facility. She is out of her wheelchair and walking normally now. She does not have any pain. Controlled Substances Monitoring:        Physical Exam:    No data recorded    General: Alert and oriented x3, no acute distress  Cardiovascular/pulmonary: No labored breathing, peripheral perfusion intact  Musculoskeletal:    Left hip: No pain pain with range of motion. She is ambulating normally incision well approximated with no signs of infection. Thigh soft and compressible. Unable to participate with the remainder of the exam      Imaging: AP pelvis and 2 views of the left hip and pelvis interpreted myself and discussed with patient. Fracture appears to be healing in stable position. There is no abnormal lucencies. No screw cut out. No change in alignment    Assessment and Plan: approximately 4 months status post open duction internal fixation left femoral neck fracture  -Appears to have healed her fracture and is rehabbed appropriately. She is back to ambulating. 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 She can follow-up with me on an as-needed basis. Major Salinas DO   Orthopaedic Surgery   11/30/23  10:22 AM    Note: This report was completed using [employer] voiced recognition software. Every effort has been made to ensure accuracy; however, inadvertent computerized transcription errors may be present.

## 2023-12-11 ENCOUNTER — APPOINTMENT (OUTPATIENT)
Dept: GENERAL RADIOLOGY | Age: 73
End: 2023-12-11
Payer: MEDICARE

## 2023-12-11 ENCOUNTER — APPOINTMENT (OUTPATIENT)
Dept: CT IMAGING | Age: 73
End: 2023-12-11
Payer: MEDICARE

## 2023-12-11 ENCOUNTER — HOSPITAL ENCOUNTER (INPATIENT)
Age: 73
LOS: 4 days | Discharge: HOME | End: 2023-12-15
Attending: EMERGENCY MEDICINE | Admitting: STUDENT IN AN ORGANIZED HEALTH CARE EDUCATION/TRAINING PROGRAM
Payer: MEDICARE

## 2023-12-11 DIAGNOSIS — I60.9 SUBARACHNOID HEMORRHAGE (HCC): Primary | ICD-10-CM
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
## 2023-12-11 PROCEDURE — 85576 BLOOD PLATELET AGGREGATION: CPT

## 2023-12-11 PROCEDURE — 96374 THER/PROPH/DIAG INJ IV PUSH: CPT

## 2023-12-11 PROCEDURE — 72131 CT LUMBAR SPINE W/O DYE: CPT

## 2023-12-11 PROCEDURE — 73551 X-RAY EXAM OF FEMUR 1: CPT

## 2023-12-11 PROCEDURE — 2060000000 HC ICU INTERMEDIATE R&B

## 2023-12-11 PROCEDURE — 80053 COMPREHEN METABOLIC PANEL: CPT

## 2023-12-11 PROCEDURE — 72125 CT NECK SPINE W/O DYE: CPT

## 2023-12-11 PROCEDURE — 72128 CT CHEST SPINE W/O DYE: CPT

## 2023-12-11 PROCEDURE — 71045 X-RAY EXAM CHEST 1 VIEW: CPT

## 2023-12-11 PROCEDURE — 85384 FIBRINOGEN ACTIVITY: CPT

## 2023-12-11 PROCEDURE — 85347 COAGULATION TIME ACTIVATED: CPT

## 2023-12-11 PROCEDURE — 85610 PROTHROMBIN TIME: CPT

## 2023-12-11 PROCEDURE — 85730 THROMBOPLASTIN TIME PARTIAL: CPT

## 2023-12-11 PROCEDURE — 70486 CT MAXILLOFACIAL W/O DYE: CPT

## 2023-12-11 PROCEDURE — 85025 COMPLETE CBC W/AUTO DIFF WBC: CPT

## 2023-12-11 PROCEDURE — 6360000002 HC RX W HCPCS

## 2023-12-11 PROCEDURE — 85390 FIBRINOLYSINS SCREEN I&R: CPT

## 2023-12-11 PROCEDURE — 70450 CT HEAD/BRAIN W/O DYE: CPT

## 2023-12-11 PROCEDURE — 72170 X-RAY EXAM OF PELVIS: CPT

## 2023-12-11 PROCEDURE — 99285 EMERGENCY DEPT VISIT HI MDM: CPT

## 2023-12-11 RX ORDER — LEVETIRACETAM 500 MG/5ML
500 INJECTION, SOLUTION, CONCENTRATE INTRAVENOUS EVERY 12 HOURS
Status: DISCONTINUED | OUTPATIENT
Start: 2023-12-12 | End: 2023-12-12

## 2023-12-11 RX ORDER — LEVETIRACETAM 500 MG/5ML
1000 INJECTION, SOLUTION, CONCENTRATE INTRAVENOUS ONCE
Status: COMPLETED | OUTPATIENT
Start: 2023-12-11 | End: 2023-12-11

## 2023-12-11 RX ORDER — DIVALPROEX SODIUM 500 MG/1
500 TABLET, DELAYED RELEASE ORAL NIGHTLY
Status: CANCELLED | OUTPATIENT
Start: 2023-12-12

## 2023-12-11 RX ADMIN — LEVETIRACETAM 1000 MG: 100 INJECTION, SOLUTION INTRAVENOUS at 23:46

## 2023-12-11 ASSESSMENT — LIFESTYLE VARIABLES
HOW MANY STANDARD DRINKS CONTAINING ALCOHOL DO YOU HAVE ON A TYPICAL DAY: PATIENT DOES NOT DRINK
HOW OFTEN DO YOU HAVE A DRINK CONTAINING ALCOHOL: NEVER

## 2023-12-12 ENCOUNTER — APPOINTMENT (OUTPATIENT)
Dept: GENERAL RADIOLOGY | Age: 73
End: 2023-12-12
Payer: MEDICARE

## 2023-12-12 ENCOUNTER — APPOINTMENT (OUTPATIENT)
Dept: CT IMAGING | Age: 73
End: 2023-12-12
Payer: MEDICARE

## 2023-12-12 LAB
ALBUMIN SERPL-MCNC: 3.9 G/DL (ref 3.5–5.2)
ALP SERPL-CCNC: 102 U/L (ref 35–104)
ALT SERPL-CCNC: 20 U/L (ref 0–32)
ANION GAP SERPL CALCULATED.3IONS-SCNC: 11 MMOL/L (ref 7–16)
ANION GAP SERPL CALCULATED.3IONS-SCNC: 11 MMOL/L (ref 7–16)
AST SERPL-CCNC: 79 U/L (ref 0–31)
BASOPHILS # BLD: 0.03 K/UL (ref 0–0.2)
BASOPHILS # BLD: 0.03 K/UL (ref 0–0.2)
BASOPHILS NFR BLD: 1 % (ref 0–2)
BASOPHILS NFR BLD: 1 % (ref 0–2)
BILIRUB SERPL-MCNC: 0.2 MG/DL (ref 0–1.2)
BUN SERPL-MCNC: 16 MG/DL (ref 6–23)
BUN SERPL-MCNC: 19 MG/DL (ref 6–23)
CALCIUM SERPL-MCNC: 7.3 MG/DL (ref 8.6–10.2)
CALCIUM SERPL-MCNC: 9 MG/DL (ref 8.6–10.2)
CHLORIDE SERPL-SCNC: 103 MMOL/L (ref 98–107)
CHLORIDE SERPL-SCNC: 109 MMOL/L (ref 98–107)
CLOT ANGLE.KAOLIN INDUCED BLD RES TEG: 75.6 DEG (ref 53–70)
CO2 SERPL-SCNC: 22 MMOL/L (ref 22–29)
CO2 SERPL-SCNC: 27 MMOL/L (ref 22–29)
CREAT SERPL-MCNC: 0.6 MG/DL (ref 0.5–1)
CREAT SERPL-MCNC: 0.8 MG/DL (ref 0.5–1)
EOSINOPHIL # BLD: 0.08 K/UL (ref 0.05–0.5)
EOSINOPHIL # BLD: 0.11 K/UL (ref 0.05–0.5)
EOSINOPHILS RELATIVE PERCENT: 2 % (ref 0–6)
EOSINOPHILS RELATIVE PERCENT: 2 % (ref 0–6)
EPL-TEG: 0 % (ref 0–15)
ERYTHROCYTE [DISTWIDTH] IN BLOOD BY AUTOMATED COUNT: 15.7 % (ref 11.5–15)
ERYTHROCYTE [DISTWIDTH] IN BLOOD BY AUTOMATED COUNT: 16.1 % (ref 11.5–15)
G-TEG: 7.1 KDYN/CM2 (ref 4.5–11)
GFR SERPL CREATININE-BSD FRML MDRD: >60 ML/MIN/1.73M2
GFR SERPL CREATININE-BSD FRML MDRD: >60 ML/MIN/1.73M2
GLUCOSE SERPL-MCNC: 131 MG/DL (ref 74–99)
GLUCOSE SERPL-MCNC: 98 MG/DL (ref 74–99)
HCT VFR BLD AUTO: 27.4 % (ref 34–48)
HCT VFR BLD AUTO: 30.3 % (ref 34–48)
HGB BLD-MCNC: 11.2 G/DL (ref 11.5–15.5)
HGB BLD-MCNC: 9.9 G/DL (ref 11.5–15.5)
IMM GRANULOCYTES # BLD AUTO: <0.03 K/UL (ref 0–0.58)
IMM GRANULOCYTES # BLD AUTO: <0.03 K/UL (ref 0–0.58)
IMM GRANULOCYTES NFR BLD: 0 % (ref 0–5)
IMM GRANULOCYTES NFR BLD: 0 % (ref 0–5)
INR PPP: 1
KINETICS TEG: 1.2 MIN (ref 1–3)
LY30 (LYSIS) TEG: 0 % (ref 0–8)
LYMPHOCYTES NFR BLD: 1.39 K/UL (ref 1.5–4)
LYMPHOCYTES NFR BLD: 1.65 K/UL (ref 1.5–4)
LYMPHOCYTES RELATIVE PERCENT: 26 % (ref 20–42)
LYMPHOCYTES RELATIVE PERCENT: 36 % (ref 20–42)
MA (MAX CLOT) TEG: 58.6 MM (ref 50–70)
MCH RBC QN AUTO: 40.9 PG (ref 26–35)
MCH RBC QN AUTO: 41.9 PG (ref 26–35)
MCHC RBC AUTO-ENTMCNC: 36.1 G/DL (ref 32–34.5)
MCHC RBC AUTO-ENTMCNC: 37 G/DL (ref 32–34.5)
MCV RBC AUTO: 113.2 FL (ref 80–99.9)
MCV RBC AUTO: 113.5 FL (ref 80–99.9)
MONOCYTES NFR BLD: 0.43 K/UL (ref 0.1–0.95)
MONOCYTES NFR BLD: 0.43 K/UL (ref 0.1–0.95)
MONOCYTES NFR BLD: 8 % (ref 2–12)
MONOCYTES NFR BLD: 9 % (ref 2–12)
NEUTROPHILS NFR BLD: 52 % (ref 43–80)
NEUTROPHILS NFR BLD: 64 % (ref 43–80)
NEUTS SEG NFR BLD: 2.38 K/UL (ref 1.8–7.3)
NEUTS SEG NFR BLD: 3.44 K/UL (ref 1.8–7.3)
PARTIAL THROMBOPLASTIN TIME: 26.6 SEC (ref 24.5–35.1)
PLATELET # BLD AUTO: 110 K/UL (ref 130–450)
PLATELET # BLD AUTO: 75 K/UL (ref 130–450)
PLATELET CONFIRMATION: NORMAL
PMV BLD AUTO: 10.5 FL (ref 7–12)
PMV BLD AUTO: 9.9 FL (ref 7–12)
POTASSIUM SERPL-SCNC: 3.6 MMOL/L (ref 3.5–5)
POTASSIUM SERPL-SCNC: 5.2 MMOL/L (ref 3.5–5)
PROT SERPL-MCNC: 6.5 G/DL (ref 6.4–8.3)
PROTHROMBIN TIME: 10.5 SEC (ref 9.3–12.4)
RBC # BLD AUTO: 2.42 M/UL (ref 3.5–5.5)
RBC # BLD AUTO: 2.67 M/UL (ref 3.5–5.5)
RBC # BLD: ABNORMAL 10*6/UL
REACTION TIME TEG: 3.1 MIN (ref 5–10)
SODIUM SERPL-SCNC: 141 MMOL/L (ref 132–146)
SODIUM SERPL-SCNC: 142 MMOL/L (ref 132–146)
WBC OTHER # BLD: 4.6 K/UL (ref 4.5–11.5)
WBC OTHER # BLD: 5.4 K/UL (ref 4.5–11.5)

## 2023-12-12 PROCEDURE — 6360000002 HC RX W HCPCS

## 2023-12-12 PROCEDURE — 2580000003 HC RX 258

## 2023-12-12 PROCEDURE — 70450 CT HEAD/BRAIN W/O DYE: CPT

## 2023-12-12 PROCEDURE — 85025 COMPLETE CBC W/AUTO DIFF WBC: CPT

## 2023-12-12 PROCEDURE — 74018 RADEX ABDOMEN 1 VIEW: CPT

## 2023-12-12 PROCEDURE — 80048 BASIC METABOLIC PNL TOTAL CA: CPT

## 2023-12-12 PROCEDURE — 6370000000 HC RX 637 (ALT 250 FOR IP)

## 2023-12-12 PROCEDURE — 2060000000 HC ICU INTERMEDIATE R&B

## 2023-12-12 PROCEDURE — 99223 1ST HOSP IP/OBS HIGH 75: CPT | Performed by: STUDENT IN AN ORGANIZED HEALTH CARE EDUCATION/TRAINING PROGRAM

## 2023-12-12 PROCEDURE — 92610 EVALUATE SWALLOWING FUNCTION: CPT | Performed by: SPEECH-LANGUAGE PATHOLOGIST

## 2023-12-12 PROCEDURE — 6370000000 HC RX 637 (ALT 250 FOR IP): Performed by: SURGERY

## 2023-12-12 RX ORDER — SODIUM CHLORIDE 0.9 % (FLUSH) 0.9 %
10 SYRINGE (ML) INJECTION EVERY 12 HOURS SCHEDULED
Status: DISCONTINUED | OUTPATIENT
Start: 2023-12-12 | End: 2023-12-15 | Stop reason: HOSPADM

## 2023-12-12 RX ORDER — LIDOCAINE HYDROCHLORIDE 20 MG/ML
JELLY TOPICAL ONCE
Status: DISCONTINUED | OUTPATIENT
Start: 2023-12-12 | End: 2023-12-14

## 2023-12-12 RX ORDER — LEVOTHYROXINE SODIUM 0.05 MG/1
100 TABLET ORAL
Status: DISCONTINUED | OUTPATIENT
Start: 2023-12-12 | End: 2023-12-15 | Stop reason: HOSPADM

## 2023-12-12 RX ORDER — EZETIMIBE 10 MG/1
10 TABLET ORAL EVERY MORNING
Status: DISCONTINUED | OUTPATIENT
Start: 2023-12-12 | End: 2023-12-15 | Stop reason: HOSPADM

## 2023-12-12 RX ORDER — SODIUM CHLORIDE 9 MG/ML
INJECTION, SOLUTION INTRAVENOUS PRN
Status: DISCONTINUED | OUTPATIENT
Start: 2023-12-12 | End: 2023-12-15 | Stop reason: HOSPADM

## 2023-12-12 RX ORDER — ERGOCALCIFEROL 1.25 MG/1
50000 CAPSULE ORAL WEEKLY
Status: DISCONTINUED | OUTPATIENT
Start: 2023-12-15 | End: 2023-12-15 | Stop reason: HOSPADM

## 2023-12-12 RX ORDER — DOCUSATE SODIUM 100 MG/1
100 CAPSULE, LIQUID FILLED ORAL EVERY MORNING
Status: DISCONTINUED | OUTPATIENT
Start: 2023-12-12 | End: 2023-12-15 | Stop reason: HOSPADM

## 2023-12-12 RX ORDER — MAGNESIUM SULFATE IN WATER 40 MG/ML
2000 INJECTION, SOLUTION INTRAVENOUS PRN
Status: DISCONTINUED | OUTPATIENT
Start: 2023-12-12 | End: 2023-12-12

## 2023-12-12 RX ORDER — SENNOSIDES A AND B 8.6 MG/1
1 TABLET, FILM COATED ORAL EVERY MORNING
Status: DISCONTINUED | OUTPATIENT
Start: 2023-12-12 | End: 2023-12-15 | Stop reason: HOSPADM

## 2023-12-12 RX ORDER — SODIUM CHLORIDE 9 MG/ML
INJECTION, SOLUTION INTRAVENOUS CONTINUOUS
Status: DISCONTINUED | OUTPATIENT
Start: 2023-12-12 | End: 2023-12-13

## 2023-12-12 RX ORDER — POTASSIUM CHLORIDE 7.45 MG/ML
10 INJECTION INTRAVENOUS PRN
Status: DISCONTINUED | OUTPATIENT
Start: 2023-12-12 | End: 2023-12-12

## 2023-12-12 RX ORDER — ASPIRIN 325 MG
325 TABLET ORAL DAILY
Status: ON HOLD | COMMUNITY
End: 2023-12-15 | Stop reason: HOSPADM

## 2023-12-12 RX ORDER — OXYMETAZOLINE HYDROCHLORIDE 0.05 G/100ML
2 SPRAY NASAL ONCE
Status: DISCONTINUED | OUTPATIENT
Start: 2023-12-12 | End: 2023-12-14

## 2023-12-12 RX ORDER — ATORVASTATIN CALCIUM 10 MG/1
10 TABLET, FILM COATED ORAL NIGHTLY
Status: DISCONTINUED | OUTPATIENT
Start: 2023-12-12 | End: 2023-12-15 | Stop reason: HOSPADM

## 2023-12-12 RX ORDER — NEPHROCAP 1 MG
1 CAP ORAL NIGHTLY
Status: DISCONTINUED | OUTPATIENT
Start: 2023-12-12 | End: 2023-12-15 | Stop reason: HOSPADM

## 2023-12-12 RX ORDER — MORPHINE SULFATE 2 MG/ML
2 INJECTION, SOLUTION INTRAMUSCULAR; INTRAVENOUS
Status: DISCONTINUED | OUTPATIENT
Start: 2023-12-12 | End: 2023-12-12

## 2023-12-12 RX ORDER — HALOPERIDOL 5 MG/ML
2.5 INJECTION INTRAMUSCULAR ONCE
Status: COMPLETED | OUTPATIENT
Start: 2023-12-12 | End: 2023-12-12

## 2023-12-12 RX ORDER — ONDANSETRON 4 MG/1
4 TABLET, ORALLY DISINTEGRATING ORAL EVERY 8 HOURS PRN
Status: DISCONTINUED | OUTPATIENT
Start: 2023-12-12 | End: 2023-12-15 | Stop reason: HOSPADM

## 2023-12-12 RX ORDER — ACETAMINOPHEN 650 MG/1
650 SUPPOSITORY RECTAL EVERY 4 HOURS PRN
Status: DISCONTINUED | OUTPATIENT
Start: 2023-12-12 | End: 2023-12-15 | Stop reason: HOSPADM

## 2023-12-12 RX ORDER — CODEINE PHOSPHATE AND GUAIFENESIN 10; 100 MG/5ML; MG/5ML
5 SOLUTION ORAL 3 TIMES DAILY PRN
COMMUNITY

## 2023-12-12 RX ORDER — LEVETIRACETAM 500 MG/1
500 TABLET ORAL 2 TIMES DAILY
Status: DISCONTINUED | OUTPATIENT
Start: 2023-12-12 | End: 2023-12-13

## 2023-12-12 RX ORDER — ONDANSETRON 2 MG/ML
4 INJECTION INTRAMUSCULAR; INTRAVENOUS EVERY 6 HOURS PRN
Status: DISCONTINUED | OUTPATIENT
Start: 2023-12-12 | End: 2023-12-15 | Stop reason: HOSPADM

## 2023-12-12 RX ORDER — HALOPERIDOL 5 MG/ML
INJECTION INTRAMUSCULAR
Status: COMPLETED
Start: 2023-12-12 | End: 2023-12-12

## 2023-12-12 RX ORDER — PANTOPRAZOLE SODIUM 40 MG/1
40 TABLET, DELAYED RELEASE ORAL
Status: DISCONTINUED | OUTPATIENT
Start: 2023-12-12 | End: 2023-12-15 | Stop reason: HOSPADM

## 2023-12-12 RX ORDER — POTASSIUM CHLORIDE 29.8 MG/ML
20 INJECTION INTRAVENOUS PRN
Status: DISCONTINUED | OUTPATIENT
Start: 2023-12-12 | End: 2023-12-12

## 2023-12-12 RX ORDER — ESCITALOPRAM OXALATE 10 MG/1
10 TABLET ORAL NIGHTLY
Status: DISCONTINUED | OUTPATIENT
Start: 2023-12-12 | End: 2023-12-15 | Stop reason: HOSPADM

## 2023-12-12 RX ORDER — DIVALPROEX SODIUM 500 MG/1
500 TABLET, DELAYED RELEASE ORAL NIGHTLY
Status: DISCONTINUED | OUTPATIENT
Start: 2023-12-12 | End: 2023-12-15 | Stop reason: HOSPADM

## 2023-12-12 RX ORDER — QUETIAPINE FUMARATE 200 MG/1
400 TABLET, FILM COATED ORAL NIGHTLY
Status: DISCONTINUED | OUTPATIENT
Start: 2023-12-12 | End: 2023-12-15 | Stop reason: HOSPADM

## 2023-12-12 RX ORDER — SODIUM CHLORIDE 0.9 % (FLUSH) 0.9 %
10 SYRINGE (ML) INJECTION PRN
Status: DISCONTINUED | OUTPATIENT
Start: 2023-12-12 | End: 2023-12-15 | Stop reason: HOSPADM

## 2023-12-12 RX ADMIN — HALOPERIDOL LACTATE 2.5 MG: 5 INJECTION, SOLUTION INTRAMUSCULAR at 02:44

## 2023-12-12 RX ADMIN — HALOPERIDOL 2.5 MG: 5 INJECTION INTRAMUSCULAR at 02:44

## 2023-12-12 RX ADMIN — SODIUM CHLORIDE: 9 INJECTION, SOLUTION INTRAVENOUS at 08:52

## 2023-12-12 RX ADMIN — ESCITALOPRAM OXALATE 10 MG: 10 TABLET ORAL at 22:24

## 2023-12-12 RX ADMIN — SODIUM CHLORIDE, PRESERVATIVE FREE 10 ML: 5 INJECTION INTRAVENOUS at 22:25

## 2023-12-12 RX ADMIN — LEVETIRACETAM 500 MG: 500 TABLET, FILM COATED ORAL at 22:24

## 2023-12-12 RX ADMIN — DIVALPROEX SODIUM 500 MG: 500 TABLET, DELAYED RELEASE ORAL at 22:24

## 2023-12-12 RX ADMIN — NEPHROCAP 1 MG: 1 CAP ORAL at 22:25

## 2023-12-12 RX ADMIN — ATORVASTATIN CALCIUM 10 MG: 10 TABLET, FILM COATED ORAL at 22:25

## 2023-12-12 RX ADMIN — QUETIAPINE FUMARATE 400 MG: 200 TABLET ORAL at 22:24

## 2023-12-12 ASSESSMENT — PAIN SCALES - GENERAL: PAINLEVEL_OUTOF10: 0

## 2023-12-12 NOTE — PROGRESS NOTES
Patient was non-compliant and combative. I was unable to obtain lateral views of both femurs. I was able to obtain bi-lateral AP views of both femurs.
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
## 2023-12-12 NOTE — ED NOTES
Attempted to wake patient for bedside swallow, patient unable to remain awake/alert enough to attempt swallow.  Provider aware     Manjula Pascal RN  12/12/23 2288

## 2023-12-12 NOTE — PROGRESS NOTES
Trauma Tertiary Survey    Admit Date: 12/11/2023  Hospital day 1    CC:  SAH    Alcohol pre-screening:  Men: How many times in the past year have you had 5 or more drinks in a day?  none  Women: How many times in the past year have you had 4 or more drinks in a day? none  How much do you drink on a daily basis? Drug Pre-screening:    How many times in the past year have you used a recreational drug or used a prescription medication for non medical reasons? No    Mood Prescreening:    During the past two weeks, have you been bothered by little interest or pleasure doing things? No  During the past two weeks, have you been bothered by feeling down, depressed or hopeless? No      Scheduled Meds:   atorvastatin  10 mg Oral Nightly    Virt-Caps  1 capsule Oral Nightly    docusate sodium  100 mg Oral QAM    escitalopram  10 mg Oral Nightly    ezetimibe  10 mg Oral QAM    levothyroxine  100 mcg Oral QAM AC    pantoprazole  40 mg Oral BID AC    QUEtiapine  400 mg Oral Nightly    senna  1 tablet Oral QAM    [START ON 12/15/2023] vitamin D  50,000 Units Oral Weekly    sodium chloride flush  10 mL IntraVENous 2 times per day    divalproex  500 mg Oral Nightly    levETIRAcetam  500 mg IntraVENous Q12H     Continuous Infusions:   sodium chloride Stopped (12/12/23 0303)    sodium chloride       PRN Meds:sodium chloride flush, sodium chloride, potassium chloride **OR** potassium chloride, magnesium sulfate, ondansetron **OR** ondansetron    Subjective:     Patient sleepy but following commands, needed Haldol overnight secondary to agitation. GCS 13 which appears to be her baseline. Repeat CT head stable. Patient not alert enough for bedside swallow this morning. Objective:   Patient Vitals for the past 8 hrs:   BP Pulse Resp SpO2 Weight   12/12/23 0554 120/78 70 14 95 % 54.4 kg (120 lb)   12/12/23 0336 118/68 78 14 94 % --   12/12/23 0004 (!) 109/95 88 14 91 % --       No intake/output data recorded.   No intake/output

## 2023-12-12 NOTE — CONSULTS
CHIEF COMPLAINT: Evaluation for TSAH     HISTORY OF PRESENT ILLNESS: Ms. Clayton Umanzor is a 67 y/o woman with a past medical history of developmental delay who reportedly had a witnessed fall in her group home striking her head. No reported LOC. Patient taken to [address] [facility] for evaluation and trauma and NSG called for findings on imaging. No reported staring episodes rhythmic movement or any other changes suggestive of an acute event. PAST MEDICAL HISTORY:  developmental delay    PAST SURGICAL HISTORY:   as documented   SOCIAL HISTORY: lives in group home  REVIEW OF SYSTEMS: As above otherwise negative      NEUROLOGICAL EXAMINATION:   Appearance                    Orbits symmetric, no rigding over sagittal, metopic, coronoal or lambdoidal suture bilaterally  Mental Status                  Awake, alert, attentive, interactive, follows commands  Speech                           fluent   Cranial Nerves               Pupils equal and reactive, extra-ocular muscles intact, face equal, oropharynx clear with symmetric rise, tongue midline  Motor: Moving all extremities x 4 with good bulk and tone, no rigidity noted  REFLEXES:    Symmetric, +2 bilaterally  SENSORY EXAMINATION: intact to light touch C5-T1 L3-S1     XRAYS/DIAGNOSTIC STUDIES:  I reviewed the patient's axial non contrast HCT which reveals L frontal TSAH with no contusion, no IPH, no IVH seen. No midline shift, no mass effect.      ASSESSMENT: 67 y/o woman with fall complicated by small L frontal TSAH     CLINCIAL IMPRESSION AND RECOMMENDATIONS:   -keppra load then 500 BID x 7 days  -repeat HCT if neuro exam changes  -HOB > 30  -frequent neuro examinations overnight  -Trauma aware

## 2023-12-12 NOTE — ED NOTES
Lakeshia Mcclure, legal guardian called to give consent. Verified by this RN and Wendie Moritz, NP.      Leif Galdamez RN  12/11/23 7122

## 2023-12-12 NOTE — PROGRESS NOTES
SPEECH/LANGUAGE PATHOLOGY  CLINICAL ASSESSMENT OF SWALLOWING FUNCTION   and PLAN OF CARE    PATIENT NAME:  Capri Shetty  (female)     MRN:  [id number]    :  1950  (68 y.o.)  STATUS:  Inpatient: Room     TODAY'S DATE:  2023  REFERRING PROVIDER:   Dr. Kimberly Mas: SLP swallowing-dysphagia evaluation and treatment Date of order:  23  REASON FOR REFERRAL: Assess oropharyngeal swallow function   EVALUATING THERAPIST: DERIK Perry                 RESULTS:    DYSPHAGIA DIAGNOSIS:   Clinical indicators of questionable pharyngeal phase dysphagia       DIET RECOMMENDATIONS:  NPO with ongoing PO analysis by SLP only to determine if PO diet can be initiated         FEEDING RECOMMENDATIONS:     Assistance level:  Not applicable      Compensatory strategies recommended: Not applicable      Discussed recommendations with nursing and/or faxed report to referring provider: Yes    SPEECH THERAPY  PLAN OF CARE   The dysphagia POC is established based on physician order, dysphagia diagnosis and results of clinical assessment     Skilled SLP intervention for dysphagia management on acute care up to 5 x per week until goals met, pt plateaus in function and/or discharged from hospital    Conditions Requiring Skilled Therapeutic Intervention for dysphagia:    Patient is performing below functional baseline d/t  current acute condition, respiratory compromise, multiple medications, and/or increased dependency upon caregivers.     Specific dysphagia interventions to include:     ongoing evaluation of swallow function to determine when PO diet can be safely initiated    Specific instructions for next treatment:  ongoing skilled PO analysis to determine if PO diet can be initiated   Patient Treatment Goals:    Short Term Goals:  Pt will participate in ongoing evaluation of swallow function to determine when PO diet can be safely initiated    Long Term Goals:   Pt will improve

## 2023-12-13 LAB
ANION GAP SERPL CALCULATED.3IONS-SCNC: 10 MMOL/L (ref 7–16)
BASOPHILS # BLD: 0.03 K/UL (ref 0–0.2)
BASOPHILS NFR BLD: 1 % (ref 0–2)
BUN SERPL-MCNC: 13 MG/DL (ref 6–23)
CALCIUM SERPL-MCNC: 8.3 MG/DL (ref 8.6–10.2)
CHLORIDE SERPL-SCNC: 104 MMOL/L (ref 98–107)
CO2 SERPL-SCNC: 23 MMOL/L (ref 22–29)
CREAT SERPL-MCNC: 0.6 MG/DL (ref 0.5–1)
EOSINOPHIL # BLD: 0.09 K/UL (ref 0.05–0.5)
EOSINOPHILS RELATIVE PERCENT: 3 % (ref 0–6)
ERYTHROCYTE [DISTWIDTH] IN BLOOD BY AUTOMATED COUNT: 15.2 % (ref 11.5–15)
GFR SERPL CREATININE-BSD FRML MDRD: >60 ML/MIN/1.73M2
GLUCOSE SERPL-MCNC: 78 MG/DL (ref 74–99)
HCT VFR BLD AUTO: 25.3 % (ref 34–48)
HGB BLD-MCNC: 9 G/DL (ref 11.5–15.5)
LYMPHOCYTES NFR BLD: 1.18 K/UL (ref 1.5–4)
LYMPHOCYTES RELATIVE PERCENT: 35 % (ref 20–42)
MCH RBC QN AUTO: 39.5 PG (ref 26–35)
MCHC RBC AUTO-ENTMCNC: 35.6 G/DL (ref 32–34.5)
MCV RBC AUTO: 111 FL (ref 80–99.9)
MONOCYTES NFR BLD: 0.3 K/UL (ref 0.1–0.95)
MONOCYTES NFR BLD: 9 % (ref 2–12)
NEUTROPHILS NFR BLD: 53 % (ref 43–80)
NEUTS SEG NFR BLD: 1.8 K/UL (ref 1.8–7.3)
PLATELET # BLD AUTO: 65 K/UL (ref 130–450)
PLATELET CONFIRMATION: NORMAL
PMV BLD AUTO: 9.8 FL (ref 7–12)
POTASSIUM SERPL-SCNC: 3.6 MMOL/L (ref 3.5–5)
RBC # BLD AUTO: 2.28 M/UL (ref 3.5–5.5)
SODIUM SERPL-SCNC: 137 MMOL/L (ref 132–146)
WBC OTHER # BLD: 3.4 K/UL (ref 4.5–11.5)

## 2023-12-13 PROCEDURE — 97165 OT EVAL LOW COMPLEX 30 MIN: CPT

## 2023-12-13 PROCEDURE — 2060000000 HC ICU INTERMEDIATE R&B

## 2023-12-13 PROCEDURE — 97530 THERAPEUTIC ACTIVITIES: CPT

## 2023-12-13 PROCEDURE — 36415 COLL VENOUS BLD VENIPUNCTURE: CPT

## 2023-12-13 PROCEDURE — 99232 SBSQ HOSP IP/OBS MODERATE 35: CPT | Performed by: SURGERY

## 2023-12-13 PROCEDURE — 80048 BASIC METABOLIC PNL TOTAL CA: CPT

## 2023-12-13 PROCEDURE — 85025 COMPLETE CBC W/AUTO DIFF WBC: CPT

## 2023-12-13 PROCEDURE — 92526 ORAL FUNCTION THERAPY: CPT | Performed by: SPEECH-LANGUAGE PATHOLOGIST

## 2023-12-13 PROCEDURE — 97535 SELF CARE MNGMENT TRAINING: CPT

## 2023-12-13 PROCEDURE — 6370000000 HC RX 637 (ALT 250 FOR IP)

## 2023-12-13 PROCEDURE — 97161 PT EVAL LOW COMPLEX 20 MIN: CPT

## 2023-12-13 RX ORDER — LEVETIRACETAM 100 MG/ML
500 SOLUTION ORAL 2 TIMES DAILY
Status: DISCONTINUED | OUTPATIENT
Start: 2023-12-13 | End: 2023-12-14

## 2023-12-13 RX ORDER — LEVETIRACETAM 100 MG/ML
500 SOLUTION ORAL 2 TIMES DAILY
Status: DISCONTINUED | OUTPATIENT
Start: 2023-12-13 | End: 2023-12-13

## 2023-12-13 RX ADMIN — QUETIAPINE FUMARATE 400 MG: 200 TABLET ORAL at 21:22

## 2023-12-13 RX ADMIN — PANTOPRAZOLE SODIUM 40 MG: 40 TABLET, DELAYED RELEASE ORAL at 18:23

## 2023-12-13 RX ADMIN — ESCITALOPRAM OXALATE 10 MG: 10 TABLET ORAL at 21:22

## 2023-12-13 RX ADMIN — LEVOTHYROXINE SODIUM 100 MCG: 0.05 TABLET ORAL at 06:04

## 2023-12-13 RX ADMIN — SENNOSIDES 8.6 MG: 8.6 TABLET, FILM COATED ORAL at 08:41

## 2023-12-13 RX ADMIN — LEVETIRACETAM 500 MG: 100 SOLUTION ORAL at 21:22

## 2023-12-13 RX ADMIN — EZETIMIBE 10 MG: 10 TABLET ORAL at 08:41

## 2023-12-13 RX ADMIN — ATORVASTATIN CALCIUM 10 MG: 10 TABLET, FILM COATED ORAL at 21:22

## 2023-12-13 ASSESSMENT — PAIN SCALES - GENERAL: PAINLEVEL_OUTOF10: 0

## 2023-12-13 NOTE — PROGRESS NOTES
Comprehensive Nutrition Assessment    Type and Reason for Visit:  Initial, Consult (TF recs)    Nutrition Recommendations/Plan:   Continue NPO. Recommend to modify TF to better meet est needs. Will monitor. Recommend:   Standard Formula w/ Fiber (Anais Velazco@[employer] x23 hr/d (hold 30mins before and after synthroid) + 1 pro mod once daily. Will provide: 805ml TV, 1208kcal, 52g pro, (1312kcal, 78g pro total w/ pro mod x1) 612ml free water. Flush per neuro management. Regimen at goal meets 100% est calorie/protein needs. Monitor electrolytes and replace PRN. Malnutrition Assessment:  Malnutrition Status: At risk for malnutrition (Comment) (12/13/23 1406)    Context:  Acute Illness     Findings of the 6 clinical characteristics of malnutrition:  Energy Intake:  Mild decrease in energy intake (Comment)  Weight Loss:  No significant weight loss     Body Fat Loss:  No significant body fat loss     Muscle Mass Loss:  No significant muscle mass loss    Fluid Accumulation:  No significant fluid accumulation     Strength:  Not Performed    Nutrition Assessment:    Pt. admitted from group home s/p fall w/ L frontal SAH-NSY following. Noted no surgical intervention indicated at this time. Hx of MRDD, fetal alcohol syndrome, hypothyroidism. Pt. is NPO 2/2 to failed swallow w/ NG placed for TF. Will provide TF recs and monitor.     Nutrition Related Findings:    Oriented to person, No I/O data, BS/GI WDL, NG (clamped), no edema, Wound Type: None       Current Nutrition Intake & Therapies:    Average Meal Intake: NPO  Average Supplements Intake: NPO  Diet NPO  ADULT TUBE FEEDING; Nasogastric; Standard with Fiber; Continuous; 10; Yes; 15; Q 4 hours; 40; 30; Q 4 hours  Current Tube Feeding (TF) Orders:  Feeding Route: Nasogastric  Formula: Standard with Fiber  Schedule: Continuous  Feeding Regimen: @40ml/hr goal  Additives/Modulars: None  Water Flushes: 30ml q1rx=652qh  Current TF & Flush Orders Provides:

## 2023-12-13 NOTE — CARE COORDINATION
Care coordination  Pt. admitted from group home s/p fall w/ L frontal SAH- Noted no surgical intervention indicated at this time. Hx of MRDD, fetal alcohol syndrome, hypothyroidism. Pt. is NPO 2/2 to failed swallow w/ NG placed  and MBSS ordered. Patient is restless with minimal verbalizations. Has been agitated and now has sitter at bedside. PT OT have been ordered but patient has refused. She is a resident of a group home  through [facility]. The nursing contact there is Jaylon Lundy [phone]. Left voice  mail for call back to obtain prior functional levels and the care capacity of the home. Patient has a legal Misael Sees [phone]. She is aware of admission and following. CM/SW to complete assessment and discharge planning after consultation with caregivers.

## 2023-12-13 NOTE — PROGRESS NOTES
SPEECH/LANGUAGE PATHOLOGY  Clinical Re-Assessment of Swallow Function    PATIENT NAME:  Mariella Alvarez  (female)     MRN:  [id number]    :  1950  (68 y.o.)      TODAY'S DATE:  2023    EVALUATING THERAPIST: DERIK Rodrigues                 RESULTS:    DYSPHAGIA DIAGNOSIS:   Clinical indicators of inconsistent indicators of pharyngeal phase dysphagia       DIET RECOMMENDATIONS:  NPO until MBSS can be completed        FEEDING RECOMMENDATIONS:     Assistance level:  Not applicable      Compensatory strategies recommended:  Thorough oral care to prevent colonization of oral bacteria       Discussed recommendations with nursing and/or faxed report to referring provider: Yes    RN cleared patient for participation in assessment     yes       PROCEDURE:  Consistencies Administered During the Evaluation   Liquids: Not appropriate   Solids:  pureed foods      Method of Intake:   spoon  Fed by clinician      Position:   Sitting in bed with head elevated above 75 degrees    CLINICAL ASSESSMENT:  Oral Stage:       Reduced oral acceptance from spoon  Delayed A-P transit due to: decreased ability for initiation    and cognitive function   Lingual pumping present      Pharyngeal Stage:    Wet/gurgly vocal quality was noted after presentation of pureed foods  Delayed initiation of the pharyngeal swallow noted    Cognition:   Confusion noted    Oral Peripheral Examination   Adequate lingual/labial strength     Current Respiratory Status    room air     Parameters of Speech Production  Respiration:  Adequate for speech production  Quality:   Presbyphonic (Normal for age/premorbid functioning)  Intensity: Quiet    Volitional Swallow: DNT    Volitional Cough:   DNT        08811  dysphagia tx    David Harley M.S., CCC-SLP  Speech-Language Pathologist  [id number]  2023

## 2023-12-13 NOTE — PROGRESS NOTES
Physical Therapy Initial Assessment     Name: Milton Dietrich  : 1950  MRN: [id number]      Date of Service: 2023    Evaluating PT:  Samuel Ames PT, DPT [id number]    Room #:  6567/9169-L  Diagnosis:  Subarachnoid hemorrhage ([address]) [I60.9]  SAH (subarachnoid hemorrhage) ([address]) [I60.9]  Fall, initial encounter [W19. XXXA]  PMHx/PSHx:    Past Medical History:   Diagnosis Date    Anxiety     Corneal scarring     Fetal alcohol syndrome     Hypercholesteremia     Hypothyroidism     Onychomycosis      Procedure/Surgery:  none this admission  Reason for admission: Subarachnoid hemorrhage ([address]) [I60.9]  SAH (subarachnoid hemorrhage) ([address]) [I60.9]  Fall, initial encounter [W19. XXXA]  Precautions:  fall risk, cognition, TSM, incontinence, NG tube (feeding tube)   Equipment Needs:  TBD, FWW    SUBJECTIVE:  Pt unable to answer subjective questions, minimally verbal this session. Per previous notes pt lives in group home and required assist for ADLs. OBJECTIVE:   Initial Evaluation  Date: 23 Treatment Short Term/ Long Term   Goals   AM-PAC 6 Clicks 41/04     Was pt agreeable to Eval/treatment? Yes     Does pt have pain?  None noted      Bed Mobility  Rolling: NT  Supine to sit: NT  Sit to supine: NT  Scooting: NT  Rolling: sup  Supine to sit: sup  Sit to supine: sup  Scooting: sup   Transfers Sit to stand: CGA  Stand to sit: CGA  Stand pivot: CGA FWW  Sit to stand: sup  Stand to sit: sup  Stand pivot: sup FWW   Ambulation    40 feet with FWW CGA  150 feet with FWW sup   Stair negotiation: ascended and descended  NT  12 steps with one rail sup   ROM BUE:  Refer to OT eval   BLE:  WNL     Strength BUE:  Refer to OT eval   BLE:  unable to formally assess d/t poor command following   >4/5 grossly    Balance Sitting EOB:  NT  Dynamic Standing:  CGA FWW  Sitting EOB:  Sup  Dynamic Standing:  sup FWW     Pt is A & O x 1, follows one step commands occasionally  Sensation:  Pt denies numbness and tingling to

## 2023-12-13 NOTE — PROGRESS NOTES
Physical Therapy    PT evaluation orders received and chart review completed. Met with pt in room who refused participation in mobility despite cues and encouragement. Pt gripping blankets maximally not allowing this therapist to assist pt to mobilize. Will follow and re-attempt as appropriate. Thank you.     Red Polanco PT, DPT  [id number]

## 2023-12-13 NOTE — PROGRESS NOTES
4 Eyes Skin Assessment     NAME:  Leora Martinez  YOB: 1950  MEDICAL RECORD NUMBER:  [id number]    The patient is being assessed for  Admission    I agree that at least one RN has performed a thorough Head to Toe Skin Assessment on the patient. ALL assessment sites listed below have been assessed. Areas assessed by both nurses:    Head, Face, Ears, Shoulders, Back, Chest, Arms, Elbows, Hands, Sacrum. Buttock, Coccyx, Ischium, and Legs. Feet and Heels        Does the Patient have a Wound?  No noted wound(s)       Gamal Prevention initiated by RN: Yes  Wound Care Orders initiated by RN: No    Pressure Injury (Stage 3,4, Unstageable, DTI, NWPT, and Complex wounds) if present, place Wound referral order by RN under : No    New Ostomies, if present place, Ostomy referral order under : No     Nurse 1 eSignature: Electronically signed by Deyvi Vazquez RN on 12/13/23 at 6:13 AM EST    **SHARE this note so that the co-signing nurse can place an eSignature**    Nurse 2 eSignature: {Esignature:[id number]}

## 2023-12-13 NOTE — PROGRESS NOTES
[place] [address] [address], [place]      LLUR:                                                  Patient Name: Willi Live  MRN: [id number]  : 1950  Room: [address] [place] [address]    Evaluating OT: WILMA Medina, OTR/L  # [id number]    Referring Provider:  Jesse Vasquez DO   Specific Provider Orders:  \"OT Eval and Treat\"  23    Diagnosis: Subarachnoid hemorrhage ([address]) [I60.9]  SAH (subarachnoid hemorrhage) ([address]) [I60.9]  Fall, initial encounter [W19. XXXA]    Pt was admitted after falling at group home (+) [facility]    Pertinent Medical History:  Pt has a past medical history of Anxiety, Corneal scarring, Fetal alcohol syndrome, Hypercholesteremia, Hypothyroidism, and Onychomycosis. ,  has a past surgical history that includes Hip fracture surgery (Left, 2023).     Surgeries this admission: None     Precautions:  Fall Risk  Cognition - Hx of Developmental Delay  , (+) Alarms  NPO - pending Swallow assessment  NG Tube  External catheter    Assessment of current deficits   [x] Functional mobility  [x]ADLs  [x] Strength               [x]Cognition   [x] Functional transfers   [x] IADLs         [x] Safety Awareness   [x]Endurance   [x] Fine Coordination              [x] Balance     [] Vision/perception   []Sensation    [x]Gross Motor Coordination  [] ROM  [] Delirium                  [] Motor Control       OT PLAN OF CARE   OT POC based on physician orders, patient diagnosis and results of clinical assessment    Frequency/Duration 1-3 days/wk for 2 weeks PRN   Specific OT Treatment to include:   * Instruction/training on adapted ADL techniques and AE recommendations to increase functional independence within precautions       * Training on energy conservation strategies, correct breathing pattern and techniques to improve independence/tolerance for self-care routine  * Functional able to state her name only   Able to Follow Simple Commands w/ Mod -Max VCs/encouragement   Memory:  poor   Sequencing:  poor(+)   Problem solving:  poor(+)   Judgement/safety:  poor(+)  Additional Comments:  Pt was pleasant and cooperative w/ Moderate Encouragement    Vitals/Lab Values:  WFL Room air         Functional Assessment:  AM-PAC Daily Activity Raw Score: 13/24     Initial Eval Status  Date: 12-13-23   Treatment Status  Date: STGs = LTGs  Time frame: 10-14 days   Feeding NPO currently      Set up - pending assessment   Grooming Mod A/Set up  Mod VCs    Light ax seated in chair - much caution r/t NG Tube  Unsafe to ambulate into bathroom w/ Lines/Tubes/ 2 IV Poles, limited room in environment    SUP/set up  Standing at the Sink   UB Dressing Mod A/set up  Max VCs    Donning/doffing gown seated in chair    Min A/set up     LB Dressing Max A/Set up  Max VCs    Simulated for safety - sitting/standing  Pt ed for safe/adaptive techs    Mod A/Set up     Bathing Max A/Set up  Max VCs    Simulated sponge-bathing    Mod A/Set up      Toileting Max A/Set up  Max VCs    Use of Bed-hennessy on chair  Max A for Hygiene/simulated clothing adjustment w/ Min A for safety w/ standing balance during task    Mod A     Bed Mobility  Supine to sit: Min A   Sit to supine:  Min A     Mod VCs for safety    Supine to sit: SUP  Sit to supine: SUP     Functional Transfers Min A    EOB 2x, Chair 2x  Pt ed for safety/hand placement    SUP     Functional Mobility Mod A w/ [employer]    Insisted on carrying purse  Min A for balance + Min A for walker mgmt, Mod VCs for safety  Household distance in room, hallway  Pt ed for safety/improved safety awareness, walker safety    SUP w/ [employer]     Balance Sitting:     Static:  Close SUP    Dynamic:  Close SUP w/ functional ax      Standing:     Static:  Min A w/ [employer]    Dynamic:  Min A w/ functional ax/mobility w/ [employer]    Sitting:     Static:  IND    Dynamic:  IND w/ functional ax    Standing:     Static:  SUP w/

## 2023-12-13 NOTE — DISCHARGE INSTRUCTIONS
TRAUMA SERVICES DISCHARGE INSTRUCTIONS    Call [phone], option 2, for any questions/concerns and for follow-up appointment in 1 week(s). Please follow the instructions checked below:      ACTIVITY INSTRUCTIONS  Increase activity as tolerated  No heavy lifting or strenuous activity  Take your incentive spirometer home and use 4-6 times/day   [x]  No driving until cleared by trauma clinic      MEDICATION INSTRUCTIONS  Take medication as prescribed. When taking pain medications, you may experience dizziness or drowsiness. Do not drink alcohol or drive when taking these medications. You may experience constipation while taking pain medication. You may take over the counter stool softeners such as docusate (Colace), sennosides S (Senokot-S), or Miralax. [x]  You may take Ibuprofen (over the counter) as directed for mild pain. --You may take up to 800mg every 8 hours for pain, please take with food or milk. [x]  You may take acetaminophen (Tylenol) products. Do NOT take more than 4000mg of Tylenol in 24h. [x]  Do not take any other acetaminophen (Tylenol) products if you are taking Percocet or Norco, as these contain Tylenol. --Do NOT take more than 4000mg of Tylenol in 24h. OPIOID MEDICATION INSTRUCTIONS  Read the medication guide that is included with your prescription. Take your medication exactly as prescribed. Store medication away from children and in a safe place. Do NOT share your medication with others. Do NOT take medication unless it is prescribed for you. Do NOT drink alcohol while taking opioids (I.e., Norco, Percocet, Oxycodone, etc). Discuss with the Trauma Clinic staff if the dose of medication you are taking does not control your pain and any side effects that you may be having. CALL 911 OR YOUR LOCAL EMERGENCY SERVICE:  --If you take too much medication  --If you have trouble breathing or shortness of breath  --A child has taken this medication.     WORK:  You may not return to work until you receive follow-up with the [address] [place] [address] or clearance by all consultants. Call the trauma clinic for any of the following or for questions/concerns;  --fever over 101F  --redness, swelling, hardness or warmth at the wound site(s). --Unrelieved nausea/vomiting  --Foul smelling or cloudy drainage at the wound site(s)  --Unrelieved pain or increase in pain  --Increase in shortness of breath    Follow-up:  Trauma Clinic: [phone] option 387 [facility], [facility]      [employer] is a secure online portal that allows you to access your electronic medical record and send messages to your doctor directly without going to the clinic or picking up the phone. You can also access your test results, communicate with your doctor, pay online, manage your appointments, and request prescription refills. To sign up for [employer], please scan the QR code below. SPECIAL CONSIDERATIONS FOR OUR PATIENTS OVER THE AGE OF 65Y    Getting around your home safely can be a challenge if you have injuries or health problems that make it easy for you to fall. Loose rugs and furniture in walkways are among the dangers for many older people who have problems walking or who have poor eyesight. People who have conditions such as arthritis, osteoporosis, or dementia also must be careful not to fall. You can make your home safer with a few simple measures. Follow-up care is a key part of your treatment and safety. Be sure to make and go to all appointments, and call your doctor or nurse call line if you are having problems. It's also a good idea to know your test results and keep a list of the medicines you take. How can you care for yourself at home? Taking care of yourself  You may get dizzy if you do not drink enough water.  To prevent dehydration, drink plenty of fluids, enough so that your urine is light yellow or clear like

## 2023-12-14 ENCOUNTER — APPOINTMENT (OUTPATIENT)
Dept: GENERAL RADIOLOGY | Age: 73
End: 2023-12-14
Payer: MEDICARE

## 2023-12-14 LAB
ANION GAP SERPL CALCULATED.3IONS-SCNC: 12 MMOL/L (ref 7–16)
BASOPHILS # BLD: 0 K/UL (ref 0–0.2)
BASOPHILS NFR BLD: 0 % (ref 0–2)
BUN SERPL-MCNC: 11 MG/DL (ref 6–23)
CALCIUM SERPL-MCNC: 8.4 MG/DL (ref 8.6–10.2)
CHLORIDE SERPL-SCNC: 104 MMOL/L (ref 98–107)
CO2 SERPL-SCNC: 24 MMOL/L (ref 22–29)
CREAT SERPL-MCNC: 0.6 MG/DL (ref 0.5–1)
EOSINOPHIL # BLD: 0.14 K/UL (ref 0.05–0.5)
EOSINOPHILS RELATIVE PERCENT: 3 % (ref 0–6)
ERYTHROCYTE [DISTWIDTH] IN BLOOD BY AUTOMATED COUNT: 14.9 % (ref 11.5–15)
GFR SERPL CREATININE-BSD FRML MDRD: >60 ML/MIN/1.73M2
GLUCOSE SERPL-MCNC: 124 MG/DL (ref 74–99)
HCT VFR BLD AUTO: 31.2 % (ref 34–48)
HGB BLD-MCNC: 10.1 G/DL (ref 11.5–15.5)
LYMPHOCYTES NFR BLD: 1.66 K/UL (ref 1.5–4)
LYMPHOCYTES RELATIVE PERCENT: 31 % (ref 20–42)
MCH RBC QN AUTO: 34.4 PG (ref 26–35)
MCHC RBC AUTO-ENTMCNC: 32.4 G/DL (ref 32–34.5)
MCV RBC AUTO: 106.1 FL (ref 80–99.9)
MONOCYTES NFR BLD: 0.47 K/UL (ref 0.1–0.95)
MONOCYTES NFR BLD: 9 % (ref 2–12)
NEUTROPHILS NFR BLD: 58 % (ref 43–80)
NEUTS SEG NFR BLD: 3.13 K/UL (ref 1.8–7.3)
NUCLEATED RED BLOOD CELLS: 2 PER 100 WBC
PLATELET # BLD AUTO: 78 K/UL (ref 130–450)
PLATELET CONFIRMATION: NORMAL
PMV BLD AUTO: 9.9 FL (ref 7–12)
POTASSIUM SERPL-SCNC: 3.9 MMOL/L (ref 3.5–5)
RBC # BLD AUTO: 2.94 M/UL (ref 3.5–5.5)
RBC # BLD: ABNORMAL 10*6/UL
SODIUM SERPL-SCNC: 140 MMOL/L (ref 132–146)
WBC OTHER # BLD: 5.8 K/UL (ref 4.5–11.5)

## 2023-12-14 PROCEDURE — 6370000000 HC RX 637 (ALT 250 FOR IP)

## 2023-12-14 PROCEDURE — 2060000000 HC ICU INTERMEDIATE R&B

## 2023-12-14 PROCEDURE — 92611 MOTION FLUOROSCOPY/SWALLOW: CPT | Performed by: SPEECH-LANGUAGE PATHOLOGIST

## 2023-12-14 PROCEDURE — 6360000002 HC RX W HCPCS: Performed by: SURGERY

## 2023-12-14 PROCEDURE — 80048 BASIC METABOLIC PNL TOTAL CA: CPT

## 2023-12-14 PROCEDURE — 36415 COLL VENOUS BLD VENIPUNCTURE: CPT

## 2023-12-14 PROCEDURE — 85025 COMPLETE CBC W/AUTO DIFF WBC: CPT

## 2023-12-14 PROCEDURE — 2580000003 HC RX 258

## 2023-12-14 PROCEDURE — 74230 X-RAY XM SWLNG FUNCJ C+: CPT

## 2023-12-14 RX ORDER — LEVETIRACETAM 100 MG/ML
500 SOLUTION ORAL 2 TIMES DAILY
Status: DISCONTINUED | OUTPATIENT
Start: 2023-12-14 | End: 2023-12-15 | Stop reason: HOSPADM

## 2023-12-14 RX ORDER — ENOXAPARIN SODIUM 100 MG/ML
30 INJECTION SUBCUTANEOUS 2 TIMES DAILY
Status: DISCONTINUED | OUTPATIENT
Start: 2023-12-14 | End: 2023-12-15 | Stop reason: HOSPADM

## 2023-12-14 RX ADMIN — LEVETIRACETAM 500 MG: 100 SOLUTION ORAL at 09:49

## 2023-12-14 RX ADMIN — EZETIMIBE 10 MG: 10 TABLET ORAL at 12:02

## 2023-12-14 RX ADMIN — SODIUM CHLORIDE, PRESERVATIVE FREE 10 ML: 5 INJECTION INTRAVENOUS at 09:00

## 2023-12-14 RX ADMIN — ENOXAPARIN SODIUM 30 MG: 100 INJECTION SUBCUTANEOUS at 12:00

## 2023-12-14 RX ADMIN — LEVOTHYROXINE SODIUM 100 MCG: 0.05 TABLET ORAL at 05:22

## 2023-12-14 RX ADMIN — SENNOSIDES 8.6 MG: 8.6 TABLET, FILM COATED ORAL at 09:49

## 2023-12-14 RX ADMIN — PANTOPRAZOLE SODIUM 40 MG: 40 TABLET, DELAYED RELEASE ORAL at 18:48

## 2023-12-14 ASSESSMENT — PAIN SCALES - GENERAL: PAINLEVEL_OUTOF10: 0

## 2023-12-14 NOTE — CARE COORDINATION
, Pedro Loya called back this am. Patient was ambulatory with no assistance prior to admission, staff did assist her at times with dressing, bathing, grooming. They will provide transportation as long as patient is ambulatory when stable for discharge. Patient just released from [facility], would like to use them again if homecare needed at discharge. Discussed with her that patient failed BSS and MBSS is pending. She stated if patient unable to tolerate oral diet at discharge will need TRACY. Patient does have a walker at home, she refuses to use it for staff. No steps to enter her home. Patient on minced and moist and thin liquids at group home prior to admission. Will follow up after barium swallow today. 1218 Barium swallow completed, nectar thick liquids/pureed. Spoke with guardian, she feels group home will be able to manage diet plans for return. Discussed homecare, she does not have a preference deferred to . [facility], they do not have availability at this time and do not have a speech therapist. Message left with Luo Carton to see if she has alternate preference and to also update her on swallow results. [place] with , [facility], no preference on homecare. Referral to [facility] for PT/OT and ST. Also discussed with manager that patient will need nectar thick liquids and pureed diet at discharge. Call [facility] when released for transport home, she will need few hours notice to arrange transport [phone]. Will need homecare orders prior to discharge. [facility] called back and accepted, will need homecare orders for PT/OT/Speech prior to discharge. For questions I can be reached at [phone].  [address], [place]    The Plan for Transition of Care is related to the following treatment goals: discharge planning when stable     The Patient and/or patient representative Pedro Loya was provided with a choice of provider and agrees with the discharge plan. [x] Yes [] No    Freedom of choice list was provided with basic dialogue that supports the patient's individualized plan of care/goals, treatment preferences and shares the quality data associated with the providers.  [x] Yes [] No

## 2023-12-14 NOTE — PROGRESS NOTES
Continuous; 10; Yes; 15; Q 4 hours; 35; 30; Q 4 hours    PROCEDURE:  Consistencies Administered During the Evaluation   Liquids: thin liquid and nectar thick liquid   Solids:  pureed foods      Method of Intake:   cup, spoon  Self fed, Fed by clinician      Position:   Sitting in the [facility] chair with head elevated above 75 degrees    INSTRUMENTAL ASSESSMENT:    ORAL PREP/ ORAL PHASE:    Delayed A-P transit due to: reduced lingual strength  and cognitive function   Lingual pumping present  Decreased bolus formation resulting in observed premature pharyngeal spillage     PHARYNGEAL PHASE:     ONSET TIME       Delayed initiation of the pharyngeal swallow was noted with swallow reflex triggered at the level of the pyriform sinus        PHARYNGEAL RESIDUALS        Vallecula/Pharyngeal Wall           No significant residuals were noted in the vallecula      Pyriform Sinuses      No significant residuals were noted in the pyriform sinuses     LARYNGEAL PENETRATION   Laryngeal penetration occurred in the absence of aspiration DURING the swallow for thin liquid due to  delayed laryngeal closure which penetrated deep into the laryngeal vestibule (to the level of the true vocal folds).  Laryngeal penetration was moderate-severe and occurred consistently   an absent cough/throat clear was noted    ASPIRATION    Aspiration  was not present during this evaluation however there is high risk for aspiration of thin liquid  due to laryngeal penetration    PENETRATION-ASPIRATION SCALE (PAS):  THIN 5 = Material enters the airway, contacts the vocal folds, and is not ejected from the airway  MILDLY THICK 1 = Material does not enter the airway  MODERATELY THICK item not administered  PUREE 1 = Material does not enter the airway  HARD SOLID item not administered       COMPENSATORY STRATEGIES    Compensatory strategies that were beneficial included Thorough oral care to prevent colonization of oral bacteria , Upright in bed/ chair as tolerated, Fully alert for all PO, Small bites/sips, Small, SINGLE cup sips only, Alternate solids and liquids, Check for oral pocketing, and NO STRAW      STRUCTURAL/FUNCTIONAL ANOMALIES   No structural/functional anomalies were noted    CERVICAL ESOPHAGEAL STAGE :     The cervical esophagus appeared adequate          ___________    Cognition:   Within functional limits for this exam    Oral Peripheral Examination   Generalized oral weakness    Current Respiratory Status   room air     Parameters of Speech Production  Respiration:  Adequate for speech production  Quality:   Within functional limits  Intensity: Within functional limits    Pain: No pain reported. EDUCATION:   The Speech Language Pathologist (SLP) completed education regarding results of evaluation and that intervention is warranted at this time. Learner: Patient  Education: Reviewed results and recommendations of this evaluation, Reviewed diet and strategies, and Reviewed signs, symptoms and risks of aspiration  Evaluation of Education:  No evidence of learning    This plan may be re-evaluated and revised as warranted. Evaluation Time includes thorough review of current medical information, gathering information on past medical history/social history and prior level of function, completion of standardized testing/informal observation of tasks, assessment of data and education on plan of care and goals. [x]The admitting diagnosis and active problem list, have been reviewed prior to initiation of this evaluation.     CPT Code: 73499  dysphagia study    ACTIVE PROBLEM LIST:   Patient Active Problem List   Diagnosis    Acute hypoxemic respiratory failure ([address])    DARIO (acute kidney injury) ([address])    Pancytopenia (HCC)    Influenza    Nondisplaced fracture of neck of left femur (HCC)    Left hip pain    Hypothyroid    Closed fracture of neck of left femur (HCC)    Subarachnoid hemorrhage ([address])       Keith Foley M.S.,

## 2023-12-14 NOTE — PROGRESS NOTES
PCP is Karin Blankenship DO  Office notified of admission.       Electronically signed by Rodríguez Saleem RN on 12/14/2023 at 2:54 PM

## 2023-12-15 VITALS
HEART RATE: 68 BPM | RESPIRATION RATE: 18 BRPM | SYSTOLIC BLOOD PRESSURE: 113 MMHG | BODY MASS INDEX: 21.93 KG/M2 | DIASTOLIC BLOOD PRESSURE: 60 MMHG | WEIGHT: 131.61 LBS | TEMPERATURE: 98.3 F | HEIGHT: 65 IN | OXYGEN SATURATION: 98 %

## 2023-12-15 LAB
ANION GAP SERPL CALCULATED.3IONS-SCNC: 10 MMOL/L (ref 7–16)
BASOPHILS # BLD: 0.05 K/UL (ref 0–0.2)
BASOPHILS NFR BLD: 1 % (ref 0–2)
BUN SERPL-MCNC: 11 MG/DL (ref 6–23)
CALCIUM SERPL-MCNC: 9 MG/DL (ref 8.6–10.2)
CHLORIDE SERPL-SCNC: 102 MMOL/L (ref 98–107)
CO2 SERPL-SCNC: 27 MMOL/L (ref 22–29)
CREAT SERPL-MCNC: 0.6 MG/DL (ref 0.5–1)
EOSINOPHIL # BLD: 0.18 K/UL (ref 0.05–0.5)
EOSINOPHILS RELATIVE PERCENT: 3 % (ref 0–6)
ERYTHROCYTE [DISTWIDTH] IN BLOOD BY AUTOMATED COUNT: 15.2 % (ref 11.5–15)
GFR SERPL CREATININE-BSD FRML MDRD: >60 ML/MIN/1.73M2
GLUCOSE SERPL-MCNC: 94 MG/DL (ref 74–99)
HCT VFR BLD AUTO: 35 % (ref 34–48)
HGB BLD-MCNC: 11.3 G/DL (ref 11.5–15.5)
IMM GRANULOCYTES # BLD AUTO: <0.03 K/UL (ref 0–0.58)
IMM GRANULOCYTES NFR BLD: 0 % (ref 0–5)
LYMPHOCYTES NFR BLD: 2.88 K/UL (ref 1.5–4)
LYMPHOCYTES RELATIVE PERCENT: 50 % (ref 20–42)
MCH RBC QN AUTO: 34.6 PG (ref 26–35)
MCHC RBC AUTO-ENTMCNC: 32.3 G/DL (ref 32–34.5)
MCV RBC AUTO: 107 FL (ref 80–99.9)
MONOCYTES NFR BLD: 0.57 K/UL (ref 0.1–0.95)
MONOCYTES NFR BLD: 10 % (ref 2–12)
NEUTROPHILS NFR BLD: 36 % (ref 43–80)
NEUTS SEG NFR BLD: 2.03 K/UL (ref 1.8–7.3)
PLATELET # BLD AUTO: 92 K/UL (ref 130–450)
PLATELET CONFIRMATION: NORMAL
PMV BLD AUTO: 10 FL (ref 7–12)
POTASSIUM SERPL-SCNC: 4.1 MMOL/L (ref 3.5–5)
RBC # BLD AUTO: 3.27 M/UL (ref 3.5–5.5)
RBC # BLD: ABNORMAL 10*6/UL
SODIUM SERPL-SCNC: 139 MMOL/L (ref 132–146)
WBC OTHER # BLD: 5.7 K/UL (ref 4.5–11.5)

## 2023-12-15 PROCEDURE — 6370000000 HC RX 637 (ALT 250 FOR IP)

## 2023-12-15 PROCEDURE — 36415 COLL VENOUS BLD VENIPUNCTURE: CPT

## 2023-12-15 PROCEDURE — 80048 BASIC METABOLIC PNL TOTAL CA: CPT

## 2023-12-15 PROCEDURE — 2580000003 HC RX 258

## 2023-12-15 PROCEDURE — 6360000002 HC RX W HCPCS: Performed by: SURGERY

## 2023-12-15 PROCEDURE — 6370000000 HC RX 637 (ALT 250 FOR IP): Performed by: SURGERY

## 2023-12-15 PROCEDURE — 85025 COMPLETE CBC W/AUTO DIFF WBC: CPT

## 2023-12-15 RX ORDER — LEVETIRACETAM 500 MG/1
500 TABLET ORAL 2 TIMES DAILY
Qty: 10 TABLET | Refills: 0 | Status: SHIPPED | OUTPATIENT
Start: 2023-12-15 | End: 2023-12-20

## 2023-12-15 RX ADMIN — ENOXAPARIN SODIUM 30 MG: 100 INJECTION SUBCUTANEOUS at 09:21

## 2023-12-15 RX ADMIN — SENNOSIDES 8.6 MG: 8.6 TABLET, FILM COATED ORAL at 09:21

## 2023-12-15 RX ADMIN — EZETIMIBE 10 MG: 10 TABLET ORAL at 09:24

## 2023-12-15 RX ADMIN — SODIUM CHLORIDE, PRESERVATIVE FREE 10 ML: 5 INJECTION INTRAVENOUS at 10:51

## 2023-12-15 RX ADMIN — ERGOCALCIFEROL 50000 UNITS: 1.25 CAPSULE ORAL at 09:24

## 2023-12-15 RX ADMIN — LEVETIRACETAM 500 MG: 100 SOLUTION ORAL at 09:22

## 2023-12-15 RX ADMIN — DOCUSATE SODIUM 100 MG: 100 CAPSULE, LIQUID FILLED ORAL at 09:21

## 2023-12-15 NOTE — DISCHARGE SUMMARY
Date: 12/12/2023  EXAMINATION: ONE XRAY VIEW OF THE CHEST 12/11/2023 11:57 pm COMPARISON: February 7, 2020 HISTORY: ORDERING SYSTEM PROVIDED HISTORY: pain TECHNOLOGIST PROVIDED HISTORY: Reason for exam:->pain What reading provider will be dictating this exam?->CRC FINDINGS: Mild cardiomegaly. Lungs clear. No pneumothorax or effusion. Body wall soft tissues unremarkable. Osseous thorax intact. No acute disease. RECOMMENDATION: Careful clinical correlation and follow up recommended. CT THORACIC SPINE WO CONTRAST    Result Date: 12/11/2023  EXAMINATION: CT OF THE THORACIC SPINE WITHOUT CONTRAST  12/11/2023 9:25 pm: TECHNIQUE: CT of the thoracic spine was performed without the administration of intravenous contrast. Multiplanar reformatted images are provided for review. Automated exposure control, iterative reconstruction, and/or weight based adjustment of the mA/kV was utilized to reduce the radiation dose to as low as reasonably achievable. COMPARISON: None. HISTORY: ORDERING SYSTEM PROVIDED HISTORY: fall TECHNOLOGIST PROVIDED HISTORY: Reason for exam:->fall What reading provider will be dictating this exam?->CRC FINDINGS: BONES/ALIGNMENT: Osteoporosis with prior compression deformities. No traumatic malalignment or acute fracture is identified. Mild scoliosis DEGENERATIVE CHANGES: No gross spinal canal stenosis or bony neural foraminal narrowing of the thoracic spine. SOFT TISSUES: No paraspinal mass is seen. No acute fracture is identified. CT LUMBAR SPINE WO CONTRAST    Result Date: 12/11/2023  EXAMINATION: CT OF THE LUMBAR SPINE WITHOUT CONTRAST  12/11/2023 TECHNIQUE: CT of the lumbar spine was performed without the administration of intravenous contrast. Multiplanar reformatted images are provided for review. Adjustment of mA and/or kV according to patient size was utilized.   Automated exposure control, iterative reconstruction, and/or weight based adjustment of the mA/kV was utilized to balance. Sleeping pills or sedatives can affect your balance. Limit the amount of alcohol you drink. Alcohol can impair your balance and other senses. Ask your doctor whether calluses or corns on your feet need to be removed. If you wear loose-fitting shoes because of calluses or corns, you can lose your balance and fall. Talk to your doctor if you have numbness in your feet. Preventing falls at home  Remove raised doorway thresholds, throw rugs, and clutter. Repair loose carpet or raised areas in the floor. Move furniture and electrical cords to keep them out of walking paths. Use non-skid floor wax, and wipe up spills right away, especially on ceramic tile floors. If you use a walker or cane, put rubber tips on it. If you use crutches, clean the bottoms of them regularly with an abrasive pad, such as steel wool. Keep your house well lit, especially stairways, porches, and outside walkways. Use night-lights in areas such as hallways and washrooms. Add extra light switches or use remote switches (such as switches that go on or off when you clap your hands) to make it easier to turn lights on if you have to get up during the night. Install sturdy handrails on stairways. Move items in your cabinets so that the things you use a lot are on the lower shelves (about waist level). Keep a cordless phone and a flashlight with new batteries by your bed. If possible, put a phone in each of the main rooms of your house, or carry a cell phone in case you fall and cannot reach a phone. Or, you can wear a device around your neck or wrist. You push a button that sends a signal for help. Wear low-heeled shoes that fit well and give your feet good support. Use footwear with non-skid soles. Check the heels and soles of your shoes for wear. Repair or replace worn heels or soles. Do not wear socks without shoes on wood floors. Walk on the grass when the sidewalks are slippery.  If you live in an area that gets snow and

## 2023-12-15 NOTE — CARE COORDINATION
Care Coordination:   Discharge order noted. Spoke to CAROLYNN Dugan at [facility]. She will arrange transport and call us back. She requested nurse to nurse to her  phone   [phone]. She also requests any new medications be  sent to their [facility]  [phone]. Home care orders are in. [facility]  notified of discharge . Guardian informed of discharge.

## 2023-12-16 ENCOUNTER — APPOINTMENT (OUTPATIENT)
Dept: CT IMAGING | Age: 73
End: 2023-12-16
Payer: MEDICARE

## 2023-12-16 ENCOUNTER — APPOINTMENT (OUTPATIENT)
Dept: GENERAL RADIOLOGY | Age: 73
End: 2023-12-16
Payer: MEDICARE

## 2023-12-16 ENCOUNTER — HOSPITAL ENCOUNTER (EMERGENCY)
Age: 73
Discharge: HOME | End: 2023-12-16
Attending: EMERGENCY MEDICINE
Payer: MEDICARE

## 2023-12-16 VITALS
WEIGHT: 131.61 LBS | DIASTOLIC BLOOD PRESSURE: 89 MMHG | TEMPERATURE: 97.6 F | BODY MASS INDEX: 21.93 KG/M2 | SYSTOLIC BLOOD PRESSURE: 112 MMHG | HEIGHT: 65 IN | OXYGEN SATURATION: 97 % | RESPIRATION RATE: 16 BRPM | HEART RATE: 72 BPM

## 2023-12-16 DIAGNOSIS — S80.01XA CONTUSION OF RIGHT KNEE, INITIAL ENCOUNTER: ICD-10-CM
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
## 2023-12-16 DIAGNOSIS — H10.33 ACUTE BACTERIAL CONJUNCTIVITIS OF BOTH EYES: ICD-10-CM

## 2023-12-16 PROCEDURE — 6370000000 HC RX 637 (ALT 250 FOR IP): Performed by: EMERGENCY MEDICINE

## 2023-12-16 PROCEDURE — 72170 X-RAY EXAM OF PELVIS: CPT

## 2023-12-16 PROCEDURE — 71045 X-RAY EXAM CHEST 1 VIEW: CPT

## 2023-12-16 PROCEDURE — 70450 CT HEAD/BRAIN W/O DYE: CPT

## 2023-12-16 PROCEDURE — 72125 CT NECK SPINE W/O DYE: CPT

## 2023-12-16 PROCEDURE — 73562 X-RAY EXAM OF KNEE 3: CPT

## 2023-12-16 RX ORDER — ERYTHROMYCIN 5 MG/G
OINTMENT OPHTHALMIC EVERY 6 HOURS SCHEDULED
Status: DISCONTINUED | OUTPATIENT
Start: 2023-12-16 | End: 2023-12-16 | Stop reason: HOSPADM

## 2023-12-16 RX ORDER — ERYTHROMYCIN 5 MG/G
1 OINTMENT OPHTHALMIC NIGHTLY
Qty: 1 EACH | Refills: 0 | Status: SHIPPED | OUTPATIENT
Start: 2023-12-16

## 2023-12-16 RX ADMIN — ERYTHROMYCIN: 5 OINTMENT OPHTHALMIC at 11:38

## 2023-12-16 ASSESSMENT — PAIN - FUNCTIONAL ASSESSMENT: PAIN_FUNCTIONAL_ASSESSMENT: NONE - DENIES PAIN

## 2023-12-16 NOTE — ED NOTES
Ongoing SW/CM Assessment/Plan of Care Note     See SW/CM flowsheets for goals and other objective data.    Patient/Family discharge goal (s):  Goal #1: Psychosocial needs assessed  Goal #2: Extended Care Facility discharge arranged  Goal #3: Transportation arranged or issues addressed    PT Recommendation:          OT Recommendation:  Recommendation for Discharge Support: OT WI: Intermittent assist daily       SLP Recommendation:       Disposition:       Progress note:   SW following for discharge planning. Pt accepted to [facility]. [facility] accepted pt as well. Per chart review pt had status chance and is having CT done this date. Anticipate discharge by next week to [facility] vs [facility]. SW to follow and assist with discharge planning as medically appropriate.          Discharge instructions given. Patient verbalizes understanding. No other noted or stated problems at this time. Patient will follow up with primary care.      Jeanmarie Gutierres RN  12/16/23 [id number]

## 2024-01-10 ENCOUNTER — TELEPHONE (OUTPATIENT)
Dept: NEUROSURGERY | Age: 74
End: 2024-01-10

## 2024-01-10 DIAGNOSIS — I60.9 SUBARACHNOID HEMORRHAGE (HCC): Primary | ICD-10-CM

## 2024-01-10 NOTE — TELEPHONE ENCOUNTER
Imaging Order faxed to Callie Ray at [employer]; Documentation is scanned to patients chart with fax conf.

## 2024-01-18 ENCOUNTER — HOSPITAL ENCOUNTER (OUTPATIENT)
Dept: CT IMAGING | Age: 74
Discharge: HOME | End: 2024-01-20
Payer: MEDICARE

## 2024-01-18 DIAGNOSIS — I60.9 SUBARACHNOID HEMORRHAGE (HCC): ICD-10-CM

## 2024-01-18 PROCEDURE — 70450 CT HEAD/BRAIN W/O DYE: CPT

## 2024-04-22 ENCOUNTER — TELEPHONE (OUTPATIENT)
Dept: NEUROSURGERY | Age: 74
End: 2024-04-22

## 2024-05-06 ENCOUNTER — HOSPITAL ENCOUNTER (EMERGENCY)
Age: 74
Discharge: HOME | End: 2024-05-06
Attending: STUDENT IN AN ORGANIZED HEALTH CARE EDUCATION/TRAINING PROGRAM
Payer: MEDICARE

## 2024-05-06 ENCOUNTER — APPOINTMENT (OUTPATIENT)
Dept: CT IMAGING | Age: 74
End: 2024-05-06
Payer: MEDICARE

## 2024-05-06 VITALS
TEMPERATURE: 97.6 F | DIASTOLIC BLOOD PRESSURE: 59 MMHG | HEART RATE: 93 BPM | OXYGEN SATURATION: 98 % | SYSTOLIC BLOOD PRESSURE: 104 MMHG | RESPIRATION RATE: 20 BRPM

## 2024-05-06 DIAGNOSIS — R53.1 GENERALIZED WEAKNESS: Primary | ICD-10-CM

## 2024-05-06 LAB
ALBUMIN SERPL-MCNC: 3.5 G/DL (ref 3.5–5.2)
ALP SERPL-CCNC: 118 U/L (ref 35–104)
ALT SERPL-CCNC: 7 U/L (ref 0–32)
ANION GAP SERPL CALCULATED.3IONS-SCNC: 14 MMOL/L (ref 7–16)
AST SERPL-CCNC: 32 U/L (ref 0–31)
BASOPHILS # BLD: 0.03 K/UL (ref 0–0.2)
BASOPHILS NFR BLD: 1 % (ref 0–2)
BILIRUB SERPL-MCNC: 0.2 MG/DL (ref 0–1.2)
BILIRUB UR QL STRIP: NEGATIVE
BUN SERPL-MCNC: 9 MG/DL (ref 6–23)
CALCIUM SERPL-MCNC: 8.8 MG/DL (ref 8.6–10.2)
CHLORIDE SERPL-SCNC: 102 MMOL/L (ref 98–107)
CLARITY UR: CLEAR
CO2 SERPL-SCNC: 25 MMOL/L (ref 22–29)
COLOR UR: YELLOW
CREAT SERPL-MCNC: 0.8 MG/DL (ref 0.5–1)
EOSINOPHIL # BLD: 0.12 K/UL (ref 0.05–0.5)
EOSINOPHILS RELATIVE PERCENT: 3 % (ref 0–6)
ERYTHROCYTE [DISTWIDTH] IN BLOOD BY AUTOMATED COUNT: 14.8 % (ref 11.5–15)
GFR, ESTIMATED: 84 ML/MIN/1.73M2
GLUCOSE SERPL-MCNC: 95 MG/DL (ref 74–99)
GLUCOSE UR STRIP-MCNC: NEGATIVE MG/DL
HCT VFR BLD AUTO: 30.9 % (ref 34–48)
HGB BLD-MCNC: 10.9 G/DL (ref 11.5–15.5)
HGB UR QL STRIP.AUTO: NEGATIVE
IMM GRANULOCYTES # BLD AUTO: <0.03 K/UL (ref 0–0.58)
IMM GRANULOCYTES NFR BLD: 0 % (ref 0–5)
KETONES UR STRIP-MCNC: NEGATIVE MG/DL
LEUKOCYTE ESTERASE UR QL STRIP: NEGATIVE
LYMPHOCYTES NFR BLD: 2.3 K/UL (ref 1.5–4)
LYMPHOCYTES RELATIVE PERCENT: 55 % (ref 20–42)
MCH RBC QN AUTO: 39.6 PG (ref 26–35)
MCHC RBC AUTO-ENTMCNC: 35.3 G/DL (ref 32–34.5)
MCV RBC AUTO: 112.4 FL (ref 80–99.9)
MONOCYTES NFR BLD: 0.48 K/UL (ref 0.1–0.95)
MONOCYTES NFR BLD: 11 % (ref 2–12)
NEUTROPHILS NFR BLD: 30 % (ref 43–80)
NEUTS SEG NFR BLD: 1.27 K/UL (ref 1.8–7.3)
NITRITE UR QL STRIP: NEGATIVE
PH UR STRIP: 7 [PH] (ref 5–9)
PLATELET # BLD AUTO: 82 K/UL (ref 130–450)
PLATELET CONFIRMATION: NORMAL
PMV BLD AUTO: 10.6 FL (ref 7–12)
POTASSIUM SERPL-SCNC: 4 MMOL/L (ref 3.5–5)
PROT SERPL-MCNC: 5.9 G/DL (ref 6.4–8.3)
PROT UR STRIP-MCNC: NEGATIVE MG/DL
RBC # BLD AUTO: 2.75 M/UL (ref 3.5–5.5)
SP GR UR STRIP: 1.01 (ref 1–1.03)
UROBILINOGEN UR STRIP-ACNC: 1 EU/DL (ref 0–1)
WBC #/AREA URNS HPF: NORMAL /HPF
WBC OTHER # BLD: 4.2 K/UL (ref 4.5–11.5)

## 2024-05-06 PROCEDURE — 80053 COMPREHEN METABOLIC PANEL: CPT

## 2024-05-06 PROCEDURE — 81001 URINALYSIS AUTO W/SCOPE: CPT

## 2024-05-06 PROCEDURE — 99284 EMERGENCY DEPT VISIT MOD MDM: CPT

## 2024-05-06 PROCEDURE — 70450 CT HEAD/BRAIN W/O DYE: CPT

## 2024-05-06 PROCEDURE — 93005 ELECTROCARDIOGRAM TRACING: CPT

## 2024-05-06 PROCEDURE — 85025 COMPLETE CBC W/AUTO DIFF WBC: CPT

## 2024-05-06 PROCEDURE — 2580000003 HC RX 258

## 2024-05-06 RX ORDER — 0.9 % SODIUM CHLORIDE 0.9 %
500 INTRAVENOUS SOLUTION INTRAVENOUS ONCE
Status: COMPLETED | OUTPATIENT
Start: 2024-05-06 | End: 2024-05-06

## 2024-05-06 RX ADMIN — SODIUM CHLORIDE 500 ML: 9 INJECTION, SOLUTION INTRAVENOUS at 11:00

## 2024-05-06 NOTE — DISCHARGE INSTRUCTIONS
Please call and follow-up with family physician as soon as possible and return to emergency department if symptoms persist or worsen

## 2024-05-06 NOTE — ED PROVIDER NOTES
35.3 (*)     Platelets 82 (*)     All other components within normal limits   COMPREHENSIVE METABOLIC PANEL - Abnormal; Notable for the following components:    Total Protein 5.9 (*)     Alkaline Phosphatase 118 (*)     AST 32 (*)     All other components within normal limits   URINALYSIS WITH MICROSCOPIC   PLATELET CONFIRMATION       As interpreted by me, the above displayed labs are abnormal. All other labs obtained during this visit were within normal range or not returned as of this dictation.    EKG Interpretation  Interpreted by emergency department resident physician, Yovani Gonzalez DO  *Please see ED Course for EKG interpretation*    RADIOLOGY:   Interpretation per the Radiologist below, if available at the time of this note:    CT Head W/O Contrast   Final Result   Stable atrophy and chronic changes seen within the brain with no acute   intracranial abnormality.           No results found.    No results found.    PROCEDURES   Unless otherwise noted below, none    EMERGENCY DEPARTMENT COURSE    Vitals:    Vitals:    05/06/24 1000 05/06/24 1010   BP: (!) 76/67 (!) 104/59   Pulse: 93    Resp: 20    Temp: 97.6 °F (36.4 °C)    TempSrc: Temporal    SpO2: 98%        Patient was given the following medications:  Medications   sodium chloride 0.9 % bolus 500 mL (500 mLs IntraVENous New Bag 5/6/24 1100)       Medical Decision Making/Differential Diagnosis:  CC/HPI Summary, Social Determinants of health, Records Reviewed, DDx, testing done/not done, ED Course, Reassessment, disposition considerations/shared decision making with patient, consults, disposition:      CC/HPI Summary, DDx, ED Course, Reassessment, Tests Considered, Patient expectation:   74-year-old female presenting to emergency department chief complaint of increased weakness.  On arrival due to patient's mildly soft blood pressure patient given 500 cc of normal saline.  Patient's blood pressure was retaken prior to diet has improved.  CBC with mild

## 2024-05-07 LAB
EKG ATRIAL RATE: 114 BPM
EKG P AXIS: 64 DEGREES
EKG P-R INTERVAL: 158 MS
EKG Q-T INTERVAL: 332 MS
EKG QRS DURATION: 106 MS
EKG QTC CALCULATION (BAZETT): 457 MS
EKG R AXIS: 19 DEGREES
EKG T AXIS: 8 DEGREES
EKG VENTRICULAR RATE: 114 BPM

## 2024-05-07 PROCEDURE — 93010 ELECTROCARDIOGRAM REPORT: CPT | Performed by: INTERNAL MEDICINE

## 2025-03-19 ENCOUNTER — APPOINTMENT (OUTPATIENT)
Dept: GENERAL RADIOLOGY | Age: 75
DRG: 871 | End: 2025-03-19
Payer: MEDICARE

## 2025-03-19 ENCOUNTER — HOSPITAL ENCOUNTER (INPATIENT)
Age: 75
LOS: 8 days | Discharge: HOME | DRG: 871 | End: 2025-03-28
Attending: EMERGENCY MEDICINE | Admitting: HOSPITALIST
Payer: MEDICARE

## 2025-03-19 ENCOUNTER — APPOINTMENT (OUTPATIENT)
Dept: CT IMAGING | Age: 75
DRG: 871 | End: 2025-03-19
Payer: MEDICARE

## 2025-03-19 DIAGNOSIS — R41.82 ALTERED MENTAL STATUS, UNSPECIFIED ALTERED MENTAL STATUS TYPE: ICD-10-CM

## 2025-03-19 DIAGNOSIS — A41.9 SEPTIC SHOCK (HCC): Primary | ICD-10-CM

## 2025-03-19 DIAGNOSIS — R65.21 SEPTIC SHOCK (HCC): Primary | ICD-10-CM

## 2025-03-19 DIAGNOSIS — R01.1 HEART MURMUR: ICD-10-CM

## 2025-03-19 DIAGNOSIS — J18.9 PNEUMONIA OF LEFT LOWER LOBE DUE TO INFECTIOUS ORGANISM: ICD-10-CM

## 2025-03-19 DIAGNOSIS — J96.01 ACUTE HYPOXEMIC RESPIRATORY FAILURE: ICD-10-CM

## 2025-03-19 DIAGNOSIS — N17.9 ACUTE KIDNEY INJURY: ICD-10-CM

## 2025-03-19 LAB
ALBUMIN SERPL-MCNC: 3.1 G/DL (ref 3.5–5.2)
ALP SERPL-CCNC: 72 U/L (ref 35–104)
ALT SERPL-CCNC: 38 U/L (ref 0–32)
ANION GAP SERPL CALCULATED.3IONS-SCNC: 16 MMOL/L (ref 7–16)
AST SERPL-CCNC: 61 U/L (ref 0–31)
ATYPICAL LYMPHOCYTE ABSOLUTE COUNT: 0.07 K/UL (ref 0–0.46)
ATYPICAL LYMPHOCYTES: 1 % (ref 0–4)
B.E.: -0.7 MMOL/L (ref -3–3)
BASOPHILS # BLD: 0 K/UL (ref 0–0.2)
BASOPHILS NFR BLD: 0 % (ref 0–2)
BILIRUB SERPL-MCNC: 0.5 MG/DL (ref 0–1.2)
BUN SERPL-MCNC: 69 MG/DL (ref 6–23)
CALCIUM SERPL-MCNC: 8 MG/DL (ref 8.6–10.2)
CHLORIDE SERPL-SCNC: 100 MMOL/L (ref 98–107)
CO2 SERPL-SCNC: 23 MMOL/L (ref 22–29)
COHB: 0.3 % (ref 0–1.5)
CREAT SERPL-MCNC: 1.8 MG/DL (ref 0.5–1)
CRITICAL: ABNORMAL
DATE ANALYZED: ABNORMAL
DATE LAST DOSE: NORMAL
DATE OF COLLECTION: ABNORMAL
EOSINOPHIL # BLD: 0 K/UL (ref 0.05–0.5)
EOSINOPHILS RELATIVE PERCENT: 0 % (ref 0–6)
ERYTHROCYTE [DISTWIDTH] IN BLOOD BY AUTOMATED COUNT: 15 % (ref 11.5–15)
GFR, ESTIMATED: 30 ML/MIN/1.73M2
GLUCOSE SERPL-MCNC: 120 MG/DL (ref 74–99)
HCO3: 23.3 MMOL/L (ref 22–26)
HCT VFR BLD AUTO: 27.6 % (ref 34–48)
HGB BLD-MCNC: 9.1 G/DL (ref 11.5–15.5)
HHB: 3.5 % (ref 0–5)
LAB: ABNORMAL
LACTATE BLDV-SCNC: 1.3 MMOL/L (ref 0.5–1.9)
LYMPHOCYTES NFR BLD: 1.01 K/UL (ref 1.5–4)
LYMPHOCYTES RELATIVE PERCENT: 13 % (ref 20–42)
Lab: 2235
MAGNESIUM SERPL-MCNC: 1.9 MG/DL (ref 1.6–2.6)
MCH RBC QN AUTO: 37.4 PG (ref 26–35)
MCHC RBC AUTO-ENTMCNC: 33 G/DL (ref 32–34.5)
MCV RBC AUTO: 113.6 FL (ref 80–99.9)
METHB: 0.3 % (ref 0–1.5)
MODE: ABNORMAL
MONOCYTES NFR BLD: 0.07 K/UL (ref 0.1–0.95)
MONOCYTES NFR BLD: 1 % (ref 2–12)
MYELOCYTES ABSOLUTE COUNT: 0.07 K/UL
MYELOCYTES: 1 %
NEUTROPHILS NFR BLD: 84 % (ref 43–80)
NEUTS SEG NFR BLD: 6.48 K/UL (ref 1.8–7.3)
O2 SATURATION: 96.5 % (ref 92–98.5)
O2HB: 95.9 % (ref 94–97)
OPERATOR ID: 3214
PARTIAL THROMBOPLASTIN TIME: 29.5 SEC (ref 24.5–35.1)
PATIENT TEMP: 37 C
PCO2: 35.9 MMHG (ref 35–45)
PH BLOOD GAS: 7.43 (ref 7.35–7.45)
PLATELET # BLD AUTO: 59 K/UL (ref 130–450)
PLATELET CONFIRMATION: NORMAL
PMV BLD AUTO: 10 FL (ref 7–12)
PO2: 94.9 MMHG (ref 75–100)
POTASSIUM SERPL-SCNC: 2.8 MMOL/L (ref 3.5–5)
PROT SERPL-MCNC: 5.2 G/DL (ref 6.4–8.3)
RBC # BLD AUTO: 2.43 M/UL (ref 3.5–5.5)
RBC # BLD: ABNORMAL 10*6/UL
SODIUM SERPL-SCNC: 139 MMOL/L (ref 132–146)
SOURCE, BLOOD GAS: ABNORMAL
THB: 10.4 G/DL (ref 11.5–16.5)
TIME ANALYZED: 2242
TME LAST DOSE: NORMAL H
TROPONIN I SERPL HS-MCNC: 108 NG/L (ref 0–9)
TROPONIN I SERPL HS-MCNC: 117 NG/L (ref 0–9)
VALPROATE SERPL-MCNC: 58 UG/ML (ref 50–100)
VANCOMYCIN DOSE: NORMAL MG
WBC OTHER # BLD: 7.7 K/UL (ref 4.5–11.5)

## 2025-03-19 PROCEDURE — 70450 CT HEAD/BRAIN W/O DYE: CPT

## 2025-03-19 PROCEDURE — 82533 TOTAL CORTISOL: CPT

## 2025-03-19 PROCEDURE — 84484 ASSAY OF TROPONIN QUANT: CPT

## 2025-03-19 PROCEDURE — 06HY33Z INSERTION OF INFUSION DEVICE INTO LOWER VEIN, PERCUTANEOUS APPROACH: ICD-10-PCS | Performed by: INTERNAL MEDICINE

## 2025-03-19 PROCEDURE — 85025 COMPLETE CBC W/AUTO DIFF WBC: CPT

## 2025-03-19 PROCEDURE — 93005 ELECTROCARDIOGRAM TRACING: CPT

## 2025-03-19 PROCEDURE — 83735 ASSAY OF MAGNESIUM: CPT

## 2025-03-19 PROCEDURE — 96365 THER/PROPH/DIAG IV INF INIT: CPT

## 2025-03-19 PROCEDURE — 0202U NFCT DS 22 TRGT SARS-COV-2: CPT

## 2025-03-19 PROCEDURE — 6360000004 HC RX CONTRAST MEDICATION: Performed by: RADIOLOGY

## 2025-03-19 PROCEDURE — 71045 X-RAY EXAM CHEST 1 VIEW: CPT

## 2025-03-19 PROCEDURE — 99223 1ST HOSP IP/OBS HIGH 75: CPT | Performed by: HOSPITALIST

## 2025-03-19 PROCEDURE — 87040 BLOOD CULTURE FOR BACTERIA: CPT

## 2025-03-19 PROCEDURE — 80053 COMPREHEN METABOLIC PANEL: CPT

## 2025-03-19 PROCEDURE — 6360000002 HC RX W HCPCS

## 2025-03-19 PROCEDURE — 2580000003 HC RX 258

## 2025-03-19 PROCEDURE — 83605 ASSAY OF LACTIC ACID: CPT

## 2025-03-19 PROCEDURE — 85730 THROMBOPLASTIN TIME PARTIAL: CPT

## 2025-03-19 PROCEDURE — 96375 TX/PRO/DX INJ NEW DRUG ADDON: CPT

## 2025-03-19 PROCEDURE — 70496 CT ANGIOGRAPHY HEAD: CPT

## 2025-03-19 PROCEDURE — 70498 CT ANGIOGRAPHY NECK: CPT

## 2025-03-19 PROCEDURE — 99285 EMERGENCY DEPT VISIT HI MDM: CPT

## 2025-03-19 PROCEDURE — 82805 BLOOD GASES W/O2 SATURATION: CPT

## 2025-03-19 PROCEDURE — 80164 ASSAY DIPROPYLACETIC ACD TOT: CPT

## 2025-03-19 RX ORDER — POTASSIUM CHLORIDE 7.45 MG/ML
10 INJECTION INTRAVENOUS
Status: DISPENSED | OUTPATIENT
Start: 2025-03-19 | End: 2025-03-20

## 2025-03-19 RX ORDER — HYDROCORTISONE SODIUM SUCCINATE 100 MG/2ML
100 INJECTION INTRAMUSCULAR; INTRAVENOUS ONCE
Status: COMPLETED | OUTPATIENT
Start: 2025-03-19 | End: 2025-03-19

## 2025-03-19 RX ORDER — SODIUM CHLORIDE, SODIUM LACTATE, POTASSIUM CHLORIDE, AND CALCIUM CHLORIDE .6; .31; .03; .02 G/100ML; G/100ML; G/100ML; G/100ML
1000 INJECTION, SOLUTION INTRAVENOUS ONCE
Status: COMPLETED | OUTPATIENT
Start: 2025-03-19 | End: 2025-03-20

## 2025-03-19 RX ORDER — 0.9 % SODIUM CHLORIDE 0.9 %
1000 INTRAVENOUS SOLUTION INTRAVENOUS ONCE
Status: COMPLETED | OUTPATIENT
Start: 2025-03-19 | End: 2025-03-20

## 2025-03-19 RX ORDER — IOPAMIDOL 755 MG/ML
75 INJECTION, SOLUTION INTRAVASCULAR
Status: COMPLETED | OUTPATIENT
Start: 2025-03-19 | End: 2025-03-19

## 2025-03-19 RX ORDER — 0.9 % SODIUM CHLORIDE 0.9 %
500 INTRAVENOUS SOLUTION INTRAVENOUS ONCE
Status: COMPLETED | OUTPATIENT
Start: 2025-03-19 | End: 2025-03-20

## 2025-03-19 RX ADMIN — SODIUM CHLORIDE, SODIUM LACTATE, POTASSIUM CHLORIDE, AND CALCIUM CHLORIDE 1000 ML: .6; .31; .03; .02 INJECTION, SOLUTION INTRAVENOUS at 23:13

## 2025-03-19 RX ADMIN — SODIUM CHLORIDE 500 ML: 0.9 INJECTION, SOLUTION INTRAVENOUS at 23:33

## 2025-03-19 RX ADMIN — PIPERACILLIN AND TAZOBACTAM 4500 MG: 4; .5 INJECTION, POWDER, LYOPHILIZED, FOR SOLUTION INTRAVENOUS; PARENTERAL at 23:50

## 2025-03-19 RX ADMIN — HYDROCORTISONE SODIUM SUCCINATE 100 MG: 100 INJECTION, POWDER, FOR SOLUTION INTRAMUSCULAR; INTRAVENOUS at 23:44

## 2025-03-19 RX ADMIN — IOPAMIDOL 75 ML: 755 INJECTION, SOLUTION INTRAVENOUS at 22:16

## 2025-03-19 RX ADMIN — SODIUM CHLORIDE 1000 ML: 0.9 INJECTION, SOLUTION INTRAVENOUS at 23:33

## 2025-03-20 ENCOUNTER — APPOINTMENT (OUTPATIENT)
Age: 75
DRG: 871 | End: 2025-03-20
Payer: MEDICARE

## 2025-03-20 ENCOUNTER — APPOINTMENT (OUTPATIENT)
Dept: CT IMAGING | Age: 75
DRG: 871 | End: 2025-03-20
Payer: MEDICARE

## 2025-03-20 PROBLEM — E87.6 HYPOKALEMIA: Status: ACTIVE | Noted: 2025-03-20

## 2025-03-20 PROBLEM — E78.2 MIXED HYPERLIPIDEMIA: Status: ACTIVE | Noted: 2025-03-20

## 2025-03-20 PROBLEM — A41.9 SEVERE SEPSIS WITH SEPTIC SHOCK (HCC): Status: ACTIVE | Noted: 2025-03-20

## 2025-03-20 PROBLEM — A41.9 SEPSIS WITH ACUTE RENAL FAILURE AND SEPTIC SHOCK (HCC): Status: ACTIVE | Noted: 2025-03-20

## 2025-03-20 PROBLEM — R79.89 ELEVATED TROPONIN: Status: ACTIVE | Noted: 2025-03-20

## 2025-03-20 PROBLEM — I65.23 STENOSIS OF BOTH INTERNAL CAROTID ARTERIES: Status: ACTIVE | Noted: 2025-03-20

## 2025-03-20 PROBLEM — D69.6 THROMBOCYTOPENIA: Status: ACTIVE | Noted: 2025-03-20

## 2025-03-20 PROBLEM — G93.41 ACUTE METABOLIC ENCEPHALOPATHY: Status: ACTIVE | Noted: 2025-03-20

## 2025-03-20 PROBLEM — R65.21 SEVERE SEPSIS WITH SEPTIC SHOCK (HCC): Status: ACTIVE | Noted: 2025-03-20

## 2025-03-20 PROBLEM — J18.9 PNEUMONIA OF LEFT LOWER LOBE DUE TO INFECTIOUS ORGANISM: Status: ACTIVE | Noted: 2025-03-20

## 2025-03-20 PROBLEM — N17.9 SEPSIS WITH ACUTE RENAL FAILURE AND SEPTIC SHOCK (HCC): Status: ACTIVE | Noted: 2025-03-20

## 2025-03-20 PROBLEM — R65.21 SEPSIS WITH ACUTE RENAL FAILURE AND SEPTIC SHOCK (HCC): Status: ACTIVE | Noted: 2025-03-20

## 2025-03-20 PROBLEM — D53.9 MACROCYTIC ANEMIA: Status: ACTIVE | Noted: 2025-03-20

## 2025-03-20 LAB
25(OH)D3 SERPL-MCNC: 36 NG/ML (ref 30–100)
AMPHET UR QL SCN: NEGATIVE
ANION GAP SERPL CALCULATED.3IONS-SCNC: 15 MMOL/L (ref 7–16)
APAP SERPL-MCNC: <5 UG/ML (ref 10–30)
B PARAP IS1001 DNA NPH QL NAA+NON-PROBE: NOT DETECTED
B PERT DNA SPEC QL NAA+PROBE: NOT DETECTED
BACTERIA URNS QL MICRO: ABNORMAL
BARBITURATES UR QL SCN: NEGATIVE
BENZODIAZ UR QL: NEGATIVE
BILIRUB UR QL STRIP: ABNORMAL
BUN SERPL-MCNC: 66 MG/DL (ref 6–23)
BUPRENORPHINE UR QL: NEGATIVE
C PNEUM DNA NPH QL NAA+NON-PROBE: NOT DETECTED
CA-I BLD-SCNC: 1.14 MMOL/L (ref 1.15–1.33)
CALCIUM SERPL-MCNC: 8 MG/DL (ref 8.6–10.2)
CANNABINOIDS UR QL SCN: NEGATIVE
CASTS #/AREA URNS LPF: ABNORMAL /LPF
CASTS #/AREA URNS LPF: ABNORMAL /LPF
CHLORIDE SERPL-SCNC: 100 MMOL/L (ref 98–107)
CK SERPL-CCNC: 105 U/L (ref 20–180)
CLARITY UR: CLEAR
CO2 SERPL-SCNC: 20 MMOL/L (ref 22–29)
COCAINE UR QL SCN: NEGATIVE
COLOR UR: YELLOW
CORTIS SERPL-MCNC: 28.2 UG/DL (ref 2.7–18.4)
CORTISOL COLLECTION INFO: ABNORMAL
CREAT SERPL-MCNC: 1.6 MG/DL (ref 0.5–1)
CREAT UR-MCNC: 75.6 MG/DL (ref 29–226)
ECHO AV AREA PEAK VELOCITY: 2 CM2
ECHO AV AREA VTI: 2.2 CM2
ECHO AV AREA/BSA PEAK VELOCITY: 1.2 CM2/M2
ECHO AV AREA/BSA VTI: 1.3 CM2/M2
ECHO AV CUSP MM: 1.3 CM
ECHO AV MEAN GRADIENT: 6 MMHG
ECHO AV MEAN VELOCITY: 1.2 M/S
ECHO AV PEAK GRADIENT: 11 MMHG
ECHO AV PEAK VELOCITY: 1.7 M/S
ECHO AV VELOCITY RATIO: 0.76
ECHO AV VTI: 32.8 CM
ECHO BSA: 1.64 M2
ECHO LA DIAMETER INDEX: 2.13 CM/M2
ECHO LA DIAMETER: 3.5 CM
ECHO LA VOL A-L A2C: 51 ML (ref 22–52)
ECHO LA VOL A-L A4C: 72 ML (ref 22–52)
ECHO LA VOL BP: 61 ML (ref 22–52)
ECHO LA VOL MOD A2C: 50 ML (ref 22–52)
ECHO LA VOL MOD A4C: 62 ML (ref 22–52)
ECHO LA VOL/BSA BIPLANE: 37 ML/M2 (ref 16–34)
ECHO LA VOLUME AREA LENGTH: 67 ML
ECHO LA VOLUME INDEX A-L A2C: 31 ML/M2 (ref 16–34)
ECHO LA VOLUME INDEX A-L A4C: 44 ML/M2 (ref 16–34)
ECHO LA VOLUME INDEX AREA LENGTH: 41 ML/M2 (ref 16–34)
ECHO LA VOLUME INDEX MOD A2C: 30 ML/M2 (ref 16–34)
ECHO LA VOLUME INDEX MOD A4C: 38 ML/M2 (ref 16–34)
ECHO LV EF PHYSICIAN: 30 %
ECHO LV ISOVOLUMETRIC RELAXATION TIME (IVRT): 38.1 MS
ECHO LVOT AREA: 2.5 CM2
ECHO LVOT AV VTI INDEX: 0.82
ECHO LVOT DIAM: 1.8 CM
ECHO LVOT MEAN GRADIENT: 4 MMHG
ECHO LVOT PEAK GRADIENT: 6 MMHG
ECHO LVOT PEAK VELOCITY: 1.3 M/S
ECHO LVOT STROKE VOLUME INDEX: 41.6 ML/M2
ECHO LVOT SV: 68.2 ML
ECHO LVOT VTI: 26.8 CM
ECHO MV "A" WAVE DURATION: 91.3 MSEC
ECHO MV A VELOCITY: 1.2 M/S
ECHO MV AREA PHT: 4.7 CM2
ECHO MV AREA VTI: 2.7 CM2
ECHO MV E DECELERATION TIME (DT): 133.2 MS
ECHO MV E VELOCITY: 0.9 M/S
ECHO MV E/A RATIO: 0.75
ECHO MV LVOT VTI INDEX: 0.95
ECHO MV MAX VELOCITY: 1.4 M/S
ECHO MV MEAN GRADIENT: 3 MMHG
ECHO MV MEAN VELOCITY: 0.9 M/S
ECHO MV PEAK GRADIENT: 7 MMHG
ECHO MV PRESSURE HALF TIME (PHT): 46.9 MS
ECHO MV REGURGITANT RADIUS PISA: 0.83 CM
ECHO MV REGURGITANT VELOCITY PISA: 5.1 M/S
ECHO MV REGURGITANT VTIA: 151.5 CM
ECHO MV VTI: 25.5 CM
ECHO RV LONGITUDINAL DIMENSION: 5.9 CM
ECHO RV MID DIMENSION: 2.5 CM
ETHANOLAMINE SERPL-MCNC: <10 MG/DL (ref 0–0.08)
FENTANYL UR QL: NEGATIVE
FERRITIN SERPL-MCNC: 341 NG/ML
FLUAV RNA NPH QL NAA+NON-PROBE: NOT DETECTED
FLUBV RNA NPH QL NAA+NON-PROBE: NOT DETECTED
FOLATE SERPL-MCNC: >20 NG/ML (ref 4.8–24.2)
GFR, ESTIMATED: 33 ML/MIN/1.73M2
GLUCOSE SERPL-MCNC: 147 MG/DL (ref 74–99)
GLUCOSE UR STRIP-MCNC: NEGATIVE MG/DL
HADV DNA NPH QL NAA+NON-PROBE: NOT DETECTED
HCOV 229E RNA NPH QL NAA+NON-PROBE: NOT DETECTED
HCOV HKU1 RNA NPH QL NAA+NON-PROBE: NOT DETECTED
HCOV NL63 RNA NPH QL NAA+NON-PROBE: NOT DETECTED
HCOV OC43 RNA NPH QL NAA+NON-PROBE: NOT DETECTED
HGB UR QL STRIP.AUTO: ABNORMAL
HMPV RNA NPH QL NAA+NON-PROBE: NOT DETECTED
HPIV1 RNA NPH QL NAA+NON-PROBE: NOT DETECTED
HPIV2 RNA NPH QL NAA+NON-PROBE: NOT DETECTED
HPIV3 RNA NPH QL NAA+NON-PROBE: NOT DETECTED
HPIV4 RNA NPH QL NAA+NON-PROBE: NOT DETECTED
IRON SATN MFR SERPL: 6 % (ref 15–50)
IRON SERPL-MCNC: 9 UG/DL (ref 37–145)
KETONES UR STRIP-MCNC: NEGATIVE MG/DL
LEUKOCYTE ESTERASE UR QL STRIP: NEGATIVE
M PNEUMO DNA NPH QL NAA+NON-PROBE: NOT DETECTED
MAGNESIUM SERPL-MCNC: 1.9 MG/DL (ref 1.6–2.6)
METHADONE UR QL: NEGATIVE
MICROORGANISM SPEC CULT: ABNORMAL
NITRITE UR QL STRIP: NEGATIVE
OPIATES UR QL SCN: NEGATIVE
OSMOLALITY UR: 514 MOSM/KG (ref 300–900)
OXYCODONE UR QL SCN: NEGATIVE
PCP UR QL SCN: NEGATIVE
PH UR STRIP: 5.5 [PH] (ref 5–8)
PHOSPHATE SERPL-MCNC: 4.4 MG/DL (ref 2.5–4.5)
POTASSIUM SERPL-SCNC: 3.8 MMOL/L (ref 3.5–5)
POTASSIUM, UR: 41 MMOL/L
PROCALCITONIN SERPL-MCNC: 15.98 NG/ML (ref 0–0.08)
PROT UR STRIP-MCNC: 30 MG/DL
RBC #/AREA URNS HPF: ABNORMAL /HPF
RSV RNA NPH QL NAA+NON-PROBE: NOT DETECTED
RV+EV RNA NPH QL NAA+NON-PROBE: NOT DETECTED
SALICYLATES SERPL-MCNC: <0.3 MG/DL (ref 0–30)
SARS-COV-2 RNA NPH QL NAA+NON-PROBE: NOT DETECTED
SERVICE CMNT-IMP: ABNORMAL
SODIUM SERPL-SCNC: 135 MMOL/L (ref 132–146)
SODIUM UR-SCNC: <20 MMOL/L
SP GR UR STRIP: 1.02 (ref 1–1.03)
SPECIMEN DESCRIPTION: ABNORMAL
SPECIMEN DESCRIPTION: NORMAL
T4 FREE SERPL-MCNC: 0.9 NG/DL (ref 0.9–1.7)
TEST INFORMATION: NORMAL
TIBC SERPL-MCNC: 159 UG/DL (ref 250–450)
TOXIC TRICYCLIC SC,BLOOD: NEGATIVE
TROPONIN I SERPL HS-MCNC: 84 NG/L (ref 0–9)
TSH SERPL DL<=0.05 MIU/L-ACNC: 0.3 UIU/ML (ref 0.27–4.2)
UROBILINOGEN UR STRIP-ACNC: 1 EU/DL (ref 0–1)
UUN UR-MCNC: 606 MG/DL (ref 800–1666)
VIT B12 SERPL-MCNC: 989 PG/ML (ref 211–946)
WBC #/AREA URNS HPF: ABNORMAL /HPF

## 2025-03-20 PROCEDURE — 84484 ASSAY OF TROPONIN QUANT: CPT

## 2025-03-20 PROCEDURE — 94640 AIRWAY INHALATION TREATMENT: CPT

## 2025-03-20 PROCEDURE — 83735 ASSAY OF MAGNESIUM: CPT

## 2025-03-20 PROCEDURE — 6370000000 HC RX 637 (ALT 250 FOR IP): Performed by: HOSPITALIST

## 2025-03-20 PROCEDURE — 2580000003 HC RX 258: Performed by: HOSPITALIST

## 2025-03-20 PROCEDURE — 83540 ASSAY OF IRON: CPT

## 2025-03-20 PROCEDURE — 2580000003 HC RX 258

## 2025-03-20 PROCEDURE — 80179 DRUG ASSAY SALICYLATE: CPT

## 2025-03-20 PROCEDURE — 82728 ASSAY OF FERRITIN: CPT

## 2025-03-20 PROCEDURE — 87086 URINE CULTURE/COLONY COUNT: CPT

## 2025-03-20 PROCEDURE — 84443 ASSAY THYROID STIM HORMONE: CPT

## 2025-03-20 PROCEDURE — 6360000002 HC RX W HCPCS: Performed by: HOSPITALIST

## 2025-03-20 PROCEDURE — 82607 VITAMIN B-12: CPT

## 2025-03-20 PROCEDURE — 2500000003 HC RX 250 WO HCPCS: Performed by: HOSPITALIST

## 2025-03-20 PROCEDURE — 93306 TTE W/DOPPLER COMPLETE: CPT

## 2025-03-20 PROCEDURE — 80307 DRUG TEST PRSMV CHEM ANLYZR: CPT

## 2025-03-20 PROCEDURE — 82330 ASSAY OF CALCIUM: CPT

## 2025-03-20 PROCEDURE — 81001 URINALYSIS AUTO W/SCOPE: CPT

## 2025-03-20 PROCEDURE — 2700000000 HC OXYGEN THERAPY PER DAY

## 2025-03-20 PROCEDURE — 84100 ASSAY OF PHOSPHORUS: CPT

## 2025-03-20 PROCEDURE — 2500000003 HC RX 250 WO HCPCS

## 2025-03-20 PROCEDURE — 87081 CULTURE SCREEN ONLY: CPT

## 2025-03-20 PROCEDURE — 80143 DRUG ASSAY ACETAMINOPHEN: CPT

## 2025-03-20 PROCEDURE — 84540 ASSAY OF URINE/UREA-N: CPT

## 2025-03-20 PROCEDURE — 2000000000 HC ICU R&B

## 2025-03-20 PROCEDURE — 83550 IRON BINDING TEST: CPT

## 2025-03-20 PROCEDURE — 6360000002 HC RX W HCPCS

## 2025-03-20 PROCEDURE — G0480 DRUG TEST DEF 1-7 CLASSES: HCPCS

## 2025-03-20 PROCEDURE — 84300 ASSAY OF URINE SODIUM: CPT

## 2025-03-20 PROCEDURE — 84439 ASSAY OF FREE THYROXINE: CPT

## 2025-03-20 PROCEDURE — 71250 CT THORAX DX C-: CPT

## 2025-03-20 PROCEDURE — 36556 INSERT NON-TUNNEL CV CATH: CPT

## 2025-03-20 PROCEDURE — 82746 ASSAY OF FOLIC ACID SERUM: CPT

## 2025-03-20 PROCEDURE — 80048 BASIC METABOLIC PNL TOTAL CA: CPT

## 2025-03-20 PROCEDURE — 3E033XZ INTRODUCTION OF VASOPRESSOR INTO PERIPHERAL VEIN, PERCUTANEOUS APPROACH: ICD-10-PCS | Performed by: INTERNAL MEDICINE

## 2025-03-20 PROCEDURE — 82306 VITAMIN D 25 HYDROXY: CPT

## 2025-03-20 PROCEDURE — 93306 TTE W/DOPPLER COMPLETE: CPT | Performed by: INTERNAL MEDICINE

## 2025-03-20 PROCEDURE — 87899 AGENT NOS ASSAY W/OPTIC: CPT

## 2025-03-20 PROCEDURE — 82570 ASSAY OF URINE CREATININE: CPT

## 2025-03-20 PROCEDURE — 96368 THER/DIAG CONCURRENT INF: CPT

## 2025-03-20 PROCEDURE — 84133 ASSAY OF URINE POTASSIUM: CPT

## 2025-03-20 PROCEDURE — 87449 NOS EACH ORGANISM AG IA: CPT

## 2025-03-20 PROCEDURE — 94664 DEMO&/EVAL PT USE INHALER: CPT

## 2025-03-20 PROCEDURE — 84145 PROCALCITONIN (PCT): CPT

## 2025-03-20 PROCEDURE — 6370000000 HC RX 637 (ALT 250 FOR IP)

## 2025-03-20 PROCEDURE — 83935 ASSAY OF URINE OSMOLALITY: CPT

## 2025-03-20 PROCEDURE — 82550 ASSAY OF CK (CPK): CPT

## 2025-03-20 RX ORDER — HYDROCORTISONE SODIUM SUCCINATE 100 MG/2ML
100 INJECTION INTRAMUSCULAR; INTRAVENOUS EVERY 8 HOURS
Status: DISCONTINUED | OUTPATIENT
Start: 2025-03-20 | End: 2025-03-20 | Stop reason: SDUPTHER

## 2025-03-20 RX ORDER — SODIUM CHLORIDE, SODIUM LACTATE, POTASSIUM CHLORIDE, AND CALCIUM CHLORIDE .6; .31; .03; .02 G/100ML; G/100ML; G/100ML; G/100ML
250 INJECTION, SOLUTION INTRAVENOUS ONCE
Status: COMPLETED | OUTPATIENT
Start: 2025-03-20 | End: 2025-03-20

## 2025-03-20 RX ORDER — ACETAMINOPHEN 650 MG/1
650 SUPPOSITORY RECTAL EVERY 6 HOURS PRN
Status: DISCONTINUED | OUTPATIENT
Start: 2025-03-20 | End: 2025-03-28 | Stop reason: HOSPADM

## 2025-03-20 RX ORDER — GLUCAGON 1 MG/ML
1 KIT INJECTION PRN
Status: DISCONTINUED | OUTPATIENT
Start: 2025-03-20 | End: 2025-03-28 | Stop reason: HOSPADM

## 2025-03-20 RX ORDER — ATORVASTATIN CALCIUM 10 MG/1
10 TABLET, FILM COATED ORAL NIGHTLY
Status: DISCONTINUED | OUTPATIENT
Start: 2025-03-20 | End: 2025-03-28 | Stop reason: HOSPADM

## 2025-03-20 RX ORDER — SODIUM CHLORIDE, SODIUM LACTATE, POTASSIUM CHLORIDE, AND CALCIUM CHLORIDE .6; .31; .03; .02 G/100ML; G/100ML; G/100ML; G/100ML
500 INJECTION, SOLUTION INTRAVENOUS ONCE
Status: COMPLETED | OUTPATIENT
Start: 2025-03-20 | End: 2025-03-20

## 2025-03-20 RX ORDER — DEXTROSE MONOHYDRATE 100 MG/ML
INJECTION, SOLUTION INTRAVENOUS CONTINUOUS PRN
Status: DISCONTINUED | OUTPATIENT
Start: 2025-03-20 | End: 2025-03-28 | Stop reason: HOSPADM

## 2025-03-20 RX ORDER — ACETAMINOPHEN 325 MG/1
650 TABLET ORAL EVERY 4 HOURS PRN
Status: DISCONTINUED | OUTPATIENT
Start: 2025-03-20 | End: 2025-03-28 | Stop reason: HOSPADM

## 2025-03-20 RX ORDER — M-VIT,TX,IRON,MINS/CALC/FOLIC 27MG-0.4MG
1 TABLET ORAL DAILY
Status: ON HOLD | COMMUNITY
End: 2025-03-28 | Stop reason: HOSPADM

## 2025-03-20 RX ORDER — VITAMIN B COMPLEX
1 CAPSULE ORAL DAILY
Status: ON HOLD | COMMUNITY

## 2025-03-20 RX ORDER — PANTOPRAZOLE SODIUM 40 MG/1
40 TABLET, DELAYED RELEASE ORAL DAILY
Status: ON HOLD | COMMUNITY

## 2025-03-20 RX ORDER — NOREPINEPHRINE BITARTRATE 0.06 MG/ML
1-100 INJECTION, SOLUTION INTRAVENOUS CONTINUOUS
Status: DISCONTINUED | OUTPATIENT
Start: 2025-03-20 | End: 2025-03-23

## 2025-03-20 RX ORDER — CODEINE PHOSPHATE AND GUAIFENESIN 10; 100 MG/5ML; MG/5ML
5 SOLUTION ORAL 3 TIMES DAILY PRN
Status: DISCONTINUED | OUTPATIENT
Start: 2025-03-20 | End: 2025-03-20

## 2025-03-20 RX ORDER — SODIUM CHLORIDE 0.9 % (FLUSH) 0.9 %
5-40 SYRINGE (ML) INJECTION EVERY 12 HOURS SCHEDULED
Status: DISCONTINUED | OUTPATIENT
Start: 2025-03-20 | End: 2025-03-28 | Stop reason: HOSPADM

## 2025-03-20 RX ORDER — GUAIFENESIN 400 MG/1
400 TABLET ORAL 3 TIMES DAILY
Status: DISCONTINUED | OUTPATIENT
Start: 2025-03-20 | End: 2025-03-28 | Stop reason: HOSPADM

## 2025-03-20 RX ORDER — PANTOPRAZOLE SODIUM 40 MG/1
40 TABLET, DELAYED RELEASE ORAL
Status: DISCONTINUED | OUTPATIENT
Start: 2025-03-20 | End: 2025-03-20

## 2025-03-20 RX ORDER — HYDROCORTISONE SODIUM SUCCINATE 100 MG/2ML
100 INJECTION INTRAMUSCULAR; INTRAVENOUS EVERY 8 HOURS
Status: DISCONTINUED | OUTPATIENT
Start: 2025-03-20 | End: 2025-03-20

## 2025-03-20 RX ORDER — POTASSIUM CHLORIDE 29.8 MG/ML
20 INJECTION INTRAVENOUS ONCE
Status: COMPLETED | OUTPATIENT
Start: 2025-03-20 | End: 2025-03-20

## 2025-03-20 RX ORDER — DIVALPROEX SODIUM 500 MG/1
500 TABLET, DELAYED RELEASE ORAL NIGHTLY
Status: DISCONTINUED | OUTPATIENT
Start: 2025-03-20 | End: 2025-03-28 | Stop reason: HOSPADM

## 2025-03-20 RX ORDER — HEPARIN SODIUM 5000 [USP'U]/ML
5000 INJECTION, SOLUTION INTRAVENOUS; SUBCUTANEOUS EVERY 8 HOURS SCHEDULED
Status: DISCONTINUED | OUTPATIENT
Start: 2025-03-20 | End: 2025-03-28 | Stop reason: HOSPADM

## 2025-03-20 RX ORDER — ACETAMINOPHEN 325 MG/1
650 TABLET ORAL EVERY 6 HOURS PRN
Status: DISCONTINUED | OUTPATIENT
Start: 2025-03-20 | End: 2025-03-20 | Stop reason: SDUPTHER

## 2025-03-20 RX ORDER — LANOLIN ALCOHOL/MO/W.PET/CERES
1000 CREAM (GRAM) TOPICAL NIGHTLY
Status: DISCONTINUED | OUTPATIENT
Start: 2025-03-20 | End: 2025-03-28 | Stop reason: HOSPADM

## 2025-03-20 RX ORDER — SODIUM CHLORIDE 0.9 % (FLUSH) 0.9 %
5-40 SYRINGE (ML) INJECTION PRN
Status: DISCONTINUED | OUTPATIENT
Start: 2025-03-20 | End: 2025-03-28 | Stop reason: HOSPADM

## 2025-03-20 RX ORDER — ONDANSETRON 2 MG/ML
4 INJECTION INTRAMUSCULAR; INTRAVENOUS EVERY 6 HOURS PRN
Status: DISCONTINUED | OUTPATIENT
Start: 2025-03-20 | End: 2025-03-28 | Stop reason: HOSPADM

## 2025-03-20 RX ORDER — ESCITALOPRAM OXALATE 10 MG/1
10 TABLET ORAL NIGHTLY
Status: DISCONTINUED | OUTPATIENT
Start: 2025-03-20 | End: 2025-03-28 | Stop reason: HOSPADM

## 2025-03-20 RX ORDER — ONDANSETRON 4 MG/1
4 TABLET, ORALLY DISINTEGRATING ORAL EVERY 8 HOURS PRN
Status: DISCONTINUED | OUTPATIENT
Start: 2025-03-20 | End: 2025-03-28 | Stop reason: HOSPADM

## 2025-03-20 RX ORDER — IRON, FOLIC ACID, CYANOCOBALAMIN, ASCORBIC ACID, AND DOCUSATE SODIUM 90; 1; 12; 120; 50 MG/1; MG/1; UG/1; MG/1; MG/1
1 TABLET, FILM COATED ORAL EVERY EVENING
Status: DISCONTINUED | OUTPATIENT
Start: 2025-03-20 | End: 2025-03-20

## 2025-03-20 RX ORDER — IPRATROPIUM BROMIDE AND ALBUTEROL SULFATE 2.5; .5 MG/3ML; MG/3ML
1 SOLUTION RESPIRATORY (INHALATION)
Status: DISCONTINUED | OUTPATIENT
Start: 2025-03-20 | End: 2025-03-22

## 2025-03-20 RX ORDER — SODIUM CHLORIDE, SODIUM LACTATE, POTASSIUM CHLORIDE, CALCIUM CHLORIDE 600; 310; 30; 20 MG/100ML; MG/100ML; MG/100ML; MG/100ML
INJECTION, SOLUTION INTRAVENOUS CONTINUOUS
Status: DISCONTINUED | OUTPATIENT
Start: 2025-03-20 | End: 2025-03-20

## 2025-03-20 RX ORDER — MAGNESIUM SULFATE IN WATER 40 MG/ML
2000 INJECTION, SOLUTION INTRAVENOUS ONCE
Status: COMPLETED | OUTPATIENT
Start: 2025-03-20 | End: 2025-03-20

## 2025-03-20 RX ORDER — POLYETHYLENE GLYCOL 3350 17 G/17G
17 POWDER, FOR SOLUTION ORAL DAILY PRN
Status: DISCONTINUED | OUTPATIENT
Start: 2025-03-20 | End: 2025-03-28 | Stop reason: HOSPADM

## 2025-03-20 RX ORDER — GUAIFENESIN 600 MG/1
600 TABLET, EXTENDED RELEASE ORAL 2 TIMES DAILY
Status: DISCONTINUED | OUTPATIENT
Start: 2025-03-20 | End: 2025-03-20 | Stop reason: CLARIF

## 2025-03-20 RX ORDER — CALCIUM GLUCONATE 20 MG/ML
2000 INJECTION, SOLUTION INTRAVENOUS ONCE
Status: COMPLETED | OUTPATIENT
Start: 2025-03-20 | End: 2025-03-20

## 2025-03-20 RX ORDER — QUETIAPINE FUMARATE 200 MG/1
400 TABLET, FILM COATED ORAL NIGHTLY
Status: DISCONTINUED | OUTPATIENT
Start: 2025-03-20 | End: 2025-03-28 | Stop reason: HOSPADM

## 2025-03-20 RX ORDER — EZETIMIBE 10 MG/1
10 TABLET ORAL EVERY MORNING
Status: DISCONTINUED | OUTPATIENT
Start: 2025-03-20 | End: 2025-03-28 | Stop reason: HOSPADM

## 2025-03-20 RX ORDER — LEVOTHYROXINE SODIUM 50 UG/1
100 TABLET ORAL
Status: DISCONTINUED | OUTPATIENT
Start: 2025-03-20 | End: 2025-03-28 | Stop reason: HOSPADM

## 2025-03-20 RX ORDER — SODIUM CHLORIDE 9 MG/ML
INJECTION, SOLUTION INTRAVENOUS PRN
Status: DISCONTINUED | OUTPATIENT
Start: 2025-03-20 | End: 2025-03-28 | Stop reason: HOSPADM

## 2025-03-20 RX ADMIN — SODIUM CHLORIDE, SODIUM LACTATE, POTASSIUM CHLORIDE, AND CALCIUM CHLORIDE 500 ML: .6; .31; .03; .02 INJECTION, SOLUTION INTRAVENOUS at 10:45

## 2025-03-20 RX ADMIN — PANTOPRAZOLE SODIUM 40 MG: 40 INJECTION, POWDER, FOR SOLUTION INTRAVENOUS at 10:48

## 2025-03-20 RX ADMIN — SODIUM CHLORIDE, SODIUM LACTATE, POTASSIUM CHLORIDE, AND CALCIUM CHLORIDE: .6; .31; .03; .02 INJECTION, SOLUTION INTRAVENOUS at 00:17

## 2025-03-20 RX ADMIN — SODIUM CHLORIDE: 9 INJECTION, SOLUTION INTRAVENOUS at 08:47

## 2025-03-20 RX ADMIN — PIPERACILLIN AND TAZOBACTAM 4500 MG: 4; .5 INJECTION, POWDER, FOR SOLUTION INTRAVENOUS at 20:34

## 2025-03-20 RX ADMIN — HEPARIN SODIUM 5000 UNITS: 5000 INJECTION INTRAVENOUS; SUBCUTANEOUS at 20:31

## 2025-03-20 RX ADMIN — MAGNESIUM SULFATE HEPTAHYDRATE 2000 MG: 40 INJECTION, SOLUTION INTRAVENOUS at 12:26

## 2025-03-20 RX ADMIN — CALCIUM GLUCONATE 2000 MG: 20 INJECTION, SOLUTION INTRAVENOUS at 12:14

## 2025-03-20 RX ADMIN — WATER 100 MG: 1 INJECTION INTRAMUSCULAR; INTRAVENOUS; SUBCUTANEOUS at 17:36

## 2025-03-20 RX ADMIN — PIPERACILLIN AND TAZOBACTAM 4500 MG: 4; .5 INJECTION, POWDER, FOR SOLUTION INTRAVENOUS at 09:03

## 2025-03-20 RX ADMIN — IPRATROPIUM BROMIDE AND ALBUTEROL SULFATE 1 DOSE: 2.5; .5 SOLUTION RESPIRATORY (INHALATION) at 16:00

## 2025-03-20 RX ADMIN — DOXYCYCLINE 100 MG: 100 INJECTION, POWDER, LYOPHILIZED, FOR SOLUTION INTRAVENOUS at 03:13

## 2025-03-20 RX ADMIN — ATORVASTATIN CALCIUM 10 MG: 10 TABLET, FILM COATED ORAL at 20:25

## 2025-03-20 RX ADMIN — IPRATROPIUM BROMIDE AND ALBUTEROL SULFATE 1 DOSE: 2.5; .5 SOLUTION RESPIRATORY (INHALATION) at 08:25

## 2025-03-20 RX ADMIN — GUAIFENESIN 400 MG: 400 TABLET ORAL at 20:24

## 2025-03-20 RX ADMIN — ESCITALOPRAM OXALATE 10 MG: 10 TABLET ORAL at 20:25

## 2025-03-20 RX ADMIN — DIVALPROEX SODIUM 500 MG: 500 TABLET, DELAYED RELEASE ORAL at 20:25

## 2025-03-20 RX ADMIN — IPRATROPIUM BROMIDE AND ALBUTEROL SULFATE 1 DOSE: 2.5; .5 SOLUTION RESPIRATORY (INHALATION) at 11:24

## 2025-03-20 RX ADMIN — WATER 100 MG: 1 INJECTION INTRAMUSCULAR; INTRAVENOUS; SUBCUTANEOUS at 10:48

## 2025-03-20 RX ADMIN — VANCOMYCIN HYDROCHLORIDE 1250 MG: 1.25 INJECTION, POWDER, LYOPHILIZED, FOR SOLUTION INTRAVENOUS at 09:05

## 2025-03-20 RX ADMIN — Medication 5 MCG/MIN: at 00:14

## 2025-03-20 RX ADMIN — GUAIFENESIN 400 MG: 400 TABLET ORAL at 12:34

## 2025-03-20 RX ADMIN — POTASSIUM CHLORIDE 20 MEQ: 29.8 INJECTION INTRAVENOUS at 12:20

## 2025-03-20 RX ADMIN — SODIUM CHLORIDE, PRESERVATIVE FREE 10 ML: 5 INJECTION INTRAVENOUS at 08:55

## 2025-03-20 RX ADMIN — SODIUM CHLORIDE, SODIUM LACTATE, POTASSIUM CHLORIDE, AND CALCIUM CHLORIDE 250 ML: .6; .31; .03; .02 INJECTION, SOLUTION INTRAVENOUS at 12:50

## 2025-03-20 RX ADMIN — POTASSIUM CHLORIDE 10 MEQ: 7.46 INJECTION, SOLUTION INTRAVENOUS at 01:22

## 2025-03-20 RX ADMIN — Medication 1000 MG: at 20:24

## 2025-03-20 RX ADMIN — GUAIFENESIN 400 MG: 400 TABLET ORAL at 17:37

## 2025-03-20 RX ADMIN — IPRATROPIUM BROMIDE AND ALBUTEROL SULFATE 1 DOSE: 2.5; .5 SOLUTION RESPIRATORY (INHALATION) at 19:45

## 2025-03-20 RX ADMIN — QUETIAPINE FUMARATE 400 MG: 200 TABLET ORAL at 20:24

## 2025-03-20 RX ADMIN — HEPARIN SODIUM 5000 UNITS: 5000 INJECTION INTRAVENOUS; SUBCUTANEOUS at 08:53

## 2025-03-20 RX ADMIN — POTASSIUM CHLORIDE 10 MEQ: 7.46 INJECTION, SOLUTION INTRAVENOUS at 00:08

## 2025-03-20 RX ADMIN — EZETIMIBE 10 MG: 10 TABLET ORAL at 12:34

## 2025-03-20 RX ADMIN — HEPARIN SODIUM 5000 UNITS: 5000 INJECTION INTRAVENOUS; SUBCUTANEOUS at 17:36

## 2025-03-20 RX ADMIN — SODIUM CHLORIDE, PRESERVATIVE FREE 10 ML: 5 INJECTION INTRAVENOUS at 20:31

## 2025-03-20 NOTE — PLAN OF CARE
Problem: Safety - Adult  Goal: Free from fall injury  Outcome: Progressing     Problem: Discharge Planning  Goal: Discharge to home or other facility with appropriate resources  Outcome: Progressing     Problem: Skin/Tissue Integrity  Goal: Skin integrity remains intact  Description: 1.  Monitor for areas of redness and/or skin breakdown  2.  Assess vascular access sites hourly  3.  Every 4-6 hours minimum:  Change oxygen saturation probe site  4.  Every 4-6 hours:  If on nasal continuous positive airway pressure, respiratory therapy assess nares and determine need for appliance change or resting period  Outcome: Progressing  Flowsheets (Taken 3/20/2025 0538)  Skin Integrity Remains Intact: Monitor for areas of redness and/or skin breakdown     Problem: Respiratory - Adult  Goal: Achieves optimal ventilation and oxygenation  Outcome: Progressing     Problem: Metabolic/Fluid and Electrolytes - Adult  Goal: Hemodynamic stability and optimal renal function maintained  Outcome: Progressing  Goal: Glucose maintained within prescribed range  Outcome: Progressing     Problem: Neurosensory - Adult  Goal: Achieves stable or improved neurological status  Outcome: Not Progressing  Goal: Achieves maximal functionality and self care  Outcome: Not Progressing     Problem: Metabolic/Fluid and Electrolytes - Adult  Goal: Electrolytes maintained within normal limits  Outcome: Not Progressing  Flowsheets (Taken 3/20/2025 0538)  Electrolytes maintained within normal limits:   Monitor labs and assess patient for signs and symptoms of electrolyte imbalances   Administer electrolyte replacement as ordered   Monitor response to electrolyte replacements, including repeat lab results as appropriate

## 2025-03-20 NOTE — PROGRESS NOTES
Renal Dose Adjustment Policy (Generic)     This patient is on medication that requires renal, weight, and/or indication dose adjustment.      Date Body Weight IBW  Adjusted BW SCr  CrCl Dialysis status   3/20/2025 59 kg (130 lb 1.1 oz) Ideal body weight: 57 kg (125 lb 10.6 oz)  Adjusted ideal body weight: 57.8 kg (127 lb 6.8 oz) Serum creatinine: 1.8 mg/dL (H) 03/19/25 2159  Estimated creatinine clearance: 25 mL/min (A) N/a       Pharmacy has dose-adjusted the following medication(s):    Date Previous Order Adjusted Order   3/20/2025 Piperacillin-tazobactam 3,375 mg IV q8h Piperacillin-tazobactam 4,500 mg IV q12h       These changes were made per protocol according to the [facility]   Automatic Renal Dose Adjustment Policy.     *Please note this dose may need readjusted if patient's condition changes.    Please contact pharmacy with any questions regarding these changes.    Jewell Dowling, PharmD, RPh, BCPS 3/20/2025 4:35 AM

## 2025-03-20 NOTE — ED NOTES
ED to Inpatient Handoff Report    Notified Sena that electronic handoff available and patient ready for transport to room 4431A.    Safety Risks: Home safety issues, Difficulty with daily activities, and Risk of falls    Patient in Restraints: no    Constant Observer or Patient : no    Telemetry Monitoring Ordered :Yes      Cardiac Rhythm: Sinus rhythm    Order to transfer to unit without monitor:NO - to the floor with RN        Deterioration Index Score:   Predictive Model Details          54 (Warning)  Factor Value    Calculated 3/20/2025 03:50 22% Dean coma scale 13    Deterioration Index Model 22% Age 74 years old     21% Supplemental oxygen Nasal cannula     15% Respiratory rate 22     10% Systolic 95     3% Potassium abnormal (2.8 mmol/L)     2% Hematocrit abnormal (27.6 %)     2% Pulse oximetry 93 %     2% BUN abnormal (69 mg/dL)     1% Blood pH 7.430     1% Platelet count abnormal (59 k/uL)     1% Pulse 88     0% Temperature 97.8 °F (36.6 °C)     0% WBC count 7.7 k/uL     0% Sodium 139 mmol/L        Vitals:    03/20/25 0325 03/20/25 0330 03/20/25 0335 03/20/25 0340   BP: (!) 115/39 (!) 110/41 (!) 111/41 (!) 95/36   Pulse: 87 90 91 88   Resp: 17 20 23 22   Temp:       TempSrc:       SpO2: 94% 94% 92% 93%   Weight:       Height:             Opportunity for questions and clarification was provided during telephone report.

## 2025-03-20 NOTE — PROGRESS NOTES
Patient unable to participate in the use of incentive spirometry. Patient educated on incentive spirometry use and benefits with no evidence of learning.

## 2025-03-20 NOTE — PROGRESS NOTES
Patient admitted to MICU with the following belongings: folder from facility, shirts x 2, pants, and socks. The following belongings admitted with the patient, None, were sent home with the patient's family.     4 Eyes Skin Assessment     NAME:  Tenisha Bruce  YOB: 1950  MEDICAL RECORD NUMBER:  [id number]    The patient is being assessed for  Admission    I agree that at least one RN has performed a thorough Head to Toe Skin Assessment on the patient. ALL assessment sites listed below have been assessed.      Areas assessed by both nurses:    Head, Face, Ears, Shoulders, Back, Chest, Arms, Elbows, Hands, Sacrum. Buttock, Coccyx, Ischium, Legs. Feet and Heels, and Under Medical Devices         Does the Patient have a Wound? No noted wound(s)     Abdominal scarring      Abrasion to L knee and R foot    Gamal Prevention initiated by RN: Yes  Wound Care Orders initiated by RN: No    Pressure Injury (Stage 3,4, Unstageable, DTI, NWPT, and Complex wounds) if present, place Wound referral order by RN under : No    New Ostomies, if present place, Ostomy referral order under : No     Nurse 1 eSignature: Electronically signed by Tita Dunbar RN on 3/20/25 at 4:27 AM EDT    **SHARE this note so that the co-signing nurse can place an eSignature**    Nurse 2 eSignature: Electronically signed by Sena Rob RN on 3/20/25 at 5:05 AM EDT     Fall Risk

## 2025-03-20 NOTE — PLAN OF CARE
Problem: Safety - Adult  Goal: Free from fall injury  3/20/2025 1414 by Ann Marie Dixon RN  Outcome: Progressing  Flowsheets (Taken 3/20/2025 1414)  Free From Fall Injury: Instruct family/caregiver on patient safety  3/20/2025 0538 by Sena Rob RN  Outcome: Progressing     Problem: Skin/Tissue Integrity  Goal: Skin integrity remains intact  Description: 1.  Monitor for areas of redness and/or skin breakdown  2.  Assess vascular access sites hourly  3.  Every 4-6 hours minimum:  Change oxygen saturation probe site  4.  Every 4-6 hours:  If on nasal continuous positive airway pressure, respiratory therapy assess nares and determine need for appliance change or resting period  3/20/2025 1414 by Ann Marie Dixon RN  Outcome: Progressing  Flowsheets (Taken 3/20/2025 0538 by Sena Rob RN)  Skin Integrity Remains Intact: Monitor for areas of redness and/or skin breakdown  3/20/2025 0538 by Sena Rob RN  Outcome: Progressing  Flowsheets (Taken 3/20/2025 0538)  Skin Integrity Remains Intact: Monitor for areas of redness and/or skin breakdown     Problem: Respiratory - Adult  Goal: Achieves optimal ventilation and oxygenation  3/20/2025 1414 by Ann Marie Dixon RN  Outcome: Progressing  Flowsheets (Taken 3/20/2025 1414)  Achieves optimal ventilation and oxygenation:   Assess for changes in respiratory status   Assess for changes in mentation and behavior   Position to facilitate oxygenation and minimize respiratory effort   Oxygen supplementation based on oxygen saturation or arterial blood gases   Respiratory therapy support as indicated   Encourage broncho-pulmonary hygiene including cough, deep breathe, incentive spirometry  3/20/2025 0538 by Sena Rob RN  Outcome: Progressing     Problem: Metabolic/Fluid and Electrolytes - Adult  Goal: Glucose maintained within prescribed range  3/20/2025 0538 by Sena Rob RN  Outcome: Progressing     Problem: Pain  Goal: Verbalizes/displays

## 2025-03-20 NOTE — ED NOTES
Stroke/ Jamee Alert Time: 2159 stroke      Time Neurologist called:  :    Time CT Notified: 2159      BRAIN alert time: (If applicable)

## 2025-03-20 NOTE — H&P
Hospital Medicine History & Physical      PCP: Julio Porter DO    Date of Admission: 3/19/2025    Date of Service: Pt seen/examined on 3/19/2025 and is  admitted to Inpatient with expected LOS greater than two midnights due to medical therapy.      Chief Complaint:  had concerns including Altered Mental Status (Per nursing home, patient LKW 8 pm.  Patient is altered and hypotensive on arrival to ER.  Left sided facial droop per EMS.).    History Of Present Illness:    Ms. Tenisha Bruce, a 74 y.o. year old female  with PMH of HLD, Hypothyroidism, anxiety and macrocytic anemia    Pt presented to ED for evaluation of altered mental status and shortness of breath.  Pt was hypotensive in ED, started on levophed.  Received NS bolus x 2.5L, and solu cortef, started on zosyn. Workup suggests sepsis with shock , left lower lobe pneumonia and DARIO.  Pt was seen lethargic and on room air, pt is not good historian, history is largely based on chart review and communication with ED staff.     I have personally reviewed and interpreted the Initial workups as summarized below:     WBC normal, H/H chronic anemia, stable H/H 9.1/27.6, .6,, platelet chronic thrombocytopenia 59K  Renal function potassium 2.8, creatinine 1.8 with BUN 69, consistent with DARIO, basal renal function is about normal  Hepatic function   mild elevation AST/ALT, normal bilirubin and normal alkaline phosphatase  Urinalysis is no strong evidence of UTI.  CXR  reviewed,with left lower lobe infiltrates  Troponin 117--108  EKG reviewed NSR with PVC with no acute ST/T wave ischemic change.       CT head   1. No acute intracranial abnormality.  2. 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  CTA HEAD:  1. No large vessel occlusion.  2. Mild stenoses in the cavernous segments of the internal carotid arteries.  CTA Neck  1. Prominent calcified atherosclerosis in the bilateral carotid bulbs  resulting in a maximal stenosis of approximately  70% in the proximal right  ICA and 90% stenosis at the origin of the left ICA.  2. Mildly stenotic calcified atherosclerotic plaque at the origin of the left  vertebral artery.    Past Medical History:        Diagnosis Date    Anxiety     Corneal scarring     Fetal alcohol syndrome     Hypercholesteremia     Hypothyroidism     Onychomycosis        Past Surgical History:        Procedure Laterality Date    HIP FRACTURE SURGERY Left 8/2/2023    OPEN REDUCTION INTERNAL FIXATION LEFT HIP performed by Ramu Hale DO at [facility] OR       Medications Prior to Admission:      Prior to Admission medications    Medication Sig Start Date End Date Taking? Authorizing Provider   erythromycin (ROMYCIN) 5 MG/GM ophthalmic ointment Place 1 cm into both eyes nightly 12/16/23   Sandra Danielson DO   levETIRAcetam (KEPPRA) 500 MG tablet Take 1 tablet by mouth 2 times daily for 5 days 12/15/23 12/20/23  Man Harley DO   guaiFENesin-codeine (GUAIFENESIN AC) 100-10 MG/5ML liquid Take 5 mLs by mouth 3 times daily as needed for Cough.    Yina Meza MD   atorvastatin (LIPITOR) 10 MG tablet Take 1 tablet by mouth nightly    Yina Meza MD   docusate sodium (COLACE) 100 MG capsule Take 1 capsule by mouth every morning    Yina Meza MD   Niacin ER 1000 MG TBCR Take 1,000 mg by mouth nightly    Yina Meza MD   omeprazole (PRILOSEC) 20 MG delayed release capsule Take 1 capsule by mouth every morning    Yina Meza MD   B Complex-C-Folic Acid (NABEEL-JASVIR RX) 1 MG TABS Take 1 tablet by mouth nightly    Yina Meza MD   senna (SENOKOT) 8.6 MG tablet Take 1 tablet by mouth every morning    Yina Meza MD   vitamin D (ERGOCALCIFEROL) 1.25 MG (81599 UT) CAPS capsule Take 1 capsule by mouth once a week **FRIDAYS**    Yina Meza MD   chlorpheniramine (CHLOR-TRIMETON) 4 MG tablet Take 1 tablet by mouth every 12 hours as needed (RUNNY OR STUFFY NOSE, CALL MD IF > 3

## 2025-03-20 NOTE — PROGRESS NOTES
History and physical from earlier today reviewed  Patient is confused  Seen and examined  Admitted from sepsis and acute kidney injury  Patient suffering from metabolic encephalopathy  Source appears to be pneumonia  On empiric antibiotics  In ICU  Nephrology consulted for acute kidney injury

## 2025-03-20 NOTE — PROGRESS NOTES
Pharmacy Consultation Note  (Antibiotic Dosing and Monitoring)    Initial consult date: 3/20/25  Consulting physician/provider: Dr. JOSE Whalen  Drug: Vancomycin  Indication: CAP; 7 days     Age/  Gender Height Weight IBW  Allergy Information   74 y.o./female 165.1 cm (5' 5\") 59 kg (130 lb 1.1 oz)     Ideal body weight: 57 kg (125 lb 10.6 oz)  Adjusted ideal body weight: 57.5 kg (126 lb 13.4 oz)   Zocor [simvastatin]      Renal Function:  Recent Labs     03/19/25  2159 03/20/25  0513   BUN 69* 66*   CREATININE 1.8* 1.6*       Intake/Output Summary (Last 24 hours) at 3/20/2025 1028  Last data filed at 3/20/2025 1028  Gross per 24 hour   Intake 4275.49 ml   Output 400 ml   Net 3875.49 ml       Vancomycin Monitoring:  Trough:  No results for input(s): \"VANCOTROUGH\" in the last 72 hours.  Random:  No results for input(s): \"VANCORANDOM\" in the last 72 hours.    Vancomycin Administration Times:  Recent vancomycin administrations                     vancomycin (VANCOCIN) 1,250 mg in sodium chloride 0.9 % 250 mL IVPB ([id number]) ()  Restarted 03/20/25 0940     1,250 mg New Bag  0905                    Assessment:  Patient is a 74 y.o. female who has been initiated on vancomycin  Estimated Creatinine Clearance: 28 mL/min (A) (based on SCr of 1.6 mg/dL (H)).  To dose vancomycin, pharmacy will be utilizing dosing based off of levels because of patient's renal impairment/insufficiency    Plan:  Vancomycin 1250 mg IV x 1 dose  Will check random vancomycin level tomorrow am on 3/21  Will continue to monitor renal function   Pharmacy to follow      Zack Kay, PharmD, BCPS, BCCCP 3/20/2025 10:28 AM    SEB: [phone]  SEY: [phone]  SJW: [phone]

## 2025-03-20 NOTE — CONSULTS
[facility]  Internal Medicine Residency Program  CONSULT NOTE  MICU    Patient:  Tenisha Bruce 74 y.o. female MRN: [id number]     Date of Service: 3/20/2025  Allergy: Zocor [simvastatin]    Hospital Day: 2      Chief Complaint   Patient presents with    Altered Mental Status     Per nursing home, patient LKW 8 pm.  Patient is altered and hypotensive on arrival to ER.  Left sided facial droop per EMS.      History of Present Illness   The patient is a 74 y.o. female with PMH of fetal alcohol syndrome, developmental delay, hyperlipidemia, hypothyroidism, anxiety, anemia presented to the ED for altered mental status from group home. History was unable to be obtained from patient. Last known well was 8 pm. Attempted to call the legal guardian but was unable to reach them. As per chart review patient is s verbal at baseline. Able to walk at baseline.     Patient had CT head wo contrast that showed no acute intracranial abnormality, small chronic infarction in the left parietal lobe. CTA head Prominent calcified atherosclerosis in the bilateral carotid bulbs resulting in a maximal stenosis of approximately 70% in the proximal right ICA and 90% stenosis at the origin of the left ICA. CTA showed  Prominent calcified atherosclerosis in the bilateral carotid bulbs resulting in a maximal stenosis of approximately 70% in the proximal right ICA and 90% stenosis at the origin of the left ICA.Mildly stenotic calcified atherosclerotic plaque at the origin of the left vertebral artery.     Patient was hypotensive given 2.5 L of fluid and started on Levophed.Labs were significant for a potassium of 2.8. She had an DARIO creatinine was 1.8, BUN 69, egf 30. Cortisol was appropriately elevated at 28.2. Valproic acid was therapeutic. She had mild transaminitis ALT 38, AST 61. Troponin was elevated at 117 > 108. EKG showed sinus rhythm with frequent PVCs. CXR showed left lower lobe opacities. Respiratory panel was    escitalopram (LEXAPRO) 10 MG tablet Take 1 tablet by mouth nightly    Yina Meza MD Fe Cbn-Fe Gluc-FA-B12-C-DSS (FERRALET 90) 90-1 MG TABS Take 1 tablet by mouth every evening    Yina Meza MD   acetaminophen (TYLENOL) 325 MG tablet Take 2 tablets by mouth every 4 hours as needed (HEAD/PAIN;TEMP >100 F, CALL MD IF TEMP >103 F) Indications: Headache, Pain, elevated temp of 100 or above, menstrual cramps, headache    Yina Meza MD   divalproex (DEPAKOTE) 500 MG DR tablet Take 1 tablet by mouth nightly    Yina Meza MD   ezetimibe (ZETIA) 10 MG tablet Take 1 tablet by mouth every morning    Yina Meza MD   QUEtiapine (SEROQUEL) 400 MG tablet Take 1 tablet by mouth nightly    Yina Meza MD   azelastine (ASTELIN) 0.1 % nasal spray 2 sprays by Nasal route 2 times daily    Yina Meza MD   levothyroxine (SYNTHROID) 100 MCG tablet Take 1 tablet by mouth every morning (before breakfast)    Yina Meza MD       Allergies:  Zocor [simvastatin]    Social History:   TOBACCO:   reports that she has never smoked. She has never used smokeless tobacco.  ETOH:   reports no history of alcohol use.    Family History:   No family history on file.    REVIEW OF SYSTEMS:    Unable to be obtained    Physical Exam   Physical Exam  Vitals: BP (!) 95/45   Pulse 85   Temp (!) 96.7 °F (35.9 °C) (Axillary)   Resp 14   Ht 1.651 m (5' 5\")   Wt 58.3 kg (128 lb 9.6 oz)   SpO2 95%   BMI 21.40 kg/m²     I & O - 24hr: I/O this shift:  In: 2800 [IV Piggyback:2800]  Out: 200 [Urine:200]   General appearance: No acute distress. Responds to noxious stimuli.   Neuro: Round symmetric pupils that reactive to light bilaterally  Cardiovascular: Regular rate and rhythm, S1 and S2 heard, holosystolic murmur   Respiratory: Clear to auscultation bilaterally.  No wheezing or crackles appreciated.  Abdomen: Soft, non tender, bowel sounds normal; masses,2 vertical scars  remainder is widely patent.  No significant stenosis in the right vertebral artery. SOFT TISSUES: The lung apices are clear.  No cervical or superior mediastinal lymphadenopathy.  The larynx and pharynx are unremarkable.  No acute abnormality of the salivary and thyroid glands. BONES: No acute osseous abnormality. CTA HEAD: ANTERIOR CIRCULATION: Moderate calcified atherosclerotic plaque in the cavernous segments of the internal carotid arteries result in mild stenoses. No significant stenosis identified in the bilateral anterior or middle cerebral arteries. POSTERIOR CIRCULATION: No significant stenosis of the basilar or posterior cerebral arteries. No aneurysm. OTHER: No dural venous sinus thrombosis on this non-dedicated study.     CT HEAD: 1. No acute intracranial abnormality. 2. 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 CTA NECK: 1. Prominent calcified atherosclerosis in the bilateral carotid bulbs resulting in a maximal stenosis of approximately 70% in the proximal right ICA and 90% stenosis at the origin of the left ICA. 2. Mildly stenotic calcified atherosclerotic plaque at the origin of the left vertebral artery. CTA HEAD: 1. No large vessel occlusion. 2. Mild stenoses in the cavernous segments of the internal carotid arteries.         Resident's Assessment and Plan        Assessment:   AMS 2/2 #3  Fetal alcohol syndrome   Shock likely septic likely 2/2 PNA   Elevated troponin 117 > 108 >84   HLD  DARIO (BS Cr 0.8)  Hypokalemia  Transaminitis   Hypothyroidism   Anxiety   Macrocytic anemia   Hx of left femoral neck fracture s/p ORIF 8/2/23    Plan:   S/p 2.5 L fluid  On Levophed; wean to maintain MAP goal >65  On solu cortef 100 mg q 8  Echo ordered; holosystolic murmur on exam likely VSD d/t FAS  Follow urine, blood cultures, MRSA nares  Continue zosyn  Follow procal, LA, B12/ Folate, TSH   Follow urine lytes   Nephrology consulted appreciate recommendations   Continue synthroid 100 mcg     DVT

## 2025-03-20 NOTE — ED NOTES
PrecautionsPrecautions: Fall riskNegative Pressure Room: NoPositive Pressure Room: No  Safe EnvironmentArm Bands On: ID; Fall; AllergiesPatient has limb restriction?: NoSafety Measures: Standard Safety Measures; Bed/Chair alarm on; Bed/Chair-Wheels locked; Bed in low position; Side rails X 2; Gripper socks; Overbed table w/in reach; Call light within reach  TelemetryCardiac/Telemetry Monitor On: Bedside monitor in useAlarm Audible: Yes  Fall Risk InterventionsNursing Judgement-Fall Risk High(Add Comments): YesToilet Every 2 Hours-In Advance of Need: Yes (Pure Wick in place)Hourly Visual Checks: Awake; In bedFall Visual Posted: Armband; Fall sign posted; SocksRoom Door Open: YesAlarm On: BedPatient Moved Closer to Nursing Station: No  MobilityActivity: In bedLevel of Assistance: Dependent, patient does less than 25%Turned/Repositioned: Pillow supportHead of Bed Elevated : HOB less than 20  NutritionNPO: Yes  HygieneHygiene: Denisa careLevel of Assistance: Dependent, patient does less than 25%  Comfort and Environment InterventionsComfort: Gown changed  Safety CompanionAlternatives to Sitter: Comfort Measures; Eliminate Invasive Treatment; Decrease Stimulation; Increased Frequency of Nursing Rounds

## 2025-03-20 NOTE — ED PROVIDER NOTES
Department of Emergency Medicine     Written by: Clemente Rudd MD  Patient Name: Tenisha Bruce  Admit Date: 3/19/2025  9:56 PM  MRN: [id number]                   : 1950    HPI     Chief Complaint   Patient presents with    Altered Mental Status     Per nursing home, patient LKW 8 pm.  Patient is altered and hypotensive on arrival to ER.  Left sided facial droop per EMS.       Tenisha Bruce is a 74 y.o. female that presents to the ED due to concerns for altered mental status.  From group home.  Around 8 PM was last known well.  She says she is not acting herself.  She normally able to talk and joke around.  Unfortunately the patient is altered and cannot provide much history.  She is unable to move her legs.  Unclear if this is baseline or not.  No chest pain or shortness of breath no abdominal pain.  She has no complaints    Review of systems   Pertinent positives and negatives mentioned in the HPI/MDM.      Physical   Physical Exam  Constitutional:       General: She is not in acute distress.     Appearance: Normal appearance. She is ill-appearing.   HENT:      Mouth/Throat:      Comments: Dry oral mucous  Eyes:      Extraocular Movements: Extraocular movements intact.      Pupils: Pupils are equal, round, and reactive to light.   Cardiovascular:      Rate and Rhythm: Regular rhythm. Tachycardia present.   Pulmonary:      Effort: Pulmonary effort is normal. No respiratory distress.      Breath sounds: Normal breath sounds. No stridor.   Abdominal:      General: Bowel sounds are normal. There is no distension.      Palpations: Abdomen is soft.      Tenderness: There is no abdominal tenderness.   Musculoskeletal:         General: No swelling or tenderness. Normal range of motion.   Skin:     General: Skin is warm.      Coloration: Skin is not cyanotic.      Comments: Warm to touch   Neurological:      Mental Status: She is alert. She is disoriented and confused.          Chart review: Admitted on  74/35 -- -- -- 16 97 % -- --   03/19/25 2301 -- -- -- -- 16 -- 1.651 m (5' 5\") 59 kg (130 lb 1.1 oz)   03/19/25 2300 (!) 76/35 97.5 °F (36.4 °C) Oral 90 21 98 % -- --   03/19/25 2245 (!) 67/27 -- -- 92 (!) 34 -- -- --   03/19/25 2230 (!) 66/31 -- -- 100 25 96 % -- --   03/19/25 2215 (!) 85/47 -- -- (!) 117 16 (!) 84 % -- --   03/19/25 2201 (!) 94/30 99.9 °F (37.7 °C) Rectal (!) 110 20 94 % -- --       ------------------------------ RESULTS -----------------------------    Medications administered today:  Medications   potassium chloride 10 mEq/100 mL IVPB (Peripheral Line) (10 mEq IntraVENous New Bag 3/20/25 0122)   norepinephrine (LEVOPHED) 16 mg in sodium chloride 0.9 % 250 mL infusion (premix) (22.5 mcg/min IntraVENous Rate/Dose Change 3/20/25 0032)   lactated ringers infusion ( IntraVENous New Bag 3/20/25 0017)   sodium chloride 0.9 % bolus 1,000 mL (0 mLs IntraVENous Stopped 3/20/25 0034)   iopamidol (ISOVUE-370) 76 % injection 75 mL (75 mLs IntraVENous Given 3/19/25 2216)   sodium chloride 0.9 % bolus 500 mL (0 mLs IntraVENous Stopped 3/20/25 0034)   lactated ringers bolus 1,000 mL (0 mLs IntraVENous Stopped 3/20/25 0000)   hydrocortisone sodium succinate PF (SOLU-CORTEF) injection 100 mg (100 mg IntraVENous Given 3/19/25 2344)   piperacillin-tazobactam (ZOSYN) 4,500 mg in sodium chloride 0.9 % 100 mL IVPB ([id number]) (0 mg IntraVENous Stopped 3/20/25 0020)       Labs reviewed and interpreted by me:  Results for orders placed or performed during the hospital encounter of 03/19/25   Respiratory Panel, Molecular, with COVID-19 (Restricted: peds pts or suitable admitted adults)    Specimen: Nasopharyngeal Swab   Result Value Ref Range    Specimen Description .NASOPHARYNGEAL SWAB     Adenovirus PCR Not Detected Not Detected    Coronavirus 229E PCR Not Detected Not Detected    Coronavirus HKU1 PCR Not Detected Not Detected    Coronavirus NL63 PCR Not Detected Not Detected    Coronavirus OC43 PCR Not Detected Not  NEGATIVE NEGATIVE mg/dL    Bilirubin, Urine SMALL (A) NEGATIVE    Ketones, Urine NEGATIVE NEGATIVE mg/dL    Specific Gravity, UA 1.025 1.005 - 1.030    Urine Hgb SMALL (A) NEGATIVE    pH, Urine 5.5 5.0 - 8.0    Protein, UA 30 (A) NEGATIVE mg/dL    Urobilinogen, Urine 1.0 0.0 - 1.0 EU/dL    Nitrite, Urine NEGATIVE NEGATIVE    Leukocyte Esterase, Urine NEGATIVE NEGATIVE    WBC, UA 0 TO 5 0 TO 5 /HPF    RBC, UA 6 TO 9 (A) 0 TO 2 /HPF    Casts UA 0 TO 2 FINE GRANULAR (A) None /LPF    Casts UA 3 to 5 HYALINE (A) None /LPF    Bacteria, UA 2+ (A) None   EKG 12 Lead   Result Value Ref Range    Ventricular Rate 91 BPM    Atrial Rate 91 BPM    P-R Interval 152 ms    QRS Duration 126 ms    Q-T Interval 382 ms    QTc Calculation (Bazett) 469 ms    P Axis 64 degrees    R Axis 31 degrees    T Axis -115 degrees       Radiology reviewed and interpreted by me:  XR CHEST PORTABLE   Final Result   Left lower lobe airspace opacities may relate to an infectious or   inflammatory process to include aspiration.         CT HEAD WO CONTRAST   Final Result   CT HEAD:      1. No acute intracranial abnormality.   2. 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   CTA NECK:      1. Prominent calcified atherosclerosis in the bilateral carotid bulbs   resulting in a maximal stenosis of approximately 70% in the proximal right   ICA and 90% stenosis at the origin of the left ICA.   2. Mildly stenotic calcified atherosclerotic plaque at the origin of the left   vertebral artery.   CTA HEAD:      1. No large vessel occlusion.   2. Mild stenoses in the cavernous segments of the internal carotid arteries.         CTA HEAD W CONTRAST   Final Result   CT HEAD:      1. No acute intracranial abnormality.   2. 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   CTA NECK:      1. Prominent calcified atherosclerosis in the bilateral carotid bulbs   resulting in a maximal stenosis of approximately 70% in the proximal right   ICA and 90% stenosis at the

## 2025-03-20 NOTE — CARE COORDINATION
Care Coordination:  Admit today. Pt resides at [facility]. Care Giver is Shani. Received a call from Shani this am.  Pt became weak over the last few days, stopped drinking and poor appetite with temp.Pt is usually independent, no teeth so has a mechanical soft/ground meat diet, drinks water and Loves Coffee.  [place] visiting physicians follow with pt-NP Theresa Carrera . No assistive devices, no dme, pilar or hhc. Goal is for her to return and they will transport, if any information is needed on pt, please call caregiver Shani. Will follow for transition of care needs.    Electronically signed by Bree Rosenberg RN on 3/20/2025 at 9:13 AM       The Plan for Transition of Care is related to the following treatment goals: dc    The Patient and/or patient representative caregiver Marily was provided with a choice of provider and agrees   with the discharge plan. [x] Yes [] No    Freedom of choice list was provided with basic dialogue that supports the patient's individualized plan of care/goals, treatment preferences and shares the quality data associated with the providers. [x] Yes [] No

## 2025-03-21 LAB
ALBUMIN SERPL-MCNC: 2.8 G/DL (ref 3.5–5.2)
ALP SERPL-CCNC: 61 U/L (ref 35–104)
ALT SERPL-CCNC: 32 U/L (ref 0–32)
ANION GAP SERPL CALCULATED.3IONS-SCNC: 20 MMOL/L (ref 7–16)
AST SERPL-CCNC: 49 U/L (ref 0–31)
B-OH-BUTYR SERPL-MCNC: 2.67 MMOL/L (ref 0.02–0.27)
BASOPHILS # BLD: 0 K/UL (ref 0–0.2)
BASOPHILS NFR BLD: 0 % (ref 0–2)
BILIRUB SERPL-MCNC: 0.5 MG/DL (ref 0–1.2)
BUN SERPL-MCNC: 61 MG/DL (ref 6–23)
CALCIUM SERPL-MCNC: 8.9 MG/DL (ref 8.6–10.2)
CHLORIDE SERPL-SCNC: 98 MMOL/L (ref 98–107)
CO2 SERPL-SCNC: 16 MMOL/L (ref 22–29)
CREAT SERPL-MCNC: 1.5 MG/DL (ref 0.5–1)
DATE LAST DOSE: NORMAL
EOSINOPHIL # BLD: 0 K/UL (ref 0.05–0.5)
EOSINOPHILS RELATIVE PERCENT: 0 % (ref 0–6)
ERYTHROCYTE [DISTWIDTH] IN BLOOD BY AUTOMATED COUNT: 15.2 % (ref 11.5–15)
GFR, ESTIMATED: 36 ML/MIN/1.73M2
GLUCOSE SERPL-MCNC: 140 MG/DL (ref 74–99)
HCT VFR BLD AUTO: 25.2 % (ref 34–48)
HGB BLD-MCNC: 8.4 G/DL (ref 11.5–15.5)
L PNEUMO1 AG UR QL IA.RAPID: NEGATIVE
LACTATE BLDV-SCNC: 1.1 MMOL/L (ref 0.5–2.2)
LYMPHOCYTES NFR BLD: 1.42 K/UL (ref 1.5–4)
LYMPHOCYTES RELATIVE PERCENT: 20 % (ref 20–42)
MAGNESIUM SERPL-MCNC: 2.7 MG/DL (ref 1.6–2.6)
MCH RBC QN AUTO: 37.8 PG (ref 26–35)
MCHC RBC AUTO-ENTMCNC: 33.3 G/DL (ref 32–34.5)
MCV RBC AUTO: 113.5 FL (ref 80–99.9)
MICROORGANISM SPEC CULT: NORMAL
MONOCYTES NFR BLD: 0.49 K/UL (ref 0.1–0.95)
MONOCYTES NFR BLD: 7 % (ref 2–12)
NEUTROPHILS NFR BLD: 73 % (ref 43–80)
NEUTS SEG NFR BLD: 5.19 K/UL (ref 1.8–7.3)
PATH REV BLD -IMP: NORMAL
PHOSPHATE SERPL-MCNC: 4.6 MG/DL (ref 2.5–4.5)
PLATELET # BLD AUTO: 57 K/UL (ref 130–450)
PLATELET CONFIRMATION: NORMAL
PMV BLD AUTO: 12 FL (ref 7–12)
POTASSIUM SERPL-SCNC: 3.4 MMOL/L (ref 3.5–5)
PROT SERPL-MCNC: 5.5 G/DL (ref 6.4–8.3)
RBC # BLD AUTO: 2.22 M/UL (ref 3.5–5.5)
RBC # BLD: ABNORMAL 10*6/UL
S PNEUM AG SPEC QL: NEGATIVE
SODIUM SERPL-SCNC: 134 MMOL/L (ref 132–146)
SPECIMEN DESCRIPTION: NORMAL
SPECIMEN SOURCE: NORMAL
TME LAST DOSE: NORMAL H
VANCOMYCIN DOSE: NORMAL MG
VANCOMYCIN SERPL-MCNC: 11.4 UG/ML (ref 5–40)
WBC # BLD: ABNORMAL 10*3/UL
WBC # BLD: ABNORMAL 10*3/UL
WBC OTHER # BLD: 7.1 K/UL (ref 4.5–11.5)

## 2025-03-21 PROCEDURE — 2580000003 HC RX 258: Performed by: HOSPITALIST

## 2025-03-21 PROCEDURE — 6370000000 HC RX 637 (ALT 250 FOR IP): Performed by: HOSPITALIST

## 2025-03-21 PROCEDURE — 2000000000 HC ICU R&B

## 2025-03-21 PROCEDURE — 83605 ASSAY OF LACTIC ACID: CPT

## 2025-03-21 PROCEDURE — 6360000002 HC RX W HCPCS

## 2025-03-21 PROCEDURE — 80053 COMPREHEN METABOLIC PANEL: CPT

## 2025-03-21 PROCEDURE — 2700000000 HC OXYGEN THERAPY PER DAY

## 2025-03-21 PROCEDURE — 2500000003 HC RX 250 WO HCPCS: Performed by: HOSPITALIST

## 2025-03-21 PROCEDURE — 83735 ASSAY OF MAGNESIUM: CPT

## 2025-03-21 PROCEDURE — 6360000002 HC RX W HCPCS: Performed by: HOSPITALIST

## 2025-03-21 PROCEDURE — 80202 ASSAY OF VANCOMYCIN: CPT

## 2025-03-21 PROCEDURE — 99232 SBSQ HOSP IP/OBS MODERATE 35: CPT

## 2025-03-21 PROCEDURE — 2500000003 HC RX 250 WO HCPCS

## 2025-03-21 PROCEDURE — 84100 ASSAY OF PHOSPHORUS: CPT

## 2025-03-21 PROCEDURE — 94640 AIRWAY INHALATION TREATMENT: CPT

## 2025-03-21 PROCEDURE — 82378 CARCINOEMBRYONIC ANTIGEN: CPT

## 2025-03-21 PROCEDURE — 6370000000 HC RX 637 (ALT 250 FOR IP)

## 2025-03-21 PROCEDURE — 82010 KETONE BODYS QUAN: CPT

## 2025-03-21 PROCEDURE — 85025 COMPLETE CBC W/AUTO DIFF WBC: CPT

## 2025-03-21 PROCEDURE — 92610 EVALUATE SWALLOWING FUNCTION: CPT | Performed by: SPEECH-LANGUAGE PATHOLOGIST

## 2025-03-21 PROCEDURE — 99291 CRITICAL CARE FIRST HOUR: CPT | Performed by: INTERNAL MEDICINE

## 2025-03-21 RX ORDER — PANTOPRAZOLE SODIUM 40 MG/1
40 TABLET, DELAYED RELEASE ORAL
Status: DISCONTINUED | OUTPATIENT
Start: 2025-03-21 | End: 2025-03-28 | Stop reason: HOSPADM

## 2025-03-21 RX ADMIN — PIPERACILLIN AND TAZOBACTAM 4500 MG: 4; .5 INJECTION, POWDER, FOR SOLUTION INTRAVENOUS at 08:38

## 2025-03-21 RX ADMIN — SODIUM CHLORIDE, PRESERVATIVE FREE 10 ML: 5 INJECTION INTRAVENOUS at 20:18

## 2025-03-21 RX ADMIN — GUAIFENESIN 400 MG: 400 TABLET ORAL at 20:19

## 2025-03-21 RX ADMIN — ATORVASTATIN CALCIUM 10 MG: 10 TABLET, FILM COATED ORAL at 20:19

## 2025-03-21 RX ADMIN — Medication 6 MCG/MIN: at 01:41

## 2025-03-21 RX ADMIN — IPRATROPIUM BROMIDE AND ALBUTEROL SULFATE 1 DOSE: 2.5; .5 SOLUTION RESPIRATORY (INHALATION) at 11:40

## 2025-03-21 RX ADMIN — HEPARIN SODIUM 5000 UNITS: 5000 INJECTION INTRAVENOUS; SUBCUTANEOUS at 05:39

## 2025-03-21 RX ADMIN — GUAIFENESIN 400 MG: 400 TABLET ORAL at 08:35

## 2025-03-21 RX ADMIN — HEPARIN SODIUM 5000 UNITS: 5000 INJECTION INTRAVENOUS; SUBCUTANEOUS at 20:19

## 2025-03-21 RX ADMIN — PIPERACILLIN AND TAZOBACTAM 4500 MG: 4; .5 INJECTION, POWDER, FOR SOLUTION INTRAVENOUS at 20:18

## 2025-03-21 RX ADMIN — HEPARIN SODIUM 5000 UNITS: 5000 INJECTION INTRAVENOUS; SUBCUTANEOUS at 17:08

## 2025-03-21 RX ADMIN — SODIUM CHLORIDE, PRESERVATIVE FREE 5 ML: 5 INJECTION INTRAVENOUS at 08:45

## 2025-03-21 RX ADMIN — WATER 100 MG: 1 INJECTION INTRAMUSCULAR; INTRAVENOUS; SUBCUTANEOUS at 08:35

## 2025-03-21 RX ADMIN — IPRATROPIUM BROMIDE AND ALBUTEROL SULFATE 1 DOSE: 2.5; .5 SOLUTION RESPIRATORY (INHALATION) at 16:21

## 2025-03-21 RX ADMIN — IPRATROPIUM BROMIDE AND ALBUTEROL SULFATE 1 DOSE: 2.5; .5 SOLUTION RESPIRATORY (INHALATION) at 07:43

## 2025-03-21 RX ADMIN — ESCITALOPRAM OXALATE 10 MG: 10 TABLET ORAL at 20:19

## 2025-03-21 RX ADMIN — EZETIMIBE 10 MG: 10 TABLET ORAL at 08:35

## 2025-03-21 RX ADMIN — DIVALPROEX SODIUM 500 MG: 500 TABLET, DELAYED RELEASE ORAL at 20:19

## 2025-03-21 RX ADMIN — IPRATROPIUM BROMIDE AND ALBUTEROL SULFATE 1 DOSE: 2.5; .5 SOLUTION RESPIRATORY (INHALATION) at 20:15

## 2025-03-21 RX ADMIN — POTASSIUM BICARBONATE 40 MEQ: 782 TABLET, EFFERVESCENT ORAL at 05:39

## 2025-03-21 RX ADMIN — Medication 1000 MG: at 20:19

## 2025-03-21 RX ADMIN — QUETIAPINE FUMARATE 400 MG: 200 TABLET ORAL at 20:19

## 2025-03-21 RX ADMIN — LEVOTHYROXINE SODIUM 100 MCG: 0.1 TABLET ORAL at 05:39

## 2025-03-21 RX ADMIN — GUAIFENESIN 400 MG: 400 TABLET ORAL at 17:08

## 2025-03-21 RX ADMIN — PANTOPRAZOLE SODIUM 40 MG: 40 TABLET, DELAYED RELEASE ORAL at 08:42

## 2025-03-21 RX ADMIN — POTASSIUM BICARBONATE 20 MEQ: 782 TABLET, EFFERVESCENT ORAL at 08:35

## 2025-03-21 RX ADMIN — WATER 100 MG: 1 INJECTION INTRAMUSCULAR; INTRAVENOUS; SUBCUTANEOUS at 20:19

## 2025-03-21 RX ADMIN — WATER 100 MG: 1 INJECTION INTRAMUSCULAR; INTRAVENOUS; SUBCUTANEOUS at 01:41

## 2025-03-21 NOTE — PROGRESS NOTES
Pharmacy Consultation Note  (Antibiotic Dosing and Monitoring)    Initial consult date: 3/20/25  Consulting physician/provider: Dr. JOSE Whalen  Drug: Vancomycin  Indication: CAP; 7 days     Age/  Gender Height Weight IBW  Allergy Information   74 y.o./female 165.1 cm (5' 5\") 59 kg (130 lb 1.1 oz)     Ideal body weight: 57 kg (125 lb 10.6 oz)  Adjusted ideal body weight: 58.6 kg (129 lb 3.8 oz)   Zocor [simvastatin]      Renal Function:  Recent Labs     03/19/25  2159 03/20/25  0513 03/21/25  0406   BUN 69* 66* 61*   CREATININE 1.8* 1.6* 1.5*       Intake/Output Summary (Last 24 hours) at 3/21/2025 0903  Last data filed at 3/21/2025 0811  Gross per 24 hour   Intake 2670.28 ml   Output 710 ml   Net 1960.28 ml       Vancomycin Monitoring:  Trough:  No results for input(s): \"VANCOTROUGH\" in the last 72 hours.  Random:    Recent Labs     03/21/25  0406   VANCORANDOM 11.4       Vancomycin Administration Times:  Recent vancomycin administrations                     vancomycin (VANCOCIN) 1,250 mg in sodium chloride 0.9 % 250 mL IVPB ([id number]) ()  Restarted 03/20/25 0940     1,250 mg New Bag  0905                   Assessment:  Patient is a 74 y.o. female who has been initiated on vancomycin  Estimated Creatinine Clearance: 30 mL/min (A) (based on SCr of 1.5 mg/dL (H)).  To dose vancomycin, pharmacy will be utilizing dosing based off of levels because of patient's renal impairment/insufficiency  Random level this AM was 11.4     Plan:  Vancomycin 750 mg IV x 1 dose  Will check random vancomycin level tomorrow am on 3/22  Will continue to monitor renal function   Pharmacy to follow    Yovani Yousif Pharm D Candidate 2025     STEFFI: [phone]  SEY: [phone]  SJW: [phone]

## 2025-03-21 NOTE — PROGRESS NOTES
Physician Progress Note      PATIENT:               HERNANDEZ VUONG  CSN #:                  [id number]  :                       1950  ADMIT DATE:       3/19/2025 9:56 PM  DISCH DATE:  RESPONDING  PROVIDER #:        Chacho Woo MD          QUERY TEXT:    Patient admitted with Pneumonia, noted to have Sepsis with Septic shock   documented. . In order to support the diagnosis of sepsis, please include   additional clinical indicators in your documentation.  Or please document if   the diagnosis of sepsis has been ruled out after further study    The medical record reflects the following:  Risk Factors: PNA  Clinical Indicators: ON admission patient presented with AMS in the ER, dx.   with PNA, Sepsis and Septic shock, as noted by the ER provider and the H&P.    Od admission: WBC: 7.7, repeated 7.1, lactic: 1.3, 1.1. Procal: 159, BP:   77/32, 68/30, 64/27 in the ER with HR: 89, 98,99, Temp: 98,5, 98.2, 97.8,  Treatment: ***  Options provided:  -- Sepsis present as evidenced by, Please document evidence.  -- Sepsis was ruled out after study  -- Other - I will add my own diagnosis  -- Disagree - Not applicable / Not valid  -- Disagree - Clinically unable to determine / Unknown  -- Refer to Clinical Documentation Reviewer    PROVIDER RESPONSE TEXT:    Sepsis was ruled out after study.    Query created by: Katheryn Gonzales on 3/21/2025 12:10 PM      Electronically signed by:  Chacho Woo MD 3/21/2025 2:15 PM

## 2025-03-21 NOTE — PROGRESS NOTES
Physician Progress Note      PATIENT:               HERNANDEZ VUONG  CSN #:                  [id number]  :                       1950  ADMIT DATE:       3/19/2025 9:56 PM  DISCH DATE:  RESPONDING  PROVIDER #:        Chacho Woo MD          QUERY TEXT:    Pt admitted with Pneumonia and has shock documented.  If possible, please   document in progress notes and discharge summary further specificity regarding   the type of shock:    The medical record reflects the following:  Risk Factors: Pneumonia,  Clinical Indicators: Previous query response removed Sepsis.  Pt. has Septid   Shock documented on progress notes dated 25 by Dr Goodman, and on the   H&P. Presents for AMS and hypotensive with left sided facial drop. BP: 77/32,   68/30, 64/27 in the ER with HR: 89, 98,99,    Treatment: Levophed, 1 L of NS n the ER, 1 l of LR, Zosyn IV in the Er,   Central Line placed  Options provided:  -- Cardiogenic Shock  -- Obstructive Shock  -- Hypovolemic Shock  -- Hypotension without shock  -- Other - I will add my own diagnosis  -- Disagree - Not applicable / Not valid  -- Disagree - Clinically unable to determine / Unknown  -- Refer to Clinical Documentation Reviewer    PROVIDER RESPONSE TEXT:    Septic shock, likely pneumonia    Query created by: Katheryn Gonzales on 3/21/2025 2:29 PM      Electronically signed by:  Chacho Woo MD 3/21/2025 2:44 PM

## 2025-03-21 NOTE — PROGRESS NOTES
The  visited the patient who appeared to be unable to track the conversation. The patient was asking for her nurse and was not able to feed herself. The  got the nurse to make her aware that the patient was asking for her and was not able to push the call button. The  provided sustaining ministry presence and prayer. Will follow as needed. If additional support is needed please reach out to Spiritual Health (ext [phone]).    Rev GABBY Pizarro MDIV,

## 2025-03-21 NOTE — PROGRESS NOTES
SPEECH/LANGUAGE PATHOLOGY  CLINICAL ASSESSMENT OF SWALLOWING FUNCTION   and PLAN OF CARE      PATIENT NAME:  Tenisha Bruce  (female)     MRN:  [id number]    :  1950  (74 y.o.)  STATUS:  Inpatient: Room 4431/4431-A    TODAY'S DATE:  3/21/2025  ORDER DATE, DESCRIPTION AND REFERRING PROVIDER: 25 1015    SLP swallowing-dysphagia evaluation and treatment  Start:  25 1015,   End:  25 1015,   ONE TIME,   Standing Count:  1 Occurrences,   R       Holli Guzman MD  REASON FOR REFERRAL:     Hx of mild-moderate oropharyngeal phase dysphagia, edentulous.      EVALUATING THERAPIST: DERIK Patino                 RESULTS:    DYSPHAGIA DIAGNOSIS:   Clinical indicators of mild-moderate oral phase dysphagia        RN reports concern of tolerance of soft solids at lunch meal.        DIET RECOMMENDATIONS:  Pureed consistency solids (IDDSI level 4) with  thin liquids (IDDSI level 0)     Administer medication crushed, as able, with pudding/applesauce     FEEDING RECOMMENDATIONS:     Assistance level:  Set-up is required for all oral intake  Supervision is needed during all oral intake  Encourage self-feeding as function allows      Compensatory strategies recommended: Fully alert for all PO, Thorough oral care to prevent colonization of oral bacteria, Upright in bed/ chair as tolerated  Slow rate of intake   SINGLE cup sips  SMALL bites      Discussed recommendations with:  Patient  and patient nurse in person    SPEECH THERAPY  PLAN OF CARE   The dysphagia POC is established based on physician order, dysphagia diagnosis and results of clinical assessment     Meal time assessment for 1-2 sessions to provide diet modification and compensatory strategy implementation to safely advance diet as functional ability improves    Conditions Requiring Skilled Therapeutic Intervention for dysphagia:    Patient is performing below functional baseline d/t  current acute condition, respiratory compromise,  multiple medications, and/or increased dependency upon caregivers.  impaired mastication     Specific dysphagia interventions to include:     PO trials of upgraded diet textures with SLP only to determine the least restrictive PO diet     Specific instructions for next treatment:  ongoing PO analysis to upgrade diet and evaluate tolerance of current PO recommendation  Patient Treatment Goals:    Short Term Goals:  Pt will participate in meal time assessment for 1-2 sessions to provide diet modification and compensatory strategy implementation to safely advance diet as functional ability improves    Long Term Goals:   Pt will improve oropharyngeal swallow function to ensure airway protection during PO intake to maintain adequate nutrition/hydration and decrease signs/symptoms of aspiration to less than 1 x/day.      Patient/family Goal:    Did not state.  Will further assess during treatment.    Plan of care discussed with Patient   The Patient did not demonstrate complete understanding of the diagnosis, prognosis and plan of care     Rehabilitation Potential/Prognosis: Fair                    ADMITTING DIAGNOSIS: Acute kidney injury [N17.9]  Severe sepsis with septic shock (HCC) [A41.9, R65.21]  Septic shock (HCC) [A41.9, R65.21]  Altered mental status, unspecified altered mental status type [R41.82]  Pneumonia of left lower lobe due to infectious organism [J18.9]  Acute hypoxemic respiratory failure [J96.01]    VISIT DIAGNOSIS:   Visit Diagnoses         Codes      Altered mental status, unspecified altered mental status type     R41.82      Heart murmur     R01.1             PATIENT REPORT/COMPLAINT: denies difficulty swallowing  RN cleared patient for participation in assessment     yes     PRIOR LEVEL OF SWALLOW FUNCTION:    PAST HISTORY OF OROPHARYNGEAL DYSPHAGIA?: none reported    Home diet: Mechanical Soft  thin liquids (IDDSI level 0)  Current Diet Order:  ADULT DIET; Dysphagia - Minced and Moist; Low Sodium

## 2025-03-21 NOTE — PROGRESS NOTES
[facility] Hospitalist Progress Note    Admitting Date and Time: 3/19/2025  9:56 PM  Admit Dx: Acute kidney injury [N17.9]  Severe sepsis with septic shock (HCC) [A41.9, R65.21]  Septic shock (HCC) [A41.9, R65.21]  Altered mental status, unspecified altered mental status type [R41.82]  Pneumonia of left lower lobe due to infectious organism [J18.9]  Acute hypoxemic respiratory failure [J96.01]    Synopsis:  74-year-old female with a past medical history of hyperlipidemia, hypothyroidism, anxiety came to the hospital due to altered mental status, shortness of breath, hypotension and found to have septic shock most likely due to left lower lobe pneumonia.  Patient also found to have DARIO.  Patient admitted to medical ICU and on Levophed, IV fluids and Zosyn, vancomycin.    Subjective:  Patient is being followed for Acute kidney injury [N17.9]  Severe sepsis with septic shock (HCC) [A41.9, R65.21]  Septic shock (HCC) [A41.9, R65.21]  Altered mental status, unspecified altered mental status type [R41.82]  Pneumonia of left lower lobe due to infectious organism [J18.9]  Acute hypoxemic respiratory failure [J96.01]     Patient was seen at bedside.  He is currently on Levophed.  She is poor historian, still confused.    ROS: denies fever, chills, cp, sob, n/v, HA unless stated above     pantoprazole  40 mg Oral QAM AC    atorvastatin  10 mg Oral Nightly    divalproex  500 mg Oral Nightly    escitalopram  10 mg Oral Nightly    ezetimibe  10 mg Oral QAM    levothyroxine  100 mcg Oral QAM AC    niacin  1,000 mg Oral Nightly    QUEtiapine  400 mg Oral Nightly    sodium chloride flush  5-40 mL IntraVENous 2 times per day    piperacillin-tazobactam  4,500 mg IntraVENous Q12H    ipratropium 0.5 mg-albuterol 2.5 mg  1 Dose Inhalation Q4H WA RT    heparin (porcine)  5,000 Units SubCUTAneous 3 times per day    hydrocortisone sodium succinate PF (SOLU-CORTEF) 100 mg in sterile water 2 mL injection  100 mg IntraVENous Q8H

## 2025-03-21 NOTE — PROGRESS NOTES
[facility]   Department of Internal Medicine   Internal Medicine Residency  MICU Progress Note    Patient:  Tenisha Bruce 74 y.o. female   MRN: [id number]       Date of Service: 3/21/2025    Allergy: Zocor [simvastatin]    Subjective     Overnight, no acute events. Diet was started.   Patient was seen and examined this morning at bedside in no acute distress. Remains on 3 of levo with MAP of 71 current, to continue weaning. No wearing oxygen on face, saturation 99%. Engages with interview with short answers. Endorses still having dry cough. Is hungry and thirsty.     Unable to contact case management to obtain phone number for Care GiverShani. Would like to confirm patient's baseline mentation. Will attempt again this morning.     Objective     I & O - 24hr:    Intake/Output Summary (Last 24 hours) at 3/21/2025 1012  Last data filed at 3/21/2025 0900  Gross per 24 hour   Intake 2670.28 ml   Output 800 ml   Net 1870.28 ml       Physical Exam  Vitals: BP (!) 94/51   Pulse (!) 108   Temp 98.5 °F (36.9 °C) (Temporal)   Resp 17   Ht 1.651 m (5' 5\")   Wt 61.1 kg (134 lb 9.6 oz)   SpO2 94%   BMI 22.40 kg/m²     General Appearance: awake, alert, no distress, orientated to self-will not answer further orientation questions   HEENT: slightly dry mucus membranes, PERRL   Neck: supple   Lung: limited due to effort low lung volumes, rhonchi LLL   Heart: holosystolic murmur, tachycardic low 100s  Abdomen: soft, nontender, BS+  Extremities: no edema     Medications     Continuous Infusions:   norepinephrine 3 mcg/min (03/21/25 0848)    sodium chloride 10 mL/hr at 03/21/25 0811    dextrose       Scheduled Meds:   pantoprazole  40 mg Oral QAM AC    atorvastatin  10 mg Oral Nightly    divalproex  500 mg Oral Nightly    escitalopram  10 mg Oral Nightly    ezetimibe  10 mg Oral QAM    levothyroxine  100 mcg Oral QAM AC    niacin  1,000 mg Oral Nightly    QUEtiapine  400 mg Oral Nightly    sodium chloride flush

## 2025-03-21 NOTE — PLAN OF CARE
Problem: Safety - Adult  Goal: Free from fall injury  Outcome: Progressing     Problem: Discharge Planning  Goal: Discharge to home or other facility with appropriate resources  Outcome: Progressing     Problem: Skin/Tissue Integrity  Goal: Skin integrity remains intact  Description: 1.  Monitor for areas of redness and/or skin breakdown  2.  Assess vascular access sites hourly  3.  Every 4-6 hours minimum:  Change oxygen saturation probe site  4.  Every 4-6 hours:  If on nasal continuous positive airway pressure, respiratory therapy assess nares and determine need for appliance change or resting period  Outcome: Progressing     Problem: Neurosensory - Adult  Goal: Achieves stable or improved neurological status  Outcome: Progressing     Problem: Metabolic/Fluid and Electrolytes - Adult  Goal: Electrolytes maintained within normal limits  Outcome: Progressing  Goal: Hemodynamic stability and optimal renal function maintained  Outcome: Progressing  Goal: Glucose maintained within prescribed range  Outcome: Progressing     Problem: Pain  Goal: Verbalizes/displays adequate comfort level or baseline comfort level  Outcome: Progressing     Problem: ABCDS Injury Assessment  Goal: Absence of physical injury  Outcome: Progressing     Problem: Neurosensory - Adult  Goal: Achieves maximal functionality and self care  Outcome: Not Progressing  Flowsheets (Taken 3/21/2025 3516)  Achieves maximal functionality and self care:   Monitor swallowing and airway patency with patient fatigue and changes in neurological status   Encourage and assist patient to increase activity and self care with guidance from physical therapy/occupational therapy   Encourage visually impaired, hearing impaired and aphasic patients to use assistive/communication devices     Problem: Respiratory - Adult  Goal: Achieves optimal ventilation and oxygenation  Outcome: Not Progressing  Flowsheets (Taken 3/21/2025 0588)  Achieves optimal ventilation and

## 2025-03-22 LAB
ALBUMIN SERPL-MCNC: 2.5 G/DL (ref 3.5–5.2)
ALP SERPL-CCNC: 57 U/L (ref 35–104)
ALT SERPL-CCNC: 30 U/L (ref 0–32)
ANION GAP SERPL CALCULATED.3IONS-SCNC: 15 MMOL/L (ref 7–16)
AST SERPL-CCNC: 39 U/L (ref 0–31)
BASOPHILS # BLD: 0 K/UL (ref 0–0.2)
BASOPHILS NFR BLD: 0 % (ref 0–2)
BILIRUB SERPL-MCNC: 0.4 MG/DL (ref 0–1.2)
BUN SERPL-MCNC: 59 MG/DL (ref 6–23)
CALCIUM SERPL-MCNC: 8.4 MG/DL (ref 8.6–10.2)
CHLORIDE SERPL-SCNC: 101 MMOL/L (ref 98–107)
CO2 SERPL-SCNC: 19 MMOL/L (ref 22–29)
CREAT SERPL-MCNC: 1.4 MG/DL (ref 0.5–1)
DATE LAST DOSE: ABNORMAL
EOSINOPHIL # BLD: 0 K/UL (ref 0.05–0.5)
EOSINOPHILS RELATIVE PERCENT: 0 % (ref 0–6)
ERYTHROCYTE [DISTWIDTH] IN BLOOD BY AUTOMATED COUNT: 15.5 % (ref 11.5–15)
GFR, ESTIMATED: 40 ML/MIN/1.73M2
GLUCOSE SERPL-MCNC: 134 MG/DL (ref 74–99)
HAPTOGLOB SERPL-MCNC: 167 MG/DL (ref 30–200)
HCT VFR BLD AUTO: 20.6 % (ref 34–48)
HCT VFR BLD AUTO: 24.9 % (ref 34–48)
HGB BLD-MCNC: 7.3 G/DL (ref 11.5–15.5)
HGB BLD-MCNC: 8 G/DL (ref 11.5–15.5)
IMM RETICS NFR: 6.1 % (ref 3–15.9)
LDH SERPL-CCNC: 280 U/L (ref 135–214)
LYMPHOCYTES NFR BLD: 1.89 K/UL (ref 1.5–4)
LYMPHOCYTES RELATIVE PERCENT: 25 % (ref 20–42)
MAGNESIUM SERPL-MCNC: 2.5 MG/DL (ref 1.6–2.6)
MCH RBC QN AUTO: 40.6 PG (ref 26–35)
MCHC RBC AUTO-ENTMCNC: 35.4 G/DL (ref 32–34.5)
MCV RBC AUTO: 114.4 FL (ref 80–99.9)
MONOCYTES NFR BLD: 0.2 K/UL (ref 0.1–0.95)
MONOCYTES NFR BLD: 3 % (ref 2–12)
NEUTROPHILS NFR BLD: 72 % (ref 43–80)
NEUTS SEG NFR BLD: 5.41 K/UL (ref 1.8–7.3)
PLATELET # BLD AUTO: 38 K/UL (ref 130–450)
PLATELET CONFIRMATION: NORMAL
PMV BLD AUTO: 11.3 FL (ref 7–12)
POTASSIUM SERPL-SCNC: 3.7 MMOL/L (ref 3.5–5)
PREALB SERPL-MCNC: 12 MG/DL (ref 20–40)
PROT SERPL-MCNC: 4.9 G/DL (ref 6.4–8.3)
RBC # BLD AUTO: 1.8 M/UL (ref 3.5–5.5)
RBC # BLD: ABNORMAL 10*6/UL
RETIC HEMOGLOBIN: 26.8 PG (ref 28.2–36.6)
RETICS # AUTO: 0.02 M/UL
RETICS/RBC NFR AUTO: 1 % (ref 0.4–1.9)
SODIUM SERPL-SCNC: 135 MMOL/L (ref 132–146)
TME LAST DOSE: ABNORMAL H
VALPROATE SERPL-MCNC: 31 UG/ML (ref 50–100)
VANCOMYCIN DOSE: ABNORMAL MG
WBC OTHER # BLD: 7.5 K/UL (ref 4.5–11.5)

## 2025-03-22 PROCEDURE — 83615 LACTATE (LD) (LDH) ENZYME: CPT

## 2025-03-22 PROCEDURE — 85014 HEMATOCRIT: CPT

## 2025-03-22 PROCEDURE — 2700000000 HC OXYGEN THERAPY PER DAY

## 2025-03-22 PROCEDURE — 2580000003 HC RX 258: Performed by: HOSPITALIST

## 2025-03-22 PROCEDURE — 51798 US URINE CAPACITY MEASURE: CPT

## 2025-03-22 PROCEDURE — 6370000000 HC RX 637 (ALT 250 FOR IP): Performed by: HOSPITALIST

## 2025-03-22 PROCEDURE — 51701 INSERT BLADDER CATHETER: CPT

## 2025-03-22 PROCEDURE — 2580000003 HC RX 258

## 2025-03-22 PROCEDURE — 6360000002 HC RX W HCPCS: Performed by: HOSPITALIST

## 2025-03-22 PROCEDURE — 85018 HEMOGLOBIN: CPT

## 2025-03-22 PROCEDURE — 83735 ASSAY OF MAGNESIUM: CPT

## 2025-03-22 PROCEDURE — 80053 COMPREHEN METABOLIC PANEL: CPT

## 2025-03-22 PROCEDURE — 6360000002 HC RX W HCPCS

## 2025-03-22 PROCEDURE — 2000000000 HC ICU R&B

## 2025-03-22 PROCEDURE — 2500000003 HC RX 250 WO HCPCS

## 2025-03-22 PROCEDURE — 84134 ASSAY OF PREALBUMIN: CPT

## 2025-03-22 PROCEDURE — 94640 AIRWAY INHALATION TREATMENT: CPT

## 2025-03-22 PROCEDURE — 85045 AUTOMATED RETICULOCYTE COUNT: CPT

## 2025-03-22 PROCEDURE — 6370000000 HC RX 637 (ALT 250 FOR IP)

## 2025-03-22 PROCEDURE — 99232 SBSQ HOSP IP/OBS MODERATE 35: CPT

## 2025-03-22 PROCEDURE — 83010 ASSAY OF HAPTOGLOBIN QUANT: CPT

## 2025-03-22 PROCEDURE — 99291 CRITICAL CARE FIRST HOUR: CPT | Performed by: INTERNAL MEDICINE

## 2025-03-22 PROCEDURE — 2500000003 HC RX 250 WO HCPCS: Performed by: HOSPITALIST

## 2025-03-22 PROCEDURE — 80164 ASSAY DIPROPYLACETIC ACD TOT: CPT

## 2025-03-22 PROCEDURE — 85025 COMPLETE CBC W/AUTO DIFF WBC: CPT

## 2025-03-22 RX ORDER — FLUDROCORTISONE ACETATE 0.1 MG/1
0.1 TABLET ORAL DAILY
Status: DISCONTINUED | OUTPATIENT
Start: 2025-03-22 | End: 2025-03-26

## 2025-03-22 RX ORDER — IPRATROPIUM BROMIDE AND ALBUTEROL SULFATE 2.5; .5 MG/3ML; MG/3ML
1 SOLUTION RESPIRATORY (INHALATION) EVERY 4 HOURS PRN
Status: DISCONTINUED | OUTPATIENT
Start: 2025-03-22 | End: 2025-03-28 | Stop reason: HOSPADM

## 2025-03-22 RX ADMIN — PIPERACILLIN AND TAZOBACTAM 4500 MG: 4; .5 INJECTION, POWDER, FOR SOLUTION INTRAVENOUS at 16:32

## 2025-03-22 RX ADMIN — IPRATROPIUM BROMIDE AND ALBUTEROL SULFATE 1 DOSE: 2.5; .5 SOLUTION RESPIRATORY (INHALATION) at 15:47

## 2025-03-22 RX ADMIN — IPRATROPIUM BROMIDE AND ALBUTEROL SULFATE 1 DOSE: 2.5; .5 SOLUTION RESPIRATORY (INHALATION) at 11:44

## 2025-03-22 RX ADMIN — DIVALPROEX SODIUM 500 MG: 500 TABLET, DELAYED RELEASE ORAL at 20:39

## 2025-03-22 RX ADMIN — IPRATROPIUM BROMIDE AND ALBUTEROL SULFATE 1 DOSE: 2.5; .5 SOLUTION RESPIRATORY (INHALATION) at 08:20

## 2025-03-22 RX ADMIN — SODIUM CHLORIDE, PRESERVATIVE FREE 10 ML: 5 INJECTION INTRAVENOUS at 20:40

## 2025-03-22 RX ADMIN — ATORVASTATIN CALCIUM 10 MG: 10 TABLET, FILM COATED ORAL at 20:39

## 2025-03-22 RX ADMIN — PIPERACILLIN AND TAZOBACTAM 4500 MG: 4; .5 INJECTION, POWDER, FOR SOLUTION INTRAVENOUS at 08:40

## 2025-03-22 RX ADMIN — WATER 100 MG: 1 INJECTION INTRAMUSCULAR; INTRAVENOUS; SUBCUTANEOUS at 08:33

## 2025-03-22 RX ADMIN — ESCITALOPRAM OXALATE 10 MG: 10 TABLET ORAL at 20:38

## 2025-03-22 RX ADMIN — Medication 1000 MG: at 20:40

## 2025-03-22 RX ADMIN — POTASSIUM BICARBONATE 40 MEQ: 782 TABLET, EFFERVESCENT ORAL at 08:32

## 2025-03-22 RX ADMIN — FLUDROCORTISONE ACETATE 0.1 MG: 0.1 TABLET ORAL at 16:29

## 2025-03-22 RX ADMIN — GUAIFENESIN 400 MG: 400 TABLET ORAL at 20:38

## 2025-03-22 RX ADMIN — HEPARIN SODIUM 5000 UNITS: 5000 INJECTION INTRAVENOUS; SUBCUTANEOUS at 20:49

## 2025-03-22 RX ADMIN — QUETIAPINE FUMARATE 400 MG: 200 TABLET ORAL at 20:40

## 2025-03-22 RX ADMIN — SODIUM CHLORIDE, PRESERVATIVE FREE 10 ML: 5 INJECTION INTRAVENOUS at 08:33

## 2025-03-22 RX ADMIN — LEVOTHYROXINE SODIUM 100 MCG: 0.1 TABLET ORAL at 07:00

## 2025-03-22 RX ADMIN — GUAIFENESIN 400 MG: 400 TABLET ORAL at 16:29

## 2025-03-22 RX ADMIN — PANTOPRAZOLE SODIUM 40 MG: 40 TABLET, DELAYED RELEASE ORAL at 07:00

## 2025-03-22 RX ADMIN — GUAIFENESIN 400 MG: 400 TABLET ORAL at 08:33

## 2025-03-22 RX ADMIN — HEPARIN SODIUM 5000 UNITS: 5000 INJECTION INTRAVENOUS; SUBCUTANEOUS at 04:07

## 2025-03-22 RX ADMIN — EZETIMIBE 10 MG: 10 TABLET ORAL at 13:45

## 2025-03-22 RX ADMIN — HEPARIN SODIUM 5000 UNITS: 5000 INJECTION INTRAVENOUS; SUBCUTANEOUS at 16:29

## 2025-03-22 RX ADMIN — WATER 100 MG: 1 INJECTION INTRAMUSCULAR; INTRAVENOUS; SUBCUTANEOUS at 20:40

## 2025-03-22 NOTE — PROGRESS NOTES
Antibiotic Extended Infusion Policy     This patient is on medication that requires renal, weight, and/or indication dose adjustment.      Date Body Weight IBW  Adjusted BW SCr  CrCl Dialysis status BMI   3/22/2025 60.8 kg (134 lb) Ideal body weight: 57 kg (125 lb 10.6 oz)  Adjusted ideal body weight: 58.5 kg (129 lb) Serum creatinine: 1.4 mg/dL (H) 03/22/25 0404  Estimated creatinine clearance: 32 mL/min (A) N/a Body mass index is 22.3 kg/m².       Pharmacy has dose-adjusted the following medication(s):    Ordered Medication: Zosyn 4500mg q12h     Order Changed/converted to: Zosyn 4500mg q8h    These changes were made per protocol according to the [facility]   Automatic Extended Infusion Dose Adjustment Policy.     *Please note this dose may need readjusted if patient's condition changes.    Please contact pharmacy with any questions regarding these changes.    Ron Acosta RPH  3/22/2025  3:22 PM

## 2025-03-22 NOTE — PROGRESS NOTES
[facility]   Department of Internal Medicine   Internal Medicine Residency  MICU Progress Note    Patient:  Tenisha Bruce 74 y.o. female   MRN: [id number]      Room: [address] [place], [place] [address]    Admission date: 3/19/2025  9:56 PM ; Hospital day: 2   ICU day: 2d 2h      Allergy: Zocor [simvastatin]    Subjective     Patient was seen and examined this morning at bedside. No events overnight. She denies any complaints. She denies chest pain, SOB, N/V/D. She is pleasant and following commands. She asked to drink soda.    Objective       I & O - 24hr:    Intake/Output Summary (Last 24 hours) at 3/22/2025 0715  Last data filed at 3/22/2025 0535  Gross per 24 hour   Intake 914.36 ml   Output 350 ml   Net 564.36 ml     Net IO Since Admission: 6,163.87 mL [03/22/25 0715]    Physical Exam  BP (!) 93/42   Pulse 84   Temp 98.5 °F (36.9 °C) (Temporal)   Resp 13   Ht 1.651 m (5' 5\")   Wt 60.8 kg (134 lb)   SpO2 95%   BMI 22.30 kg/m²   Ideal body weight: 57 kg (125 lb 10.6 oz)    General Appearance: alert, appears stated age, cooperative, and no distress  HEENT:  atraumatic, PERRL  Lung: clear to auscultation bilaterally  Heart: regular rate and rhythm and holosystolic murmur 2/6 at left 5th ICS  obscuring S1  Abdomen: soft, non-tender; bowel sounds normal; no masses,  no organomegaly  Extremities:  extremities normal, atraumatic, no cyanosis or edema      Medications     Continuous Infusions:   norepinephrine 6 mcg/min (03/22/25 0535)    sodium chloride 10 mL/hr at 03/22/25 0535    dextrose       Scheduled Meds:   potassium bicarb-citric acid  40 mEq Oral Once    pantoprazole  40 mg Oral QAM AC    hydrocortisone sodium succinate PF (SOLU-CORTEF) 100 mg in sterile water 2 mL injection  100 mg IntraVENous Q12H    atorvastatin  10 mg Oral Nightly    divalproex  500 mg Oral Nightly    escitalopram  10 mg Oral Nightly    ezetimibe  10 mg Oral QAM    levothyroxine  100 mcg Oral QAM AC    niacin  1,000 mg Oral Nightly

## 2025-03-22 NOTE — PLAN OF CARE
Problem: Neurosensory - Adult  Goal: Achieves maximal functionality and self care  3/22/2025 0645 by Sena Rob, RN  Flowsheets (Taken 3/21/2025 0597)  Achieves maximal functionality and self care:   Monitor swallowing and airway patency with patient fatigue and changes in neurological status   Encourage and assist patient to increase activity and self care with guidance from physical therapy/occupational therapy   Encourage visually impaired, hearing impaired and aphasic patients to use assistive/communication devices     Problem: Genitourinary - Adult  Goal: Absence of urinary retention  Outcome: Not Progressing  Flowsheets (Taken 3/22/2025 0645)  Absence of urinary retention:   Assess patient’s ability to void and empty bladder   Monitor intake/output and perform bladder scan as needed   Place urinary catheter per Licensed Independent Practitioner order if needed     Problem: Safety - Adult  Goal: Free from fall injury  Outcome: Progressing     Problem: Discharge Planning  Goal: Discharge to home or other facility with appropriate resources  Outcome: Progressing     Problem: Skin/Tissue Integrity  Goal: Skin integrity remains intact  Description: 1.  Monitor for areas of redness and/or skin breakdown  2.  Assess vascular access sites hourly  3.  Every 4-6 hours minimum:  Change oxygen saturation probe site  4.  Every 4-6 hours:  If on nasal continuous positive airway pressure, respiratory therapy assess nares and determine need for appliance change or resting period  Outcome: Progressing     Problem: Neurosensory - Adult  Goal: Achieves stable or improved neurological status  Outcome: Progressing     Problem: Respiratory - Adult  Goal: Achieves optimal ventilation and oxygenation  Outcome: Progressing     Problem: Metabolic/Fluid and Electrolytes - Adult  Goal: Electrolytes maintained within normal limits  Outcome: Progressing  Goal: Hemodynamic stability and optimal renal function maintained  Outcome:

## 2025-03-22 NOTE — PROGRESS NOTES
[facility] Hospitalist Progress Note    Admitting Date and Time: 3/19/2025  9:56 PM  Admit Dx: Acute kidney injury [N17.9]  Severe sepsis with septic shock (HCC) [A41.9, R65.21]  Septic shock (HCC) [A41.9, R65.21]  Altered mental status, unspecified altered mental status type [R41.82]  Pneumonia of left lower lobe due to infectious organism [J18.9]  Acute hypoxemic respiratory failure [J96.01]    Synopsis:  74-year-old female with a past medical history of hyperlipidemia, hypothyroidism, anxiety came to the hospital due to altered mental status, shortness of breath, hypotension and found to have septic shock most likely due to left lower lobe pneumonia.  Patient also found to have DARIO.  Patient admitted to medical ICU and on Levophed, IV fluids, and Zosyn.  Vancomycin was stopped as MRSA was negative.     Subjective:  Patient is being followed for Acute kidney injury [N17.9]  Severe sepsis with septic shock (HCC) [A41.9, R65.21]  Septic shock (HCC) [A41.9, R65.21]  Altered mental status, unspecified altered mental status type [R41.82]  Pneumonia of left lower lobe due to infectious organism [J18.9]  Acute hypoxemic respiratory failure [J96.01]     Patient was seen at bedside.  She is more alert and oriented as compared to yesterday.  She is thirsty and asked for drink.   Denies any complaint    ROS: denies fever, chills, cp, sob, n/v, HA unless stated above     pantoprazole  40 mg Oral QAM AC    hydrocortisone sodium succinate PF (SOLU-CORTEF) 100 mg in sterile water 2 mL injection  100 mg IntraVENous Q12H    atorvastatin  10 mg Oral Nightly    divalproex  500 mg Oral Nightly    escitalopram  10 mg Oral Nightly    ezetimibe  10 mg Oral QAM    levothyroxine  100 mcg Oral QAM AC    niacin  1,000 mg Oral Nightly    QUEtiapine  400 mg Oral Nightly    sodium chloride flush  5-40 mL IntraVENous 2 times per day    piperacillin-tazobactam  4,500 mg IntraVENous Q12H    ipratropium 0.5 mg-albuterol 2.5 mg  1 Dose

## 2025-03-23 LAB
ALBUMIN SERPL-MCNC: 2.6 G/DL (ref 3.5–5.2)
ALP SERPL-CCNC: 58 U/L (ref 35–104)
ALT SERPL-CCNC: 31 U/L (ref 0–32)
ANION GAP SERPL CALCULATED.3IONS-SCNC: 12 MMOL/L (ref 7–16)
AST SERPL-CCNC: 38 U/L (ref 0–31)
BASOPHILS # BLD: 0 K/UL (ref 0–0.2)
BASOPHILS NFR BLD: 0 % (ref 0–2)
BILIRUB SERPL-MCNC: 0.4 MG/DL (ref 0–1.2)
BUN SERPL-MCNC: 46 MG/DL (ref 6–23)
CALCIUM SERPL-MCNC: 8.2 MG/DL (ref 8.6–10.2)
CEA SERPL-MCNC: 3.6 NG/ML (ref 0–5.2)
CHLORIDE SERPL-SCNC: 104 MMOL/L (ref 98–107)
CO2 SERPL-SCNC: 23 MMOL/L (ref 22–29)
CREAT SERPL-MCNC: 1.2 MG/DL (ref 0.5–1)
EKG ATRIAL RATE: 91 BPM
EKG P AXIS: 64 DEGREES
EKG P-R INTERVAL: 152 MS
EKG Q-T INTERVAL: 382 MS
EKG QRS DURATION: 126 MS
EKG QTC CALCULATION (BAZETT): 469 MS
EKG R AXIS: 31 DEGREES
EKG T AXIS: -115 DEGREES
EKG VENTRICULAR RATE: 91 BPM
EOSINOPHIL # BLD: 0 K/UL (ref 0.05–0.5)
EOSINOPHILS RELATIVE PERCENT: 0 % (ref 0–6)
ERYTHROCYTE [DISTWIDTH] IN BLOOD BY AUTOMATED COUNT: 15.7 % (ref 11.5–15)
GFR, ESTIMATED: 48 ML/MIN/1.73M2
GLUCOSE SERPL-MCNC: 121 MG/DL (ref 74–99)
HCT VFR BLD AUTO: 21.6 % (ref 34–48)
HGB BLD-MCNC: 7.5 G/DL (ref 11.5–15.5)
LYMPHOCYTES NFR BLD: 1.67 K/UL (ref 1.5–4)
LYMPHOCYTES RELATIVE PERCENT: 35 % (ref 20–42)
MAGNESIUM SERPL-MCNC: 2.4 MG/DL (ref 1.6–2.6)
MCH RBC QN AUTO: 40.3 PG (ref 26–35)
MCHC RBC AUTO-ENTMCNC: 34.7 G/DL (ref 32–34.5)
MCV RBC AUTO: 116.1 FL (ref 80–99.9)
MONOCYTES NFR BLD: 0.08 K/UL (ref 0.1–0.95)
MONOCYTES NFR BLD: 2 % (ref 2–12)
NEUTROPHILS NFR BLD: 64 % (ref 43–80)
NEUTS SEG NFR BLD: 3.05 K/UL (ref 1.8–7.3)
PLATELET # BLD AUTO: 36 K/UL (ref 130–450)
PLATELET CONFIRMATION: NORMAL
PMV BLD AUTO: 11.1 FL (ref 7–12)
POTASSIUM SERPL-SCNC: 4.4 MMOL/L (ref 3.5–5)
PROT SERPL-MCNC: 4.8 G/DL (ref 6.4–8.3)
RBC # BLD AUTO: 1.86 M/UL (ref 3.5–5.5)
RBC # BLD: ABNORMAL 10*6/UL
SODIUM SERPL-SCNC: 139 MMOL/L (ref 132–146)
WBC OTHER # BLD: 4.8 K/UL (ref 4.5–11.5)

## 2025-03-23 PROCEDURE — 6360000002 HC RX W HCPCS

## 2025-03-23 PROCEDURE — 2700000000 HC OXYGEN THERAPY PER DAY

## 2025-03-23 PROCEDURE — 2580000003 HC RX 258

## 2025-03-23 PROCEDURE — 83735 ASSAY OF MAGNESIUM: CPT

## 2025-03-23 PROCEDURE — 99232 SBSQ HOSP IP/OBS MODERATE 35: CPT | Performed by: INTERNAL MEDICINE

## 2025-03-23 PROCEDURE — 2500000003 HC RX 250 WO HCPCS

## 2025-03-23 PROCEDURE — 6370000000 HC RX 637 (ALT 250 FOR IP): Performed by: HOSPITALIST

## 2025-03-23 PROCEDURE — 80053 COMPREHEN METABOLIC PANEL: CPT

## 2025-03-23 PROCEDURE — 6370000000 HC RX 637 (ALT 250 FOR IP)

## 2025-03-23 PROCEDURE — 2060000000 HC ICU INTERMEDIATE R&B

## 2025-03-23 PROCEDURE — 85025 COMPLETE CBC W/AUTO DIFF WBC: CPT

## 2025-03-23 PROCEDURE — 2500000003 HC RX 250 WO HCPCS: Performed by: HOSPITALIST

## 2025-03-23 PROCEDURE — 99291 CRITICAL CARE FIRST HOUR: CPT | Performed by: INTERNAL MEDICINE

## 2025-03-23 RX ADMIN — ATORVASTATIN CALCIUM 10 MG: 10 TABLET, FILM COATED ORAL at 21:14

## 2025-03-23 RX ADMIN — ESCITALOPRAM OXALATE 10 MG: 10 TABLET ORAL at 21:14

## 2025-03-23 RX ADMIN — PANTOPRAZOLE SODIUM 40 MG: 40 TABLET, DELAYED RELEASE ORAL at 05:39

## 2025-03-23 RX ADMIN — SODIUM CHLORIDE, PRESERVATIVE FREE 10 ML: 5 INJECTION INTRAVENOUS at 21:48

## 2025-03-23 RX ADMIN — PIPERACILLIN AND TAZOBACTAM 4500 MG: 4; .5 INJECTION, POWDER, FOR SOLUTION INTRAVENOUS at 00:53

## 2025-03-23 RX ADMIN — PIPERACILLIN AND TAZOBACTAM 4500 MG: 4; .5 INJECTION, POWDER, FOR SOLUTION INTRAVENOUS at 16:06

## 2025-03-23 RX ADMIN — WATER 100 MG: 1 INJECTION INTRAMUSCULAR; INTRAVENOUS; SUBCUTANEOUS at 08:18

## 2025-03-23 RX ADMIN — DIVALPROEX SODIUM 500 MG: 500 TABLET, DELAYED RELEASE ORAL at 21:14

## 2025-03-23 RX ADMIN — QUETIAPINE FUMARATE 400 MG: 200 TABLET ORAL at 21:13

## 2025-03-23 RX ADMIN — PIPERACILLIN AND TAZOBACTAM 4500 MG: 4; .5 INJECTION, POWDER, FOR SOLUTION INTRAVENOUS at 08:21

## 2025-03-23 RX ADMIN — EZETIMIBE 10 MG: 10 TABLET ORAL at 08:18

## 2025-03-23 RX ADMIN — Medication 1000 MG: at 21:13

## 2025-03-23 RX ADMIN — GUAIFENESIN 400 MG: 400 TABLET ORAL at 21:14

## 2025-03-23 RX ADMIN — LEVOTHYROXINE SODIUM 100 MCG: 0.1 TABLET ORAL at 05:39

## 2025-03-23 RX ADMIN — GUAIFENESIN 400 MG: 400 TABLET ORAL at 16:05

## 2025-03-23 RX ADMIN — GUAIFENESIN 400 MG: 400 TABLET ORAL at 08:18

## 2025-03-23 RX ADMIN — FLUDROCORTISONE ACETATE 0.1 MG: 0.1 TABLET ORAL at 08:18

## 2025-03-23 RX ADMIN — SODIUM CHLORIDE, PRESERVATIVE FREE 5 ML: 5 INJECTION INTRAVENOUS at 08:34

## 2025-03-23 RX ADMIN — PIPERACILLIN AND TAZOBACTAM 4500 MG: 4; .5 INJECTION, POWDER, FOR SOLUTION INTRAVENOUS at 23:49

## 2025-03-23 RX ADMIN — HEPARIN SODIUM 5000 UNITS: 5000 INJECTION INTRAVENOUS; SUBCUTANEOUS at 05:39

## 2025-03-23 NOTE — PROGRESS NOTES
[facility]   Department of Internal Medicine   Internal Medicine Residency  MICU Progress Note    Patient:  Tenisha Bruce 74 y.o. female   MRN: [id number]       Date of Service: 3/23/2025    Allergy: Zocor [simvastatin]    Subjective     Overnight, no acute events.   Patient was seen and examined this morning at bedside in no acute distress. Off levophed. Endorses cough but feeling better. Eating and drinking well.     Objective     I & O - 24hr:    Intake/Output Summary (Last 24 hours) at 3/23/2025 0836  Last data filed at 3/23/2025 0539  Gross per 24 hour   Intake 462.43 ml   Output 700 ml   Net -237.57 ml       Physical Exam  Vitals: BP (!) 90/49   Pulse 83   Temp 98 °F (36.7 °C) (Temporal)   Resp 16   Ht 1.651 m (5' 5\")   Wt 60.8 kg (134 lb)   SpO2 92%   BMI 22.30 kg/m²     General Appearance: awake, alert, no distress   HEENT: moist mucus membranes, PERRL   Neck: supple   Lung: no wheezing rhonchi rales   Heart: holosystolic murmur, RRR  Abdomen: soft, nontender, BS+  Extremities: no edema, skin warm and dry     Medications     Continuous Infusions:   norepinephrine Stopped (03/22/25 1803)    sodium chloride 10 mL/hr at 03/23/25 0539    dextrose       Scheduled Meds:   hydrocortisone sodium succinate PF (SOLU-CORTEF) 100 mg in sterile water 2 mL injection  100 mg IntraVENous Daily    fludrocortisone  0.1 mg Oral Daily    piperacillin-tazobactam  4,500 mg IntraVENous Q8H    pantoprazole  40 mg Oral QAM AC    atorvastatin  10 mg Oral Nightly    divalproex  500 mg Oral Nightly    escitalopram  10 mg Oral Nightly    ezetimibe  10 mg Oral QAM    levothyroxine  100 mcg Oral QAM AC    niacin  1,000 mg Oral Nightly    QUEtiapine  400 mg Oral Nightly    sodium chloride flush  5-40 mL IntraVENous 2 times per day    [Held by provider] heparin (porcine)  5,000 Units SubCUTAneous 3 times per day    guaiFENesin  400 mg Oral TID     PRN Meds: ipratropium 0.5 mg-albuterol 2.5 mg, acetaminophen, sodium

## 2025-03-23 NOTE — PROGRESS NOTES
[facility] Hospitalist Progress Note    Admitting Date and Time: 3/19/2025  9:56 PM  Admit Dx: Acute kidney injury [N17.9]  Severe sepsis with septic shock (HCC) [A41.9, R65.21]  Septic shock (HCC) [A41.9, R65.21]  Altered mental status, unspecified altered mental status type [R41.82]  Pneumonia of left lower lobe due to infectious organism [J18.9]  Acute hypoxemic respiratory failure [J96.01]    Synopsis:  74-year-old female with a past medical history of hyperlipidemia, hypothyroidism, anxiety came to the hospital due to altered mental status, shortness of breath, hypotension and found to have septic shock most likely due to left lower lobe pneumonia.  Patient also found to have DARIO.  Patient admitted to medical ICU and on Levophed, IV fluids, and Zosyn.  Vancomycin was stopped as MRSA was negative. Now off pressors     Subjective:  Patient is being followed for Acute kidney injury [N17.9]  Severe sepsis with septic shock (HCC) [A41.9, R65.21]  Septic shock (HCC) [A41.9, R65.21]  Altered mental status, unspecified altered mental status type [R41.82]  Pneumonia of left lower lobe due to infectious organism [J18.9]  Acute hypoxemic respiratory failure [J96.01]     Patient was seen at bedside.  She is more alert and oriented as compared to yesterday.  She is thirsty and asked for drink.   Denies any complaint    ROS: denies fever, chills, cp, sob, n/v, HA unless stated above     hydrocortisone sodium succinate PF (SOLU-CORTEF) 100 mg in sterile water 2 mL injection  100 mg IntraVENous Daily    fludrocortisone  0.1 mg Oral Daily    piperacillin-tazobactam  4,500 mg IntraVENous Q8H    pantoprazole  40 mg Oral QAM AC    atorvastatin  10 mg Oral Nightly    divalproex  500 mg Oral Nightly    escitalopram  10 mg Oral Nightly    ezetimibe  10 mg Oral QAM    levothyroxine  100 mcg Oral QAM AC    niacin  1,000 mg Oral Nightly    QUEtiapine  400 mg Oral Nightly    sodium chloride flush  5-40 mL IntraVENous 2 times per

## 2025-03-23 NOTE — PROGRESS NOTES
4 Eyes Skin Assessment     NAME:  Tenisha Bruce  YOB: 1950  MEDICAL RECORD NUMBER:  [id number]    The patient is being assessed for  Transfer to New Unit    I agree that at least one RN has performed a thorough Head to Toe Skin Assessment on the patient. ALL assessment sites listed below have been assessed.      Areas assessed by both nurses:    Head, Face, Ears, Shoulders, Back, Chest, Arms, Elbows, Hands, Sacrum. Buttock, Coccyx, Ischium, Legs. Feet and Heels, and Under Medical Devices         Does the Patient have a Wound? No noted wound(s)       Gamal Prevention initiated by RN: Yes  Wound Care Orders initiated by RN: Yes    Pressure Injury (Stage 3,4, Unstageable, DTI, NWPT, and Complex wounds) if present, place Wound referral order by RN under : No    New Ostomies, if present place, Ostomy referral order under : No     Nurse 1 eSignature: Electronically signed by Leeanne Morrison RN on 3/23/25 at 3:05 PM EDT    **SHARE this note so that the co-signing nurse can place an eSignature**    Nurse 2 eSignature: Electronically signed by Manas Carter RN on 3/23/25 at 3:06 PM EDT

## 2025-03-23 NOTE — PLAN OF CARE
Problem: Safety - Adult  Goal: Free from fall injury  Outcome: Progressing  Flowsheets (Taken 3/22/2025 2000)  Free From Fall Injury:   Instruct family/caregiver on patient safety   Based on caregiver fall risk screen, instruct family/caregiver to ask for assistance with transferring infant if caregiver noted to have fall risk factors     Problem: Discharge Planning  Goal: Discharge to home or other facility with appropriate resources  Outcome: Progressing  Flowsheets (Taken 3/22/2025 2000)  Discharge to home or other facility with appropriate resources:   Identify barriers to discharge with patient and caregiver   Arrange for needed discharge resources and transportation as appropriate   Identify discharge learning needs (meds, wound care, etc)   Arrange for interpreters to assist at discharge as needed   Refer to discharge planning if patient needs post-hospital services based on physician order or complex needs related to functional status, cognitive ability or social support system     Problem: Skin/Tissue Integrity  Goal: Skin integrity remains intact  Description: 1.  Monitor for areas of redness and/or skin breakdown  2.  Assess vascular access sites hourly  3.  Every 4-6 hours minimum:  Change oxygen saturation probe site  4.  Every 4-6 hours:  If on nasal continuous positive airway pressure, respiratory therapy assess nares and determine need for appliance change or resting period  Outcome: Progressing  Flowsheets (Taken 3/22/2025 2000)  Skin Integrity Remains Intact:   Monitor for areas of redness and/or skin breakdown   Assess vascular access sites hourly   Every 4-6 hours minimum: Change oxygen saturation probe site   Every 4-6 hours: If on nasal continuous positive airway pressure, respiratory therapy assesses nares and determine need for appliance change or resting period     Problem: Neurosensory - Adult  Goal: Achieves stable or improved neurological status  Outcome: Progressing  Flowsheets (Taken

## 2025-03-24 LAB
ALBUMIN SERPL-MCNC: 2.9 G/DL (ref 3.5–5.2)
ALP SERPL-CCNC: 61 U/L (ref 35–104)
ALT SERPL-CCNC: 32 U/L (ref 0–32)
ANION GAP SERPL CALCULATED.3IONS-SCNC: 9 MMOL/L (ref 7–16)
AST SERPL-CCNC: 30 U/L (ref 0–31)
BASOPHILS # BLD: 0.01 K/UL (ref 0–0.2)
BASOPHILS NFR BLD: 0 % (ref 0–2)
BILIRUB SERPL-MCNC: 0.4 MG/DL (ref 0–1.2)
BUN SERPL-MCNC: 40 MG/DL (ref 6–23)
CALCIUM SERPL-MCNC: 8.3 MG/DL (ref 8.6–10.2)
CHLORIDE SERPL-SCNC: 104 MMOL/L (ref 98–107)
CO2 SERPL-SCNC: 26 MMOL/L (ref 22–29)
CREAT SERPL-MCNC: 1.1 MG/DL (ref 0.5–1)
EOSINOPHIL # BLD: 0.13 K/UL (ref 0.05–0.5)
EOSINOPHILS RELATIVE PERCENT: 2 % (ref 0–6)
ERYTHROCYTE [DISTWIDTH] IN BLOOD BY AUTOMATED COUNT: 15.6 % (ref 11.5–15)
GFR, ESTIMATED: 51 ML/MIN/1.73M2
GLUCOSE SERPL-MCNC: 105 MG/DL (ref 74–99)
HCT VFR BLD AUTO: 21.8 % (ref 34–48)
HGB BLD-MCNC: 7.4 G/DL (ref 11.5–15.5)
IMM GRANULOCYTES # BLD AUTO: 0.03 K/UL (ref 0–0.58)
IMM GRANULOCYTES NFR BLD: 1 % (ref 0–5)
LYMPHOCYTES NFR BLD: 2.68 K/UL (ref 1.5–4)
LYMPHOCYTES RELATIVE PERCENT: 41 % (ref 20–42)
MAGNESIUM SERPL-MCNC: 2 MG/DL (ref 1.6–2.6)
MCH RBC QN AUTO: 40 PG (ref 26–35)
MCHC RBC AUTO-ENTMCNC: 33.9 G/DL (ref 32–34.5)
MCV RBC AUTO: 117.8 FL (ref 80–99.9)
MONOCYTES NFR BLD: 0.65 K/UL (ref 0.1–0.95)
MONOCYTES NFR BLD: 10 % (ref 2–12)
NEUTROPHILS NFR BLD: 47 % (ref 43–80)
NEUTS SEG NFR BLD: 3.08 K/UL (ref 1.8–7.3)
PLATELET CONFIRMATION: NORMAL
PLATELET, FLUORESCENCE: 32 K/UL (ref 130–450)
PMV BLD AUTO: 10.9 FL (ref 7–12)
POTASSIUM SERPL-SCNC: 3.6 MMOL/L (ref 3.5–5)
PROT SERPL-MCNC: 4.7 G/DL (ref 6.4–8.3)
RBC # BLD AUTO: 1.85 M/UL (ref 3.5–5.5)
SODIUM SERPL-SCNC: 139 MMOL/L (ref 132–146)
WBC OTHER # BLD: 6.6 K/UL (ref 4.5–11.5)

## 2025-03-24 PROCEDURE — 2500000003 HC RX 250 WO HCPCS

## 2025-03-24 PROCEDURE — 99232 SBSQ HOSP IP/OBS MODERATE 35: CPT | Performed by: INTERNAL MEDICINE

## 2025-03-24 PROCEDURE — 2700000000 HC OXYGEN THERAPY PER DAY

## 2025-03-24 PROCEDURE — 85025 COMPLETE CBC W/AUTO DIFF WBC: CPT

## 2025-03-24 PROCEDURE — 6360000002 HC RX W HCPCS

## 2025-03-24 PROCEDURE — 36415 COLL VENOUS BLD VENIPUNCTURE: CPT

## 2025-03-24 PROCEDURE — 2060000000 HC ICU INTERMEDIATE R&B

## 2025-03-24 PROCEDURE — 6370000000 HC RX 637 (ALT 250 FOR IP)

## 2025-03-24 PROCEDURE — 2580000003 HC RX 258

## 2025-03-24 PROCEDURE — 83735 ASSAY OF MAGNESIUM: CPT

## 2025-03-24 PROCEDURE — 80053 COMPREHEN METABOLIC PANEL: CPT

## 2025-03-24 PROCEDURE — 99233 SBSQ HOSP IP/OBS HIGH 50: CPT | Performed by: INTERNAL MEDICINE

## 2025-03-24 PROCEDURE — 2580000003 HC RX 258: Performed by: INTERNAL MEDICINE

## 2025-03-24 RX ORDER — SODIUM CHLORIDE, SODIUM LACTATE, POTASSIUM CHLORIDE, CALCIUM CHLORIDE 600; 310; 30; 20 MG/100ML; MG/100ML; MG/100ML; MG/100ML
INJECTION, SOLUTION INTRAVENOUS CONTINUOUS
Status: DISCONTINUED | OUTPATIENT
Start: 2025-03-24 | End: 2025-03-27

## 2025-03-24 RX ADMIN — PANTOPRAZOLE SODIUM 40 MG: 40 TABLET, DELAYED RELEASE ORAL at 05:30

## 2025-03-24 RX ADMIN — DIVALPROEX SODIUM 500 MG: 500 TABLET, DELAYED RELEASE ORAL at 21:36

## 2025-03-24 RX ADMIN — WATER 100 MG: 1 INJECTION INTRAMUSCULAR; INTRAVENOUS; SUBCUTANEOUS at 10:50

## 2025-03-24 RX ADMIN — Medication 1000 MG: at 21:36

## 2025-03-24 RX ADMIN — PIPERACILLIN AND TAZOBACTAM 4500 MG: 4; .5 INJECTION, POWDER, FOR SOLUTION INTRAVENOUS at 10:50

## 2025-03-24 RX ADMIN — ESCITALOPRAM OXALATE 10 MG: 10 TABLET ORAL at 21:37

## 2025-03-24 RX ADMIN — GUAIFENESIN 400 MG: 400 TABLET ORAL at 21:37

## 2025-03-24 RX ADMIN — SODIUM CHLORIDE, SODIUM LACTATE, POTASSIUM CHLORIDE, AND CALCIUM CHLORIDE: .6; .31; .03; .02 INJECTION, SOLUTION INTRAVENOUS at 14:36

## 2025-03-24 RX ADMIN — LEVOTHYROXINE SODIUM 100 MCG: 0.1 TABLET ORAL at 05:30

## 2025-03-24 RX ADMIN — SODIUM CHLORIDE, PRESERVATIVE FREE 10 ML: 5 INJECTION INTRAVENOUS at 10:50

## 2025-03-24 RX ADMIN — ATORVASTATIN CALCIUM 10 MG: 10 TABLET, FILM COATED ORAL at 21:37

## 2025-03-24 RX ADMIN — QUETIAPINE FUMARATE 400 MG: 200 TABLET ORAL at 21:37

## 2025-03-24 ASSESSMENT — PAIN SCALES - GENERAL: PAINLEVEL_OUTOF10: 0

## 2025-03-24 NOTE — PROGRESS NOTES
Occupational Therapy  Attempted OT eval at b/s, however, pt resistant to all ax despite much encouragement, reassurance.  Will attempt assessment at a later time.  Thank you for this referral WILMA Khan, OTR/L  # [id number]

## 2025-03-24 NOTE — CARE COORDINATION
Pureed and thins continue for patient. Normally independent from CSS. Will follow up with home manager, Shani, [phone], after therapy sees today.     For questions I can be reached at [phone]. NORIS Mcconnell

## 2025-03-24 NOTE — PLAN OF CARE
Problem: Safety - Adult  Goal: Free from fall injury  3/24/2025 1109 by Christy Prince, RN  Outcome: Progressing  3/23/2025 2254 by Iraida Renee, RN  Outcome: Progressing     Problem: Discharge Planning  Goal: Discharge to home or other facility with appropriate resources  3/24/2025 1109 by Christy Prince, RN  Outcome: Progressing  Flowsheets (Taken 3/24/2025 0751)  Discharge to home or other facility with appropriate resources:   Identify barriers to discharge with patient and caregiver   Arrange for needed discharge resources and transportation as appropriate   Identify discharge learning needs (meds, wound care, etc)   Refer to discharge planning if patient needs post-hospital services based on physician order or complex needs related to functional status, cognitive ability or social support system  3/23/2025 2254 by Iraida Renee, RN  Outcome: Progressing     Problem: Skin/Tissue Integrity  Goal: Skin integrity remains intact  Description: 1.  Monitor for areas of redness and/or skin breakdown  2.  Assess vascular access sites hourly  3.  Every 4-6 hours minimum:  Change oxygen saturation probe site  4.  Every 4-6 hours:  If on nasal continuous positive airway pressure, respiratory therapy assess nares and determine need for appliance change or resting period  3/24/2025 1109 by Christy Prince RN  Outcome: Progressing  Flowsheets (Taken 3/22/2025 2000 by Xenia Baez, RN)  Skin Integrity Remains Intact:   Monitor for areas of redness and/or skin breakdown   Assess vascular access sites hourly   Every 4-6 hours minimum: Change oxygen saturation probe site   Every 4-6 hours: If on nasal continuous positive airway pressure, respiratory therapy assesses nares and determine need for appliance change or resting period  3/23/2025 2254 by Iraida Renee, RN  Outcome: Progressing     Problem: Neurosensory - Adult  Goal: Achieves stable or improved neurological status  Outcome: Progressing  Flowsheets

## 2025-03-24 NOTE — PROGRESS NOTES
Physical Therapy    Date: 3/24/2025       Patient Name: Tenisha Bruce  : 1950      MRN: [id number]    Physical therapy order received and chart reviewed. PT evaluation attempted, pt declined continually stating \"michi\".  Will follow up as appropriate.     Paige Kaye PT, DPT  [id number]

## 2025-03-24 NOTE — PROGRESS NOTES
[facility]  Department of Pulmonary, Critical Care and Sleep Medicine  [facility]  Department of Internal Medicine  Progress Note    Raul FIELDS        Patients Primary Pulmonologist is:     SUBJECTIVE:      Patient seen and examined. Currently denies shortness of breath, repeatedly asking staff to leave her alone.    OBJECTIVE:  Vitals:    03/23/25 2023 03/24/25 0015 03/24/25 0158 03/24/25 0749   BP: (!) 120/59 (!) 92/47  112/65   Pulse: 93 82  96   Resp: 18 18  19   Temp: 98.6 °F (37 °C) 97.7 °F (36.5 °C)  98.2 °F (36.8 °C)   TempSrc: Oral Oral  Temporal   SpO2: 99% 98%  97%   Weight:   61.7 kg (136 lb) 62.8 kg (138 lb 7.2 oz)   Height:         Constitutional: Alert,     EENT: EOMI CECILLE. MMM. No icterus. No thrush.     Neck:  Trachea was midline.   Respiratory: CTA bilaterally, no use of accessory muscles  Cardiovascular: Regular, No murmur. No rubs.      Pulses:  Equal bilaterally.    Abdomen: Soft without organomegaly. No rebound, rigidity.  No guarding.  Lymphatic: No lymphadenopathy.  Musculoskeletal: Without weakness or gross deficits  Extremities:  No lower extremity edema. Reflexes appear adequate.   Skin:  Warm and dry.  No skin rashes.   Neurological/Psychiatric: Intermittently following commands.      DATA:    Monitor Strips:  Reviewed & discusses with technical team. No changes noted.    RADIOLOGY:  No new imaging today      CBC with Differential:    Lab Results   Component Value Date/Time    WBC 6.6 03/24/2025 04:33 AM    RBC 1.85 03/24/2025 04:33 AM    HGB 7.4 03/24/2025 04:33 AM    HCT 21.8 03/24/2025 04:33 AM    PLT 36 03/23/2025 04:54 AM    .8 03/24/2025 04:33 AM    MCH 40.0 03/24/2025 04:33 AM    MCHC 33.9 03/24/2025 04:33 AM    RDW 15.6 03/24/2025 04:33 AM    NRBC 2 12/14/2023 04:58 AM    LYMPHOPCT 41  03/24/2025 04:33 AM    MONOPCT 10 03/24/2025 04:33 AM    MYELOPCT 1 03/19/2025 09:59 PM    EOSPCT 2 03/24/2025 04:33 AM    BASOPCT 0 03/24/2025 04:33 AM    MONOSABS 0.65 03/24/2025 04:33 AM    LYMPHSABS 2.68 03/24/2025 04:33 AM    EOSABS 0.13 03/24/2025 04:33 AM    BASOSABS 0.01 03/24/2025 04:33 AM     CMP:    Lab Results   Component Value Date/Time     03/24/2025 04:33 AM    K 3.6 03/24/2025 04:33 AM    K 3.5 02/07/2020 05:26 AM     03/24/2025 04:33 AM    CO2 26 03/24/2025 04:33 AM    BUN 40 03/24/2025 04:33 AM    CREATININE 1.1 03/24/2025 04:33 AM    GFRAA >60 02/08/2020 07:51 AM    LABGLOM 51 03/24/2025 04:33 AM    LABGLOM >60 12/15/2023 06:11 AM    GLUCOSE 105 03/24/2025 04:33 AM    GLUCOSE 164 08/02/2011 04:00 PM    CALCIUM 8.3 03/24/2025 04:33 AM    BILITOT 0.4 03/24/2025 04:33 AM    ALKPHOS 61 03/24/2025 04:33 AM    AST 30 03/24/2025 04:33 AM    ALT 32 03/24/2025 04:33 AM       Assessment:  Acute encephalopathy 2/2 #3; improving   Fetal alcohol syndrome, development delay   Shock requiring pressor support likely septic, LLL pneumonia; resolved  Holosystolic murmur on exam 2/2 moderate MR  Non-sustained vtach, asymptomatic, 6 beats   Newly diagnosed HFrEF, EF 30-35%, ECHO 3/20/2025  Elevated HS troponin likely demand, down-trended, asymptomatic   Hypothyroidism, controlled   HLD  Prerenal DARIO, FeNa 0.3%  Transaminitis, mild  Macrocytic anemia, KAMILLE component   Chronic thrombocytopenia   Anxiety   Hx of left femoral neck fracture s/p ORIF 8/2/23  Hx of mild-moderate oropharyngeal dysphagia     Plan:  Off levophed, florinef 0.1, steroids weaned to 50mg daily  Will need GDMT when BP allows.   Pulm hygiene, wean O2 none at baseline, infectious workup so far unrevealing, waiting on respiratory culture, continues on zosyn.   Monitor renal function, urine output.   Platelet count dropped to 36, heparin DVT proph held, 4T score low probability for HIT, continue to monitor.       Bhargavi Pressley DO

## 2025-03-24 NOTE — PROGRESS NOTES
[facility] Hospitalist Progress Note    Admitting Date and Time: 3/19/2025  9:56 PM  Admit Dx: Acute kidney injury [N17.9]  Severe sepsis with septic shock (HCC) [A41.9, R65.21]  Septic shock (HCC) [A41.9, R65.21]  Altered mental status, unspecified altered mental status type [R41.82]  Pneumonia of left lower lobe due to infectious organism [J18.9]  Acute hypoxemic respiratory failure [J96.01]    Synopsis:  74-year-old female with a past medical history of hyperlipidemia, hypothyroidism, anxiety came to the hospital due to altered mental status, shortness of breath, hypotension and found to have septic shock most likely due to left lower lobe pneumonia.  Patient also found to have DARIO.  Patient admitted to medical ICU and on Levophed, IV fluids, and Zosyn.  Vancomycin was stopped as MRSA was negative. Now off pressors     Subjective:  Patient is being followed for Acute kidney injury [N17.9]  Severe sepsis with septic shock (HCC) [A41.9, R65.21]  Septic shock (HCC) [A41.9, R65.21]  Altered mental status, unspecified altered mental status type [R41.82]  Pneumonia of left lower lobe due to infectious organism [J18.9]  Acute hypoxemic respiratory failure [J96.01]     Patient was seen at bedside.  She is more alert and oriented as compared to yesterday.  On 2L of oxygen   Denies any complaint    ROS: denies fever, chills, cp, sob, n/v, HA unless stated above     hydrocortisone sodium succinate PF (SOLU-CORTEF) 100 mg in sterile water 2 mL injection  100 mg IntraVENous Daily    fludrocortisone  0.1 mg Oral Daily    piperacillin-tazobactam  4,500 mg IntraVENous Q8H    pantoprazole  40 mg Oral QAM AC    atorvastatin  10 mg Oral Nightly    divalproex  500 mg Oral Nightly    escitalopram  10 mg Oral Nightly    ezetimibe  10 mg Oral QAM    levothyroxine  100 mcg Oral QAM AC    niacin  1,000 mg Oral Nightly    QUEtiapine  400 mg Oral Nightly    sodium chloride flush  5-40 mL IntraVENous 2 times per day    [Held by

## 2025-03-25 LAB
ALBUMIN SERPL-MCNC: 2.8 G/DL (ref 3.5–5.2)
ALP SERPL-CCNC: 70 U/L (ref 35–104)
ALT SERPL-CCNC: 27 U/L (ref 0–32)
ANION GAP SERPL CALCULATED.3IONS-SCNC: 15 MMOL/L (ref 7–16)
AST SERPL-CCNC: 28 U/L (ref 0–31)
BASOPHILS # BLD: 0.01 K/UL (ref 0–0.2)
BASOPHILS NFR BLD: 0 % (ref 0–2)
BILIRUB SERPL-MCNC: 0.4 MG/DL (ref 0–1.2)
BUN SERPL-MCNC: 33 MG/DL (ref 6–23)
CALCIUM SERPL-MCNC: 8.8 MG/DL (ref 8.6–10.2)
CHLORIDE SERPL-SCNC: 106 MMOL/L (ref 98–107)
CO2 SERPL-SCNC: 23 MMOL/L (ref 22–29)
CREAT SERPL-MCNC: 1 MG/DL (ref 0.5–1)
EOSINOPHIL # BLD: 0.13 K/UL (ref 0.05–0.5)
EOSINOPHILS RELATIVE PERCENT: 1 % (ref 0–6)
ERYTHROCYTE [DISTWIDTH] IN BLOOD BY AUTOMATED COUNT: 15.8 % (ref 11.5–15)
GFR, ESTIMATED: 60 ML/MIN/1.73M2
GLUCOSE SERPL-MCNC: 110 MG/DL (ref 74–99)
HCT VFR BLD AUTO: 22.8 % (ref 34–48)
HGB BLD-MCNC: 7.4 G/DL (ref 11.5–15.5)
IMM GRANULOCYTES # BLD AUTO: 0.06 K/UL (ref 0–0.58)
IMM GRANULOCYTES NFR BLD: 1 % (ref 0–5)
LYMPHOCYTES NFR BLD: 3.51 K/UL (ref 1.5–4)
LYMPHOCYTES RELATIVE PERCENT: 39 % (ref 20–42)
MAGNESIUM SERPL-MCNC: 1.9 MG/DL (ref 1.6–2.6)
MCH RBC QN AUTO: 37.8 PG (ref 26–35)
MCHC RBC AUTO-ENTMCNC: 32.5 G/DL (ref 32–34.5)
MCV RBC AUTO: 116.3 FL (ref 80–99.9)
MICROORGANISM SPEC CULT: NORMAL
MICROORGANISM SPEC CULT: NORMAL
MONOCYTES NFR BLD: 0.69 K/UL (ref 0.1–0.95)
MONOCYTES NFR BLD: 8 % (ref 2–12)
NEUTROPHILS NFR BLD: 51 % (ref 43–80)
NEUTS SEG NFR BLD: 4.62 K/UL (ref 1.8–7.3)
PLATELET CONFIRMATION: NORMAL
PLATELET, FLUORESCENCE: 44 K/UL (ref 130–450)
PMV BLD AUTO: 11 FL (ref 7–12)
POTASSIUM SERPL-SCNC: 4.2 MMOL/L (ref 3.5–5)
PROT SERPL-MCNC: 4.9 G/DL (ref 6.4–8.3)
RBC # BLD AUTO: 1.96 M/UL (ref 3.5–5.5)
RBC # BLD: ABNORMAL 10*6/UL
RBC # BLD: ABNORMAL 10*6/UL
SERVICE CMNT-IMP: NORMAL
SERVICE CMNT-IMP: NORMAL
SODIUM SERPL-SCNC: 144 MMOL/L (ref 132–146)
SPECIMEN DESCRIPTION: NORMAL
SPECIMEN DESCRIPTION: NORMAL
WBC OTHER # BLD: 9 K/UL (ref 4.5–11.5)

## 2025-03-25 PROCEDURE — 94640 AIRWAY INHALATION TREATMENT: CPT

## 2025-03-25 PROCEDURE — 99232 SBSQ HOSP IP/OBS MODERATE 35: CPT | Performed by: INTERNAL MEDICINE

## 2025-03-25 PROCEDURE — 85025 COMPLETE CBC W/AUTO DIFF WBC: CPT

## 2025-03-25 PROCEDURE — 6370000000 HC RX 637 (ALT 250 FOR IP)

## 2025-03-25 PROCEDURE — 2500000003 HC RX 250 WO HCPCS: Performed by: INTERNAL MEDICINE

## 2025-03-25 PROCEDURE — 99233 SBSQ HOSP IP/OBS HIGH 50: CPT | Performed by: INTERNAL MEDICINE

## 2025-03-25 PROCEDURE — 2700000000 HC OXYGEN THERAPY PER DAY

## 2025-03-25 PROCEDURE — 2060000000 HC ICU INTERMEDIATE R&B

## 2025-03-25 PROCEDURE — 36415 COLL VENOUS BLD VENIPUNCTURE: CPT

## 2025-03-25 PROCEDURE — 6360000002 HC RX W HCPCS: Performed by: INTERNAL MEDICINE

## 2025-03-25 PROCEDURE — 83735 ASSAY OF MAGNESIUM: CPT

## 2025-03-25 PROCEDURE — 2580000003 HC RX 258: Performed by: INTERNAL MEDICINE

## 2025-03-25 PROCEDURE — 80053 COMPREHEN METABOLIC PANEL: CPT

## 2025-03-25 PROCEDURE — 2500000003 HC RX 250 WO HCPCS

## 2025-03-25 RX ORDER — SODIUM CHLORIDE 0.9 % (FLUSH) 0.9 %
5-40 SYRINGE (ML) INJECTION EVERY 12 HOURS SCHEDULED
Status: DISCONTINUED | OUTPATIENT
Start: 2025-03-25 | End: 2025-03-28 | Stop reason: HOSPADM

## 2025-03-25 RX ORDER — HEPARIN 100 UNIT/ML
1 SYRINGE INTRAVENOUS EVERY 12 HOURS SCHEDULED
Status: DISCONTINUED | OUTPATIENT
Start: 2025-03-25 | End: 2025-03-26

## 2025-03-25 RX ORDER — SODIUM CHLORIDE 9 MG/ML
INJECTION, SOLUTION INTRAVENOUS PRN
Status: DISCONTINUED | OUTPATIENT
Start: 2025-03-25 | End: 2025-03-28 | Stop reason: HOSPADM

## 2025-03-25 RX ORDER — HEPARIN 100 UNIT/ML
1 SYRINGE INTRAVENOUS PRN
Status: DISCONTINUED | OUTPATIENT
Start: 2025-03-25 | End: 2025-03-26

## 2025-03-25 RX ORDER — SODIUM CHLORIDE 0.9 % (FLUSH) 0.9 %
5-40 SYRINGE (ML) INJECTION PRN
Status: DISCONTINUED | OUTPATIENT
Start: 2025-03-25 | End: 2025-03-28 | Stop reason: HOSPADM

## 2025-03-25 RX ADMIN — GUAIFENESIN 400 MG: 400 TABLET ORAL at 14:45

## 2025-03-25 RX ADMIN — ESCITALOPRAM OXALATE 10 MG: 10 TABLET ORAL at 19:53

## 2025-03-25 RX ADMIN — SODIUM CHLORIDE, SODIUM LACTATE, POTASSIUM CHLORIDE, AND CALCIUM CHLORIDE: .6; .31; .03; .02 INJECTION, SOLUTION INTRAVENOUS at 14:44

## 2025-03-25 RX ADMIN — LEVOTHYROXINE SODIUM 100 MCG: 0.1 TABLET ORAL at 06:46

## 2025-03-25 RX ADMIN — PANTOPRAZOLE SODIUM 40 MG: 40 TABLET, DELAYED RELEASE ORAL at 06:46

## 2025-03-25 RX ADMIN — SODIUM CHLORIDE, SODIUM LACTATE, POTASSIUM CHLORIDE, AND CALCIUM CHLORIDE: .6; .31; .03; .02 INJECTION, SOLUTION INTRAVENOUS at 08:20

## 2025-03-25 RX ADMIN — FLUDROCORTISONE ACETATE 0.1 MG: 0.1 TABLET ORAL at 08:21

## 2025-03-25 RX ADMIN — SODIUM CHLORIDE, PRESERVATIVE FREE 10 ML: 5 INJECTION INTRAVENOUS at 19:53

## 2025-03-25 RX ADMIN — WATER 50 MG: 1 INJECTION INTRAMUSCULAR; INTRAVENOUS; SUBCUTANEOUS at 08:17

## 2025-03-25 RX ADMIN — EZETIMIBE 10 MG: 10 TABLET ORAL at 08:21

## 2025-03-25 RX ADMIN — GUAIFENESIN 400 MG: 400 TABLET ORAL at 08:21

## 2025-03-25 RX ADMIN — Medication 1000 MG: at 19:52

## 2025-03-25 RX ADMIN — Medication 100 UNITS: at 19:57

## 2025-03-25 RX ADMIN — QUETIAPINE FUMARATE 400 MG: 200 TABLET ORAL at 19:52

## 2025-03-25 RX ADMIN — GUAIFENESIN 400 MG: 400 TABLET ORAL at 19:53

## 2025-03-25 RX ADMIN — IPRATROPIUM BROMIDE AND ALBUTEROL SULFATE 1 DOSE: 2.5; .5 SOLUTION RESPIRATORY (INHALATION) at 20:03

## 2025-03-25 RX ADMIN — ATORVASTATIN CALCIUM 10 MG: 10 TABLET, FILM COATED ORAL at 19:53

## 2025-03-25 RX ADMIN — SODIUM CHLORIDE, PRESERVATIVE FREE 10 ML: 5 INJECTION INTRAVENOUS at 08:17

## 2025-03-25 RX ADMIN — DIVALPROEX SODIUM 500 MG: 500 TABLET, DELAYED RELEASE ORAL at 19:52

## 2025-03-25 NOTE — PLAN OF CARE
Problem: Safety - Adult  Goal: Free from fall injury  Outcome: Progressing     Problem: Discharge Planning  Goal: Discharge to home or other facility with appropriate resources  Outcome: Progressing     Problem: Skin/Tissue Integrity  Goal: Skin integrity remains intact  Description: 1.  Monitor for areas of redness and/or skin breakdown  2.  Assess vascular access sites hourly  3.  Every 4-6 hours minimum:  Change oxygen saturation probe site  4.  Every 4-6 hours:  If on nasal continuous positive airway pressure, respiratory therapy assess nares and determine need for appliance change or resting period  Outcome: Progressing     Problem: Neurosensory - Adult  Goal: Achieves stable or improved neurological status  Outcome: Progressing  Goal: Achieves maximal functionality and self care  Outcome: Progressing     Problem: Respiratory - Adult  Goal: Achieves optimal ventilation and oxygenation  Outcome: Progressing     Problem: Metabolic/Fluid and Electrolytes - Adult  Goal: Electrolytes maintained within normal limits  Outcome: Progressing  Goal: Hemodynamic stability and optimal renal function maintained  Outcome: Progressing  Goal: Glucose maintained within prescribed range  Outcome: Progressing     Problem: Genitourinary - Adult  Goal: Absence of urinary retention  Outcome: Progressing     Problem: Pain  Goal: Verbalizes/displays adequate comfort level or baseline comfort level  Outcome: Progressing     Problem: ABCDS Injury Assessment  Goal: Absence of physical injury  Outcome: Progressing

## 2025-03-25 NOTE — PROGRESS NOTES
[facility]  Department of Pulmonary, Critical Care and Sleep Medicine  [facility]  Department of Internal Medicine  Progress Note    Raul FIELDS        Patients Primary Pulmonologist is:     SUBJECTIVE:      Patient seen and examined. Oriented to person only. Upon entering room O2 sat noted to be 81% on room air. Placed back on oxygen, increased to 5L NC for sat 94%    OBJECTIVE:  Vitals:    03/25/25 0400 03/25/25 0600 03/25/25 0816 03/25/25 1054   BP: 112/63  119/70 119/71   Pulse: 88  (!) 110 (!) 117   Resp: 16  24 22   Temp: 98.4 °F (36.9 °C)  97.7 °F (36.5 °C) 97.8 °F (36.6 °C)   TempSrc: Axillary  Infrared Axillary   SpO2: 92%  96% 94%   Weight:  60.3 kg (133 lb)     Height:         Constitutional: Alert,     EENT: EOMI CECILLE. MMM. No icterus. No thrush.     Neck:  Trachea was midline.   Respiratory: CTA bilaterally, no use of accessory muscles  Cardiovascular: Regular, No murmur. No rubs.      Pulses:  Equal bilaterally.    Abdomen: Soft without organomegaly. No rebound, rigidity.  No guarding.  Lymphatic: No lymphadenopathy.  Musculoskeletal: Without weakness or gross deficits  Extremities:  No lower extremity edema. Reflexes appear adequate.   Skin:  Warm and dry.  No skin rashes.   Neurological/Psychiatric: Intermittently following commands.      DATA:    Monitor Strips:  Reviewed & discusses with technical team. No changes noted.    RADIOLOGY:  No new imaging today      CBC with Differential:    Lab Results   Component Value Date/Time    WBC 9.0 03/25/2025 04:17 AM    RBC 1.96 03/25/2025 04:17 AM    HGB 7.4 03/25/2025 04:17 AM    HCT 22.8 03/25/2025 04:17 AM    PLT 36 03/23/2025 04:54 AM    .3 03/25/2025 04:17 AM    MCH 37.8 03/25/2025 04:17 AM    MCHC 32.5 03/25/2025 04:17 AM    RDW 15.8 03/25/2025 04:17 AM    NRBC 2

## 2025-03-25 NOTE — PLAN OF CARE
Problem: Safety - Adult  Goal: Free from fall injury  3/24/2025 2301 by Annette Aquino RN  Outcome: Progressing  3/24/2025 1109 by Christy Prince RN  Outcome: Progressing     Problem: Discharge Planning  Goal: Discharge to home or other facility with appropriate resources  3/24/2025 2301 by Annette Aquino RN  Outcome: Progressing  Flowsheets (Taken 3/24/2025 2000)  Discharge to home or other facility with appropriate resources: Identify barriers to discharge with patient and caregiver  3/24/2025 1109 by Christy Prince RN  Outcome: Progressing  Flowsheets (Taken 3/24/2025 0751)  Discharge to home or other facility with appropriate resources:   Identify barriers to discharge with patient and caregiver   Arrange for needed discharge resources and transportation as appropriate   Identify discharge learning needs (meds, wound care, etc)   Refer to discharge planning if patient needs post-hospital services based on physician order or complex needs related to functional status, cognitive ability or social support system     Problem: Skin/Tissue Integrity  Goal: Skin integrity remains intact  Description: 1.  Monitor for areas of redness and/or skin breakdown  2.  Assess vascular access sites hourly  3.  Every 4-6 hours minimum:  Change oxygen saturation probe site  4.  Every 4-6 hours:  If on nasal continuous positive airway pressure, respiratory therapy assess nares and determine need for appliance change or resting period  3/24/2025 2301 by Annette Aquino RN  Outcome: Progressing  3/24/2025 1109 by Christy Prince RN  Outcome: Progressing  Flowsheets (Taken 3/22/2025 2000 by Xenia Baez, RN)  Skin Integrity Remains Intact:   Monitor for areas of redness and/or skin breakdown   Assess vascular access sites hourly   Every 4-6 hours minimum: Change oxygen saturation probe site   Every 4-6 hours: If on nasal continuous positive airway pressure, respiratory therapy assesses nares and determine need for appliance

## 2025-03-25 NOTE — PROGRESS NOTES
Patient refusing turns/offloading during this RN shift. This RN attempted education on turning/offloading, patient non receptive to education and just states \"Leave me alone\". Patient A&O x1 at baseline.

## 2025-03-25 NOTE — PLAN OF CARE
Problem: Safety - Adult  Goal: Free from fall injury  3/25/2025 1833 by Vianney Khalil RN  Outcome: Progressing  3/25/2025 1818 by Vianney Khalil RN  Outcome: Progressing     Problem: Discharge Planning  Goal: Discharge to home or other facility with appropriate resources  3/25/2025 1833 by Vianney Khalil RN  Outcome: Progressing  3/25/2025 1818 by Vianney Khalil RN  Outcome: Progressing     Problem: Skin/Tissue Integrity  Goal: Skin integrity remains intact  Description: 1.  Monitor for areas of redness and/or skin breakdown  2.  Assess vascular access sites hourly  3.  Every 4-6 hours minimum:  Change oxygen saturation probe site  4.  Every 4-6 hours:  If on nasal continuous positive airway pressure, respiratory therapy assess nares and determine need for appliance change or resting period  3/25/2025 1833 by Vianney Khalil RN  Outcome: Progressing  3/25/2025 1818 by Vianney Khalil RN  Outcome: Progressing     Problem: Neurosensory - Adult  Goal: Achieves stable or improved neurological status  3/25/2025 1833 by Vianney Khalil RN  Outcome: Progressing  3/25/2025 1818 by Vianney Khalil RN  Outcome: Progressing  Goal: Achieves maximal functionality and self care  3/25/2025 1833 by Vianney Khalil RN  Outcome: Progressing  3/25/2025 1818 by Vianney Khalil RN  Outcome: Progressing

## 2025-03-25 NOTE — PROGRESS NOTES
[facility] Hospitalist Progress Note    Admitting Date and Time: 3/19/2025  9:56 PM  Admit Dx: Acute kidney injury [N17.9]  Severe sepsis with septic shock (HCC) [A41.9, R65.21]  Septic shock (HCC) [A41.9, R65.21]  Altered mental status, unspecified altered mental status type [R41.82]  Pneumonia of left lower lobe due to infectious organism [J18.9]  Acute hypoxemic respiratory failure [J96.01]    Synopsis:  74-year-old female with a past medical history of hyperlipidemia, hypothyroidism, anxiety came to the hospital due to altered mental status, shortness of breath, hypotension and found to have septic shock most likely due to left lower lobe pneumonia.  Patient also found to have DARIO.  Patient admitted to medical ICU and on Levophed, IV fluids, and Zosyn.  Vancomycin was stopped as MRSA was negative. Now off pressors     Subjective:  Patient is being followed for Acute kidney injury [N17.9]  Severe sepsis with septic shock (HCC) [A41.9, R65.21]  Septic shock (HCC) [A41.9, R65.21]  Altered mental status, unspecified altered mental status type [R41.82]  Pneumonia of left lower lobe due to infectious organism [J18.9]  Acute hypoxemic respiratory failure [J96.01]     Patient was seen at bedside.  She is more alert and oriented as compared to yesterday.  On 2L of oxygen   Denies any complaint  Oral intake poor per nursing    ROS: denies fever, chills, cp, sob, n/v, HA unless stated above     lidocaine  5 mL IntraDERmal Once    sodium chloride flush  5-40 mL IntraVENous 2 times per day    heparin flush  1 mL IntraVENous 2 times per day    hydrocortisone sodium succinate PF (SOLU-CORTEF) 50 mg in sterile water 2 mL injection  50 mg IntraVENous Daily    fludrocortisone  0.1 mg Oral Daily    pantoprazole  40 mg Oral QAM AC    atorvastatin  10 mg Oral Nightly    divalproex  500 mg Oral Nightly    escitalopram  10 mg Oral Nightly    ezetimibe  10 mg Oral QAM    levothyroxine  100 mcg Oral QAM AC    niacin  1,000 mg

## 2025-03-25 NOTE — PROGRESS NOTES
Occupational Therapy  Attempted OT eval this date, however, pt remains resistant to any and all ax shouting repeatedly \"Leave me alone!\"  Will attempt assessment at a later time.  Thank you for this referral. Dana Ramos, WILMA, OTR/L  # [id number]

## 2025-03-26 LAB
ALBUMIN SERPL-MCNC: 2.7 G/DL (ref 3.5–5.2)
ALP SERPL-CCNC: 64 U/L (ref 35–104)
ALT SERPL-CCNC: 32 U/L (ref 0–32)
ANION GAP SERPL CALCULATED.3IONS-SCNC: 14 MMOL/L (ref 7–16)
AST SERPL-CCNC: 36 U/L (ref 0–31)
BASOPHILS # BLD: 0 K/UL (ref 0–0.2)
BASOPHILS NFR BLD: 0 % (ref 0–2)
BILIRUB SERPL-MCNC: 0.3 MG/DL (ref 0–1.2)
BNP SERPL-MCNC: ABNORMAL PG/ML (ref 0–125)
BUN SERPL-MCNC: 38 MG/DL (ref 6–23)
CALCIUM SERPL-MCNC: 8.1 MG/DL (ref 8.6–10.2)
CHLORIDE SERPL-SCNC: 106 MMOL/L (ref 98–107)
CO2 SERPL-SCNC: 22 MMOL/L (ref 22–29)
CREAT SERPL-MCNC: 1 MG/DL (ref 0.5–1)
EOSINOPHIL # BLD: 0.09 K/UL (ref 0.05–0.5)
EOSINOPHILS RELATIVE PERCENT: 2 % (ref 0–6)
ERYTHROCYTE [DISTWIDTH] IN BLOOD BY AUTOMATED COUNT: 15.8 % (ref 11.5–15)
GFR, ESTIMATED: 59 ML/MIN/1.73M2
GLUCOSE SERPL-MCNC: 94 MG/DL (ref 74–99)
HCT VFR BLD AUTO: 17.9 % (ref 34–48)
HCT VFR BLD AUTO: 19.6 % (ref 34–48)
HCT VFR BLD AUTO: 25.7 % (ref 34–48)
HGB BLD-MCNC: 5.9 G/DL (ref 11.5–15.5)
HGB BLD-MCNC: 6.3 G/DL (ref 11.5–15.5)
HGB BLD-MCNC: 8.2 G/DL (ref 11.5–15.5)
LYMPHOCYTES NFR BLD: 1.91 K/UL (ref 1.5–4)
LYMPHOCYTES RELATIVE PERCENT: 36 % (ref 20–42)
MAGNESIUM SERPL-MCNC: 1.8 MG/DL (ref 1.6–2.6)
MCH RBC QN AUTO: 38.8 PG (ref 26–35)
MCHC RBC AUTO-ENTMCNC: 33 G/DL (ref 32–34.5)
MCV RBC AUTO: 117.8 FL (ref 80–99.9)
MONOCYTES NFR BLD: 0.28 K/UL (ref 0.1–0.95)
MONOCYTES NFR BLD: 5 % (ref 2–12)
NEUTROPHILS NFR BLD: 57 % (ref 43–80)
NEUTS SEG NFR BLD: 3.02 K/UL (ref 1.8–7.3)
PLATELET # BLD AUTO: 34 K/UL (ref 130–450)
PLATELET CONFIRMATION: NORMAL
PMV BLD AUTO: 10.7 FL (ref 7–12)
POTASSIUM SERPL-SCNC: 4.4 MMOL/L (ref 3.5–5)
PROT SERPL-MCNC: 4.3 G/DL (ref 6.4–8.3)
RBC # BLD AUTO: 1.52 M/UL (ref 3.5–5.5)
RBC # BLD: ABNORMAL 10*6/UL
SODIUM SERPL-SCNC: 142 MMOL/L (ref 132–146)
WBC OTHER # BLD: 5.3 K/UL (ref 4.5–11.5)

## 2025-03-26 PROCEDURE — 2500000003 HC RX 250 WO HCPCS: Performed by: INTERNAL MEDICINE

## 2025-03-26 PROCEDURE — 85018 HEMOGLOBIN: CPT

## 2025-03-26 PROCEDURE — 86900 BLOOD TYPING SEROLOGIC ABO: CPT

## 2025-03-26 PROCEDURE — 6370000000 HC RX 637 (ALT 250 FOR IP)

## 2025-03-26 PROCEDURE — 2580000003 HC RX 258: Performed by: INTERNAL MEDICINE

## 2025-03-26 PROCEDURE — 30233N1 TRANSFUSION OF NONAUTOLOGOUS RED BLOOD CELLS INTO PERIPHERAL VEIN, PERCUTANEOUS APPROACH: ICD-10-PCS | Performed by: INTERNAL MEDICINE

## 2025-03-26 PROCEDURE — 2700000000 HC OXYGEN THERAPY PER DAY

## 2025-03-26 PROCEDURE — 83735 ASSAY OF MAGNESIUM: CPT

## 2025-03-26 PROCEDURE — 92526 ORAL FUNCTION THERAPY: CPT | Performed by: SPEECH-LANGUAGE PATHOLOGIST

## 2025-03-26 PROCEDURE — 80053 COMPREHEN METABOLIC PANEL: CPT

## 2025-03-26 PROCEDURE — 6370000000 HC RX 637 (ALT 250 FOR IP): Performed by: NURSE PRACTITIONER

## 2025-03-26 PROCEDURE — 99223 1ST HOSP IP/OBS HIGH 75: CPT | Performed by: INTERNAL MEDICINE

## 2025-03-26 PROCEDURE — 99233 SBSQ HOSP IP/OBS HIGH 50: CPT | Performed by: INTERNAL MEDICINE

## 2025-03-26 PROCEDURE — 36430 TRANSFUSION BLD/BLD COMPNT: CPT

## 2025-03-26 PROCEDURE — 36415 COLL VENOUS BLD VENIPUNCTURE: CPT

## 2025-03-26 PROCEDURE — 99232 SBSQ HOSP IP/OBS MODERATE 35: CPT | Performed by: INTERNAL MEDICINE

## 2025-03-26 PROCEDURE — 83880 ASSAY OF NATRIURETIC PEPTIDE: CPT

## 2025-03-26 PROCEDURE — 2060000000 HC ICU INTERMEDIATE R&B

## 2025-03-26 PROCEDURE — P9016 RBC LEUKOCYTES REDUCED: HCPCS

## 2025-03-26 PROCEDURE — 86923 COMPATIBILITY TEST ELECTRIC: CPT

## 2025-03-26 PROCEDURE — 86850 RBC ANTIBODY SCREEN: CPT

## 2025-03-26 PROCEDURE — 85025 COMPLETE CBC W/AUTO DIFF WBC: CPT

## 2025-03-26 PROCEDURE — 85014 HEMATOCRIT: CPT

## 2025-03-26 PROCEDURE — APPSS60 APP SPLIT SHARED TIME 46-60 MINUTES: Performed by: NURSE PRACTITIONER

## 2025-03-26 PROCEDURE — 86901 BLOOD TYPING SEROLOGIC RH(D): CPT

## 2025-03-26 RX ORDER — LISINOPRIL 2.5 MG/1
2.5 TABLET ORAL DAILY
Status: DISCONTINUED | OUTPATIENT
Start: 2025-03-26 | End: 2025-03-28 | Stop reason: HOSPADM

## 2025-03-26 RX ORDER — METOPROLOL SUCCINATE 25 MG/1
25 TABLET, EXTENDED RELEASE ORAL DAILY
Status: DISCONTINUED | OUTPATIENT
Start: 2025-03-26 | End: 2025-03-28 | Stop reason: HOSPADM

## 2025-03-26 RX ORDER — SPIRONOLACTONE 25 MG/1
25 TABLET ORAL DAILY
Status: DISCONTINUED | OUTPATIENT
Start: 2025-03-26 | End: 2025-03-28 | Stop reason: HOSPADM

## 2025-03-26 RX ORDER — SODIUM CHLORIDE 9 MG/ML
INJECTION, SOLUTION INTRAVENOUS PRN
Status: DISCONTINUED | OUTPATIENT
Start: 2025-03-26 | End: 2025-03-28 | Stop reason: HOSPADM

## 2025-03-26 RX ADMIN — FLUDROCORTISONE ACETATE 0.1 MG: 0.1 TABLET ORAL at 08:18

## 2025-03-26 RX ADMIN — DIVALPROEX SODIUM 500 MG: 500 TABLET, DELAYED RELEASE ORAL at 21:28

## 2025-03-26 RX ADMIN — LISINOPRIL 2.5 MG: 2.5 TABLET ORAL at 15:13

## 2025-03-26 RX ADMIN — ESCITALOPRAM OXALATE 10 MG: 10 TABLET ORAL at 21:28

## 2025-03-26 RX ADMIN — GUAIFENESIN 400 MG: 400 TABLET ORAL at 15:13

## 2025-03-26 RX ADMIN — LEVOTHYROXINE SODIUM 100 MCG: 0.1 TABLET ORAL at 06:12

## 2025-03-26 RX ADMIN — PANTOPRAZOLE SODIUM 40 MG: 40 TABLET, DELAYED RELEASE ORAL at 06:12

## 2025-03-26 RX ADMIN — SPIRONOLACTONE 25 MG: 25 TABLET ORAL at 15:13

## 2025-03-26 RX ADMIN — METOPROLOL SUCCINATE 25 MG: 25 TABLET, EXTENDED RELEASE ORAL at 15:14

## 2025-03-26 RX ADMIN — Medication 1000 MG: at 21:27

## 2025-03-26 RX ADMIN — GUAIFENESIN 400 MG: 400 TABLET ORAL at 08:18

## 2025-03-26 RX ADMIN — SODIUM CHLORIDE, SODIUM LACTATE, POTASSIUM CHLORIDE, AND CALCIUM CHLORIDE: .6; .31; .03; .02 INJECTION, SOLUTION INTRAVENOUS at 11:29

## 2025-03-26 RX ADMIN — QUETIAPINE FUMARATE 400 MG: 200 TABLET ORAL at 21:28

## 2025-03-26 RX ADMIN — GUAIFENESIN 400 MG: 400 TABLET ORAL at 21:28

## 2025-03-26 RX ADMIN — EZETIMIBE 10 MG: 10 TABLET ORAL at 08:18

## 2025-03-26 RX ADMIN — SODIUM CHLORIDE, PRESERVATIVE FREE 10 ML: 5 INJECTION INTRAVENOUS at 08:18

## 2025-03-26 RX ADMIN — ATORVASTATIN CALCIUM 10 MG: 10 TABLET, FILM COATED ORAL at 21:28

## 2025-03-26 NOTE — PLAN OF CARE
Problem: Safety - Adult  Goal: Free from fall injury  3/25/2025 2119 by Annette Aquino RN  Outcome: Progressing  3/25/2025 1833 by Vianney Khalil RN  Outcome: Progressing  3/25/2025 1818 by Vianney Khalil RN  Outcome: Progressing     Problem: Discharge Planning  Goal: Discharge to home or other facility with appropriate resources  3/25/2025 2119 by Annette Aquino RN  Outcome: Progressing  Flowsheets (Taken 3/25/2025 2000)  Discharge to home or other facility with appropriate resources: Identify barriers to discharge with patient and caregiver  3/25/2025 1833 by Vianney Khalil RN  Outcome: Progressing  3/25/2025 1818 by Vianney Khalil RN  Outcome: Progressing  Flowsheets (Taken 3/25/2025 0816)  Discharge to home or other facility with appropriate resources: Identify barriers to discharge with patient and caregiver     Problem: Skin/Tissue Integrity  Goal: Skin integrity remains intact  Description: 1.  Monitor for areas of redness and/or skin breakdown  2.  Assess vascular access sites hourly  3.  Every 4-6 hours minimum:  Change oxygen saturation probe site  4.  Every 4-6 hours:  If on nasal continuous positive airway pressure, respiratory therapy assess nares and determine need for appliance change or resting period  3/25/2025 2119 by Annette Aquino RN  Outcome: Progressing  Flowsheets (Taken 3/25/2025 2000)  Skin Integrity Remains Intact: Monitor for areas of redness and/or skin breakdown  3/25/2025 1833 by Vianney Khalil RN  Outcome: Progressing  3/25/2025 1818 by Vianney Kahlil RN  Outcome: Progressing  Flowsheets (Taken 3/25/2025 0816)  Skin Integrity Remains Intact: Monitor for areas of redness and/or skin breakdown     Problem: Neurosensory - Adult  Goal: Achieves stable or improved neurological status  3/25/2025 2119 by Annette Aquino RN  Outcome: Progressing  Flowsheets (Taken 3/25/2025 2000)  Achieves stable or improved neurological status: Assess for and report changes in neurological

## 2025-03-26 NOTE — CONSULTS
Inpatient Cardiology Consultation      Reason for Consult: New onset HF    Consulting Physician: Dr Santos    Requesting Physician:  Dr JOSE Pressley    Date of Consultation: 3/26/2025    HISTORY OF PRESENT ILLNESS: 74-year-old elderly  female not previously known to [facility] cardiology    [facility]/19/[facility] for AMS and L facial droop from NH.  BP 94/30  ST febrile 99.9 O2 saturation 94% on RA.  Patient had significant hypotension while in ED BP 66/31 requiring Levophed gtt. 2.5 liters NSS, IV Solu-Cortef and IV ATB.  Treated for septic shock 2/2 LLL pneumonia and DARIO.  Admitted to ICU    Troponin 117>108>84, Bun/Cr 69/1.8, cortisol 28.2 valproic acid therapeutic, ALT 38 AST 61, K+ 2.8, Hgb 9.1, WBC 7.7, Plt 59.  Respiratory panel including COVID-19 negative, Albumin 3.1, UDS negative    3/20/2025 6 beats NSVT    3/20/2025 TTE Dr Jerez: EF 30-35%.  Normal LV size and wall thickness.  Regional wall motion abnormality.  Normal diastolic.  Normal RV size and function.  Moderate MR. Unable to assess RVSP.  Mildly dilated LA. normal RA.  No AS.  No pericardial effusion. Normal sized aortic root and ascending aorta.    3/22/2025 Levophed weaned off placed on Florinef    3/24/2025 L @ 100/hr     3/26/2025 Na 142, K+ 4.4, magnesium 1.8, Bun/Cr 38/1.0, Albumin 2.7, Hgb 5.9>6.3, Plt 34, AST 36    Currently /69 HR 90s to 100s SR-ST afebrile O2 saturation 97% on 3L NC    Evaluated patient at bedside she was yelling not following simple commands, would not open her eyes.  Appears euvolemic.       Please note: past medical records were reviewed per electronic medical record (EMR) - see detailed reports under Past Medical/ Surgical History.   Past Medical History:    HLD on Lipitor 10 mg  QD/Zetia 10 mg QD  Hypothyroidism on HRT  3/20/2025 TSH 0/30, Free T4  0.9  Macrocytic anemia  Thrombocytopenia  Anxiety  Fetal alcohol syndrome/developmental delay  Schizoaffective disorder  Dysphagia on mechanical soft

## 2025-03-26 NOTE — PROGRESS NOTES
Physical Therapy    Date: 3/26/2025       Patient Name: Tenisha Bruce  : 1950      MRN: [id number]    Physical therapy order received and chart reviewed. PT evaluation held this AM 2/2 low hemoglobin.  Will follow up as appropriate.     Paige Kaye PT, DPT  [id number]

## 2025-03-26 NOTE — PLAN OF CARE
Problem: Safety - Adult  Goal: Free from fall injury  3/26/2025 0900 by Vianney Khalil RN  Outcome: Progressing  3/25/2025 2119 by Annette Aquino RN  Outcome: Progressing     Problem: Discharge Planning  Goal: Discharge to home or other facility with appropriate resources  3/26/2025 0900 by Vianney Khalil RN  Outcome: Progressing  3/25/2025 2119 by Annette Aquino RN  Outcome: Progressing  Flowsheets (Taken 3/25/2025 2000)  Discharge to home or other facility with appropriate resources: Identify barriers to discharge with patient and caregiver     Problem: Skin/Tissue Integrity  Goal: Skin integrity remains intact  Description: 1.  Monitor for areas of redness and/or skin breakdown  2.  Assess vascular access sites hourly  3.  Every 4-6 hours minimum:  Change oxygen saturation probe site  4.  Every 4-6 hours:  If on nasal continuous positive airway pressure, respiratory therapy assess nares and determine need for appliance change or resting period  3/26/2025 0900 by Vianney Khalil RN  Outcome: Progressing  3/25/2025 2119 by Annette Aquino RN  Outcome: Progressing  Flowsheets (Taken 3/25/2025 2000)  Skin Integrity Remains Intact: Monitor for areas of redness and/or skin breakdown     Problem: Neurosensory - Adult  Goal: Achieves stable or improved neurological status  3/26/2025 0900 by Vianney Khalil RN  Outcome: Progressing  3/25/2025 2119 by Annette Aquino RN  Outcome: Progressing  Flowsheets (Taken 3/25/2025 2000)  Achieves stable or improved neurological status: Assess for and report changes in neurological status  Goal: Achieves maximal functionality and self care  3/26/2025 0900 by Vianney Khalil RN  Outcome: Progressing  3/25/2025 2119 by Annette Aquino RN  Outcome: Progressing  Flowsheets (Taken 3/25/2025 2000)  Achieves maximal functionality and self care: Monitor swallowing and airway patency with patient fatigue and changes in neurological status     Problem: Respiratory - Adult  Goal: Achieves

## 2025-03-26 NOTE — PROGRESS NOTES
Speech Language Pathology      NAME:  Tenisha Bruce  :  1950  DATE: 3/26/2025  ROOM:  [facility]A    Seen for dysphagia management. Provided Pt with pureed solids/ thin liquids via straw. Tolerated well. Mild to mod anterior loss of puree noted secondary to impaired labial seal.     Will DC from SLP caseload. Rec ongoing SLP services at next level of care.     Acute kidney injury [N17.9]  Severe sepsis with septic shock (HCC) [A41.9, R65.21]  Septic shock (HCC) [A41.9, R65.21]  Altered mental status, unspecified altered mental status type [R41.82]  Pneumonia of left lower lobe due to infectious organism [J18.9]  Acute hypoxemic respiratory failure [J96.01]    09341  dysphagia tx    Albania Chase M.S., CCC-SLP  Speech-Language Pathologist  [id number]  3/26/2025

## 2025-03-26 NOTE — PROGRESS NOTES
Hgb 5.9 on morning labs. Repeat drawn and resulted at 6.3. Dr England notified via [employer].    Electronically signed by Vianney Khalil RN on 3/26/25 at 8:39 AM EDT

## 2025-03-26 NOTE — CARE COORDINATION
Spoke with Shani. Discussed that patient has been refusing therapy. Discussed that I am discussed that patient will need TRACY when released since we really don't know true functional status at this point and several day admission. Also continues to need oxygen and she does not wear oxygen at group home. She is in agreement with short term TRACY to determine baseline functional status more clearly. Message left with guardian to discuss choices. Will follow up.     1551 Spoke with guardian, she is hopeful patient can return home at discharge. If she does not participate or is nonambulatory she would prefer Lexi Robb or Lexi Bryant at discharge. Will follow up with referral in am.     For questions I can be reached at [phone]. NORIS Mcconnell    The Plan for Transition of Care is related to the following treatment goals: discharge planning when stable     The Patient and/or patient representative Analia Avila was provided with a choice of provider and agrees   with the discharge plan. [x] Yes [] No    Freedom of choice list was provided with basic dialogue that supports the patient's individualized plan of care/goals, treatment preferences and shares the quality data associated with the providers. [x] Yes [] No

## 2025-03-26 NOTE — PROGRESS NOTES
[facility] Hospitalist Progress Note    Admitting Date and Time: 3/19/2025  9:56 PM  Admit Dx: Acute kidney injury [N17.9]  Severe sepsis with septic shock (HCC) [A41.9, R65.21]  Septic shock (HCC) [A41.9, R65.21]  Altered mental status, unspecified altered mental status type [R41.82]  Pneumonia of left lower lobe due to infectious organism [J18.9]  Acute hypoxemic respiratory failure [J96.01]    Subjective:  Patient is being followed for Acute kidney injury [N17.9]  Severe sepsis with septic shock (HCC) [A41.9, R65.21]  Septic shock (HCC) [A41.9, R65.21]  Altered mental status, unspecified altered mental status type [R41.82]  Pneumonia of left lower lobe due to infectious organism [J18.9]  Acute hypoxemic respiratory failure [J96.01]   Pt feels good, not in acute distress    ROS: denies fever, chills, cp, sob, n/v, HA unless stated above.      metoprolol succinate  25 mg Oral Daily    lisinopril  2.5 mg Oral Daily    spironolactone  25 mg Oral Daily    sodium chloride flush  5-40 mL IntraVENous 2 times per day    pantoprazole  40 mg Oral QAM AC    atorvastatin  10 mg Oral Nightly    divalproex  500 mg Oral Nightly    escitalopram  10 mg Oral Nightly    ezetimibe  10 mg Oral QAM    levothyroxine  100 mcg Oral QAM AC    niacin  1,000 mg Oral Nightly    QUEtiapine  400 mg Oral Nightly    sodium chloride flush  5-40 mL IntraVENous 2 times per day    [Held by provider] heparin (porcine)  5,000 Units SubCUTAneous 3 times per day    guaiFENesin  400 mg Oral TID     sodium chloride, , PRN  sodium chloride flush, 5-40 mL, PRN  sodium chloride, , PRN  ipratropium 0.5 mg-albuterol 2.5 mg, 1 Dose, Q4H PRN  acetaminophen, 650 mg, Q4H PRN  sodium chloride flush, 5-40 mL, PRN  sodium chloride, , PRN  ondansetron, 4 mg, Q8H PRN   Or  ondansetron, 4 mg, Q6H PRN  polyethylene glycol, 17 g, Daily PRN  acetaminophen, 650 mg, Q6H PRN  glucose, 4 tablet, PRN  dextrose bolus, 125 mL, PRN   Or  dextrose bolus, 250 mL,

## 2025-03-26 NOTE — PROGRESS NOTES
[facility]  Department of Pulmonary, Critical Care and Sleep Medicine  [facility]  Department of Internal Medicine  Progress Note    Raul FIELDS        Patients Primary Pulmonologist is:     SUBJECTIVE:  Patient seen and examined.  Patient is nasal cannula, in no acute distress.  No reported issues overnight.  Hemoglobin 5.9 this a.m.    OBJECTIVE:  Vitals:    03/26/25 0400 03/26/25 0600 03/26/25 0816 03/26/25 1128   BP: 109/65  115/63 121/69   Pulse: 95  (!) 102 95   Resp: 23  21 19   Temp: 97.6 °F (36.4 °C)  97.5 °F (36.4 °C) 97.9 °F (36.6 °C)   TempSrc:   Infrared Infrared   SpO2: 95%  98% 97%   Weight:  59.9 kg (132 lb 1.6 oz)     Height:         Constitutional: Alert, ill-appearing, pale  EENT: EOMI CECILLE. MMM. No icterus. No thrush.     Neck:  Trachea was midline.   Respiratory: CTA bilaterally, no use of accessory muscles  Cardiovascular: Regular, No murmur. No rubs.      Pulses:  Equal bilaterally.    Abdomen: Soft without organomegaly. No rebound, rigidity.  No guarding.  Lymphatic: No lymphadenopathy.  Musculoskeletal: Without weakness or gross deficits  Extremities:  No lower extremity edema. Reflexes appear adequate.   Skin:  Warm and dry.  No skin rashes.   Neurological/Psychiatric: Intermittently following commands.      DATA:    Monitor Strips:  Reviewed & discusses with technical team. No changes noted.    RADIOLOGY:  No new imaging today      CBC with Differential:    Lab Results   Component Value Date/Time    WBC 5.3 03/26/2025 06:20 AM    RBC 1.52 03/26/2025 06:20 AM    HGB 6.3 03/26/2025 07:51 AM    HCT 19.6 03/26/2025 07:51 AM    PLT 34 03/26/2025 06:20 AM    .8 03/26/2025 06:20 AM    MCH 38.8 03/26/2025 06:20 AM    MCHC 33.0 03/26/2025 06:20 AM    RDW 15.8 03/26/2025 06:20 AM    NRBC 2 12/14/2023 04:58 AM

## 2025-03-26 NOTE — PROGRESS NOTES
Occupational Therapy  Attempted OT eval this AM, however, noted pt's hgb 6.3.  Will assessment until pt's labs are more therapeutic for pt's safety.  Thank you for this referral WILMA Khan, OTR/L  # [id number]'

## 2025-03-26 NOTE — PROGRESS NOTES
BNP 68,854. Dr England notified via [employer].    Electronically signed by Vianney Khalil RN on 3/26/25 at 4:37 PM EDT

## 2025-03-26 NOTE — PROGRESS NOTES
RN asked for PRN treatment for patient. Upon arrival to the room, nasal cannula was on the patient's forehead and her spo2 was 78%. She recovered to 92% when placed back in her nose at 3L. Telesitter in room was not going off. RN notified.

## 2025-03-26 NOTE — PROGRESS NOTES
1 unit of blood ordered for patient for hgb of 6.3 this morning.     This RN obtained phone consent from Guardian, Mateo Ruff with [employer] at [phone]. Telephone consent also verified with another RN.    Consent placed in softchart.    Electronically signed by Vianney Khalil RN on 3/26/25 at 10:54 AM EDT

## 2025-03-27 LAB
ALBUMIN SERPL-MCNC: 2.4 G/DL (ref 3.5–5.2)
ALP SERPL-CCNC: 68 U/L (ref 35–104)
ALT SERPL-CCNC: 27 U/L (ref 0–32)
ANION GAP SERPL CALCULATED.3IONS-SCNC: 10 MMOL/L (ref 7–16)
AST SERPL-CCNC: 28 U/L (ref 0–31)
BASOPHILS # BLD: 0.01 K/UL (ref 0–0.2)
BASOPHILS NFR BLD: 0 % (ref 0–2)
BILIRUB SERPL-MCNC: 0.4 MG/DL (ref 0–1.2)
BUN SERPL-MCNC: 33 MG/DL (ref 6–23)
CALCIUM SERPL-MCNC: 7.7 MG/DL (ref 8.6–10.2)
CHLORIDE SERPL-SCNC: 108 MMOL/L (ref 98–107)
CO2 SERPL-SCNC: 25 MMOL/L (ref 22–29)
CREAT SERPL-MCNC: 1 MG/DL (ref 0.5–1)
EOSINOPHIL # BLD: 0.11 K/UL (ref 0.05–0.5)
EOSINOPHILS RELATIVE PERCENT: 2 % (ref 0–6)
ERYTHROCYTE [DISTWIDTH] IN BLOOD BY AUTOMATED COUNT: 20.4 % (ref 11.5–15)
GFR, ESTIMATED: 59 ML/MIN/1.73M2
GLUCOSE SERPL-MCNC: 99 MG/DL (ref 74–99)
HCT VFR BLD AUTO: 21.6 % (ref 34–48)
HCT VFR BLD AUTO: 24.5 % (ref 34–48)
HGB BLD-MCNC: 7 G/DL (ref 11.5–15.5)
HGB BLD-MCNC: 8 G/DL (ref 11.5–15.5)
IMM GRANULOCYTES # BLD AUTO: 0.06 K/UL (ref 0–0.58)
IMM GRANULOCYTES NFR BLD: 1 % (ref 0–5)
LYMPHOCYTES NFR BLD: 2.38 K/UL (ref 1.5–4)
LYMPHOCYTES RELATIVE PERCENT: 40 % (ref 20–42)
MAGNESIUM SERPL-MCNC: 1.7 MG/DL (ref 1.6–2.6)
MCH RBC QN AUTO: 36.6 PG (ref 26–35)
MCHC RBC AUTO-ENTMCNC: 32.4 G/DL (ref 32–34.5)
MCV RBC AUTO: 113.1 FL (ref 80–99.9)
MONOCYTES NFR BLD: 0.45 K/UL (ref 0.1–0.95)
MONOCYTES NFR BLD: 8 % (ref 2–12)
NEUTROPHILS NFR BLD: 50 % (ref 43–80)
NEUTS SEG NFR BLD: 2.99 K/UL (ref 1.8–7.3)
PLATELET CONFIRMATION: NORMAL
PLATELET, FLUORESCENCE: 46 K/UL (ref 130–450)
PMV BLD AUTO: 10 FL (ref 7–12)
POTASSIUM SERPL-SCNC: 4.3 MMOL/L (ref 3.5–5)
PROT SERPL-MCNC: 4.1 G/DL (ref 6.4–8.3)
RBC # BLD AUTO: 1.91 M/UL (ref 3.5–5.5)
SODIUM SERPL-SCNC: 143 MMOL/L (ref 132–146)
WBC OTHER # BLD: 6 K/UL (ref 4.5–11.5)

## 2025-03-27 PROCEDURE — P9016 RBC LEUKOCYTES REDUCED: HCPCS

## 2025-03-27 PROCEDURE — 6360000002 HC RX W HCPCS: Performed by: INTERNAL MEDICINE

## 2025-03-27 PROCEDURE — 36415 COLL VENOUS BLD VENIPUNCTURE: CPT

## 2025-03-27 PROCEDURE — 2700000000 HC OXYGEN THERAPY PER DAY

## 2025-03-27 PROCEDURE — 85025 COMPLETE CBC W/AUTO DIFF WBC: CPT

## 2025-03-27 PROCEDURE — 2500000003 HC RX 250 WO HCPCS: Performed by: INTERNAL MEDICINE

## 2025-03-27 PROCEDURE — 99233 SBSQ HOSP IP/OBS HIGH 50: CPT | Performed by: INTERNAL MEDICINE

## 2025-03-27 PROCEDURE — 99232 SBSQ HOSP IP/OBS MODERATE 35: CPT | Performed by: INTERNAL MEDICINE

## 2025-03-27 PROCEDURE — 97535 SELF CARE MNGMENT TRAINING: CPT

## 2025-03-27 PROCEDURE — 6370000000 HC RX 637 (ALT 250 FOR IP): Performed by: NURSE PRACTITIONER

## 2025-03-27 PROCEDURE — 97161 PT EVAL LOW COMPLEX 20 MIN: CPT

## 2025-03-27 PROCEDURE — 80053 COMPREHEN METABOLIC PANEL: CPT

## 2025-03-27 PROCEDURE — 85018 HEMOGLOBIN: CPT

## 2025-03-27 PROCEDURE — 85014 HEMATOCRIT: CPT

## 2025-03-27 PROCEDURE — 36430 TRANSFUSION BLD/BLD COMPNT: CPT

## 2025-03-27 PROCEDURE — 97166 OT EVAL MOD COMPLEX 45 MIN: CPT

## 2025-03-27 PROCEDURE — 51798 US URINE CAPACITY MEASURE: CPT

## 2025-03-27 PROCEDURE — 83735 ASSAY OF MAGNESIUM: CPT

## 2025-03-27 PROCEDURE — 6370000000 HC RX 637 (ALT 250 FOR IP)

## 2025-03-27 PROCEDURE — 2060000000 HC ICU INTERMEDIATE R&B

## 2025-03-27 RX ORDER — SODIUM CHLORIDE 9 MG/ML
INJECTION, SOLUTION INTRAVENOUS PRN
Status: DISCONTINUED | OUTPATIENT
Start: 2025-03-27 | End: 2025-03-28 | Stop reason: HOSPADM

## 2025-03-27 RX ORDER — FUROSEMIDE 20 MG/1
20 TABLET ORAL DAILY
Status: DISCONTINUED | OUTPATIENT
Start: 2025-03-28 | End: 2025-03-28 | Stop reason: HOSPADM

## 2025-03-27 RX ORDER — FUROSEMIDE 10 MG/ML
20 INJECTION INTRAMUSCULAR; INTRAVENOUS ONCE
Status: COMPLETED | OUTPATIENT
Start: 2025-03-27 | End: 2025-03-27

## 2025-03-27 RX ADMIN — Medication 1000 MG: at 20:01

## 2025-03-27 RX ADMIN — SPIRONOLACTONE 25 MG: 25 TABLET ORAL at 09:28

## 2025-03-27 RX ADMIN — GUAIFENESIN 400 MG: 400 TABLET ORAL at 09:28

## 2025-03-27 RX ADMIN — SODIUM CHLORIDE, PRESERVATIVE FREE 10 ML: 5 INJECTION INTRAVENOUS at 20:07

## 2025-03-27 RX ADMIN — METOPROLOL SUCCINATE 25 MG: 25 TABLET, EXTENDED RELEASE ORAL at 09:28

## 2025-03-27 RX ADMIN — LISINOPRIL 2.5 MG: 2.5 TABLET ORAL at 09:28

## 2025-03-27 RX ADMIN — GUAIFENESIN 400 MG: 400 TABLET ORAL at 20:02

## 2025-03-27 RX ADMIN — QUETIAPINE FUMARATE 400 MG: 200 TABLET ORAL at 20:02

## 2025-03-27 RX ADMIN — EZETIMIBE 10 MG: 10 TABLET ORAL at 09:27

## 2025-03-27 RX ADMIN — GUAIFENESIN 400 MG: 400 TABLET ORAL at 15:22

## 2025-03-27 RX ADMIN — DIVALPROEX SODIUM 500 MG: 500 TABLET, DELAYED RELEASE ORAL at 20:02

## 2025-03-27 RX ADMIN — FUROSEMIDE 20 MG: 10 INJECTION, SOLUTION INTRAMUSCULAR; INTRAVENOUS at 15:22

## 2025-03-27 RX ADMIN — ATORVASTATIN CALCIUM 10 MG: 10 TABLET, FILM COATED ORAL at 20:02

## 2025-03-27 RX ADMIN — ESCITALOPRAM OXALATE 10 MG: 10 TABLET ORAL at 20:02

## 2025-03-27 ASSESSMENT — PAIN SCALES - GENERAL
PAINLEVEL_OUTOF10: 0

## 2025-03-27 NOTE — PLAN OF CARE
Problem: Safety - Adult  Goal: Free from fall injury  Outcome: Progressing     Problem: Discharge Planning  Goal: Discharge to home or other facility with appropriate resources  Outcome: Progressing  Flowsheets (Taken 3/26/2025 2000)  Discharge to home or other facility with appropriate resources: Identify barriers to discharge with patient and caregiver     Problem: Skin/Tissue Integrity  Goal: Skin integrity remains intact  Description: 1.  Monitor for areas of redness and/or skin breakdown  2.  Assess vascular access sites hourly  3.  Every 4-6 hours minimum:  Change oxygen saturation probe site  4.  Every 4-6 hours:  If on nasal continuous positive airway pressure, respiratory therapy assess nares and determine need for appliance change or resting period  Outcome: Progressing     Problem: Neurosensory - Adult  Goal: Achieves stable or improved neurological status  Outcome: Progressing  Flowsheets (Taken 3/26/2025 2000)  Achieves stable or improved neurological status: Assess for and report changes in neurological status  Goal: Achieves maximal functionality and self care  Outcome: Progressing  Flowsheets (Taken 3/26/2025 2000)  Achieves maximal functionality and self care: Monitor swallowing and airway patency with patient fatigue and changes in neurological status     Problem: Respiratory - Adult  Goal: Achieves optimal ventilation and oxygenation  Outcome: Progressing  Flowsheets (Taken 3/26/2025 2000)  Achieves optimal ventilation and oxygenation: Assess for changes in respiratory status     Problem: Metabolic/Fluid and Electrolytes - Adult  Goal: Electrolytes maintained within normal limits  Outcome: Progressing  Flowsheets (Taken 3/26/2025 2000)  Electrolytes maintained within normal limits: Monitor labs and assess patient for signs and symptoms of electrolyte imbalances  Goal: Hemodynamic stability and optimal renal function maintained  Outcome: Progressing  Flowsheets (Taken 3/26/2025 2000)  Hemodynamic

## 2025-03-27 NOTE — PROGRESS NOTES
Bladder scanned patient as she has only had 1 urine occurrence over the night at approximately 2045. Scanned showed 220, will not straight cath at this time.

## 2025-03-27 NOTE — PROGRESS NOTES
OCCUPATIONAL THERAPY INITIAL EVALUATION    [facility]  [address] [place], [place]      Date:3/27/2025                                                  Patient Name: Tenisha Bruce  MRN: [id number]  : 1950  Room: [address] [place] [address]    Evaluating OT: WILMA Khan, OTR/L  # [id number]    Referring Provider:  Holli Guzman MD   Specific Provider Orders:  \"OT Eval and Treat\"  3-21-25    Diagnosis: Acute kidney injury [N17.9]  Severe sepsis with septic shock (HCC) [A41.9, R65.21]  Septic shock (HCC) [A41.9, R65.21]  Altered mental status, unspecified altered mental status type [R41.82]  Pneumonia of left lower lobe due to infectious organism [J18.9]  Acute hypoxemic respiratory failure [J96.01]    Pt was admitted from the Group Home in which she lives w/ Altered mental status, Noted w/ Left Facial Droop, hypotension - admitted to ICU    Pertinent Medical History:  Pt has a past medical history of Anxiety, Corneal scarring, Fetal alcohol syndrome, Hypercholesteremia, Hypothyroidism, and Onychomycosis.,  has a past surgical history that includes Hip fracture surgery (Left, 2023).    Surgeries this admission: None     Precautions:  Fall Risk  Cognition - (+) Developmental Delay    Dysphagia - Pureed Solids, Thin liquids    Assessment of current deficits   [x] Functional mobility  [x]ADLs  [x] Strength               [x]Cognition   [x] Functional transfers   [x] IADLs         [x] Safety Awareness   [x]Endurance   [] Fine Coordination              [x] Balance     [] Vision/perception   []Sensation    []Gross Motor Coordination  [] ROM  [] Delirium                  [] Motor Control       OT PLAN OF CARE   OT POC based on physician orders, patient diagnosis and results of clinical assessment    Frequency/Duration 2-5 days/wk for 2 weeks PRN   Specific OT Treatment to include:   * Instruction/training on adapted ADL techniques and AE

## 2025-03-27 NOTE — PLAN OF CARE
Problem: Safety - Adult  Goal: Free from fall injury  3/27/2025 1037 by Loren Choudhary RN  Outcome: Progressing     Problem: Discharge Planning  Goal: Discharge to home or other facility with appropriate resources  3/27/2025 1037 by Loren Choudhary RN  Outcome: Progressing  Flowsheets (Taken 3/27/2025 0741)  Discharge to home or other facility with appropriate resources: Identify barriers to discharge with patient and caregiver     Problem: Skin/Tissue Integrity  Goal: Skin integrity remains intact  Description: 1.  Monitor for areas of redness and/or skin breakdown  2.  Assess vascular access sites hourly  3.  Every 4-6 hours minimum:  Change oxygen saturation probe site  4.  Every 4-6 hours:  If on nasal continuous positive airway pressure, respiratory therapy assess nares and determine need for appliance change or resting period  3/27/2025 1037 by Loren Choudhary RN  Outcome: Progressing     Problem: Neurosensory - Adult  Goal: Achieves stable or improved neurological status  3/27/2025 1037 by Loren Choudhary RN  Outcome: Progressing     Problem: Respiratory - Adult  Goal: Achieves optimal ventilation and oxygenation  3/27/2025 1037 by Loren Choudhary RN  Outcome: Progressing     Problem: Metabolic/Fluid and Electrolytes - Adult  Goal: Electrolytes maintained within normal limits  3/27/2025 1037 by Loren Choudhary RN  Outcome: Progressing     Problem: Genitourinary - Adult  Goal: Absence of urinary retention  3/27/2025 1037 by Loren Choudhary RN  Outcome: Progressing  Flowsheets (Taken 3/27/2025 0741)  Absence of urinary retention: Assess patient’s ability to void and empty bladder     Problem: Pain  Goal: Verbalizes/displays adequate comfort level or baseline comfort level  3/27/2025 1037 by Loren Choudhary RN  Outcome: Progressing     Problem: ABCDS Injury Assessment  Goal: Absence of physical injury  3/27/2025 1037 by Loren Choudhary RN  Outcome: Progressing

## 2025-03-27 NOTE — CARE COORDINATION
Referral to the [facility] pending. Will follow up based on decision. Guardian would prefer [facility] if they cannot accept.     1528 Patient accepted at [facility] Annette, voicemail left for guardian updating on PT eval and acceptance at [facility].     For questions I can be reached at [phone]. NORIS Mcconnell

## 2025-03-27 NOTE — PROGRESS NOTES
Dr. England notified that pt has mostly been refusing to eat and drink since admission. Dr. Pressley and Dr. Santos on floor inquiring about nutrition plan for this pt due to inadequate nutrition intake, whether that is an NG or possible peg in future. Dr. England notified.    Electronically signed by Della Maza RN on 3/27/2025 at 4:33 PM

## 2025-03-27 NOTE — PROGRESS NOTES
[facility]  Department of Pulmonary, Critical Care and Sleep Medicine  [facility]  Department of Internal Medicine  Progress Note    Raul FIELDS        [facility]    SUBJECTIVE:  Patient seen and examined.  She is more alert for me today.  Still not able to answer questions appropriately though.    OBJECTIVE:  Vitals:    03/27/25 0400 03/27/25 0600 03/27/25 0741 03/27/25 1116   BP: 118/73  115/65 (!) 105/47   Pulse: 99  90 75   Resp: 19  18 18   Temp: 98.3 °F (36.8 °C)  97.3 °F (36.3 °C) 97.7 °F (36.5 °C)   TempSrc: Temporal  Axillary Axillary   SpO2:   95% 97%   Weight:  60.4 kg (133 lb 1.6 oz)     Height:         Constitutional: Alert, frail-appearing pale  EENT: EOMI CECILLE. MMM. No icterus. No thrush.     Neck:  Trachea was midline.   Respiratory: Few crackles in bases bilaterally.  Clear to auscultation in upper lobes.  Cardiovascular: Regular, No murmur. No rubs.      Pulses:  Equal bilaterally.    Abdomen: Soft without organomegaly. No rebound, rigidity.  No guarding.  Lymphatic: No lymphadenopathy.  Musculoskeletal: Without weakness or gross deficits  Extremities:  No lower extremity edema. Reflexes appear adequate.   Skin:  Warm and dry.  No skin rashes.   Neurological/Psychiatric: Intermittently following commands.      DATA:    Monitor Strips:  Reviewed & discusses with technical team. No changes noted.    RADIOLOGY:  No new imaging today      CBC with Differential:    Lab Results   Component Value Date/Time    WBC 6.0 03/27/2025 04:10 AM    RBC 1.91 03/27/2025 04:10 AM    HGB 7.0 03/27/2025 04:10 AM    HCT 21.6 03/27/2025 04:10 AM    PLT 34 03/26/2025 06:20 AM    .1 03/27/2025 04:10 AM    MCH 36.6 03/27/2025 04:10 AM    MCHC 32.4 03/27/2025 04:10 AM    RDW 20.4 03/27/2025 04:10

## 2025-03-27 NOTE — PROGRESS NOTES
Inpatient Cardiology Progress note     PATIENT IS BEING FOLLOWED FOR: New onset HF    Tenisha Bruce is a 74 y.o. elderly  female seen in initial consultation by [facility] 3/19/2025 for AMS and L facial droop from NH.  BP 94/30  ST febrile 99.9 O2 saturation 94% on RA.  Patient had significant hypotension while in ED BP 66/31 requiring Levophed gtt. 2.5 liters NSS, IV Solu-Cortef and IV ATB.  Treated for septic shock 2/2 LLL pneumonia and DARIO.  Admitted to ICU     Troponin 117>108>84, Bun/Cr 69/1.8, cortisol 28.2 valproic acid therapeutic, ALT 38 AST 61, K+ 2.8, Hgb 9.1, WBC 7.7, Plt 59.  Respiratory panel including COVID-19 negative, Albumin 3.1, UDS negative     3/20/2025 6 beats NSVT     3/20/2025 TTE Dr Jerez: EF 30-35%.  Normal LV size and wall thickness.  Regional wall motion abnormality.  Normal diastolic.  Normal RV size and function.  Moderate MR. Unable to assess RVSP.  Mildly dilated LA. normal RA.  No AS.  No pericardial effusion. Normal sized aortic root and ascending aorta.     3/22/2025 Levophed weaned off placed on Florinef     3/24/2025 L @ 100/hr     3/26/225 Hgb 5.9>6.3>transfuse 1 unit PRBC>Hgb 8.2    3/26/2025 NT p-BNP 93610    3/27/2025 Hgb 7.0, Plt 46, Bun/Cr 33/1.0, K+ 4.3, Na 143, magnesium 1.7. IVF's STOPPED this am.      SUBJECTIVE: Patient confused.  History cannot be obtained.  OBJECTIVE: Laying flat in bed in no apparent distress.  Wearing oxygen 3 L nasal cannula    ROS:  Cannot be obtained    PHYSICAL EXAM:   /65   Pulse 90   Temp 97.3 °F (36.3 °C) (Axillary)   Resp 18   Ht 1.651 m (5' 5\")   Wt 60.4 kg (133 lb 1.6 oz)   SpO2 95%   BMI 22.15 kg/m²    B/P Range last 24 hours: Systolic (24hrs), Av , Min:115 , Max:124    Diastolic (24hrs), Av, Min:65, Max:79  CONST:  Well developed, well nourished elderly  female who appears of stated age and in no apparent acute distress.    HEENT:   Head- Normocephalic, atraumatic   Eyes-  dextrose         DIAGNOSTIC/ LABORATORY DATA:  Labs:   CBC:   Recent Labs     03/26/25  0620 03/26/25  0751 03/26/25  2224 03/27/25  0410   WBC 5.3  --   --  6.0   HGB 5.9*   < > 8.2* 7.0*   HCT 17.9*   < > 25.7* 21.6*   PLT 34*  --   --   --     < > = values in this interval not displayed.     BMP:   Recent Labs     03/26/25  0620 03/27/25  0410    143   K 4.4 4.3   CO2 22 25   BUN 38* 33*   CREATININE 1.0 1.0   LABGLOM 59* 59*   CALCIUM 8.1* 7.7*     Mag:   Recent Labs     03/26/25  0620 03/27/25  0410   MG 1.8 1.7     TFT:   Lab Results   Component Value Date    TSH 0.30 03/20/2025    T4FREE 0.9 03/20/2025      LIVER PROFILE:  Recent Labs     03/26/25  0620 03/27/25  0410   AST 36* 28   ALT 32 27     Diagnostic:     CXR 3/19/2025:Left lower lobe airspace opacities may relate to an infectious or  inflammatory process to include aspiration.     CT Head/CTA Head/neck 3/19/2025: No acute intracranial abnormality. 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 Prominent calcified atherosclerosis in the bilateral carotid bulbs   resulting in a maximal stenosis of approximately 70% in the proximal right ICA and 90% stenosis at the origin of the left ICA.  Mildly stenotic calcified atherosclerotic plaque at the origin of the left vertebral artery. No large vessel occlusion. Mild stenoses in the cavernous segments of the internal carotid arteries.     CT Chest WO 3/20/2025: Left lower lobe pneumonia with trace effusion      TTE 3/20/2025 Dr Jerez: EF 30-35%.  Normal LV size and wall thickness.  Regional wall motion abnormality.  Normal diastolic.  Normal RV size and function.  Moderate MR. Unable to assess RVSP.  Mildly dilated LA. normal RA.  No AS.  No pericardial effusion. Normal sized aortic root and ascending aorta.      ECG 3/19/2025, reviewed by me:   Sinus rhythm with sinus arrhythmia and frequent PVCs.  Biatrial enlargement.  IVCD.  Small nondiagnostic Q waves in the inferior leads and anterolateral

## 2025-03-27 NOTE — PROGRESS NOTES
Nutrition Assessment     Type and Reason for Visit: LOS (RD ReScreen Negative)    Nutrition Assessment:  Pt re-screened per LOS protocol.  Chart reviewed.  Pt currently eating ~75% of most meals and w/ no other significant nutritional issues noted at this time.  Will follow-up per policy.  Please consult if RD needed.    Eric De La Rosa RD, LD  Contact: ext [phone]

## 2025-03-27 NOTE — ACP (ADVANCE CARE PLANNING)
Advance Care Planning   Healthcare Decision Maker:    Primary Decision Maker: Analia Avila - Legal Guardian, Legal Guardian - [phone]    Click here to complete Healthcare Decision Makers including selection of the Healthcare Decision Maker Relationship (ie \"Primary\").

## 2025-03-27 NOTE — PROGRESS NOTES
Physical Therapy  Physical Therapy Initial Assessment     Name: Tenisha Bruce  : 1950  MRN: [id number]      Date of Service: 3/27/2025    Evaluating PT:  Paige Kaye, PT, DPT, [id number]    Room #:  4502/4502-A  Diagnosis:  Acute kidney injury [N17.9]  Severe sepsis with septic shock (HCC) [A41.9, R65.21]  Septic shock (HCC) [A41.9, R65.21]  Altered mental status, unspecified altered mental status type [R41.82]  Pneumonia of left lower lobe due to infectious organism [J18.9]  Acute hypoxemic respiratory failure [J96.01]  PMHx/PSHx:    Past Medical History:   Diagnosis Date    Anxiety     Corneal scarring     Fetal alcohol syndrome     Hypercholesteremia     Hypothyroidism     Onychomycosis       Past Surgical History:   Procedure Laterality Date    HIP FRACTURE SURGERY Left 2023    OPEN REDUCTION INTERNAL FIXATION LEFT HIP performed by Ramu Hale DO at [facility] OR      Procedure/Surgery:  none this admission   Precautions:  Fall Risk, cognition- h/o developmental delay, incontinence, + Alarms, sitter, agitated   Equipment Needs:  TBD    SUBJECTIVE:    Per chart review, Pt lives in a Group Home.     OBJECTIVE:   Initial Evaluation  Date: 3/27/25 Treatment Short Term/ Long Term   Goals   AM-PAC 6 Clicks      Was pt agreeable to Eval/treatment? yes     Does pt have pain? No c/o pain      Bed Mobility  Rolling: NT  Supine to sit: MaxAx2  Sit to supine: MaxAx2  Scooting: MaxA  Rolling: Sup   Supine to sit: Sup   Sit to supine: Sup   Scooting: Sup    Transfers Sit to stand: MaxAx2  Stand to sit: MaxAx2  Stand pivot: NT  Sit to stand: SBA   Stand to sit: SBA   Stand pivot: SBA with AAD    Ambulation    Side stepped toward HOB with HHA MaxAx2  150+ feet with AAD SBA   Stair negotiation: ascended and descended NT  TBD   ROM BUE:  Refer to OT socorro  BLE:  WFL     Strength BUE:  Refer to OT note  BLE:  WFL     Balance Sitting EOB:  ModA progressed to Dinah/SBA  Dynamic Standing:  MaxAx2 HHA  Sitting EOB:

## 2025-03-27 NOTE — PROGRESS NOTES
[facility] Hospitalist Progress Note    Admitting Date and Time: 3/19/2025  9:56 PM  Admit Dx: Acute kidney injury [N17.9]  Severe sepsis with septic shock (HCC) [A41.9, R65.21]  Septic shock (HCC) [A41.9, R65.21]  Altered mental status, unspecified altered mental status type [R41.82]  Pneumonia of left lower lobe due to infectious organism [J18.9]  Acute hypoxemic respiratory failure [J96.01]    Subjective:  Patient is being followed for Acute kidney injury [N17.9]  Severe sepsis with septic shock (HCC) [A41.9, R65.21]  Septic shock (HCC) [A41.9, R65.21]  Altered mental status, unspecified altered mental status type [R41.82]  Pneumonia of left lower lobe due to infectious organism [J18.9]  Acute hypoxemic respiratory failure [J96.01]   Pt feels good, not in acute distress    ROS: denies fever, chills, cp, sob, n/v, HA unless stated above.      furosemide  20 mg IntraVENous Once    [START ON 3/28/2025] furosemide  20 mg Oral Daily    metoprolol succinate  25 mg Oral Daily    lisinopril  2.5 mg Oral Daily    spironolactone  25 mg Oral Daily    sodium chloride flush  5-40 mL IntraVENous 2 times per day    pantoprazole  40 mg Oral QAM AC    atorvastatin  10 mg Oral Nightly    divalproex  500 mg Oral Nightly    escitalopram  10 mg Oral Nightly    ezetimibe  10 mg Oral QAM    levothyroxine  100 mcg Oral QAM AC    niacin  1,000 mg Oral Nightly    QUEtiapine  400 mg Oral Nightly    sodium chloride flush  5-40 mL IntraVENous 2 times per day    [Held by provider] heparin (porcine)  5,000 Units SubCUTAneous 3 times per day    guaiFENesin  400 mg Oral TID     sodium chloride, , PRN  sodium chloride flush, 5-40 mL, PRN  sodium chloride, , PRN  ipratropium 0.5 mg-albuterol 2.5 mg, 1 Dose, Q4H PRN  acetaminophen, 650 mg, Q4H PRN  sodium chloride flush, 5-40 mL, PRN  sodium chloride, , PRN  ondansetron, 4 mg, Q8H PRN   Or  ondansetron, 4 mg, Q6H PRN  polyethylene glycol, 17 g, Daily PRN  acetaminophen, 650 mg, Q6H   Thrombocytopenia    Stenosis of both internal carotid arteries    Elevated troponin    Pneumonia of left lower lobe due to infectious organism    Mixed hyperlipidemia    Septic shock (HCC)  Resolved Problems:    * No resolved hospital problems. *      Plan:  1.  Acute metabolic encephalopathy due to septic shock: Patient cannot answer most of the question, continue IV antibiotic Zosyn, continue Solu-Cortef.  2.  New onset heart failure: Cardiology consult appreciated, continue beta-blocker and ACE inhibitor.  No cardiac intervention at this time, medical management.  3.  Hypothyroidism: Continue levothyroxine 100 mcg p.o. daily.  4.  Hyperlipidemia: Continue Lipitor 10 mg p.o. daily.  Waiting for the placement.  5. H and H: now 7, ordered  ha nd H, if it goes below 7, transfuse one unit PRBC.    NOTE: This report was transcribed using voice recognition software. Every effort was made to ensure accuracy; however, inadvertent computerized transcription errors may be present.  Electronically signed by FABIAN HARMON MD on 3/27/2025 at 1:47 PM

## 2025-03-28 VITALS
WEIGHT: 134.04 LBS | DIASTOLIC BLOOD PRESSURE: 55 MMHG | HEIGHT: 65 IN | TEMPERATURE: 98.8 F | HEART RATE: 72 BPM | SYSTOLIC BLOOD PRESSURE: 100 MMHG | OXYGEN SATURATION: 98 % | BODY MASS INDEX: 22.33 KG/M2 | RESPIRATION RATE: 20 BRPM

## 2025-03-28 LAB
ABO/RH: NORMAL
ALBUMIN SERPL-MCNC: 2.4 G/DL (ref 3.5–5.2)
ALP SERPL-CCNC: 70 U/L (ref 35–104)
ALT SERPL-CCNC: 26 U/L (ref 0–32)
ANION GAP SERPL CALCULATED.3IONS-SCNC: 14 MMOL/L (ref 7–16)
ANTIBODY SCREEN: NEGATIVE
ARM BAND NUMBER: NORMAL
AST SERPL-CCNC: 27 U/L (ref 0–31)
BASOPHILS # BLD: 0.02 K/UL (ref 0–0.2)
BASOPHILS NFR BLD: 0 % (ref 0–2)
BILIRUB SERPL-MCNC: 0.5 MG/DL (ref 0–1.2)
BLOOD BANK BLOOD PRODUCT EXPIRATION DATE: NORMAL
BLOOD BANK BLOOD PRODUCT EXPIRATION DATE: NORMAL
BLOOD BANK DISPENSE STATUS: NORMAL
BLOOD BANK DISPENSE STATUS: NORMAL
BLOOD BANK ISBT PRODUCT BLOOD TYPE: 5100
BLOOD BANK ISBT PRODUCT BLOOD TYPE: 5100
BLOOD BANK PRODUCT CODE: NORMAL
BLOOD BANK PRODUCT CODE: NORMAL
BLOOD BANK SAMPLE EXPIRATION: NORMAL
BLOOD BANK UNIT TYPE AND RH: NORMAL
BLOOD BANK UNIT TYPE AND RH: NORMAL
BPU ID: NORMAL
BPU ID: NORMAL
BUN SERPL-MCNC: 29 MG/DL (ref 6–23)
CALCIUM SERPL-MCNC: 8 MG/DL (ref 8.6–10.2)
CHLORIDE SERPL-SCNC: 106 MMOL/L (ref 98–107)
CO2 SERPL-SCNC: 24 MMOL/L (ref 22–29)
COMPONENT: NORMAL
COMPONENT: NORMAL
CREAT SERPL-MCNC: 1 MG/DL (ref 0.5–1)
CROSSMATCH RESULT: NORMAL
CROSSMATCH RESULT: NORMAL
EOSINOPHIL # BLD: 0.12 K/UL (ref 0.05–0.5)
EOSINOPHILS RELATIVE PERCENT: 2 % (ref 0–6)
ERYTHROCYTE [DISTWIDTH] IN BLOOD BY AUTOMATED COUNT: 22.2 % (ref 11.5–15)
GFR, ESTIMATED: 58 ML/MIN/1.73M2
GLUCOSE SERPL-MCNC: 99 MG/DL (ref 74–99)
HCT VFR BLD AUTO: 25.1 % (ref 34–48)
HCT VFR BLD AUTO: 25.2 % (ref 34–48)
HGB BLD-MCNC: 8.4 G/DL (ref 11.5–15.5)
HGB BLD-MCNC: 8.5 G/DL (ref 11.5–15.5)
IMM GRANULOCYTES # BLD AUTO: 0.08 K/UL (ref 0–0.58)
IMM GRANULOCYTES NFR BLD: 1 % (ref 0–5)
LYMPHOCYTES NFR BLD: 2.21 K/UL (ref 1.5–4)
LYMPHOCYTES RELATIVE PERCENT: 35 % (ref 20–42)
MAGNESIUM SERPL-MCNC: 1.8 MG/DL (ref 1.6–2.6)
MCH RBC QN AUTO: 37 PG (ref 26–35)
MCHC RBC AUTO-ENTMCNC: 33.9 G/DL (ref 32–34.5)
MCV RBC AUTO: 109.1 FL (ref 80–99.9)
MONOCYTES NFR BLD: 0.53 K/UL (ref 0.1–0.95)
MONOCYTES NFR BLD: 8 % (ref 2–12)
NEUTROPHILS NFR BLD: 54 % (ref 43–80)
NEUTS SEG NFR BLD: 3.43 K/UL (ref 1.8–7.3)
PLATELET # BLD AUTO: 40 K/UL (ref 130–450)
PLATELET CONFIRMATION: NORMAL
PMV BLD AUTO: 10.5 FL (ref 7–12)
POTASSIUM SERPL-SCNC: 4.2 MMOL/L (ref 3.5–5)
PROT SERPL-MCNC: 4.3 G/DL (ref 6.4–8.3)
RBC # BLD AUTO: 2.3 M/UL (ref 3.5–5.5)
RBC # BLD: ABNORMAL 10*6/UL
SODIUM SERPL-SCNC: 144 MMOL/L (ref 132–146)
TRANSFUSION STATUS: NORMAL
TRANSFUSION STATUS: NORMAL
UNIT DIVISION: 0
UNIT DIVISION: 0
UNIT ISSUE DATE/TIME: NORMAL
UNIT ISSUE DATE/TIME: NORMAL
WBC # BLD: ABNORMAL 10*3/UL
WBC OTHER # BLD: 6.4 K/UL (ref 4.5–11.5)

## 2025-03-28 PROCEDURE — 99233 SBSQ HOSP IP/OBS HIGH 50: CPT | Performed by: INTERNAL MEDICINE

## 2025-03-28 PROCEDURE — 6370000000 HC RX 637 (ALT 250 FOR IP): Performed by: INTERNAL MEDICINE

## 2025-03-28 PROCEDURE — 2700000000 HC OXYGEN THERAPY PER DAY

## 2025-03-28 PROCEDURE — 6370000000 HC RX 637 (ALT 250 FOR IP)

## 2025-03-28 PROCEDURE — 36415 COLL VENOUS BLD VENIPUNCTURE: CPT

## 2025-03-28 PROCEDURE — 99239 HOSP IP/OBS DSCHRG MGMT >30: CPT | Performed by: INTERNAL MEDICINE

## 2025-03-28 PROCEDURE — 85025 COMPLETE CBC W/AUTO DIFF WBC: CPT

## 2025-03-28 PROCEDURE — 80053 COMPREHEN METABOLIC PANEL: CPT

## 2025-03-28 PROCEDURE — 6370000000 HC RX 637 (ALT 250 FOR IP): Performed by: NURSE PRACTITIONER

## 2025-03-28 PROCEDURE — 83735 ASSAY OF MAGNESIUM: CPT

## 2025-03-28 PROCEDURE — 2500000003 HC RX 250 WO HCPCS: Performed by: INTERNAL MEDICINE

## 2025-03-28 RX ORDER — FUROSEMIDE 20 MG/1
20 TABLET ORAL DAILY
Qty: 30 TABLET | Refills: 0 | Status: ON HOLD | OUTPATIENT
Start: 2025-03-29

## 2025-03-28 RX ORDER — SPIRONOLACTONE 25 MG/1
25 TABLET ORAL DAILY
Qty: 30 TABLET | Refills: 0 | Status: ON HOLD | OUTPATIENT
Start: 2025-03-29

## 2025-03-28 RX ORDER — LISINOPRIL 2.5 MG/1
2.5 TABLET ORAL DAILY
Qty: 30 TABLET | Refills: 0 | Status: ON HOLD | OUTPATIENT
Start: 2025-03-29

## 2025-03-28 RX ORDER — METOPROLOL SUCCINATE 25 MG/1
25 TABLET, EXTENDED RELEASE ORAL DAILY
Qty: 60 TABLET | Refills: 0 | Status: ON HOLD | OUTPATIENT
Start: 2025-03-29

## 2025-03-28 RX ADMIN — METOPROLOL SUCCINATE 25 MG: 25 TABLET, EXTENDED RELEASE ORAL at 08:34

## 2025-03-28 RX ADMIN — FUROSEMIDE 20 MG: 20 TABLET ORAL at 08:33

## 2025-03-28 RX ADMIN — EZETIMIBE 10 MG: 10 TABLET ORAL at 08:40

## 2025-03-28 RX ADMIN — PANTOPRAZOLE SODIUM 40 MG: 40 TABLET, DELAYED RELEASE ORAL at 05:57

## 2025-03-28 RX ADMIN — GUAIFENESIN 400 MG: 400 TABLET ORAL at 08:34

## 2025-03-28 RX ADMIN — LISINOPRIL 2.5 MG: 2.5 TABLET ORAL at 08:33

## 2025-03-28 RX ADMIN — SPIRONOLACTONE 25 MG: 25 TABLET ORAL at 08:34

## 2025-03-28 RX ADMIN — LEVOTHYROXINE SODIUM 100 MCG: 0.1 TABLET ORAL at 05:57

## 2025-03-28 RX ADMIN — SODIUM CHLORIDE, PRESERVATIVE FREE 10 ML: 5 INJECTION INTRAVENOUS at 08:40

## 2025-03-28 NOTE — PLAN OF CARE
Problem: Safety - Adult  Goal: Free from fall injury  3/27/2025 2324 by Annette Aquino RN  Outcome: Progressing  3/27/2025 1037 by Loren Choudhary RN  Outcome: Progressing     Problem: Discharge Planning  Goal: Discharge to home or other facility with appropriate resources  3/27/2025 2324 by Annette Aquino RN  Outcome: Progressing  3/27/2025 1037 by Loren Choudhary RN  Outcome: Progressing  Flowsheets (Taken 3/27/2025 0741)  Discharge to home or other facility with appropriate resources: Identify barriers to discharge with patient and caregiver     Problem: Skin/Tissue Integrity  Goal: Skin integrity remains intact  Description: 1.  Monitor for areas of redness and/or skin breakdown  2.  Assess vascular access sites hourly  3.  Every 4-6 hours minimum:  Change oxygen saturation probe site  4.  Every 4-6 hours:  If on nasal continuous positive airway pressure, respiratory therapy assess nares and determine need for appliance change or resting period  3/27/2025 2324 by Annette Aquino RN  Outcome: Progressing  3/27/2025 1037 by Loren Choudhary RN  Outcome: Progressing     Problem: Neurosensory - Adult  Goal: Achieves stable or improved neurological status  3/27/2025 2324 by Annette Aquino RN  Outcome: Progressing  Flowsheets (Taken 3/27/2025 2000)  Achieves stable or improved neurological status: Assess for and report changes in neurological status  3/27/2025 1037 by Loren Choudhary RN  Outcome: Progressing  Goal: Achieves maximal functionality and self care  Outcome: Progressing  Flowsheets (Taken 3/27/2025 2000)  Achieves maximal functionality and self care: Monitor swallowing and airway patency with patient fatigue and changes in neurological status     Problem: Respiratory - Adult  Goal: Achieves optimal ventilation and oxygenation  3/27/2025 2324 by Annette Aquino RN  Outcome: Progressing  Flowsheets (Taken 3/27/2025 2000)  Achieves optimal ventilation and oxygenation: Assess for changes in respiratory status  3/27/2025

## 2025-03-28 NOTE — PROGRESS NOTES
[facility]  Department of Pulmonary, Critical Care and Sleep Medicine  [facility]  Department of Internal Medicine  Progress Note    Raul Cotter APROSCAR        [facility]    SUBJECTIVE:  Patient seen and examined.  Patient remains on 2 L nasal cannula, no acute distress.  Patient not interactive during time of evaluation.  Sitter remains at bedside for patient safety.      OBJECTIVE:  Vitals:    03/28/25 0400 03/28/25 0600 03/28/25 0734 03/28/25 0833   BP: 125/77  113/67 113/67   Pulse: 91  (!) 108 (!) 108   Resp: 16  18    Temp: 97.9 °F (36.6 °C)  97.4 °F (36.3 °C)    TempSrc:   Axillary    SpO2: 95%  96%    Weight:  60.8 kg (134 lb 0.6 oz)     Height:         Constitutional: Alert, frail-appearing pale  EENT: EOMI CECILLE. MMM. No icterus. No thrush.     Neck:  Trachea was midline.   Respiratory: Few crackles in bases bilaterally.  Clear to auscultation in upper lobes.  Cardiovascular: Regular, No murmur. No rubs.      Pulses:  Equal bilaterally.    Abdomen: Soft without organomegaly. No rebound, rigidity.  No guarding.  Lymphatic: No lymphadenopathy.  Musculoskeletal: Without weakness or gross deficits  Extremities:  No lower extremity edema. Reflexes appear adequate.   Skin:  Warm and dry.  No skin rashes.   Neurological/Psychiatric: Intermittently following commands.      DATA:    Monitor Strips:  Reviewed & discusses with technical team. No changes noted.    RADIOLOGY:  No new imaging today      CBC with Differential:    Lab Results   Component Value Date/Time    WBC 6.4 03/28/2025 04:26 AM    RBC 2.30 03/28/2025 04:26 AM    HGB 8.5 03/28/2025 04:26 AM    HCT 25.1 03/28/2025 04:26 AM    PLT 40 03/28/2025 04:26 AM    .1 03/28/2025 04:26 AM    MCH 37.0 03/28/2025 04:26 AM    MCHC 33.9  03/28/2025 04:26 AM    RDW 22.2 03/28/2025 04:26 AM    NRBC 2 12/14/2023 04:58 AM    LYMPHOPCT 35 03/28/2025 04:26 AM    MONOPCT 8 03/28/2025 04:26 AM    MYELOPCT 1 03/19/2025 09:59 PM    EOSPCT 2 03/28/2025 04:26 AM    BASOPCT 0 03/28/2025 04:26 AM    MONOSABS 0.53 03/28/2025 04:26 AM    LYMPHSABS 2.21 03/28/2025 04:26 AM    EOSABS 0.12 03/28/2025 04:26 AM    BASOSABS 0.02 03/28/2025 04:26 AM     CMP:    Lab Results   Component Value Date/Time     03/28/2025 04:26 AM    K 4.2 03/28/2025 04:26 AM    K 3.5 02/07/2020 05:26 AM     03/28/2025 04:26 AM    CO2 24 03/28/2025 04:26 AM    BUN 29 03/28/2025 04:26 AM    CREATININE 1.0 03/28/2025 04:26 AM    GFRAA >60 02/08/2020 07:51 AM    LABGLOM 58 03/28/2025 04:26 AM    LABGLOM >60 12/15/2023 06:11 AM    GLUCOSE 99 03/28/2025 04:26 AM    GLUCOSE 164 08/02/2011 04:00 PM    CALCIUM 8.0 03/28/2025 04:26 AM    BILITOT 0.5 03/28/2025 04:26 AM    ALKPHOS 70 03/28/2025 04:26 AM    AST 27 03/28/2025 04:26 AM    ALT 26 03/28/2025 04:26 AM       Assessment:  Acute encephalopathy   Fetal alcohol syndrome, development delay   Holosystolic murmur on exam 2/2 moderate MR  Non-sustained vtach, asymptomatic, 6 beats   Newly diagnosed HFrEF, EF 30-35%, ECHO 3/20/2025  Hypothyroidism, controlled   Anemia  Chronic thrombocytopenia   Anxiety   Hx of left femoral neck fracture s/p ORIF 8/2/23  Hx of mild-moderate oropharyngeal dysphagia     Plan:  Steroids discontinued  Goal-directed medical therapy as per cardiology  Pulm hygiene, wean O2 none at baseline, infectious workup so far unrevealing, waiting on respiratory culture, completed zosyn x 6 days  Monitor renal function, urine output.   Monitor H&H, transfuse per primary  IV fluids discontinued due to patient's elevated proBNP.  Recommend goals of care discussion.  Given patient's degree of delirium she will likely need either an NG tube or PEG tube.  Will defer to primary.    Above assessment and plan were made in  collaboration with Dr. Pressley.  Further recommendations to follow.       DIAMOND Beckwith - NP       I saw and evaluated the patient and performed the MDM as documented in my note. Radiographs, labs and medication list were reviewed by me independently. I spoke with bedside nursing, therapists and consultants. The case was discussed in detail and plans for care were reviewed with Izabela FIELDS. I provided the substantive portion of this visit by personally performing the history/MDM component in its entirety.Review of CNP documentation was conducted and revisions were made as appropriate.      Patient seen and examined.  Overall no significant change from yesterday.    Fetal alcohol syndrome/developmental delay  Newly diagnosed heart failure with reduced ejection fraction  Hypothyroidism  Hypoxemia    Continue goal-directed medical therapy as per cardiology  Pulmonary hygiene  Wean FiO2 as tolerated  Will likely need NG tube or PEG tube placed.  Will defer to primary    Bhargavi Pressley,   [unfilled]  3:15 PM

## 2025-03-28 NOTE — PLAN OF CARE
Problem: Safety - Adult  Goal: Free from fall injury  3/28/2025 0918 by Xenia Lao RN  Outcome: Progressing  3/27/2025 2324 by Annette Aquino RN  Outcome: Progressing     Problem: Discharge Planning  Goal: Discharge to home or other facility with appropriate resources  3/27/2025 2324 by Annette Aquino RN  Outcome: Progressing     Problem: Skin/Tissue Integrity  Goal: Skin integrity remains intact  Description: 1.  Monitor for areas of redness and/or skin breakdown  2.  Assess vascular access sites hourly  3.  Every 4-6 hours minimum:  Change oxygen saturation probe site  4.  Every 4-6 hours:  If on nasal continuous positive airway pressure, respiratory therapy assess nares and determine need for appliance change or resting period  3/27/2025 2324 by Annette Aquino RN  Outcome: Progressing     Problem: Neurosensory - Adult  Goal: Achieves stable or improved neurological status  3/28/2025 0918 by Xenia Lao RN  Outcome: Progressing  3/27/2025 2324 by Annette Aquino RN  Outcome: Progressing  Flowsheets (Taken 3/27/2025 2000)  Achieves stable or improved neurological status: Assess for and report changes in neurological status  Goal: Achieves maximal functionality and self care  3/28/2025 0918 by Xenia Lao RN  Outcome: Progressing  3/27/2025 2324 by Annette Aquino RN  Outcome: Progressing  Flowsheets (Taken 3/27/2025 2000)  Achieves maximal functionality and self care: Monitor swallowing and airway patency with patient fatigue and changes in neurological status     Problem: Respiratory - Adult  Goal: Achieves optimal ventilation and oxygenation  3/27/2025 2324 by Annette Aquino RN  Outcome: Progressing  Flowsheets (Taken 3/27/2025 2000)  Achieves optimal ventilation and oxygenation: Assess for changes in respiratory status     Problem: Metabolic/Fluid and Electrolytes - Adult  Goal: Electrolytes maintained within normal limits  3/27/2025 2324 by Annette Aquino RN  Outcome: Progressing  Goal: Hemodynamic

## 2025-03-28 NOTE — DISCHARGE SUMMARY
[facility] Hospitalist Physician Discharge Summary       [facility] [facility]  [address] [place] [address]  [phone]          Activity level:  As tolerated    Dispo: Home      Condition on discharge:  Stable normal report as it is    Patient ID:  Tenisha Bruce  [id number]  74 y.o.  1950    Admit date: 3/19/2025    Discharge date and time:  3/28/2025  11:18 AM    Admission Diagnoses: Principal Problem:    Sepsis with acute renal failure and septic shock (HCC)  Active Problems:    Acute kidney injury    Hypothyroid    Acute metabolic encephalopathy    Hypokalemia    Macrocytic anemia    Thrombocytopenia    Stenosis of both internal carotid arteries    Elevated troponin    Pneumonia of left lower lobe due to infectious organism    Mixed hyperlipidemia    Septic shock (HCC)  Resolved Problems:    * No resolved hospital problems. *      Discharge Diagnoses: Principal Problem:    Sepsis with acute renal failure and septic shock (HCC)  Active Problems:    Acute kidney injury    Hypothyroid    Acute metabolic encephalopathy    Hypokalemia    Macrocytic anemia    Thrombocytopenia    Stenosis of both internal carotid arteries    Elevated troponin    Pneumonia of left lower lobe due to infectious organism    Mixed hyperlipidemia    Septic shock (HCC)  Resolved Problems:    * No resolved hospital problems. *      Consults:  IP CONSULT TO CRITICAL CARE  IP CONSULT TO INTERNAL MEDICINE  PHARMACY TO DOSE VANCOMYCIN  IP CONSULT TO CARDIOLOGY    Procedures:  none    Hospital Course:   Patient Tenisha Bruce is a 74 y.o. presented with Acute kidney injury [N17.9]  Severe sepsis with septic shock (HCC) [A41.9, R65.21]  Septic shock (HCC) [A41.9, R65.21]  Altered mental status, unspecified altered mental status type [R41.82]  Pneumonia of left lower lobe due to infectious organism [J18.9]  Acute hypoxemic respiratory failure [J96.01]  Ms. Tenisha Bruce, a 74 y.o. year old female  with PMH of HLD,  unremarkable.  No acute abnormality of the salivary and thyroid glands. BONES: No acute osseous abnormality. CTA HEAD: ANTERIOR CIRCULATION: Moderate calcified atherosclerotic plaque in the cavernous segments of the internal carotid arteries result in mild stenoses. No significant stenosis identified in the bilateral anterior or middle cerebral arteries. POSTERIOR CIRCULATION: No significant stenosis of the basilar or posterior cerebral arteries. No aneurysm. OTHER: No dural venous sinus thrombosis on this non-dedicated study.     CT HEAD: 1. No acute intracranial abnormality. 2. 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 CTA NECK: 1. Prominent calcified atherosclerosis in the bilateral carotid bulbs resulting in a maximal stenosis of approximately 70% in the proximal right ICA and 90% stenosis at the origin of the left ICA. 2. Mildly stenotic calcified atherosclerotic plaque at the origin of the left vertebral artery. CTA HEAD: 1. No large vessel occlusion. 2. Mild stenoses in the cavernous segments of the internal carotid arteries.       Patient Instructions:      Medication List        START taking these medications      furosemide 20 MG tablet  Commonly known as: LASIX  Take 1 tablet by mouth daily  Start taking on: March 29, 2025     lisinopril 2.5 MG tablet  Commonly known as: PRINIVIL;ZESTRIL  Take 1 tablet by mouth daily  Start taking on: March 29, 2025     metoprolol succinate 25 MG extended release tablet  Commonly known as: TOPROL XL  Take 1 tablet by mouth daily  Start taking on: March 29, 2025     spironolactone 25 MG tablet  Commonly known as: ALDACTONE  Take 1 tablet by mouth daily  Start taking on: March 29, 2025            CONTINUE taking these medications      acetaminophen 325 MG tablet  Commonly known as: TYLENOL     atorvastatin 10 MG tablet  Commonly known as: LIPITOR     azelastine 0.1 % nasal spray  Commonly known as: ASTELIN     b complex vitamins capsule     chlorpheniramine 4 MG  tablet  Commonly known as: CHLOR-TRIMETON     divalproex 500 MG DR tablet  Commonly known as: DEPAKOTE     docusate sodium 100 MG capsule  Commonly known as: COLACE     escitalopram 10 MG tablet  Commonly known as: LEXAPRO     ezetimibe 10 MG tablet  Commonly known as: ZETIA     levothyroxine 100 MCG tablet  Commonly known as: SYNTHROID     Niacin ER 1000 MG Tbcr     pantoprazole 40 MG tablet  Commonly known as: PROTONIX     QUEtiapine 400 MG tablet  Commonly known as: SEROQUEL            STOP taking these medications      erythromycin 5 MG/GM ophthalmic ointment  Commonly known as: ROMYCIN     omeprazole 20 MG delayed release capsule  Commonly known as: PRILOSEC     Lissette-Kia Rx 1 MG Tabs     therapeutic multivitamin-minerals tablet               Where to Get Your Medications        These medications were sent to [facility] - [address] - P [phone] - F [phone]  [address] [place], [place] [address] [address]      Phone: [phone]   furosemide 20 MG tablet  lisinopril 2.5 MG tablet  metoprolol succinate 25 MG extended release tablet  spironolactone 25 MG tablet           Note that more than 30 minutes was spent in preparing discharge papers, discussing discharge with patient, medication review, etc.    Signed:  Electronically signed by FABIAN HARMON MD on 3/28/2025 at 11:18 AM

## 2025-03-28 NOTE — CARE COORDINATION
Bed at [facility] when stable for discharge, will not require a precert. PRBC yesterday evening. Ambulance and 7000 on soft chart.      1235 Discharge to [facility] arranged for 1230 with physicians. Message left with guardian regarding discharge time to [facility]. Facility notified of discharge time.  notified of discharge to [facility] at 1230, can follow there.     For questions I can be reached at [phone]. NORIS Mcconnell

## 2025-03-28 NOTE — DISCHARGE INSTR - COC
Continuity of Care Form    Patient Name: Tenisha Bruce   :  1950  MRN:  [id number]    Admit date:  3/19/2025  Discharge date:  ***    Code Status Order: Full Code   Advance Directives:     Admitting Physician:  Josef Alvarez MD  PCP: Julio Porter DO    Discharging Nurse: ***  Discharging Hospital Unit/Room#: 4502/4502-A  Discharging Unit Phone Number: ***    Emergency Contact:   Extended Emergency Contact Information  Primary Emergency Contact: Analia Avila  Address: [address]  Home Phone: [phone]  Work Phone: [phone]  Relation: Legal Guardian  Secondary Emergency Contact: LUCI ORTIZ  Address: [employer]           [address] [place] [address],  [address]           [place] [address] [place]  Work Phone: [phone]  Relation: Other  Preferred language: English   needed? No    Past Surgical History:  Past Surgical History:   Procedure Laterality Date    HIP FRACTURE SURGERY Left 2023    OPEN REDUCTION INTERNAL FIXATION LEFT HIP performed by Ramu Hale DO at [facility] OR       Immunization History:     There is no immunization history on file for this patient.    Active Problems:  Patient Active Problem List   Diagnosis Code    Acute hypoxemic respiratory failure ([facility]) J96.01    Acute kidney injury N17.9    Pancytopenia ([facility]) D61.818    Influenza J11.1    Nondisplaced fracture of neck of left femur ([facility]) S72.002A    Left hip pain M25.552    Hypothyroid E03.9    Closed fracture of neck of left femur ([facility]) S72.002A    Subarachnoid hemorrhage ([facility]) I60.9    Sepsis with acute renal failure and septic shock ([facility]) A41.9, R65.21, N17.9    Acute metabolic encephalopathy G93.41    Hypokalemia E87.6    Macrocytic anemia D53.9    Thrombocytopenia D69.6    Stenosis of both internal carotid arteries I65.23    Elevated troponin R79.89    Pneumonia of left lower lobe due to  cannula.  Ventilator:    - No ventilator support    Rehab Therapies: Physical Therapy and Occupational Therapy  Weight Bearing Status/Restrictions: No weight bearing restrictions  Other Medical Equipment (for information only, NOT a DME order):  ***  Other Treatments: ***    Patient's personal belongings (please select all that are sent with patient):raad  jacket pants     RN SIGNATURE:  Electronically signed by Xenia Lao RN on 3/28/25 at 1:08 PM EDT    CASE MANAGEMENT/SOCIAL WORK SECTION    Inpatient Status Date: ***    Readmission Risk Assessment Score:  [facility] RISK OF UNPLANNED READMISSION 2.0             19.7 Total Score        Discharging to Facility/ Agency   Name:   Address:  Phone:  Fax:    Dialysis Facility (if applicable)   Name:  Address:  Dialysis Schedule:  Phone:  Fax:    / signature: {Esignature:[id number]}    PHYSICIAN SECTION    Prognosis: {Prognosis:9143443801}    Condition at Discharge: { Patient Condition:876403662}    Rehab Potential (if transferring to Rehab): {Prognosis:9440582938}    Recommended Labs or Other Treatments After Discharge: ***    Physician Certification: I certify the above information and transfer of Tenisha Bruce  is necessary for the continuing treatment of the diagnosis listed and that she requires {Admit to Appropriate Level of Care:49754} for {GREATER/LESS:898876175} 30 days.     Update Admission H&P: {CHP DME Changes in HandP:312470136}    PHYSICIAN SIGNATURE:  {Esignature:016612308}

## 2025-03-29 ENCOUNTER — APPOINTMENT (OUTPATIENT)
Dept: GENERAL RADIOLOGY | Age: 75
DRG: 189 | End: 2025-03-29
Payer: MEDICARE

## 2025-03-29 ENCOUNTER — APPOINTMENT (OUTPATIENT)
Dept: CT IMAGING | Age: 75
DRG: 189 | End: 2025-03-29
Payer: MEDICARE

## 2025-03-29 ENCOUNTER — HOSPITAL ENCOUNTER (INPATIENT)
Age: 75
LOS: 17 days | Discharge: HOSPICE | DRG: 189 | End: 2025-04-16
Attending: STUDENT IN AN ORGANIZED HEALTH CARE EDUCATION/TRAINING PROGRAM
Payer: MEDICARE

## 2025-03-29 DIAGNOSIS — J96.01 ACUTE RESPIRATORY FAILURE WITH HYPOXIA AND HYPERCAPNIA: Primary | ICD-10-CM

## 2025-03-29 DIAGNOSIS — K56.7 ILEUS (HCC): ICD-10-CM

## 2025-03-29 DIAGNOSIS — J90 PLEURAL EFFUSION: ICD-10-CM

## 2025-03-29 DIAGNOSIS — D69.6 THROMBOCYTOPENIA: ICD-10-CM

## 2025-03-29 DIAGNOSIS — I50.9 ACUTE ON CHRONIC CONGESTIVE HEART FAILURE, UNSPECIFIED HEART FAILURE TYPE (HCC): ICD-10-CM

## 2025-03-29 DIAGNOSIS — J96.02 ACUTE RESPIRATORY FAILURE WITH HYPOXIA AND HYPERCAPNIA: Primary | ICD-10-CM

## 2025-03-29 LAB
AADO2: 264.3 MMHG
ALBUMIN SERPL-MCNC: 3.1 G/DL (ref 3.5–5.2)
ALP SERPL-CCNC: 89 U/L (ref 35–104)
ALT SERPL-CCNC: 32 U/L (ref 0–32)
ANION GAP SERPL CALCULATED.3IONS-SCNC: 13 MMOL/L (ref 7–16)
AST SERPL-CCNC: 37 U/L (ref 0–31)
B PARAP IS1001 DNA NPH QL NAA+NON-PROBE: NOT DETECTED
B PERT DNA SPEC QL NAA+PROBE: NOT DETECTED
B.E.: 3.5 MMOL/L (ref -3–3)
B.E.: 4.1 MMOL/L (ref -3–3)
BACTERIA URNS QL MICRO: ABNORMAL
BASOPHILS # BLD: 0.07 K/UL (ref 0–0.2)
BASOPHILS NFR BLD: 1 % (ref 0–2)
BILIRUB SERPL-MCNC: 0.7 MG/DL (ref 0–1.2)
BILIRUB UR QL STRIP: NEGATIVE
BNP SERPL-MCNC: ABNORMAL PG/ML (ref 0–125)
BUN SERPL-MCNC: 32 MG/DL (ref 6–23)
C PNEUM DNA NPH QL NAA+NON-PROBE: NOT DETECTED
CALCIUM SERPL-MCNC: 8.7 MG/DL (ref 8.6–10.2)
CASTS #/AREA URNS LPF: ABNORMAL /LPF
CHLORIDE SERPL-SCNC: 103 MMOL/L (ref 98–107)
CLARITY UR: CLEAR
CO2 SERPL-SCNC: 29 MMOL/L (ref 22–29)
COHB: 0.2 % (ref 0–1.5)
COHB: 0.3 % (ref 0–1.5)
COLOR UR: YELLOW
CREAT SERPL-MCNC: 1 MG/DL (ref 0.5–1)
CRITICAL: ABNORMAL
CRITICAL: ABNORMAL
DATE ANALYZED: ABNORMAL
DATE ANALYZED: ABNORMAL
DATE OF COLLECTION: ABNORMAL
DATE OF COLLECTION: ABNORMAL
EOSINOPHIL # BLD: 0 K/UL (ref 0.05–0.5)
EOSINOPHILS RELATIVE PERCENT: 0 % (ref 0–6)
ERYTHROCYTE [DISTWIDTH] IN BLOOD BY AUTOMATED COUNT: 20.5 % (ref 11.5–15)
ERYTHROCYTE [DISTWIDTH] IN BLOOD BY AUTOMATED COUNT: 20.8 % (ref 11.5–15)
FERRITIN SERPL-MCNC: 583 NG/ML
FIO2: 90 %
FLUAV RNA NPH QL NAA+NON-PROBE: NOT DETECTED
FLUBV RNA NPH QL NAA+NON-PROBE: NOT DETECTED
GFR, ESTIMATED: 60 ML/MIN/1.73M2
GLUCOSE BLD-MCNC: 117 MG/DL (ref 74–99)
GLUCOSE SERPL-MCNC: 116 MG/DL (ref 74–99)
GLUCOSE UR STRIP-MCNC: NEGATIVE MG/DL
HADV DNA NPH QL NAA+NON-PROBE: NOT DETECTED
HCO3: 31.3 MMOL/L (ref 22–26)
HCO3: 31.7 MMOL/L (ref 22–26)
HCOV 229E RNA NPH QL NAA+NON-PROBE: NOT DETECTED
HCOV HKU1 RNA NPH QL NAA+NON-PROBE: NOT DETECTED
HCOV NL63 RNA NPH QL NAA+NON-PROBE: NOT DETECTED
HCOV OC43 RNA NPH QL NAA+NON-PROBE: NOT DETECTED
HCT VFR BLD AUTO: 29 % (ref 34–48)
HCT VFR BLD AUTO: 29.2 % (ref 34–48)
HGB BLD-MCNC: 9.1 G/DL (ref 11.5–15.5)
HGB BLD-MCNC: 9.4 G/DL (ref 11.5–15.5)
HGB UR QL STRIP.AUTO: NEGATIVE
HHB: 0.6 % (ref 0–5)
HHB: 1.2 % (ref 0–5)
HMPV RNA NPH QL NAA+NON-PROBE: NOT DETECTED
HPIV1 RNA NPH QL NAA+NON-PROBE: NOT DETECTED
HPIV2 RNA NPH QL NAA+NON-PROBE: NOT DETECTED
HPIV3 RNA NPH QL NAA+NON-PROBE: NOT DETECTED
HPIV4 RNA NPH QL NAA+NON-PROBE: NOT DETECTED
IRON SATN MFR SERPL: 26 % (ref 15–50)
IRON SERPL-MCNC: 49 UG/DL (ref 37–145)
KETONES UR STRIP-MCNC: ABNORMAL MG/DL
LAB: ABNORMAL
LAB: ABNORMAL
LACTATE BLDV-SCNC: 1.1 MMOL/L (ref 0.5–1.9)
LEUKOCYTE ESTERASE UR QL STRIP: NEGATIVE
LYMPHOCYTES NFR BLD: 1.24 K/UL (ref 1.5–4)
LYMPHOCYTES RELATIVE PERCENT: 16 % (ref 20–42)
Lab: 1732
Lab: 1955
M PNEUMO DNA NPH QL NAA+NON-PROBE: NOT DETECTED
MAGNESIUM SERPL-MCNC: 1.8 MG/DL (ref 1.6–2.6)
MCH RBC QN AUTO: 34.9 PG (ref 26–35)
MCH RBC QN AUTO: 36.4 PG (ref 26–35)
MCHC RBC AUTO-ENTMCNC: 31.2 G/DL (ref 32–34.5)
MCHC RBC AUTO-ENTMCNC: 32.4 G/DL (ref 32–34.5)
MCV RBC AUTO: 111.9 FL (ref 80–99.9)
MCV RBC AUTO: 112.4 FL (ref 80–99.9)
METHB: 0.2 % (ref 0–1.5)
METHB: 0.5 % (ref 0–1.5)
MODE: ABNORMAL
MODE: ABNORMAL
MONOCYTES NFR BLD: 0.52 K/UL (ref 0.1–0.95)
MONOCYTES NFR BLD: 7 % (ref 2–12)
NEUTROPHILS NFR BLD: 76 % (ref 43–80)
NEUTS SEG NFR BLD: 5.77 K/UL (ref 1.8–7.3)
NITRITE UR QL STRIP: NEGATIVE
O2 SATURATION: 98.8 % (ref 92–98.5)
O2 SATURATION: 99.4 % (ref 92–98.5)
O2HB: 98.4 % (ref 94–97)
O2HB: 98.6 % (ref 94–97)
OPERATOR ID: ABNORMAL
OPERATOR ID: ABNORMAL
PARTIAL THROMBOPLASTIN TIME: 28.2 SEC (ref 24.5–35.1)
PATIENT TEMP: 37 C
PATIENT TEMP: 37 C
PCO2: 65.1 MMHG (ref 35–45)
PCO2: 66.4 MMHG (ref 35–45)
PEEP/CPAP: 5 CMH2O
PFO2: 3.19 MMHG/%
PH BLOOD GAS: 7.29 (ref 7.35–7.45)
PH BLOOD GAS: 7.3 (ref 7.35–7.45)
PH UR STRIP: 6 [PH] (ref 5–8)
PLATELET # BLD AUTO: 47 K/UL (ref 130–450)
PLATELET # BLD AUTO: 55 K/UL (ref 130–450)
PLATELET CONFIRMATION: NORMAL
PLATELET CONFIRMATION: NORMAL
PMV BLD AUTO: 10.5 FL (ref 7–12)
PMV BLD AUTO: 10.7 FL (ref 7–12)
PO2: 160.8 MMHG (ref 75–100)
PO2: 286.8 MMHG (ref 75–100)
POTASSIUM SERPL-SCNC: 4.9 MMOL/L (ref 3.5–5)
PROCALCITONIN SERPL-MCNC: 0.5 NG/ML (ref 0–0.08)
PROT SERPL-MCNC: 5.5 G/DL (ref 6.4–8.3)
PROT UR STRIP-MCNC: ABNORMAL MG/DL
PS: 10 CMH20
RBC # BLD AUTO: 2.58 M/UL (ref 3.5–5.5)
RBC # BLD AUTO: 2.61 M/UL (ref 3.5–5.5)
RBC # BLD: ABNORMAL 10*6/UL
RBC #/AREA URNS HPF: ABNORMAL /HPF
RI(T): 0.92
RSV RNA NPH QL NAA+NON-PROBE: NOT DETECTED
RV+EV RNA NPH QL NAA+NON-PROBE: NOT DETECTED
SARS-COV-2 RNA NPH QL NAA+NON-PROBE: NOT DETECTED
SODIUM SERPL-SCNC: 145 MMOL/L (ref 132–146)
SOURCE, BLOOD GAS: ABNORMAL
SOURCE, BLOOD GAS: ABNORMAL
SP GR UR STRIP: 1.02 (ref 1–1.03)
SPECIMEN DESCRIPTION: NORMAL
T4 FREE SERPL-MCNC: 0.4 NG/DL (ref 0.9–1.7)
THB: 10 G/DL (ref 11.5–16.5)
THB: 10 G/DL (ref 11.5–16.5)
TIBC SERPL-MCNC: 189 UG/DL (ref 250–450)
TIME ANALYZED: 1746
TIME ANALYZED: 2004
TROPONIN I SERPL HS-MCNC: 291 NG/L (ref 0–9)
TROPONIN I SERPL HS-MCNC: 362 NG/L (ref 0–9)
TROPONIN I SERPL HS-MCNC: 370 NG/L (ref 0–9)
TSH SERPL DL<=0.05 MIU/L-ACNC: 5.53 UIU/ML (ref 0.27–4.2)
UROBILINOGEN UR STRIP-ACNC: 4 EU/DL (ref 0–1)
WBC #/AREA URNS HPF: ABNORMAL /HPF
WBC OTHER # BLD: 6.5 K/UL (ref 4.5–11.5)
WBC OTHER # BLD: 7.6 K/UL (ref 4.5–11.5)

## 2025-03-29 PROCEDURE — 85027 COMPLETE CBC AUTOMATED: CPT

## 2025-03-29 PROCEDURE — 83735 ASSAY OF MAGNESIUM: CPT

## 2025-03-29 PROCEDURE — 94660 CPAP INITIATION&MGMT: CPT

## 2025-03-29 PROCEDURE — 93005 ELECTROCARDIOGRAM TRACING: CPT

## 2025-03-29 PROCEDURE — 71275 CT ANGIOGRAPHY CHEST: CPT

## 2025-03-29 PROCEDURE — 6360000002 HC RX W HCPCS: Performed by: STUDENT IN AN ORGANIZED HEALTH CARE EDUCATION/TRAINING PROGRAM

## 2025-03-29 PROCEDURE — 74177 CT ABD & PELVIS W/CONTRAST: CPT

## 2025-03-29 PROCEDURE — 71045 X-RAY EXAM CHEST 1 VIEW: CPT

## 2025-03-29 PROCEDURE — 84145 PROCALCITONIN (PCT): CPT

## 2025-03-29 PROCEDURE — 84439 ASSAY OF FREE THYROXINE: CPT

## 2025-03-29 PROCEDURE — 6370000000 HC RX 637 (ALT 250 FOR IP)

## 2025-03-29 PROCEDURE — 0202U NFCT DS 22 TRGT SARS-COV-2: CPT

## 2025-03-29 PROCEDURE — 87086 URINE CULTURE/COLONY COUNT: CPT

## 2025-03-29 PROCEDURE — 80053 COMPREHEN METABOLIC PANEL: CPT

## 2025-03-29 PROCEDURE — 82805 BLOOD GASES W/O2 SATURATION: CPT

## 2025-03-29 PROCEDURE — 96365 THER/PROPH/DIAG IV INF INIT: CPT

## 2025-03-29 PROCEDURE — 2500000003 HC RX 250 WO HCPCS: Performed by: RADIOLOGY

## 2025-03-29 PROCEDURE — 82728 ASSAY OF FERRITIN: CPT

## 2025-03-29 PROCEDURE — 83550 IRON BINDING TEST: CPT

## 2025-03-29 PROCEDURE — 85730 THROMBOPLASTIN TIME PARTIAL: CPT

## 2025-03-29 PROCEDURE — 6360000004 HC RX CONTRAST MEDICATION: Performed by: RADIOLOGY

## 2025-03-29 PROCEDURE — 99223 1ST HOSP IP/OBS HIGH 75: CPT

## 2025-03-29 PROCEDURE — 6360000002 HC RX W HCPCS

## 2025-03-29 PROCEDURE — 83540 ASSAY OF IRON: CPT

## 2025-03-29 PROCEDURE — 87040 BLOOD CULTURE FOR BACTERIA: CPT

## 2025-03-29 PROCEDURE — 83880 ASSAY OF NATRIURETIC PEPTIDE: CPT

## 2025-03-29 PROCEDURE — 5A0955A ASSISTANCE WITH RESPIRATORY VENTILATION, GREATER THAN 96 CONSECUTIVE HOURS, HIGH NASAL FLOW/VELOCITY: ICD-10-PCS | Performed by: STUDENT IN AN ORGANIZED HEALTH CARE EDUCATION/TRAINING PROGRAM

## 2025-03-29 PROCEDURE — 81001 URINALYSIS AUTO W/SCOPE: CPT

## 2025-03-29 PROCEDURE — 70450 CT HEAD/BRAIN W/O DYE: CPT

## 2025-03-29 PROCEDURE — G0378 HOSPITAL OBSERVATION PER HR: HCPCS

## 2025-03-29 PROCEDURE — 82962 GLUCOSE BLOOD TEST: CPT

## 2025-03-29 PROCEDURE — 83605 ASSAY OF LACTIC ACID: CPT

## 2025-03-29 PROCEDURE — 93005 ELECTROCARDIOGRAM TRACING: CPT | Performed by: STUDENT IN AN ORGANIZED HEALTH CARE EDUCATION/TRAINING PROGRAM

## 2025-03-29 PROCEDURE — 96368 THER/DIAG CONCURRENT INF: CPT

## 2025-03-29 PROCEDURE — 96375 TX/PRO/DX INJ NEW DRUG ADDON: CPT

## 2025-03-29 PROCEDURE — 84484 ASSAY OF TROPONIN QUANT: CPT

## 2025-03-29 PROCEDURE — 84443 ASSAY THYROID STIM HORMONE: CPT

## 2025-03-29 PROCEDURE — 5A09457 ASSISTANCE WITH RESPIRATORY VENTILATION, 24-96 CONSECUTIVE HOURS, CONTINUOUS POSITIVE AIRWAY PRESSURE: ICD-10-PCS | Performed by: STUDENT IN AN ORGANIZED HEALTH CARE EDUCATION/TRAINING PROGRAM

## 2025-03-29 PROCEDURE — 85025 COMPLETE CBC W/AUTO DIFF WBC: CPT

## 2025-03-29 PROCEDURE — 99285 EMERGENCY DEPT VISIT HI MDM: CPT

## 2025-03-29 PROCEDURE — 2580000003 HC RX 258: Performed by: STUDENT IN AN ORGANIZED HEALTH CARE EDUCATION/TRAINING PROGRAM

## 2025-03-29 RX ORDER — FUROSEMIDE 10 MG/ML
40 INJECTION INTRAMUSCULAR; INTRAVENOUS ONCE
Status: COMPLETED | OUTPATIENT
Start: 2025-03-29 | End: 2025-03-29

## 2025-03-29 RX ORDER — HEPARIN SODIUM 10000 [USP'U]/100ML
5-30 INJECTION, SOLUTION INTRAVENOUS CONTINUOUS
Status: DISCONTINUED | OUTPATIENT
Start: 2025-03-29 | End: 2025-03-29

## 2025-03-29 RX ORDER — METOPROLOL SUCCINATE 25 MG/1
25 TABLET, EXTENDED RELEASE ORAL DAILY
Status: DISCONTINUED | OUTPATIENT
Start: 2025-03-30 | End: 2025-04-09

## 2025-03-29 RX ORDER — SODIUM CHLORIDE 0.9 % (FLUSH) 0.9 %
10 SYRINGE (ML) INJECTION PRN
Status: DISCONTINUED | OUTPATIENT
Start: 2025-03-29 | End: 2025-04-14 | Stop reason: SDUPTHER

## 2025-03-29 RX ORDER — HEPARIN SODIUM 1000 [USP'U]/ML
60 INJECTION, SOLUTION INTRAVENOUS; SUBCUTANEOUS ONCE
Status: COMPLETED | OUTPATIENT
Start: 2025-03-29 | End: 2025-03-29

## 2025-03-29 RX ORDER — SODIUM CHLORIDE 0.9 % (FLUSH) 0.9 %
5-40 SYRINGE (ML) INJECTION EVERY 12 HOURS SCHEDULED
Status: DISCONTINUED | OUTPATIENT
Start: 2025-03-29 | End: 2025-04-14 | Stop reason: SDUPTHER

## 2025-03-29 RX ORDER — IOPAMIDOL 755 MG/ML
75 INJECTION, SOLUTION INTRAVASCULAR
Status: COMPLETED | OUTPATIENT
Start: 2025-03-29 | End: 2025-03-29

## 2025-03-29 RX ORDER — POTASSIUM CHLORIDE 7.45 MG/ML
10 INJECTION INTRAVENOUS PRN
Status: DISCONTINUED | OUTPATIENT
Start: 2025-03-29 | End: 2025-04-16 | Stop reason: HOSPADM

## 2025-03-29 RX ORDER — LANOLIN ALCOHOL/MO/W.PET/CERES
1000 CREAM (GRAM) TOPICAL NIGHTLY
Status: DISCONTINUED | OUTPATIENT
Start: 2025-03-29 | End: 2025-04-16 | Stop reason: HOSPADM

## 2025-03-29 RX ORDER — ONDANSETRON 4 MG/1
4 TABLET, ORALLY DISINTEGRATING ORAL EVERY 8 HOURS PRN
Status: DISCONTINUED | OUTPATIENT
Start: 2025-03-29 | End: 2025-04-16 | Stop reason: HOSPADM

## 2025-03-29 RX ORDER — EZETIMIBE 10 MG/1
10 TABLET ORAL DAILY
Status: DISCONTINUED | OUTPATIENT
Start: 2025-03-30 | End: 2025-04-16 | Stop reason: HOSPADM

## 2025-03-29 RX ORDER — PANTOPRAZOLE SODIUM 40 MG/1
40 TABLET, DELAYED RELEASE ORAL DAILY
Status: DISCONTINUED | OUTPATIENT
Start: 2025-03-30 | End: 2025-04-04

## 2025-03-29 RX ORDER — LISINOPRIL 2.5 MG/1
2.5 TABLET ORAL DAILY
Status: DISCONTINUED | OUTPATIENT
Start: 2025-03-30 | End: 2025-04-16 | Stop reason: HOSPADM

## 2025-03-29 RX ORDER — SPIRONOLACTONE 25 MG/1
25 TABLET ORAL DAILY
Status: DISCONTINUED | OUTPATIENT
Start: 2025-03-30 | End: 2025-04-16 | Stop reason: HOSPADM

## 2025-03-29 RX ORDER — SODIUM CHLORIDE 9 MG/ML
INJECTION, SOLUTION INTRAVENOUS PRN
Status: DISCONTINUED | OUTPATIENT
Start: 2025-03-29 | End: 2025-04-16 | Stop reason: HOSPADM

## 2025-03-29 RX ORDER — ACETAMINOPHEN 650 MG/1
650 SUPPOSITORY RECTAL EVERY 6 HOURS PRN
Status: DISCONTINUED | OUTPATIENT
Start: 2025-03-29 | End: 2025-04-16 | Stop reason: HOSPADM

## 2025-03-29 RX ORDER — LEVOTHYROXINE SODIUM 100 UG/1
100 TABLET ORAL
Status: DISCONTINUED | OUTPATIENT
Start: 2025-03-30 | End: 2025-04-16 | Stop reason: HOSPADM

## 2025-03-29 RX ORDER — QUETIAPINE FUMARATE 200 MG/1
400 TABLET, FILM COATED ORAL NIGHTLY
Status: DISCONTINUED | OUTPATIENT
Start: 2025-03-29 | End: 2025-04-16 | Stop reason: HOSPADM

## 2025-03-29 RX ORDER — BUMETANIDE 0.25 MG/ML
2 INJECTION, SOLUTION INTRAMUSCULAR; INTRAVENOUS 2 TIMES DAILY
Status: DISCONTINUED | OUTPATIENT
Start: 2025-03-29 | End: 2025-03-29

## 2025-03-29 RX ORDER — SODIUM CHLORIDE 0.9 % (FLUSH) 0.9 %
10 SYRINGE (ML) INJECTION PRN
Status: COMPLETED | OUTPATIENT
Start: 2025-03-29 | End: 2025-03-29

## 2025-03-29 RX ORDER — 0.9 % SODIUM CHLORIDE 0.9 %
500 INTRAVENOUS SOLUTION INTRAVENOUS ONCE
Status: COMPLETED | OUTPATIENT
Start: 2025-03-29 | End: 2025-03-29

## 2025-03-29 RX ORDER — ACETAMINOPHEN 325 MG/1
650 TABLET ORAL EVERY 6 HOURS PRN
Status: DISCONTINUED | OUTPATIENT
Start: 2025-03-29 | End: 2025-04-16 | Stop reason: HOSPADM

## 2025-03-29 RX ORDER — FUROSEMIDE 20 MG/1
20 TABLET ORAL DAILY
Status: DISCONTINUED | OUTPATIENT
Start: 2025-03-30 | End: 2025-04-12

## 2025-03-29 RX ORDER — MAGNESIUM SULFATE IN WATER 40 MG/ML
2000 INJECTION, SOLUTION INTRAVENOUS PRN
Status: DISCONTINUED | OUTPATIENT
Start: 2025-03-29 | End: 2025-04-16 | Stop reason: HOSPADM

## 2025-03-29 RX ORDER — HEPARIN SODIUM 1000 [USP'U]/ML
30 INJECTION, SOLUTION INTRAVENOUS; SUBCUTANEOUS PRN
Status: DISCONTINUED | OUTPATIENT
Start: 2025-03-29 | End: 2025-03-29

## 2025-03-29 RX ORDER — ATORVASTATIN CALCIUM 10 MG/1
10 TABLET, FILM COATED ORAL NIGHTLY
Status: DISCONTINUED | OUTPATIENT
Start: 2025-03-29 | End: 2025-04-16 | Stop reason: HOSPADM

## 2025-03-29 RX ORDER — POTASSIUM CHLORIDE 1500 MG/1
40 TABLET, EXTENDED RELEASE ORAL PRN
Status: DISCONTINUED | OUTPATIENT
Start: 2025-03-29 | End: 2025-04-16 | Stop reason: HOSPADM

## 2025-03-29 RX ORDER — AZELASTINE 1 MG/ML
2 SPRAY, METERED NASAL 2 TIMES DAILY
Status: DISCONTINUED | OUTPATIENT
Start: 2025-03-29 | End: 2025-03-29

## 2025-03-29 RX ORDER — DOCUSATE SODIUM 100 MG/1
100 CAPSULE, LIQUID FILLED ORAL EVERY MORNING
Status: DISCONTINUED | OUTPATIENT
Start: 2025-03-30 | End: 2025-04-07

## 2025-03-29 RX ORDER — ENOXAPARIN SODIUM 100 MG/ML
40 INJECTION SUBCUTANEOUS DAILY
Status: DISCONTINUED | OUTPATIENT
Start: 2025-03-30 | End: 2025-03-29

## 2025-03-29 RX ORDER — MULTIVITAMIN WITH IRON
1 TABLET ORAL DAILY
Status: DISCONTINUED | OUTPATIENT
Start: 2025-03-30 | End: 2025-04-16 | Stop reason: HOSPADM

## 2025-03-29 RX ORDER — DIVALPROEX SODIUM 500 MG/1
500 TABLET, DELAYED RELEASE ORAL NIGHTLY
Status: DISCONTINUED | OUTPATIENT
Start: 2025-03-29 | End: 2025-04-04 | Stop reason: ALTCHOICE

## 2025-03-29 RX ORDER — HEPARIN SODIUM 1000 [USP'U]/ML
60 INJECTION, SOLUTION INTRAVENOUS; SUBCUTANEOUS PRN
Status: DISCONTINUED | OUTPATIENT
Start: 2025-03-29 | End: 2025-03-29

## 2025-03-29 RX ORDER — ESCITALOPRAM OXALATE 10 MG/1
10 TABLET ORAL NIGHTLY
Status: DISCONTINUED | OUTPATIENT
Start: 2025-03-29 | End: 2025-04-16 | Stop reason: HOSPADM

## 2025-03-29 RX ORDER — ONDANSETRON 2 MG/ML
4 INJECTION INTRAMUSCULAR; INTRAVENOUS EVERY 6 HOURS PRN
Status: DISCONTINUED | OUTPATIENT
Start: 2025-03-29 | End: 2025-04-16 | Stop reason: HOSPADM

## 2025-03-29 RX ADMIN — ATORVASTATIN CALCIUM 10 MG: 10 TABLET, FILM COATED ORAL at 21:39

## 2025-03-29 RX ADMIN — HEPARIN SODIUM 3600 UNITS: 1000 INJECTION INTRAVENOUS; SUBCUTANEOUS at 21:40

## 2025-03-29 RX ADMIN — BUMETANIDE 2 MG: 0.25 INJECTION INTRAMUSCULAR; INTRAVENOUS at 21:39

## 2025-03-29 RX ADMIN — ESCITALOPRAM OXALATE 10 MG: 10 TABLET ORAL at 21:39

## 2025-03-29 RX ADMIN — HEPARIN SODIUM 12 UNITS/KG/HR: 10000 INJECTION, SOLUTION INTRAVENOUS at 21:47

## 2025-03-29 RX ADMIN — SODIUM CHLORIDE, PRESERVATIVE FREE 10 ML: 5 INJECTION INTRAVENOUS at 18:25

## 2025-03-29 RX ADMIN — CEFEPIME 2000 MG: 2 INJECTION, POWDER, FOR SOLUTION INTRAVENOUS at 19:49

## 2025-03-29 RX ADMIN — Medication 1000 MG: at 21:39

## 2025-03-29 RX ADMIN — FUROSEMIDE 40 MG: 10 INJECTION, SOLUTION INTRAMUSCULAR; INTRAVENOUS at 19:50

## 2025-03-29 RX ADMIN — QUETIAPINE FUMARATE 400 MG: 200 TABLET ORAL at 21:39

## 2025-03-29 RX ADMIN — IOPAMIDOL 75 ML: 755 INJECTION, SOLUTION INTRAVENOUS at 18:25

## 2025-03-29 RX ADMIN — DIVALPROEX SODIUM 500 MG: 500 TABLET, DELAYED RELEASE ORAL at 21:39

## 2025-03-29 RX ADMIN — VANCOMYCIN HYDROCHLORIDE 1250 MG: 1.25 INJECTION, POWDER, LYOPHILIZED, FOR SOLUTION INTRAVENOUS at 19:49

## 2025-03-29 RX ADMIN — SODIUM CHLORIDE 500 ML: 0.9 INJECTION, SOLUTION INTRAVENOUS at 16:16

## 2025-03-29 ASSESSMENT — PAIN - FUNCTIONAL ASSESSMENT: PAIN_FUNCTIONAL_ASSESSMENT: NONE - DENIES PAIN

## 2025-03-29 NOTE — ED PROVIDER NOTES
[facility]  EMERGENCY DEPARTMENT ENCOUNTER        Pt Name: Tenisha Bruce  MRN: [id number]  Birthdate 1950  Date of evaluation: 3/29/2025  Provider: Sandrine France MD  PCP: Julio Porter DO  Note Started: 4:04 PM EDT 3/29/25    HPI  74 y.o. female presenting for shortness of breath.  Per report, patient from nursing facility, found to be hypoxic at the facility.  Patient does not wear oxygen at baseline.  Patient poor historian, but baseline mental status is unknown at this time.  She states \"I don't know\" to most questions      --------------------------------------------- PAST HISTORY ---------------------------------------------  Past Medical History:  has a past medical history of Anxiety, Corneal scarring, Fetal alcohol syndrome, Hypercholesteremia, Hypothyroidism, and Onychomycosis.    Past Surgical History:  has a past surgical history that includes Hip fracture surgery (Left, 8/2/2023).    Social History:  reports that she has never smoked. She has never used smokeless tobacco. She reports that she does not drink alcohol and does not use drugs.    Family History: family history is not on file.     The patient’s home medications have been reviewed.    Allergies: Zocor [simvastatin]      Review of Systems   Unable to perform ROS: Acuity of condition        Physical Exam  Constitutional:       General: She is not in acute distress.     Appearance: Normal appearance.      Comments: Patient lying down with eyes closed, ill but nontoxic-appearing   HENT:      Head: Normocephalic and atraumatic.      Right Ear: External ear normal.      Left Ear: External ear normal.      Nose: Nose normal.   Eyes:      Conjunctiva/sclera: Conjunctivae normal.   Cardiovascular:      Rate and Rhythm: Normal rate and regular rhythm.   Pulmonary:      Effort: Pulmonary effort is normal.      Comments: Crackles left base  Abdominal:      General: There is no distension.      Palpations: Abdomen is soft.

## 2025-03-30 ENCOUNTER — APPOINTMENT (OUTPATIENT)
Dept: ULTRASOUND IMAGING | Age: 75
DRG: 189 | End: 2025-03-30
Payer: MEDICARE

## 2025-03-30 ENCOUNTER — APPOINTMENT (OUTPATIENT)
Dept: GENERAL RADIOLOGY | Age: 75
DRG: 189 | End: 2025-03-30
Payer: MEDICARE

## 2025-03-30 PROBLEM — J96.02 ACUTE RESPIRATORY FAILURE WITH HYPOXIA AND HYPERCAPNIA: Status: ACTIVE | Noted: 2020-02-06

## 2025-03-30 LAB
AADO2: 314 MMHG
AADO2: 321 MMHG
ABSOLUTE BANDS: NORMAL K/UL (ref 0–1)
ABSOLUTE PLASMA CELLS: NORMAL K/UL
ANION GAP SERPL CALCULATED.3IONS-SCNC: 14 MMOL/L (ref 7–16)
APPEARANCE FLD: NORMAL
ATYPICAL LYMPHOCYTE ABSOLUTE COUNT: NORMAL K/UL
ATYPICAL LYMPHOCYTES: NORMAL %
B.E.: 6.8 MMOL/L (ref -3–3)
B.E.: 8.2 MMOL/L (ref -3–3)
BANDS: NORMAL %
BASOPHILS # BLD: 0 K/UL (ref 0–0.2)
BASOPHILS # BLD: NORMAL K/UL (ref 0–0.2)
BASOPHILS NFR BLD: 0 % (ref 0–2)
BASOPHILS NFR BLD: NORMAL % (ref 0–2)
BLASTS ABSOLUTE COUNT: NORMAL K/UL
BLASTS: NORMAL %
BODY FLD TYPE: NORMAL
BUN SERPL-MCNC: 27 MG/DL (ref 6–23)
CALCIUM SERPL-MCNC: 8.5 MG/DL (ref 8.6–10.2)
CHLORIDE SERPL-SCNC: 98 MMOL/L (ref 98–107)
CHOLEST SERPL-MCNC: 164 MG/DL
CLOT CHECK: NORMAL
CO2 SERPL-SCNC: 30 MMOL/L (ref 22–29)
COHB: 0.3 % (ref 0–1.5)
COHB: 0.4 % (ref 0–1.5)
COLOR FLD: YELLOW
CREAT SERPL-MCNC: 1.1 MG/DL (ref 0.5–1)
CRITICAL: ABNORMAL
CRITICAL: ABNORMAL
DATE ANALYZED: ABNORMAL
DATE ANALYZED: ABNORMAL
DATE OF COLLECTION: ABNORMAL
DATE OF COLLECTION: ABNORMAL
EOSINOPHIL # BLD: 0.19 K/UL (ref 0.05–0.5)
EOSINOPHIL # BLD: NORMAL K/UL (ref 0–0.4)
EOSINOPHILS RELATIVE PERCENT: 2 % (ref 0–6)
EOSINOPHILS RELATIVE PERCENT: NORMAL % (ref 1–4)
ERYTHROCYTE [DISTWIDTH] IN BLOOD BY AUTOMATED COUNT: 20 % (ref 11.5–15)
ERYTHROCYTE [DISTWIDTH] IN BLOOD BY AUTOMATED COUNT: NORMAL % (ref 11.8–14.4)
FIBRINOGEN PPP-MCNC: 385 MG/DL (ref 200–400)
FIO2: 70 %
FIO2: 75 %
GFR, ESTIMATED: 56 ML/MIN/1.73M2
GLUCOSE FLD-MCNC: 81 MG/DL
GLUCOSE SERPL-MCNC: 90 MG/DL (ref 74–99)
HCO3: 32.1 MMOL/L (ref 22–26)
HCO3: 34.3 MMOL/L (ref 22–26)
HCT VFR BLD AUTO: 27.1 % (ref 34–48)
HCT VFR BLD AUTO: NORMAL % (ref 36.3–47.1)
HDLC SERPL-MCNC: 49 MG/DL
HGB BLD-MCNC: 8.7 G/DL (ref 11.5–15.5)
HGB BLD-MCNC: NORMAL G/DL (ref 11.9–15.1)
HHB: 1.6 % (ref 0–5)
HHB: 1.7 % (ref 0–5)
IMM GRANULOCYTES # BLD AUTO: NORMAL K/UL (ref 0–0.3)
IMM GRANULOCYTES NFR BLD: NORMAL %
L PNEUMO1 AG UR QL IA.RAPID: NEGATIVE
LAB: ABNORMAL
LAB: ABNORMAL
LDH FLD L TO P-CCNC: 184 U/L
LDLC SERPL CALC-MCNC: 92 MG/DL
LYMPHOCYTES NFR BLD: 0.19 K/UL (ref 1.5–4)
LYMPHOCYTES NFR BLD: NORMAL K/UL (ref 1–4.8)
LYMPHOCYTES RELATIVE PERCENT: 2 % (ref 20–42)
LYMPHOCYTES RELATIVE PERCENT: NORMAL % (ref 24–44)
Lab: 2
Lab: 730
MCH RBC QN AUTO: 35.8 PG (ref 26–35)
MCH RBC QN AUTO: NORMAL PG (ref 25.2–33.5)
MCHC RBC AUTO-ENTMCNC: 32.1 G/DL (ref 32–34.5)
MCHC RBC AUTO-ENTMCNC: NORMAL G/DL (ref 28.4–34.8)
MCV RBC AUTO: 111.5 FL (ref 80–99.9)
MCV RBC AUTO: NORMAL FL (ref 82.6–102.9)
METAMYELOCYTES ABSOLUTE COUNT: NORMAL K/UL
METAMYELOCYTES: NORMAL %
METHB: 0.3 % (ref 0–1.5)
METHB: 0.5 % (ref 0–1.5)
MICROORGANISM SPEC CULT: NO GROWTH
MODE: ABNORMAL
MODE: ABNORMAL
MONOCYTES NFR BLD: 0.39 K/UL (ref 0.1–0.95)
MONOCYTES NFR BLD: 4 % (ref 2–12)
MONOCYTES NFR BLD: NORMAL % (ref 1–7)
MONOCYTES NFR BLD: NORMAL K/UL (ref 0.1–0.8)
MONOCYTES NFR FLD: 55 %
MYELOCYTES ABSOLUTE COUNT: NORMAL K/UL
MYELOCYTES: NORMAL %
NEUTROPHILS NFR BLD: 93 % (ref 43–80)
NEUTROPHILS NFR BLD: NORMAL % (ref 36–66)
NEUTROPHILS NFR FLD: 45 %
NEUTS SEG NFR BLD: 10.33 K/UL (ref 1.8–7.3)
NEUTS SEG NFR BLD: NORMAL K/UL (ref 1.8–7.7)
NRBC BLD-RTO: NORMAL PER 100 WBC
NUCLEATED RED BLOOD CELLS: NORMAL PER 100 WBC
O2 SATURATION: 98.3 % (ref 92–98.5)
O2 SATURATION: 98.4 % (ref 92–98.5)
O2HB: 97.4 % (ref 94–97)
O2HB: 97.8 % (ref 94–97)
OPERATOR ID: 9072
OPERATOR ID: ABNORMAL
OTHER CELL: NORMAL %
PATIENT TEMP: 37 C
PATIENT TEMP: 37 C
PCO2: 49.4 MMHG (ref 35–45)
PCO2: 56 MMHG (ref 35–45)
PEEP/CPAP: 5 CMH2O
PEEP/CPAP: 5 CMH2O
PFO2: 1.79 MMHG/%
PFO2: 1.9 MMHG/%
PH BLOOD GAS: 7.41 (ref 7.35–7.45)
PH BLOOD GAS: 7.43 (ref 7.35–7.45)
PLASMA CELLS: NORMAL %
PLATELET # BLD AUTO: 48 K/UL (ref 130–450)
PLATELET # BLD AUTO: NORMAL K/UL (ref 138–453)
PLATELET CONFIRMATION: NORMAL
PLATELET ESTIMATE: NORMAL
PLATELET, FLUORESCENCE: NORMAL K/UL (ref 138–453)
PLATELETS.RETICULATED NFR BLD AUTO: NORMAL % (ref 1.1–10.3)
PMV BLD AUTO: 10.3 FL (ref 7–12)
PMV BLD AUTO: NORMAL FL (ref 8.1–13.5)
PO2: 125 MMHG (ref 75–100)
PO2: 142.5 MMHG (ref 75–100)
POTASSIUM SERPL-SCNC: 4.6 MMOL/L (ref 3.5–5)
PROCALCITONIN SERPL-MCNC: 0.59 NG/ML (ref 0–0.08)
PROMYELOCYTES ABSOLUTE COUNT: NORMAL K/UL
PROMYELOCYTES: NORMAL %
PROT FLD-MCNC: 1.8 G/DL
RBC # BLD AUTO: 2.43 M/UL (ref 3.5–5.5)
RBC # BLD AUTO: NORMAL M/UL (ref 3.95–5.11)
RBC # BLD: ABNORMAL 10*6/UL
RBC # BLD: NORMAL 10*6/UL
RBC # FLD: 5000 CELLS/UL
RI(T): 2.2
RI(T): 2.57
RR MECHANICAL: 18 B/MIN
RR MECHANICAL: 18 B/MIN
S PNEUM AG SPEC QL: POSITIVE
SERVICE CMNT-IMP: NORMAL
SODIUM SERPL-SCNC: 142 MMOL/L (ref 132–146)
SOURCE, BLOOD GAS: ABNORMAL
SOURCE, BLOOD GAS: ABNORMAL
SPECIMEN DESCRIPTION: NORMAL
SPECIMEN SOURCE: ABNORMAL
SPECIMEN TYPE: NORMAL
THB: 10.4 G/DL (ref 11.5–16.5)
THB: 9.7 G/DL (ref 11.5–16.5)
TIME ANALYZED: 10
TIME ANALYZED: 733
TRIGL SERPL-MCNC: 117 MG/DL
TRIGLYCERIDES FLUID: 29 MG/DL
VLDLC SERPL CALC-MCNC: 23 MG/DL
VT MECHANICAL: 450 ML
VT MECHANICAL: 450 ML
WBC # BLD: NORMAL 10*3/UL
WBC # FLD: 1149 CELLS/UL
WBC OTHER # BLD: 11.1 K/UL (ref 4.5–11.5)
WBC OTHER # BLD: NORMAL K/UL (ref 3.5–11.3)

## 2025-03-30 PROCEDURE — 82805 BLOOD GASES W/O2 SATURATION: CPT

## 2025-03-30 PROCEDURE — 6360000002 HC RX W HCPCS: Performed by: FAMILY MEDICINE

## 2025-03-30 PROCEDURE — 99232 SBSQ HOSP IP/OBS MODERATE 35: CPT

## 2025-03-30 PROCEDURE — 87899 AGENT NOS ASSAY W/OPTIC: CPT

## 2025-03-30 PROCEDURE — 32555 ASPIRATE PLEURA W/ IMAGING: CPT

## 2025-03-30 PROCEDURE — 2500000003 HC RX 250 WO HCPCS

## 2025-03-30 PROCEDURE — 89051 BODY FLUID CELL COUNT: CPT

## 2025-03-30 PROCEDURE — 87070 CULTURE OTHR SPECIMN AEROBIC: CPT

## 2025-03-30 PROCEDURE — 36415 COLL VENOUS BLD VENIPUNCTURE: CPT

## 2025-03-30 PROCEDURE — 0W9B30Z DRAINAGE OF LEFT PLEURAL CAVITY WITH DRAINAGE DEVICE, PERCUTANEOUS APPROACH: ICD-10-PCS | Performed by: INTERNAL MEDICINE

## 2025-03-30 PROCEDURE — 2580000003 HC RX 258

## 2025-03-30 PROCEDURE — 2580000003 HC RX 258: Performed by: FAMILY MEDICINE

## 2025-03-30 PROCEDURE — 86738 MYCOPLASMA ANTIBODY: CPT

## 2025-03-30 PROCEDURE — 80061 LIPID PANEL: CPT

## 2025-03-30 PROCEDURE — 83615 LACTATE (LD) (LDH) ENZYME: CPT

## 2025-03-30 PROCEDURE — 88305 TISSUE EXAM BY PATHOLOGIST: CPT

## 2025-03-30 PROCEDURE — 82945 GLUCOSE OTHER FLUID: CPT

## 2025-03-30 PROCEDURE — 87081 CULTURE SCREEN ONLY: CPT

## 2025-03-30 PROCEDURE — 87015 SPECIMEN INFECT AGNT CONCNTJ: CPT

## 2025-03-30 PROCEDURE — 2140000000 HC CCU INTERMEDIATE R&B

## 2025-03-30 PROCEDURE — 80048 BASIC METABOLIC PNL TOTAL CA: CPT

## 2025-03-30 PROCEDURE — 36600 WITHDRAWAL OF ARTERIAL BLOOD: CPT

## 2025-03-30 PROCEDURE — 85384 FIBRINOGEN ACTIVITY: CPT

## 2025-03-30 PROCEDURE — 88112 CYTOPATH CELL ENHANCE TECH: CPT

## 2025-03-30 PROCEDURE — 87206 SMEAR FLUORESCENT/ACID STAI: CPT

## 2025-03-30 PROCEDURE — 87449 NOS EACH ORGANISM AG IA: CPT

## 2025-03-30 PROCEDURE — 87205 SMEAR GRAM STAIN: CPT

## 2025-03-30 PROCEDURE — 87116 MYCOBACTERIA CULTURE: CPT

## 2025-03-30 PROCEDURE — 71045 X-RAY EXAM CHEST 1 VIEW: CPT

## 2025-03-30 PROCEDURE — 6360000002 HC RX W HCPCS

## 2025-03-30 PROCEDURE — 84157 ASSAY OF PROTEIN OTHER: CPT

## 2025-03-30 PROCEDURE — 94660 CPAP INITIATION&MGMT: CPT

## 2025-03-30 PROCEDURE — 32557 INSERT CATH PLEURA W/ IMAGE: CPT | Performed by: INTERNAL MEDICINE

## 2025-03-30 PROCEDURE — 83986 ASSAY PH BODY FLUID NOS: CPT

## 2025-03-30 PROCEDURE — 84145 PROCALCITONIN (PCT): CPT

## 2025-03-30 PROCEDURE — 99222 1ST HOSP IP/OBS MODERATE 55: CPT | Performed by: INTERNAL MEDICINE

## 2025-03-30 PROCEDURE — 85025 COMPLETE CBC W/AUTO DIFF WBC: CPT

## 2025-03-30 PROCEDURE — 87102 FUNGUS ISOLATION CULTURE: CPT

## 2025-03-30 PROCEDURE — 84478 ASSAY OF TRIGLYCERIDES: CPT

## 2025-03-30 RX ORDER — FUROSEMIDE 10 MG/ML
40 INJECTION INTRAMUSCULAR; INTRAVENOUS 2 TIMES DAILY
Status: DISCONTINUED | OUTPATIENT
Start: 2025-03-30 | End: 2025-04-12

## 2025-03-30 RX ADMIN — CEFEPIME 2000 MG: 2 INJECTION, POWDER, FOR SOLUTION INTRAVENOUS at 20:17

## 2025-03-30 RX ADMIN — SODIUM CHLORIDE, PRESERVATIVE FREE 10 ML: 5 INJECTION INTRAVENOUS at 20:15

## 2025-03-30 RX ADMIN — SODIUM CHLORIDE, PRESERVATIVE FREE 10 ML: 5 INJECTION INTRAVENOUS at 09:58

## 2025-03-30 RX ADMIN — CEFEPIME 2000 MG: 2 INJECTION, POWDER, FOR SOLUTION INTRAVENOUS at 05:31

## 2025-03-30 RX ADMIN — FUROSEMIDE 40 MG: 10 INJECTION, SOLUTION INTRAMUSCULAR; INTRAVENOUS at 18:54

## 2025-03-30 RX ADMIN — VANCOMYCIN HYDROCHLORIDE 1250 MG: 1.25 INJECTION, POWDER, LYOPHILIZED, FOR SOLUTION INTRAVENOUS at 09:58

## 2025-03-30 ASSESSMENT — PAIN SCALES - WONG BAKER
WONGBAKER_NUMERICALRESPONSE: NO HURT

## 2025-03-30 ASSESSMENT — PAIN SCALES - GENERAL
PAINLEVEL_OUTOF10: 0

## 2025-03-30 NOTE — H&P
lobe. CTA NECK: 1. Prominent calcified atherosclerosis in the bilateral carotid bulbs resulting in a maximal stenosis of approximately 70% in the proximal right ICA and 90% stenosis at the origin of the left ICA. 2. Mildly stenotic calcified atherosclerotic plaque at the origin of the left vertebral artery. CTA HEAD: 1. No large vessel occlusion. 2. Mild stenoses in the cavernous segments of the internal carotid arteries.     CTA NECK W CONTRAST  Result Date: 3/19/2025  CT HEAD: 1. No acute intracranial abnormality. 2. 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 CTA NECK: 1. Prominent calcified atherosclerosis in the bilateral carotid bulbs resulting in a maximal stenosis of approximately 70% in the proximal right ICA and 90% stenosis at the origin of the left ICA. 2. Mildly stenotic calcified atherosclerotic plaque at the origin of the left vertebral artery. CTA HEAD: 1. No large vessel occlusion. 2. Mild stenoses in the cavernous segments of the internal carotid arteries.         Assessment and Plan:    Principal Problem:    Acute hypoxic respiratory failure (HCC)  Resolved Problems:    * No resolved hospital problems. *          Discussed patient's case with ED physician.  Admit to Intermediate    Acute hypoxic respiratory failure  -ABG-hypercapnic C02-66.4  -on bipap for now  -CTA pulmonary-Cardiomegaly with interstitial pulmonary edema pattern and moderate right and large left pleural effusions may be loculated on the left with adjacent atelectasis.   -pulmonology consult  -vanc, cefepime in ED, continue     Elevated Troponin  -291>370>362  -consult cardiology r/o ACS  -Heparin gtt initiated  -BP controlled 124/63        HFrEF, acute on chronic   -BNP-52,616  -continue home diuretics  -40mg IV lasix in ED  ECHO 3/20/25-moderate to severe LV systolic dysfunction, EF 30 to 35%    Chronic macrocytic anemia  -Monitor hemoglobin hematocrit daily  -hgb-9.1  -Transfuse less than 7      Chronic problems:

## 2025-03-30 NOTE — PROCEDURES
[facility]    PULMONARY/CRITICAL CARE PROCEDURE NOTE    LEFT CHEST TUBE PROCEDURE NOTE    Patient: Tenisha Bruce  MRN: [id number]  : 1950  Date: 3/30/2025  Time: 2:47 PM    Laboratory Work:  Lab Results   Component Value Date    INR 1.0 2023    PROTIME 10.5 2023     Lab Results   Component Value Date    WBC 11.1 2025    HGB 8.7 (L) 2025    HCT 27.1 (L) 2025    .5 (H) 2025    PLT 48 (L) 2025    LYMPHOPCT 2 (L) 2025    RBC 2.43 (L) 2025    MCH 35.8 (H) 2025    MCHC 32.1 2025    RDW 20.0 (H) 2025    NEUTOPHILPCT 93 (H) 2025    MONOPCT 4 2025    EOSPCT 2 2025    BASOPCT 0 2025    NEUTROABS 10.33 (H) 2025    LYMPHSABS 0.19 (L) 2025    MONOSABS 0.39 2025    EOSABS 0.19 2025    BASOSABS 0.00 2025     Lab Results   Component Value Date/Time     2025 06:07 AM    K 4.6 2025 06:07 AM    K 3.5 2020 05:26 AM    CL 98 2025 06:07 AM    CO2 30 2025 06:07 AM    BUN 27 2025 06:07 AM    CREATININE 1.1 2025 06:07 AM    GLUCOSE 90 2025 06:07 AM    GLUCOSE 164 2011 04:00 PM    CALCIUM 8.5 2025 06:07 AM      Attending Physician: Dr. Rosales    Assistant(s): Ultrasound Department, Nursing Staff    Pre-Operative Diagnosis: Pleural Effusion, Left    Operation/Procedure:   Left 8 Fr 35 cm Pigtail Catheter Placement   Pleural Drainage with Insertion of Indwelling Catheter with Image Guidance     Post-Operative Diagnosis: Pleural Effusion, Left    Anesthesia: Local with 1% Lidocaine     Indications: Pleural Effusion, Left    Purpose: Therapeutic    Consent: Consent was obtained prior to procedure. Indications, risks, benefits were explained at length.  We explained the major steps of the procedure, the necessity for repeat chest radiographs and possible complications but not limited to bleeding, infection, re-expansion

## 2025-03-31 ENCOUNTER — APPOINTMENT (OUTPATIENT)
Dept: GENERAL RADIOLOGY | Age: 75
DRG: 189 | End: 2025-03-31
Payer: MEDICARE

## 2025-03-31 LAB
ANION GAP SERPL CALCULATED.3IONS-SCNC: 18 MMOL/L (ref 7–16)
BNP SERPL-MCNC: ABNORMAL PG/ML (ref 0–125)
BUN SERPL-MCNC: 34 MG/DL (ref 6–23)
CALCIUM SERPL-MCNC: 8.2 MG/DL (ref 8.6–10.2)
CHLORIDE SERPL-SCNC: 100 MMOL/L (ref 98–107)
CO2 SERPL-SCNC: 28 MMOL/L (ref 22–29)
CREAT SERPL-MCNC: 1.2 MG/DL (ref 0.5–1)
DATE LAST DOSE: NORMAL
EKG ATRIAL RATE: 104 BPM
EKG ATRIAL RATE: 95 BPM
EKG P AXIS: 47 DEGREES
EKG P AXIS: 57 DEGREES
EKG P-R INTERVAL: 146 MS
EKG P-R INTERVAL: 150 MS
EKG Q-T INTERVAL: 360 MS
EKG Q-T INTERVAL: 368 MS
EKG QRS DURATION: 128 MS
EKG QRS DURATION: 132 MS
EKG QTC CALCULATION (BAZETT): 452 MS
EKG QTC CALCULATION (BAZETT): 483 MS
EKG R AXIS: -8 DEGREES
EKG R AXIS: -9 DEGREES
EKG T AXIS: 81 DEGREES
EKG T AXIS: 89 DEGREES
EKG VENTRICULAR RATE: 104 BPM
EKG VENTRICULAR RATE: 95 BPM
GFR, ESTIMATED: 48 ML/MIN/1.73M2
GLUCOSE SERPL-MCNC: 83 MG/DL (ref 74–99)
L PNEUMO1 AG UR QL IA.RAPID: NEGATIVE
MYCOPLASMA AB,IGM: PRESENT
PATH REV BLD -IMP: NORMAL
POTASSIUM SERPL-SCNC: 3.7 MMOL/L (ref 3.5–5)
S PNEUM AG SPEC QL: POSITIVE
SODIUM SERPL-SCNC: 146 MMOL/L (ref 132–146)
SPECIMEN SOURCE: ABNORMAL
TME LAST DOSE: NORMAL H
VANCOMYCIN DOSE: NORMAL MG
VANCOMYCIN SERPL-MCNC: 18.2 UG/ML (ref 5–40)

## 2025-03-31 PROCEDURE — 2580000003 HC RX 258: Performed by: FAMILY MEDICINE

## 2025-03-31 PROCEDURE — 71045 X-RAY EXAM CHEST 1 VIEW: CPT

## 2025-03-31 PROCEDURE — 36415 COLL VENOUS BLD VENIPUNCTURE: CPT

## 2025-03-31 PROCEDURE — 6360000002 HC RX W HCPCS: Performed by: FAMILY MEDICINE

## 2025-03-31 PROCEDURE — 6360000002 HC RX W HCPCS

## 2025-03-31 PROCEDURE — 2500000003 HC RX 250 WO HCPCS

## 2025-03-31 PROCEDURE — 2580000003 HC RX 258

## 2025-03-31 PROCEDURE — 83880 ASSAY OF NATRIURETIC PEPTIDE: CPT

## 2025-03-31 PROCEDURE — 93010 ELECTROCARDIOGRAM REPORT: CPT | Performed by: INTERNAL MEDICINE

## 2025-03-31 PROCEDURE — 94660 CPAP INITIATION&MGMT: CPT

## 2025-03-31 PROCEDURE — 6370000000 HC RX 637 (ALT 250 FOR IP)

## 2025-03-31 PROCEDURE — 99232 SBSQ HOSP IP/OBS MODERATE 35: CPT | Performed by: STUDENT IN AN ORGANIZED HEALTH CARE EDUCATION/TRAINING PROGRAM

## 2025-03-31 PROCEDURE — 80048 BASIC METABOLIC PNL TOTAL CA: CPT

## 2025-03-31 PROCEDURE — 80202 ASSAY OF VANCOMYCIN: CPT

## 2025-03-31 PROCEDURE — 2140000000 HC CCU INTERMEDIATE R&B

## 2025-03-31 PROCEDURE — 99232 SBSQ HOSP IP/OBS MODERATE 35: CPT | Performed by: INTERNAL MEDICINE

## 2025-03-31 RX ORDER — HEPARIN SODIUM 10000 [USP'U]/ML
5000 INJECTION, SOLUTION INTRAVENOUS; SUBCUTANEOUS EVERY 8 HOURS
Status: DISCONTINUED | OUTPATIENT
Start: 2025-03-31 | End: 2025-03-31

## 2025-03-31 RX ADMIN — AZITHROMYCIN MONOHYDRATE 500 MG: 500 INJECTION, POWDER, LYOPHILIZED, FOR SOLUTION INTRAVENOUS at 20:20

## 2025-03-31 RX ADMIN — CEFEPIME 2000 MG: 2 INJECTION, POWDER, FOR SOLUTION INTRAVENOUS at 18:37

## 2025-03-31 RX ADMIN — ESCITALOPRAM OXALATE 10 MG: 10 TABLET ORAL at 20:12

## 2025-03-31 RX ADMIN — ATORVASTATIN CALCIUM 10 MG: 10 TABLET, FILM COATED ORAL at 20:12

## 2025-03-31 RX ADMIN — PANTOPRAZOLE SODIUM 40 MG: 40 TABLET, DELAYED RELEASE ORAL at 11:00

## 2025-03-31 RX ADMIN — DIVALPROEX SODIUM 500 MG: 500 TABLET, DELAYED RELEASE ORAL at 20:12

## 2025-03-31 RX ADMIN — SPIRONOLACTONE 25 MG: 25 TABLET ORAL at 10:59

## 2025-03-31 RX ADMIN — LISINOPRIL 2.5 MG: 2.5 TABLET ORAL at 11:00

## 2025-03-31 RX ADMIN — VANCOMYCIN HYDROCHLORIDE 750 MG: 750 INJECTION, POWDER, LYOPHILIZED, FOR SOLUTION INTRAVENOUS at 11:41

## 2025-03-31 RX ADMIN — SODIUM CHLORIDE, PRESERVATIVE FREE 10 ML: 5 INJECTION INTRAVENOUS at 20:13

## 2025-03-31 RX ADMIN — SODIUM CHLORIDE, PRESERVATIVE FREE 10 ML: 5 INJECTION INTRAVENOUS at 11:05

## 2025-03-31 RX ADMIN — Medication 1000 MG: at 20:12

## 2025-03-31 RX ADMIN — Medication 1 TABLET: at 11:00

## 2025-03-31 RX ADMIN — EZETIMIBE 10 MG: 10 TABLET ORAL at 11:01

## 2025-03-31 RX ADMIN — QUETIAPINE FUMARATE 400 MG: 200 TABLET ORAL at 20:12

## 2025-03-31 RX ADMIN — DOCUSATE SODIUM 100 MG: 100 CAPSULE, LIQUID FILLED ORAL at 11:00

## 2025-03-31 RX ADMIN — FUROSEMIDE 40 MG: 10 INJECTION, SOLUTION INTRAMUSCULAR; INTRAVENOUS at 11:00

## 2025-03-31 RX ADMIN — FUROSEMIDE 40 MG: 10 INJECTION, SOLUTION INTRAMUSCULAR; INTRAVENOUS at 18:37

## 2025-03-31 RX ADMIN — METOPROLOL SUCCINATE 25 MG: 25 TABLET, EXTENDED RELEASE ORAL at 11:00

## 2025-03-31 RX ADMIN — CEFEPIME 2000 MG: 2 INJECTION, POWDER, FOR SOLUTION INTRAVENOUS at 07:32

## 2025-03-31 ASSESSMENT — PAIN SCALES - GENERAL: PAINLEVEL_OUTOF10: 0

## 2025-04-01 ENCOUNTER — APPOINTMENT (OUTPATIENT)
Dept: GENERAL RADIOLOGY | Age: 75
DRG: 189 | End: 2025-04-01
Payer: MEDICARE

## 2025-04-01 LAB
ANION GAP SERPL CALCULATED.3IONS-SCNC: 14 MMOL/L (ref 7–16)
BUN SERPL-MCNC: 37 MG/DL (ref 6–23)
CALCIUM SERPL-MCNC: 7.8 MG/DL (ref 8.6–10.2)
CHLORIDE SERPL-SCNC: 94 MMOL/L (ref 98–107)
CO2 SERPL-SCNC: 32 MMOL/L (ref 22–29)
CREAT SERPL-MCNC: 1.2 MG/DL (ref 0.5–1)
GFR, ESTIMATED: 46 ML/MIN/1.73M2
GLUCOSE SERPL-MCNC: 86 MG/DL (ref 74–99)
MAGNESIUM SERPL-MCNC: 1.6 MG/DL (ref 1.6–2.6)
MICROORGANISM SPEC CULT: NORMAL
POTASSIUM SERPL-SCNC: 3.6 MMOL/L (ref 3.5–5)
REPORT STATUS: NORMAL
SODIUM SERPL-SCNC: 140 MMOL/L (ref 132–146)
SPECIMEN DESCRIPTION: NORMAL

## 2025-04-01 PROCEDURE — 2700000000 HC OXYGEN THERAPY PER DAY

## 2025-04-01 PROCEDURE — 71045 X-RAY EXAM CHEST 1 VIEW: CPT

## 2025-04-01 PROCEDURE — 80048 BASIC METABOLIC PNL TOTAL CA: CPT

## 2025-04-01 PROCEDURE — 2500000003 HC RX 250 WO HCPCS

## 2025-04-01 PROCEDURE — 6370000000 HC RX 637 (ALT 250 FOR IP)

## 2025-04-01 PROCEDURE — 51798 US URINE CAPACITY MEASURE: CPT

## 2025-04-01 PROCEDURE — 99232 SBSQ HOSP IP/OBS MODERATE 35: CPT | Performed by: INTERNAL MEDICINE

## 2025-04-01 PROCEDURE — 99232 SBSQ HOSP IP/OBS MODERATE 35: CPT | Performed by: STUDENT IN AN ORGANIZED HEALTH CARE EDUCATION/TRAINING PROGRAM

## 2025-04-01 PROCEDURE — 83735 ASSAY OF MAGNESIUM: CPT

## 2025-04-01 PROCEDURE — 2140000000 HC CCU INTERMEDIATE R&B

## 2025-04-01 PROCEDURE — 36415 COLL VENOUS BLD VENIPUNCTURE: CPT

## 2025-04-01 PROCEDURE — 6360000002 HC RX W HCPCS: Performed by: FAMILY MEDICINE

## 2025-04-01 PROCEDURE — 2580000003 HC RX 258: Performed by: FAMILY MEDICINE

## 2025-04-01 PROCEDURE — 6360000002 HC RX W HCPCS

## 2025-04-01 RX ADMIN — METOPROLOL SUCCINATE 25 MG: 25 TABLET, EXTENDED RELEASE ORAL at 10:00

## 2025-04-01 RX ADMIN — ATORVASTATIN CALCIUM 10 MG: 10 TABLET, FILM COATED ORAL at 20:40

## 2025-04-01 RX ADMIN — FUROSEMIDE 40 MG: 10 INJECTION, SOLUTION INTRAMUSCULAR; INTRAVENOUS at 19:21

## 2025-04-01 RX ADMIN — PANTOPRAZOLE SODIUM 40 MG: 40 TABLET, DELAYED RELEASE ORAL at 10:00

## 2025-04-01 RX ADMIN — EZETIMIBE 10 MG: 10 TABLET ORAL at 10:01

## 2025-04-01 RX ADMIN — SPIRONOLACTONE 25 MG: 25 TABLET ORAL at 10:01

## 2025-04-01 RX ADMIN — ESCITALOPRAM OXALATE 10 MG: 10 TABLET ORAL at 20:40

## 2025-04-01 RX ADMIN — Medication 1 TABLET: at 10:15

## 2025-04-01 RX ADMIN — CEFEPIME 2000 MG: 2 INJECTION, POWDER, FOR SOLUTION INTRAVENOUS at 06:13

## 2025-04-01 RX ADMIN — SODIUM CHLORIDE, PRESERVATIVE FREE 10 ML: 5 INJECTION INTRAVENOUS at 20:40

## 2025-04-01 RX ADMIN — Medication 1000 MG: at 20:40

## 2025-04-01 RX ADMIN — LEVOTHYROXINE SODIUM 100 MCG: 0.1 TABLET ORAL at 06:06

## 2025-04-01 RX ADMIN — DOCUSATE SODIUM 100 MG: 100 CAPSULE, LIQUID FILLED ORAL at 10:00

## 2025-04-01 RX ADMIN — MAGNESIUM SULFATE HEPTAHYDRATE 2000 MG: 40 INJECTION, SOLUTION INTRAVENOUS at 06:22

## 2025-04-01 RX ADMIN — QUETIAPINE FUMARATE 400 MG: 200 TABLET ORAL at 20:40

## 2025-04-01 RX ADMIN — DIVALPROEX SODIUM 500 MG: 500 TABLET, DELAYED RELEASE ORAL at 20:40

## 2025-04-01 RX ADMIN — FUROSEMIDE 40 MG: 10 INJECTION, SOLUTION INTRAMUSCULAR; INTRAVENOUS at 10:00

## 2025-04-01 RX ADMIN — SODIUM CHLORIDE, PRESERVATIVE FREE 10 ML: 5 INJECTION INTRAVENOUS at 10:01

## 2025-04-01 RX ADMIN — CEFEPIME 2000 MG: 2 INJECTION, POWDER, FOR SOLUTION INTRAVENOUS at 21:38

## 2025-04-01 RX ADMIN — LISINOPRIL 2.5 MG: 2.5 TABLET ORAL at 10:00

## 2025-04-01 RX ADMIN — AZITHROMYCIN MONOHYDRATE 500 MG: 500 INJECTION, POWDER, LYOPHILIZED, FOR SOLUTION INTRAVENOUS at 20:35

## 2025-04-01 NOTE — ACP (ADVANCE CARE PLANNING)
Advance Care Planning   Healthcare Decision Maker:    Primary Decision Maker: Analia Avila - Legal Guardian, Legal Guardian - [phone]    Click here to complete Healthcare Decision Makers including selection of the Healthcare Decision Maker Relationship (ie \"Primary\").  Today we documented Decision Maker(s) consistent with Legal Next of Kin hierarchy.       Electronically signed by Candace Khanna RN on 4/1/2025 at 3:38 PM

## 2025-04-02 ENCOUNTER — APPOINTMENT (OUTPATIENT)
Dept: GENERAL RADIOLOGY | Age: 75
DRG: 189 | End: 2025-04-02
Payer: MEDICARE

## 2025-04-02 LAB
ANION GAP SERPL CALCULATED.3IONS-SCNC: 14 MMOL/L (ref 7–16)
BASOPHILS # BLD: 0 K/UL (ref 0–0.2)
BASOPHILS NFR BLD: 0 % (ref 0–2)
BNP SERPL-MCNC: ABNORMAL PG/ML (ref 0–125)
BUN SERPL-MCNC: 35 MG/DL (ref 6–23)
CALCIUM SERPL-MCNC: 7.7 MG/DL (ref 8.6–10.2)
CHLORIDE SERPL-SCNC: 93 MMOL/L (ref 98–107)
CO2 SERPL-SCNC: 33 MMOL/L (ref 22–29)
CREAT SERPL-MCNC: 1.3 MG/DL (ref 0.5–1)
EOSINOPHIL # BLD: 0.28 K/UL (ref 0.05–0.5)
EOSINOPHILS RELATIVE PERCENT: 4 % (ref 0–6)
ERYTHROCYTE [DISTWIDTH] IN BLOOD BY AUTOMATED COUNT: 18.6 % (ref 11.5–15)
GFR, ESTIMATED: 45 ML/MIN/1.73M2
GLUCOSE SERPL-MCNC: 101 MG/DL (ref 74–99)
HCT VFR BLD AUTO: 28.9 % (ref 34–48)
HGB BLD-MCNC: 9.4 G/DL (ref 11.5–15.5)
LYMPHOCYTES NFR BLD: 2.25 K/UL (ref 1.5–4)
LYMPHOCYTES RELATIVE PERCENT: 28 % (ref 20–42)
MCH RBC QN AUTO: 34.7 PG (ref 26–35)
MCHC RBC AUTO-ENTMCNC: 32.5 G/DL (ref 32–34.5)
MCV RBC AUTO: 106.6 FL (ref 80–99.9)
MICROORGANISM SPEC CULT: NO GROWTH
MICROORGANISM/AGENT SPEC: NORMAL
MONOCYTES NFR BLD: 0.56 K/UL (ref 0.1–0.95)
MONOCYTES NFR BLD: 7 % (ref 2–12)
NEUTROPHILS NFR BLD: 61 % (ref 43–80)
NEUTS SEG NFR BLD: 4.91 K/UL (ref 1.8–7.3)
NON-GYN CYTOLOGY REPORT: NORMAL
PLATELET CONFIRMATION: NORMAL
PLATELET, FLUORESCENCE: 43 K/UL (ref 130–450)
PMV BLD AUTO: 10.6 FL (ref 7–12)
POTASSIUM SERPL-SCNC: 3.1 MMOL/L (ref 3.5–5)
RBC # BLD AUTO: 2.71 M/UL (ref 3.5–5.5)
RBC # BLD: ABNORMAL 10*6/UL
SERVICE CMNT-IMP: NORMAL
SODIUM SERPL-SCNC: 140 MMOL/L (ref 132–146)
SPECIMEN DESCRIPTION: NORMAL
WBC OTHER # BLD: 8 K/UL (ref 4.5–11.5)

## 2025-04-02 PROCEDURE — 6370000000 HC RX 637 (ALT 250 FOR IP)

## 2025-04-02 PROCEDURE — 99232 SBSQ HOSP IP/OBS MODERATE 35: CPT | Performed by: INTERNAL MEDICINE

## 2025-04-02 PROCEDURE — 6360000002 HC RX W HCPCS: Performed by: FAMILY MEDICINE

## 2025-04-02 PROCEDURE — 36415 COLL VENOUS BLD VENIPUNCTURE: CPT

## 2025-04-02 PROCEDURE — 2140000000 HC CCU INTERMEDIATE R&B

## 2025-04-02 PROCEDURE — 2500000003 HC RX 250 WO HCPCS

## 2025-04-02 PROCEDURE — 83880 ASSAY OF NATRIURETIC PEPTIDE: CPT

## 2025-04-02 PROCEDURE — 2700000000 HC OXYGEN THERAPY PER DAY

## 2025-04-02 PROCEDURE — 99232 SBSQ HOSP IP/OBS MODERATE 35: CPT | Performed by: STUDENT IN AN ORGANIZED HEALTH CARE EDUCATION/TRAINING PROGRAM

## 2025-04-02 PROCEDURE — 85025 COMPLETE CBC W/AUTO DIFF WBC: CPT

## 2025-04-02 PROCEDURE — 80048 BASIC METABOLIC PNL TOTAL CA: CPT

## 2025-04-02 PROCEDURE — 2580000003 HC RX 258: Performed by: FAMILY MEDICINE

## 2025-04-02 PROCEDURE — 71045 X-RAY EXAM CHEST 1 VIEW: CPT

## 2025-04-02 RX ADMIN — QUETIAPINE FUMARATE 400 MG: 200 TABLET ORAL at 20:27

## 2025-04-02 RX ADMIN — AZITHROMYCIN MONOHYDRATE 500 MG: 500 INJECTION, POWDER, LYOPHILIZED, FOR SOLUTION INTRAVENOUS at 20:27

## 2025-04-02 RX ADMIN — ATORVASTATIN CALCIUM 10 MG: 10 TABLET, FILM COATED ORAL at 20:27

## 2025-04-02 RX ADMIN — Medication 1000 MG: at 20:27

## 2025-04-02 RX ADMIN — PANTOPRAZOLE SODIUM 40 MG: 40 TABLET, DELAYED RELEASE ORAL at 09:58

## 2025-04-02 RX ADMIN — METOPROLOL SUCCINATE 25 MG: 25 TABLET, EXTENDED RELEASE ORAL at 09:59

## 2025-04-02 RX ADMIN — CEFEPIME 2000 MG: 2 INJECTION, POWDER, FOR SOLUTION INTRAVENOUS at 10:22

## 2025-04-02 RX ADMIN — SODIUM CHLORIDE, PRESERVATIVE FREE 10 ML: 5 INJECTION INTRAVENOUS at 10:00

## 2025-04-02 RX ADMIN — SPIRONOLACTONE 25 MG: 25 TABLET ORAL at 09:58

## 2025-04-02 RX ADMIN — DOCUSATE SODIUM 100 MG: 100 CAPSULE, LIQUID FILLED ORAL at 09:59

## 2025-04-02 RX ADMIN — DIVALPROEX SODIUM 500 MG: 500 TABLET, DELAYED RELEASE ORAL at 20:27

## 2025-04-02 RX ADMIN — EZETIMIBE 10 MG: 10 TABLET ORAL at 09:59

## 2025-04-02 RX ADMIN — ESCITALOPRAM OXALATE 10 MG: 10 TABLET ORAL at 20:27

## 2025-04-02 RX ADMIN — POTASSIUM CHLORIDE 40 MEQ: 1500 TABLET, EXTENDED RELEASE ORAL at 14:26

## 2025-04-02 RX ADMIN — SODIUM CHLORIDE, PRESERVATIVE FREE 10 ML: 5 INJECTION INTRAVENOUS at 20:28

## 2025-04-02 RX ADMIN — FUROSEMIDE 40 MG: 10 INJECTION, SOLUTION INTRAMUSCULAR; INTRAVENOUS at 09:58

## 2025-04-02 RX ADMIN — CEFEPIME 2000 MG: 2 INJECTION, POWDER, FOR SOLUTION INTRAVENOUS at 20:25

## 2025-04-02 RX ADMIN — Medication 1 TABLET: at 09:59

## 2025-04-02 RX ADMIN — LISINOPRIL 2.5 MG: 2.5 TABLET ORAL at 09:59

## 2025-04-02 RX ADMIN — FUROSEMIDE 40 MG: 10 INJECTION, SOLUTION INTRAMUSCULAR; INTRAVENOUS at 18:29

## 2025-04-02 RX ADMIN — LEVOTHYROXINE SODIUM 100 MCG: 0.1 TABLET ORAL at 06:09

## 2025-04-03 ENCOUNTER — APPOINTMENT (OUTPATIENT)
Dept: GENERAL RADIOLOGY | Age: 75
DRG: 189 | End: 2025-04-03
Payer: MEDICARE

## 2025-04-03 LAB
ANION GAP SERPL CALCULATED.3IONS-SCNC: 11 MMOL/L (ref 7–16)
BASOPHILS # BLD: 0 K/UL (ref 0–0.2)
BASOPHILS NFR BLD: 0 % (ref 0–2)
BUN SERPL-MCNC: 37 MG/DL (ref 6–23)
CALCIUM SERPL-MCNC: 7.8 MG/DL (ref 8.6–10.2)
CHLORIDE SERPL-SCNC: 92 MMOL/L (ref 98–107)
CO2 SERPL-SCNC: 34 MMOL/L (ref 22–29)
CREAT SERPL-MCNC: 1.4 MG/DL (ref 0.5–1)
EOSINOPHIL # BLD: 0.12 K/UL (ref 0.05–0.5)
EOSINOPHILS RELATIVE PERCENT: 2 % (ref 0–6)
ERYTHROCYTE [DISTWIDTH] IN BLOOD BY AUTOMATED COUNT: 18.7 % (ref 11.5–15)
GFR, ESTIMATED: 39 ML/MIN/1.73M2
GLUCOSE SERPL-MCNC: 93 MG/DL (ref 74–99)
HCT VFR BLD AUTO: 27.1 % (ref 34–48)
HGB BLD-MCNC: 9 G/DL (ref 11.5–15.5)
LYMPHOCYTES NFR BLD: 1.56 K/UL (ref 1.5–4)
LYMPHOCYTES RELATIVE PERCENT: 24 % (ref 20–42)
MCH RBC QN AUTO: 35.7 PG (ref 26–35)
MCHC RBC AUTO-ENTMCNC: 33.2 G/DL (ref 32–34.5)
MCV RBC AUTO: 107.5 FL (ref 80–99.9)
MICROORGANISM SPEC CULT: NORMAL
MICROORGANISM SPEC CULT: NORMAL
MONOCYTES NFR BLD: 0.35 K/UL (ref 0.1–0.95)
MONOCYTES NFR BLD: 5 % (ref 2–12)
NEUTROPHILS NFR BLD: 69 % (ref 43–80)
NEUTS SEG NFR BLD: 4.57 K/UL (ref 1.8–7.3)
PLATELET CONFIRMATION: NORMAL
PLATELET, FLUORESCENCE: 35 K/UL (ref 130–450)
PMV BLD AUTO: 11.1 FL (ref 7–12)
POTASSIUM SERPL-SCNC: 3.7 MMOL/L (ref 3.5–5)
RBC # BLD AUTO: 2.52 M/UL (ref 3.5–5.5)
RBC # BLD: ABNORMAL 10*6/UL
SERVICE CMNT-IMP: NORMAL
SERVICE CMNT-IMP: NORMAL
SODIUM SERPL-SCNC: 137 MMOL/L (ref 132–146)
SPECIMEN DESCRIPTION: NORMAL
SPECIMEN DESCRIPTION: NORMAL
WBC OTHER # BLD: 6.6 K/UL (ref 4.5–11.5)

## 2025-04-03 PROCEDURE — 85025 COMPLETE CBC W/AUTO DIFF WBC: CPT

## 2025-04-03 PROCEDURE — 36415 COLL VENOUS BLD VENIPUNCTURE: CPT

## 2025-04-03 PROCEDURE — 99232 SBSQ HOSP IP/OBS MODERATE 35: CPT | Performed by: INTERNAL MEDICINE

## 2025-04-03 PROCEDURE — 99232 SBSQ HOSP IP/OBS MODERATE 35: CPT | Performed by: STUDENT IN AN ORGANIZED HEALTH CARE EDUCATION/TRAINING PROGRAM

## 2025-04-03 PROCEDURE — 71045 X-RAY EXAM CHEST 1 VIEW: CPT

## 2025-04-03 PROCEDURE — 2140000000 HC CCU INTERMEDIATE R&B

## 2025-04-03 PROCEDURE — 2700000000 HC OXYGEN THERAPY PER DAY

## 2025-04-03 PROCEDURE — 6370000000 HC RX 637 (ALT 250 FOR IP)

## 2025-04-03 PROCEDURE — 6360000002 HC RX W HCPCS: Performed by: FAMILY MEDICINE

## 2025-04-03 PROCEDURE — 6370000000 HC RX 637 (ALT 250 FOR IP): Performed by: STUDENT IN AN ORGANIZED HEALTH CARE EDUCATION/TRAINING PROGRAM

## 2025-04-03 PROCEDURE — 2500000003 HC RX 250 WO HCPCS

## 2025-04-03 PROCEDURE — 2580000003 HC RX 258: Performed by: FAMILY MEDICINE

## 2025-04-03 PROCEDURE — 80048 BASIC METABOLIC PNL TOTAL CA: CPT

## 2025-04-03 RX ADMIN — ATORVASTATIN CALCIUM 10 MG: 10 TABLET, FILM COATED ORAL at 22:03

## 2025-04-03 RX ADMIN — SPIRONOLACTONE 25 MG: 25 TABLET ORAL at 08:35

## 2025-04-03 RX ADMIN — EZETIMIBE 10 MG: 10 TABLET ORAL at 08:36

## 2025-04-03 RX ADMIN — AZITHROMYCIN MONOHYDRATE 500 MG: 500 INJECTION, POWDER, LYOPHILIZED, FOR SOLUTION INTRAVENOUS at 19:35

## 2025-04-03 RX ADMIN — METOPROLOL SUCCINATE 25 MG: 25 TABLET, EXTENDED RELEASE ORAL at 08:35

## 2025-04-03 RX ADMIN — DOCUSATE SODIUM 100 MG: 100 CAPSULE, LIQUID FILLED ORAL at 08:35

## 2025-04-03 RX ADMIN — CEFEPIME 2000 MG: 2 INJECTION, POWDER, FOR SOLUTION INTRAVENOUS at 06:30

## 2025-04-03 RX ADMIN — Medication 1000 MG: at 22:03

## 2025-04-03 RX ADMIN — DIVALPROEX SODIUM 500 MG: 500 TABLET, DELAYED RELEASE ORAL at 22:03

## 2025-04-03 RX ADMIN — AMOXICILLIN AND CLAVULANATE POTASSIUM 1 TABLET: 875; 125 TABLET, FILM COATED ORAL at 11:30

## 2025-04-03 RX ADMIN — AMOXICILLIN AND CLAVULANATE POTASSIUM 1 TABLET: 875; 125 TABLET, FILM COATED ORAL at 22:03

## 2025-04-03 RX ADMIN — SODIUM CHLORIDE, PRESERVATIVE FREE 10 ML: 5 INJECTION INTRAVENOUS at 21:00

## 2025-04-03 RX ADMIN — ESCITALOPRAM OXALATE 10 MG: 10 TABLET ORAL at 22:03

## 2025-04-03 RX ADMIN — FUROSEMIDE 40 MG: 10 INJECTION, SOLUTION INTRAMUSCULAR; INTRAVENOUS at 17:34

## 2025-04-03 RX ADMIN — LISINOPRIL 2.5 MG: 2.5 TABLET ORAL at 08:35

## 2025-04-03 RX ADMIN — Medication 1 TABLET: at 08:36

## 2025-04-03 RX ADMIN — PANTOPRAZOLE SODIUM 40 MG: 40 TABLET, DELAYED RELEASE ORAL at 08:35

## 2025-04-03 RX ADMIN — LEVOTHYROXINE SODIUM 100 MCG: 0.1 TABLET ORAL at 06:25

## 2025-04-03 RX ADMIN — FUROSEMIDE 40 MG: 10 INJECTION, SOLUTION INTRAMUSCULAR; INTRAVENOUS at 08:35

## 2025-04-03 RX ADMIN — QUETIAPINE FUMARATE 400 MG: 200 TABLET ORAL at 22:03

## 2025-04-04 ENCOUNTER — APPOINTMENT (OUTPATIENT)
Dept: GENERAL RADIOLOGY | Age: 75
DRG: 189 | End: 2025-04-04
Payer: MEDICARE

## 2025-04-04 ENCOUNTER — APPOINTMENT (OUTPATIENT)
Dept: CT IMAGING | Age: 75
DRG: 189 | End: 2025-04-04
Payer: MEDICARE

## 2025-04-04 LAB
ABO + RH BLD: NORMAL
ALBUMIN SERPL-MCNC: 2.7 G/DL (ref 3.5–5.2)
ALP SERPL-CCNC: 89 U/L (ref 35–104)
ALT SERPL-CCNC: 25 U/L (ref 0–32)
ANION GAP SERPL CALCULATED.3IONS-SCNC: 16 MMOL/L (ref 7–16)
ARM BAND NUMBER: NORMAL
AST SERPL-CCNC: 34 U/L (ref 0–31)
B.E.: 15.7 MMOL/L (ref -3–3)
BASOPHILS # BLD: 0 K/UL (ref 0–0.2)
BASOPHILS # BLD: 0.05 K/UL (ref 0–0.2)
BASOPHILS NFR BLD: 0 % (ref 0–2)
BASOPHILS NFR BLD: 0 % (ref 0–2)
BILIRUB SERPL-MCNC: 0.7 MG/DL (ref 0–1.2)
BLOOD BANK SAMPLE EXPIRATION: NORMAL
BLOOD GROUP ANTIBODIES SERPL: NEGATIVE
BNP SERPL-MCNC: ABNORMAL PG/ML (ref 0–125)
BUN SERPL-MCNC: 46 MG/DL (ref 6–23)
CALCIUM SERPL-MCNC: 7.7 MG/DL (ref 8.6–10.2)
CHLORIDE SERPL-SCNC: 89 MMOL/L (ref 98–107)
CO2 SERPL-SCNC: 28 MMOL/L (ref 22–29)
COHB: 1.2 % (ref 0–1.5)
CREAT SERPL-MCNC: 1.7 MG/DL (ref 0.5–1)
CRITICAL: ABNORMAL
DATE ANALYZED: ABNORMAL
DATE OF COLLECTION: ABNORMAL
EOSINOPHIL # BLD: 0.06 K/UL (ref 0.05–0.5)
EOSINOPHIL # BLD: 0.19 K/UL (ref 0.05–0.5)
EOSINOPHILS RELATIVE PERCENT: 1 % (ref 0–6)
EOSINOPHILS RELATIVE PERCENT: 1 % (ref 0–6)
ERYTHROCYTE [DISTWIDTH] IN BLOOD BY AUTOMATED COUNT: 18.4 % (ref 11.5–15)
ERYTHROCYTE [DISTWIDTH] IN BLOOD BY AUTOMATED COUNT: 18.6 % (ref 11.5–15)
ERYTHROCYTE [DISTWIDTH] IN BLOOD BY AUTOMATED COUNT: 18.9 % (ref 11.5–15)
GFR, ESTIMATED: 31 ML/MIN/1.73M2
GLUCOSE BLD-MCNC: 136 MG/DL (ref 74–99)
GLUCOSE SERPL-MCNC: 111 MG/DL (ref 74–99)
HCO3: 40.2 MMOL/L (ref 22–26)
HCT VFR BLD AUTO: 25.3 % (ref 34–48)
HCT VFR BLD AUTO: 26.5 % (ref 34–48)
HCT VFR BLD AUTO: 27.1 % (ref 34–48)
HCT VFR BLD AUTO: 27.2 % (ref 34–48)
HGB BLD-MCNC: 8.3 G/DL (ref 11.5–15.5)
HGB BLD-MCNC: 8.9 G/DL (ref 11.5–15.5)
HGB BLD-MCNC: 9.1 G/DL (ref 11.5–15.5)
HGB BLD-MCNC: 9.2 G/DL (ref 11.5–15.5)
HHB: 5.4 % (ref 0–5)
IMM GRANULOCYTES # BLD AUTO: 0.08 K/UL (ref 0–0.58)
IMM GRANULOCYTES NFR BLD: 1 % (ref 0–5)
LAB: ABNORMAL
LACTATE BLDV-SCNC: 1.1 MMOL/L (ref 0.5–2.2)
LYMPHOCYTES NFR BLD: 0.83 K/UL (ref 1.5–4)
LYMPHOCYTES NFR BLD: 4.19 K/UL (ref 1.5–4)
LYMPHOCYTES RELATIVE PERCENT: 11 % (ref 20–42)
LYMPHOCYTES RELATIVE PERCENT: 32 % (ref 20–42)
Lab: 117
MAGNESIUM SERPL-MCNC: 1.3 MG/DL (ref 1.6–2.6)
MCH RBC QN AUTO: 35.5 PG (ref 26–35)
MCH RBC QN AUTO: 35.8 PG (ref 26–35)
MCH RBC QN AUTO: 36.7 PG (ref 26–35)
MCHC RBC AUTO-ENTMCNC: 32.8 G/DL (ref 32–34.5)
MCHC RBC AUTO-ENTMCNC: 33.8 G/DL (ref 32–34.5)
MCHC RBC AUTO-ENTMCNC: 34.3 G/DL (ref 32–34.5)
MCV RBC AUTO: 105.8 FL (ref 80–99.9)
MCV RBC AUTO: 106.9 FL (ref 80–99.9)
MCV RBC AUTO: 108 FL (ref 80–99.9)
METHB: 0.5 % (ref 0–1.5)
MODE: ABNORMAL
MONOCYTES NFR BLD: 0.45 K/UL (ref 0.1–0.95)
MONOCYTES NFR BLD: 0.64 K/UL (ref 0.1–0.95)
MONOCYTES NFR BLD: 5 % (ref 2–12)
MONOCYTES NFR BLD: 6 % (ref 2–12)
NEUTROPHILS NFR BLD: 61 % (ref 43–80)
NEUTROPHILS NFR BLD: 82 % (ref 43–80)
NEUTS SEG NFR BLD: 5.96 K/UL (ref 1.8–7.3)
NEUTS SEG NFR BLD: 8.13 K/UL (ref 1.8–7.3)
O2 SATURATION: 94.5 % (ref 92–98.5)
O2HB: 92.9 % (ref 94–97)
OPERATOR ID: 2860
PATIENT TEMP: 37 C
PCO2: 49.5 MMHG (ref 35–45)
PH BLOOD GAS: 7.53 (ref 7.35–7.45)
PHOSPHATE SERPL-MCNC: 3 MG/DL (ref 2.5–4.5)
PLATELET # BLD AUTO: 29 K/UL (ref 130–450)
PLATELET # BLD AUTO: 59 K/UL (ref 130–450)
PLATELET CONFIRMATION: NORMAL
PLATELET, FLUORESCENCE: 36 K/UL (ref 130–450)
PMV BLD AUTO: 10.3 FL (ref 7–12)
PMV BLD AUTO: 10.4 FL (ref 7–12)
PMV BLD AUTO: 11.4 FL (ref 7–12)
PO2: 71.1 MMHG (ref 75–100)
POTASSIUM SERPL-SCNC: 4.6 MMOL/L (ref 3.5–5)
PROCALCITONIN SERPL-MCNC: 0.32 NG/ML (ref 0–0.08)
PROT SERPL-MCNC: 4.6 G/DL (ref 6.4–8.3)
RBC # BLD AUTO: 2.48 M/UL (ref 3.5–5.5)
RBC # BLD AUTO: 2.51 M/UL (ref 3.5–5.5)
RBC # BLD AUTO: 2.57 M/UL (ref 3.5–5.5)
RBC # BLD: ABNORMAL 10*6/UL
SODIUM SERPL-SCNC: 133 MMOL/L (ref 132–146)
SOURCE, BLOOD GAS: ABNORMAL
THB: 10.3 G/DL (ref 11.5–16.5)
TIME ANALYZED: 130
WBC # BLD: ABNORMAL 10*3/UL
WBC OTHER # BLD: 13.3 K/UL (ref 4.5–11.5)
WBC OTHER # BLD: 13.3 K/UL (ref 4.5–11.5)
WBC OTHER # BLD: 7.3 K/UL (ref 4.5–11.5)

## 2025-04-04 PROCEDURE — 86900 BLOOD TYPING SEROLOGIC ABO: CPT

## 2025-04-04 PROCEDURE — 6370000000 HC RX 637 (ALT 250 FOR IP): Performed by: STUDENT IN AN ORGANIZED HEALTH CARE EDUCATION/TRAINING PROGRAM

## 2025-04-04 PROCEDURE — 6370000000 HC RX 637 (ALT 250 FOR IP): Performed by: NURSE PRACTITIONER

## 2025-04-04 PROCEDURE — 85018 HEMOGLOBIN: CPT

## 2025-04-04 PROCEDURE — 2000000000 HC ICU R&B

## 2025-04-04 PROCEDURE — 6360000002 HC RX W HCPCS: Performed by: STUDENT IN AN ORGANIZED HEALTH CARE EDUCATION/TRAINING PROGRAM

## 2025-04-04 PROCEDURE — 2500000003 HC RX 250 WO HCPCS

## 2025-04-04 PROCEDURE — 84145 PROCALCITONIN (PCT): CPT

## 2025-04-04 PROCEDURE — 36556 INSERT NON-TUNNEL CV CATH: CPT | Performed by: INTERNAL MEDICINE

## 2025-04-04 PROCEDURE — 86850 RBC ANTIBODY SCREEN: CPT

## 2025-04-04 PROCEDURE — 2580000003 HC RX 258: Performed by: INTERNAL MEDICINE

## 2025-04-04 PROCEDURE — 6370000000 HC RX 637 (ALT 250 FOR IP)

## 2025-04-04 PROCEDURE — 71045 X-RAY EXAM CHEST 1 VIEW: CPT

## 2025-04-04 PROCEDURE — 82962 GLUCOSE BLOOD TEST: CPT

## 2025-04-04 PROCEDURE — 82805 BLOOD GASES W/O2 SATURATION: CPT

## 2025-04-04 PROCEDURE — P9073 PLATELETS PHERESIS PATH REDU: HCPCS

## 2025-04-04 PROCEDURE — 85014 HEMATOCRIT: CPT

## 2025-04-04 PROCEDURE — 2580000003 HC RX 258: Performed by: STUDENT IN AN ORGANIZED HEALTH CARE EDUCATION/TRAINING PROGRAM

## 2025-04-04 PROCEDURE — 6360000002 HC RX W HCPCS: Performed by: INTERNAL MEDICINE

## 2025-04-04 PROCEDURE — 36430 TRANSFUSION BLD/BLD COMPNT: CPT

## 2025-04-04 PROCEDURE — 94660 CPAP INITIATION&MGMT: CPT

## 2025-04-04 PROCEDURE — 87040 BLOOD CULTURE FOR BACTERIA: CPT

## 2025-04-04 PROCEDURE — 30233R1 TRANSFUSION OF NONAUTOLOGOUS PLATELETS INTO PERIPHERAL VEIN, PERCUTANEOUS APPROACH: ICD-10-PCS | Performed by: STUDENT IN AN ORGANIZED HEALTH CARE EDUCATION/TRAINING PROGRAM

## 2025-04-04 PROCEDURE — 84100 ASSAY OF PHOSPHORUS: CPT

## 2025-04-04 PROCEDURE — 2580000003 HC RX 258

## 2025-04-04 PROCEDURE — 70450 CT HEAD/BRAIN W/O DYE: CPT

## 2025-04-04 PROCEDURE — 99232 SBSQ HOSP IP/OBS MODERATE 35: CPT | Performed by: INTERNAL MEDICINE

## 2025-04-04 PROCEDURE — 86901 BLOOD TYPING SEROLOGIC RH(D): CPT

## 2025-04-04 PROCEDURE — 99232 SBSQ HOSP IP/OBS MODERATE 35: CPT | Performed by: STUDENT IN AN ORGANIZED HEALTH CARE EDUCATION/TRAINING PROGRAM

## 2025-04-04 PROCEDURE — 2700000000 HC OXYGEN THERAPY PER DAY

## 2025-04-04 PROCEDURE — 36556 INSERT NON-TUNNEL CV CATH: CPT

## 2025-04-04 PROCEDURE — 87081 CULTURE SCREEN ONLY: CPT

## 2025-04-04 PROCEDURE — 83880 ASSAY OF NATRIURETIC PEPTIDE: CPT

## 2025-04-04 PROCEDURE — 83735 ASSAY OF MAGNESIUM: CPT

## 2025-04-04 PROCEDURE — 6360000002 HC RX W HCPCS: Performed by: FAMILY MEDICINE

## 2025-04-04 PROCEDURE — 85027 COMPLETE CBC AUTOMATED: CPT

## 2025-04-04 PROCEDURE — 80053 COMPREHEN METABOLIC PANEL: CPT

## 2025-04-04 PROCEDURE — 3E033XZ INTRODUCTION OF VASOPRESSOR INTO PERIPHERAL VEIN, PERCUTANEOUS APPROACH: ICD-10-PCS | Performed by: STUDENT IN AN ORGANIZED HEALTH CARE EDUCATION/TRAINING PROGRAM

## 2025-04-04 PROCEDURE — 2580000003 HC RX 258: Performed by: FAMILY MEDICINE

## 2025-04-04 PROCEDURE — 06HY33Z INSERTION OF INFUSION DEVICE INTO LOWER VEIN, PERCUTANEOUS APPROACH: ICD-10-PCS

## 2025-04-04 PROCEDURE — 83605 ASSAY OF LACTIC ACID: CPT

## 2025-04-04 PROCEDURE — 99291 CRITICAL CARE FIRST HOUR: CPT | Performed by: INTERNAL MEDICINE

## 2025-04-04 PROCEDURE — 6360000002 HC RX W HCPCS

## 2025-04-04 PROCEDURE — 36415 COLL VENOUS BLD VENIPUNCTURE: CPT

## 2025-04-04 PROCEDURE — 85025 COMPLETE CBC W/AUTO DIFF WBC: CPT

## 2025-04-04 RX ORDER — MIDODRINE HYDROCHLORIDE 10 MG/1
10 TABLET ORAL ONCE
Status: COMPLETED | OUTPATIENT
Start: 2025-04-04 | End: 2025-04-04

## 2025-04-04 RX ORDER — SODIUM CHLORIDE 0.9 % (FLUSH) 0.9 %
5-40 SYRINGE (ML) INJECTION EVERY 12 HOURS SCHEDULED
Status: DISCONTINUED | OUTPATIENT
Start: 2025-04-04 | End: 2025-04-16 | Stop reason: HOSPADM

## 2025-04-04 RX ORDER — MIDODRINE HYDROCHLORIDE 10 MG/1
10 TABLET ORAL
Status: DISCONTINUED | OUTPATIENT
Start: 2025-04-04 | End: 2025-04-04

## 2025-04-04 RX ORDER — LORAZEPAM 2 MG/ML
0.26 INJECTION INTRAMUSCULAR ONCE
Status: COMPLETED | OUTPATIENT
Start: 2025-04-04 | End: 2025-04-04

## 2025-04-04 RX ORDER — 0.9 % SODIUM CHLORIDE 0.9 %
1000 INTRAVENOUS SOLUTION INTRAVENOUS ONCE
Status: COMPLETED | OUTPATIENT
Start: 2025-04-04 | End: 2025-04-04

## 2025-04-04 RX ORDER — 0.9 % SODIUM CHLORIDE 0.9 %
500 INTRAVENOUS SOLUTION INTRAVENOUS ONCE
Status: DISCONTINUED | OUTPATIENT
Start: 2025-04-04 | End: 2025-04-14 | Stop reason: SDUPTHER

## 2025-04-04 RX ORDER — POLYETHYLENE GLYCOL 3350 17 G/17G
17 POWDER, FOR SOLUTION ORAL DAILY PRN
Status: DISCONTINUED | OUTPATIENT
Start: 2025-04-04 | End: 2025-04-16 | Stop reason: HOSPADM

## 2025-04-04 RX ORDER — 0.9 % SODIUM CHLORIDE 0.9 %
500 INTRAVENOUS SOLUTION INTRAVENOUS ONCE
Status: COMPLETED | OUTPATIENT
Start: 2025-04-04 | End: 2025-04-04

## 2025-04-04 RX ORDER — SODIUM CHLORIDE 9 MG/ML
INJECTION, SOLUTION INTRAVENOUS PRN
Status: DISCONTINUED | OUTPATIENT
Start: 2025-04-04 | End: 2025-04-14 | Stop reason: SDUPTHER

## 2025-04-04 RX ORDER — VALPROIC ACID 250 MG/5ML
250 SOLUTION ORAL 2 TIMES DAILY
Status: DISCONTINUED | OUTPATIENT
Start: 2025-04-05 | End: 2025-04-16 | Stop reason: HOSPADM

## 2025-04-04 RX ORDER — SODIUM CHLORIDE 0.9 % (FLUSH) 0.9 %
5-40 SYRINGE (ML) INJECTION PRN
Status: DISCONTINUED | OUTPATIENT
Start: 2025-04-04 | End: 2025-04-16 | Stop reason: HOSPADM

## 2025-04-04 RX ORDER — NOREPINEPHRINE BITARTRATE 0.06 MG/ML
1-100 INJECTION, SOLUTION INTRAVENOUS CONTINUOUS
Status: DISCONTINUED | OUTPATIENT
Start: 2025-04-04 | End: 2025-04-08

## 2025-04-04 RX ADMIN — AZITHROMYCIN MONOHYDRATE 500 MG: 500 INJECTION, POWDER, LYOPHILIZED, FOR SOLUTION INTRAVENOUS at 22:50

## 2025-04-04 RX ADMIN — EZETIMIBE 10 MG: 10 TABLET ORAL at 08:18

## 2025-04-04 RX ADMIN — LORAZEPAM 0.26 MG: 2 INJECTION INTRAMUSCULAR; INTRAVENOUS at 20:36

## 2025-04-04 RX ADMIN — SODIUM CHLORIDE, PRESERVATIVE FREE 10 ML: 5 INJECTION INTRAVENOUS at 21:41

## 2025-04-04 RX ADMIN — PIPERACILLIN AND TAZOBACTAM 4500 MG: 4; .5 INJECTION, POWDER, LYOPHILIZED, FOR SOLUTION INTRAVENOUS at 11:26

## 2025-04-04 RX ADMIN — SODIUM CHLORIDE 500 ML: 0.9 INJECTION, SOLUTION INTRAVENOUS at 22:36

## 2025-04-04 RX ADMIN — LISINOPRIL 2.5 MG: 2.5 TABLET ORAL at 08:19

## 2025-04-04 RX ADMIN — LORAZEPAM 0.26 MG: 2 INJECTION INTRAMUSCULAR; INTRAVENOUS at 20:10

## 2025-04-04 RX ADMIN — AMOXICILLIN AND CLAVULANATE POTASSIUM 1 TABLET: 875; 125 TABLET, FILM COATED ORAL at 08:19

## 2025-04-04 RX ADMIN — SODIUM CHLORIDE, PRESERVATIVE FREE 10 ML: 5 INJECTION INTRAVENOUS at 21:42

## 2025-04-04 RX ADMIN — MIDODRINE HYDROCHLORIDE 10 MG: 10 TABLET ORAL at 00:25

## 2025-04-04 RX ADMIN — SODIUM CHLORIDE, PRESERVATIVE FREE 10 ML: 5 INJECTION INTRAVENOUS at 11:27

## 2025-04-04 RX ADMIN — PIPERACILLIN AND TAZOBACTAM 4500 MG: 4; .5 INJECTION, POWDER, LYOPHILIZED, FOR SOLUTION INTRAVENOUS at 18:51

## 2025-04-04 RX ADMIN — DOCUSATE SODIUM 100 MG: 100 CAPSULE, LIQUID FILLED ORAL at 08:17

## 2025-04-04 RX ADMIN — PANTOPRAZOLE SODIUM 40 MG: 40 TABLET, DELAYED RELEASE ORAL at 08:17

## 2025-04-04 RX ADMIN — Medication 5 MCG/MIN: at 14:04

## 2025-04-04 RX ADMIN — LEVOTHYROXINE SODIUM 100 MCG: 0.1 TABLET ORAL at 06:49

## 2025-04-04 RX ADMIN — Medication 1 TABLET: at 08:18

## 2025-04-04 RX ADMIN — SODIUM CHLORIDE 1000 ML: 0.9 INJECTION, SOLUTION INTRAVENOUS at 09:35

## 2025-04-04 ASSESSMENT — PAIN SCALES - GENERAL
PAINLEVEL_OUTOF10: 0

## 2025-04-04 ASSESSMENT — PAIN SCALES - WONG BAKER: WONGBAKER_NUMERICALRESPONSE: NO HURT

## 2025-04-05 ENCOUNTER — APPOINTMENT (OUTPATIENT)
Dept: GENERAL RADIOLOGY | Age: 75
DRG: 189 | End: 2025-04-05
Payer: MEDICARE

## 2025-04-05 LAB
ALBUMIN SERPL-MCNC: 2.6 G/DL (ref 3.5–5.2)
ALP SERPL-CCNC: 90 U/L (ref 35–104)
ALT SERPL-CCNC: 22 U/L (ref 0–32)
ANION GAP SERPL CALCULATED.3IONS-SCNC: 12 MMOL/L (ref 7–16)
ARM BAND NUMBER: NORMAL
AST SERPL-CCNC: 23 U/L (ref 0–31)
BASOPHILS # BLD: 0.24 K/UL (ref 0–0.2)
BASOPHILS NFR BLD: 2 % (ref 0–2)
BILIRUB SERPL-MCNC: 0.6 MG/DL (ref 0–1.2)
BLOOD BANK BLOOD PRODUCT EXPIRATION DATE: NORMAL
BLOOD BANK DISPENSE STATUS: NORMAL
BLOOD BANK ISBT PRODUCT BLOOD TYPE: 8400
BLOOD BANK PRODUCT CODE: NORMAL
BLOOD BANK UNIT TYPE AND RH: NORMAL
BPU ID: NORMAL
BUN SERPL-MCNC: 43 MG/DL (ref 6–23)
CALCIUM SERPL-MCNC: 7.8 MG/DL (ref 8.6–10.2)
CHLORIDE SERPL-SCNC: 94 MMOL/L (ref 98–107)
CO2 SERPL-SCNC: 31 MMOL/L (ref 22–29)
COMPONENT: NORMAL
CREAT SERPL-MCNC: 1.6 MG/DL (ref 0.5–1)
EOSINOPHIL # BLD: 0.6 K/UL (ref 0.05–0.5)
EOSINOPHILS RELATIVE PERCENT: 4 % (ref 0–6)
ERYTHROCYTE [DISTWIDTH] IN BLOOD BY AUTOMATED COUNT: 18.6 % (ref 11.5–15)
GFR, ESTIMATED: 33 ML/MIN/1.73M2
GLUCOSE BLD-MCNC: 115 MG/DL (ref 74–99)
GLUCOSE SERPL-MCNC: 118 MG/DL (ref 74–99)
HCT VFR BLD AUTO: 24.7 % (ref 34–48)
HGB BLD-MCNC: 8.3 G/DL (ref 11.5–15.5)
LYMPHOCYTES NFR BLD: 2.28 K/UL (ref 1.5–4)
LYMPHOCYTES RELATIVE PERCENT: 17 % (ref 20–42)
MAGNESIUM SERPL-MCNC: 1.9 MG/DL (ref 1.6–2.6)
MCH RBC QN AUTO: 35.6 PG (ref 26–35)
MCHC RBC AUTO-ENTMCNC: 33.6 G/DL (ref 32–34.5)
MCV RBC AUTO: 106 FL (ref 80–99.9)
MONOCYTES NFR BLD: 0.72 K/UL (ref 0.1–0.95)
MONOCYTES NFR BLD: 5 % (ref 2–12)
NEUTROPHILS NFR BLD: 72 % (ref 43–80)
NEUTS SEG NFR BLD: 9.85 K/UL (ref 1.8–7.3)
PHOSPHATE SERPL-MCNC: 3.6 MG/DL (ref 2.5–4.5)
PLATELET # BLD AUTO: 50 K/UL (ref 130–450)
PLATELET CONFIRMATION: NORMAL
PMV BLD AUTO: 9.9 FL (ref 7–12)
POTASSIUM SERPL-SCNC: 3.9 MMOL/L (ref 3.5–5)
PROT SERPL-MCNC: 4.4 G/DL (ref 6.4–8.3)
RBC # BLD AUTO: 2.33 M/UL (ref 3.5–5.5)
RBC # BLD: ABNORMAL 10*6/UL
SODIUM SERPL-SCNC: 137 MMOL/L (ref 132–146)
TRANSFUSION STATUS: NORMAL
UNIT DIVISION: 0
UNIT ISSUE DATE/TIME: NORMAL
WBC OTHER # BLD: 13.7 K/UL (ref 4.5–11.5)

## 2025-04-05 PROCEDURE — 6370000000 HC RX 637 (ALT 250 FOR IP)

## 2025-04-05 PROCEDURE — 71045 X-RAY EXAM CHEST 1 VIEW: CPT

## 2025-04-05 PROCEDURE — 99291 CRITICAL CARE FIRST HOUR: CPT | Performed by: INTERNAL MEDICINE

## 2025-04-05 PROCEDURE — 83735 ASSAY OF MAGNESIUM: CPT

## 2025-04-05 PROCEDURE — 80053 COMPREHEN METABOLIC PANEL: CPT

## 2025-04-05 PROCEDURE — 6360000002 HC RX W HCPCS

## 2025-04-05 PROCEDURE — 2500000003 HC RX 250 WO HCPCS

## 2025-04-05 PROCEDURE — 2700000000 HC OXYGEN THERAPY PER DAY

## 2025-04-05 PROCEDURE — 2580000003 HC RX 258

## 2025-04-05 PROCEDURE — 2000000000 HC ICU R&B

## 2025-04-05 PROCEDURE — 85025 COMPLETE CBC W/AUTO DIFF WBC: CPT

## 2025-04-05 PROCEDURE — 36415 COLL VENOUS BLD VENIPUNCTURE: CPT

## 2025-04-05 PROCEDURE — 87040 BLOOD CULTURE FOR BACTERIA: CPT

## 2025-04-05 PROCEDURE — 84100 ASSAY OF PHOSPHORUS: CPT

## 2025-04-05 PROCEDURE — 94660 CPAP INITIATION&MGMT: CPT

## 2025-04-05 PROCEDURE — 2580000003 HC RX 258: Performed by: STUDENT IN AN ORGANIZED HEALTH CARE EDUCATION/TRAINING PROGRAM

## 2025-04-05 PROCEDURE — 6360000002 HC RX W HCPCS: Performed by: STUDENT IN AN ORGANIZED HEALTH CARE EDUCATION/TRAINING PROGRAM

## 2025-04-05 PROCEDURE — 99232 SBSQ HOSP IP/OBS MODERATE 35: CPT | Performed by: STUDENT IN AN ORGANIZED HEALTH CARE EDUCATION/TRAINING PROGRAM

## 2025-04-05 PROCEDURE — 82962 GLUCOSE BLOOD TEST: CPT

## 2025-04-05 PROCEDURE — 93005 ELECTROCARDIOGRAM TRACING: CPT

## 2025-04-05 RX ORDER — MAGNESIUM SULFATE 1 G/100ML
1000 INJECTION INTRAVENOUS ONCE
Status: COMPLETED | OUTPATIENT
Start: 2025-04-05 | End: 2025-04-05

## 2025-04-05 RX ORDER — MAGNESIUM SULFATE IN WATER 40 MG/ML
2000 INJECTION, SOLUTION INTRAVENOUS ONCE
Status: COMPLETED | OUTPATIENT
Start: 2025-04-05 | End: 2025-04-05

## 2025-04-05 RX ADMIN — Medication 1000 MG: at 20:48

## 2025-04-05 RX ADMIN — Medication 1 TABLET: at 07:41

## 2025-04-05 RX ADMIN — Medication 12 MCG/MIN: at 19:12

## 2025-04-05 RX ADMIN — SODIUM CHLORIDE, PRESERVATIVE FREE 40 MG: 5 INJECTION INTRAVENOUS at 07:42

## 2025-04-05 RX ADMIN — VALPROIC ACID 250 MG: 250 SOLUTION ORAL at 20:54

## 2025-04-05 RX ADMIN — PIPERACILLIN AND TAZOBACTAM 4500 MG: 4; .5 INJECTION, POWDER, LYOPHILIZED, FOR SOLUTION INTRAVENOUS at 19:11

## 2025-04-05 RX ADMIN — ATORVASTATIN CALCIUM 10 MG: 10 TABLET, FILM COATED ORAL at 20:48

## 2025-04-05 RX ADMIN — PIPERACILLIN AND TAZOBACTAM 4500 MG: 4; .5 INJECTION, POWDER, LYOPHILIZED, FOR SOLUTION INTRAVENOUS at 02:20

## 2025-04-05 RX ADMIN — PIPERACILLIN AND TAZOBACTAM 4500 MG: 4; .5 INJECTION, POWDER, LYOPHILIZED, FOR SOLUTION INTRAVENOUS at 07:44

## 2025-04-05 RX ADMIN — QUETIAPINE FUMARATE 400 MG: 200 TABLET ORAL at 20:48

## 2025-04-05 RX ADMIN — ESCITALOPRAM OXALATE 10 MG: 10 TABLET ORAL at 20:48

## 2025-04-05 RX ADMIN — LEVOTHYROXINE SODIUM 100 MCG: 0.1 TABLET ORAL at 07:41

## 2025-04-05 RX ADMIN — EZETIMIBE 10 MG: 10 TABLET ORAL at 07:41

## 2025-04-05 RX ADMIN — MAGNESIUM SULFATE HEPTAHYDRATE 2000 MG: 40 INJECTION, SOLUTION INTRAVENOUS at 00:50

## 2025-04-05 RX ADMIN — MAGNESIUM SULFATE HEPTAHYDRATE 1000 MG: 1 INJECTION, SOLUTION INTRAVENOUS at 08:05

## 2025-04-05 RX ADMIN — SODIUM CHLORIDE, PRESERVATIVE FREE 10 ML: 5 INJECTION INTRAVENOUS at 20:55

## 2025-04-05 RX ADMIN — VALPROIC ACID 250 MG: 250 SOLUTION ORAL at 07:43

## 2025-04-05 ASSESSMENT — PAIN SCALES - GENERAL: PAINLEVEL_OUTOF10: 0

## 2025-04-05 NOTE — PROCEDURES
PROCEDURE NOTE  Date: 4/4/2025   Name: Tenisha Bruce  YOB: 1950    Procedures      Central Line Insertion     Procedure: left femoral vein triple lumen catheter placement.    Indications: vascular access    Consent: The legal guardian was counseled regarding the procedure, its indications, risks, potential complications and alternatives, and any questions were answered. Consent was obtained to proceed.    Number of sticks: 3    Number of Kits used: 1    Procedure: Time Out: Immediately prior to the procedure a \"timeout\" was called to verify the correct patient and procedure. The patient was place in the trendelenburg position and the skin over the left femoral vein was prepped with chlorhexidine. Local anesthesia was obtained by infiltration using 1% Lidocaine without epinephrine.  With ultrasound guidance a large bore needle was used to identify the vein, dark non pulsatile blood returned. The guide wire was then inserted through the needle with minimal resistance. 2 mm nick was made in the skin beside the guidewire. Then a dilator was inserted and removed. A triple lumen catheter was then inserted into the vessel over the guide wire using the Seldinger technique to the 15 cm mateo.  All ports showed good, free flowing blood return and were flushed with saline solution.  The catheter was then securely fastened to the skin with sutures and covered with a bio patch and sterile dressing.  A post procedure X-ray was not indicated.    Complications: bleeding   The patient tolerated the procedure well.    Estimated blood loss: 5 ml.    Sophie Feliz MD PGY-2  4/4/2025  11:59 PM

## 2025-04-06 ENCOUNTER — APPOINTMENT (OUTPATIENT)
Dept: GENERAL RADIOLOGY | Age: 75
DRG: 189 | End: 2025-04-06
Payer: MEDICARE

## 2025-04-06 LAB
ALBUMIN SERPL-MCNC: 2.4 G/DL (ref 3.5–5.2)
ALP SERPL-CCNC: 87 U/L (ref 35–104)
ALT SERPL-CCNC: 21 U/L (ref 0–32)
ANION GAP SERPL CALCULATED.3IONS-SCNC: 15 MMOL/L (ref 7–16)
AST SERPL-CCNC: 23 U/L (ref 0–31)
BASOPHILS # BLD: 0 K/UL (ref 0–0.2)
BASOPHILS NFR BLD: 0 % (ref 0–2)
BILIRUB SERPL-MCNC: 0.6 MG/DL (ref 0–1.2)
BUN SERPL-MCNC: 36 MG/DL (ref 6–23)
CALCIUM SERPL-MCNC: 8.1 MG/DL (ref 8.6–10.2)
CHLORIDE SERPL-SCNC: 96 MMOL/L (ref 98–107)
CO2 SERPL-SCNC: 27 MMOL/L (ref 22–29)
CREAT SERPL-MCNC: 1.4 MG/DL (ref 0.5–1)
EOSINOPHIL # BLD: 0.5 K/UL (ref 0.05–0.5)
EOSINOPHILS RELATIVE PERCENT: 6 % (ref 0–6)
ERYTHROCYTE [DISTWIDTH] IN BLOOD BY AUTOMATED COUNT: 18.1 % (ref 11.5–15)
GFR, ESTIMATED: 39 ML/MIN/1.73M2
GLUCOSE BLD-MCNC: 134 MG/DL (ref 74–99)
GLUCOSE BLD-MCNC: 181 MG/DL (ref 74–99)
GLUCOSE BLD-MCNC: 202 MG/DL (ref 74–99)
GLUCOSE SERPL-MCNC: 122 MG/DL (ref 74–99)
HCT VFR BLD AUTO: 22.2 % (ref 34–48)
HGB BLD-MCNC: 7.5 G/DL (ref 11.5–15.5)
LYMPHOCYTES NFR BLD: 2.64 K/UL (ref 1.5–4)
LYMPHOCYTES RELATIVE PERCENT: 32 % (ref 20–42)
MAGNESIUM SERPL-MCNC: 1.9 MG/DL (ref 1.6–2.6)
MCH RBC QN AUTO: 36.6 PG (ref 26–35)
MCHC RBC AUTO-ENTMCNC: 33.8 G/DL (ref 32–34.5)
MCV RBC AUTO: 108.3 FL (ref 80–99.9)
MICROORGANISM SPEC CULT: NORMAL
MONOCYTES NFR BLD: 0.43 K/UL (ref 0.1–0.95)
MONOCYTES NFR BLD: 5 % (ref 2–12)
NEUTROPHILS NFR BLD: 57 % (ref 43–80)
NEUTS SEG NFR BLD: 4.63 K/UL (ref 1.8–7.3)
PHOSPHATE SERPL-MCNC: 2.7 MG/DL (ref 2.5–4.5)
PLATELET # BLD AUTO: 36 K/UL (ref 130–450)
PLATELET CONFIRMATION: NORMAL
PMV BLD AUTO: 10.4 FL (ref 7–12)
POTASSIUM SERPL-SCNC: 4 MMOL/L (ref 3.5–5)
PROT SERPL-MCNC: 4.3 G/DL (ref 6.4–8.3)
RBC # BLD AUTO: 2.05 M/UL (ref 3.5–5.5)
RBC # BLD: ABNORMAL 10*6/UL
SODIUM SERPL-SCNC: 138 MMOL/L (ref 132–146)
SPECIMEN DESCRIPTION: NORMAL
WBC OTHER # BLD: 8.2 K/UL (ref 4.5–11.5)

## 2025-04-06 PROCEDURE — 6360000002 HC RX W HCPCS

## 2025-04-06 PROCEDURE — P9073 PLATELETS PHERESIS PATH REDU: HCPCS

## 2025-04-06 PROCEDURE — 36592 COLLECT BLOOD FROM PICC: CPT

## 2025-04-06 PROCEDURE — 2500000003 HC RX 250 WO HCPCS

## 2025-04-06 PROCEDURE — 30243R1 TRANSFUSION OF NONAUTOLOGOUS PLATELETS INTO CENTRAL VEIN, PERCUTANEOUS APPROACH: ICD-10-PCS | Performed by: STUDENT IN AN ORGANIZED HEALTH CARE EDUCATION/TRAINING PROGRAM

## 2025-04-06 PROCEDURE — 6370000000 HC RX 637 (ALT 250 FOR IP)

## 2025-04-06 PROCEDURE — 84100 ASSAY OF PHOSPHORUS: CPT

## 2025-04-06 PROCEDURE — 04HY32Z INSERTION OF MONITORING DEVICE INTO LOWER ARTERY, PERCUTANEOUS APPROACH: ICD-10-PCS | Performed by: INTERNAL MEDICINE

## 2025-04-06 PROCEDURE — 2580000003 HC RX 258

## 2025-04-06 PROCEDURE — 80053 COMPREHEN METABOLIC PANEL: CPT

## 2025-04-06 PROCEDURE — 94660 CPAP INITIATION&MGMT: CPT

## 2025-04-06 PROCEDURE — 82962 GLUCOSE BLOOD TEST: CPT

## 2025-04-06 PROCEDURE — 99291 CRITICAL CARE FIRST HOUR: CPT | Performed by: INTERNAL MEDICINE

## 2025-04-06 PROCEDURE — 71045 X-RAY EXAM CHEST 1 VIEW: CPT

## 2025-04-06 PROCEDURE — 36620 INSERTION CATHETER ARTERY: CPT | Performed by: INTERNAL MEDICINE

## 2025-04-06 PROCEDURE — 36620 INSERTION CATHETER ARTERY: CPT

## 2025-04-06 PROCEDURE — 2000000000 HC ICU R&B

## 2025-04-06 PROCEDURE — 83735 ASSAY OF MAGNESIUM: CPT

## 2025-04-06 PROCEDURE — 6360000002 HC RX W HCPCS: Performed by: STUDENT IN AN ORGANIZED HEALTH CARE EDUCATION/TRAINING PROGRAM

## 2025-04-06 PROCEDURE — 85025 COMPLETE CBC W/AUTO DIFF WBC: CPT

## 2025-04-06 PROCEDURE — 2580000003 HC RX 258: Performed by: STUDENT IN AN ORGANIZED HEALTH CARE EDUCATION/TRAINING PROGRAM

## 2025-04-06 PROCEDURE — 99232 SBSQ HOSP IP/OBS MODERATE 35: CPT | Performed by: STUDENT IN AN ORGANIZED HEALTH CARE EDUCATION/TRAINING PROGRAM

## 2025-04-06 PROCEDURE — 2700000000 HC OXYGEN THERAPY PER DAY

## 2025-04-06 PROCEDURE — 36430 TRANSFUSION BLD/BLD COMPNT: CPT

## 2025-04-06 RX ORDER — LIDOCAINE HYDROCHLORIDE 10 MG/ML
5 INJECTION, SOLUTION INFILTRATION; PERINEURAL ONCE
Status: COMPLETED | OUTPATIENT
Start: 2025-04-06 | End: 2025-04-06

## 2025-04-06 RX ORDER — LIDOCAINE HYDROCHLORIDE 10 MG/ML
INJECTION, SOLUTION INFILTRATION; PERINEURAL
Status: DISPENSED
Start: 2025-04-06 | End: 2025-04-06

## 2025-04-06 RX ORDER — SODIUM CHLORIDE 9 MG/ML
INJECTION, SOLUTION INTRAVENOUS PRN
Status: DISCONTINUED | OUTPATIENT
Start: 2025-04-06 | End: 2025-04-14 | Stop reason: SDUPTHER

## 2025-04-06 RX ORDER — MAGNESIUM SULFATE IN WATER 40 MG/ML
2000 INJECTION, SOLUTION INTRAVENOUS ONCE
Status: COMPLETED | OUTPATIENT
Start: 2025-04-06 | End: 2025-04-06

## 2025-04-06 RX ADMIN — PIPERACILLIN AND TAZOBACTAM 4500 MG: 4; .5 INJECTION, POWDER, LYOPHILIZED, FOR SOLUTION INTRAVENOUS at 01:55

## 2025-04-06 RX ADMIN — PIPERACILLIN AND TAZOBACTAM 4500 MG: 4; .5 INJECTION, POWDER, LYOPHILIZED, FOR SOLUTION INTRAVENOUS at 17:10

## 2025-04-06 RX ADMIN — MAGNESIUM SULFATE HEPTAHYDRATE 2000 MG: 40 INJECTION, SOLUTION INTRAVENOUS at 06:40

## 2025-04-06 RX ADMIN — QUETIAPINE FUMARATE 400 MG: 200 TABLET ORAL at 20:33

## 2025-04-06 RX ADMIN — Medication 1 TABLET: at 07:44

## 2025-04-06 RX ADMIN — PIPERACILLIN AND TAZOBACTAM 4500 MG: 4; .5 INJECTION, POWDER, LYOPHILIZED, FOR SOLUTION INTRAVENOUS at 08:01

## 2025-04-06 RX ADMIN — ATORVASTATIN CALCIUM 10 MG: 10 TABLET, FILM COATED ORAL at 20:00

## 2025-04-06 RX ADMIN — Medication 1000 MG: at 20:33

## 2025-04-06 RX ADMIN — LIDOCAINE HYDROCHLORIDE 5 ML: 10 INJECTION, SOLUTION INFILTRATION; PERINEURAL at 12:39

## 2025-04-06 RX ADMIN — VALPROIC ACID 250 MG: 250 SOLUTION ORAL at 07:45

## 2025-04-06 RX ADMIN — EZETIMIBE 10 MG: 10 TABLET ORAL at 07:44

## 2025-04-06 RX ADMIN — DOCUSATE SODIUM 100 MG: 100 CAPSULE, LIQUID FILLED ORAL at 07:45

## 2025-04-06 RX ADMIN — SODIUM CHLORIDE, PRESERVATIVE FREE 40 MG: 5 INJECTION INTRAVENOUS at 07:45

## 2025-04-06 RX ADMIN — ESCITALOPRAM OXALATE 10 MG: 10 TABLET ORAL at 20:00

## 2025-04-06 RX ADMIN — VALPROIC ACID 250 MG: 250 SOLUTION ORAL at 20:00

## 2025-04-06 RX ADMIN — SODIUM CHLORIDE, PRESERVATIVE FREE 10 ML: 5 INJECTION INTRAVENOUS at 20:00

## 2025-04-06 RX ADMIN — LEVOTHYROXINE SODIUM 100 MCG: 0.1 TABLET ORAL at 06:39

## 2025-04-06 RX ADMIN — WATER 100 MG: 1 INJECTION INTRAMUSCULAR; INTRAVENOUS; SUBCUTANEOUS at 20:00

## 2025-04-06 RX ADMIN — SODIUM PHOSPHATE, MONOBASIC, MONOHYDRATE AND SODIUM PHOSPHATE, DIBASIC, ANHYDROUS 10 MMOL: 142; 276 INJECTION, SOLUTION INTRAVENOUS at 09:16

## 2025-04-06 RX ADMIN — WATER 100 MG: 1 INJECTION INTRAMUSCULAR; INTRAVENOUS; SUBCUTANEOUS at 14:01

## 2025-04-06 RX ADMIN — Medication 13 MCG/MIN: at 07:51

## 2025-04-06 ASSESSMENT — PAIN SCALES - WONG BAKER
WONGBAKER_NUMERICALRESPONSE: NO HURT
WONGBAKER_NUMERICALRESPONSE: NO HURT

## 2025-04-06 ASSESSMENT — PAIN DESCRIPTION - ORIENTATION: ORIENTATION: RIGHT

## 2025-04-06 ASSESSMENT — PAIN SCALES - GENERAL
PAINLEVEL_OUTOF10: 0

## 2025-04-06 ASSESSMENT — PAIN DESCRIPTION - DESCRIPTORS: DESCRIPTORS: OTHER (COMMENT)

## 2025-04-06 ASSESSMENT — PAIN DESCRIPTION - LOCATION: LOCATION: GROIN

## 2025-04-06 NOTE — FLOWSHEET NOTE
Hyaluronidase injections given x 5 subcutaneous injection right and left arms in area of suspected  intravenous infiltrations. Dr Romo medical resident administered injections.

## 2025-04-06 NOTE — PROCEDURES
PROCEDURE NOTE  Date: 4/6/2025   Name: Tenisha Bruce  YOB: 1950    Procedures          Arterial Catheterization Procedure Note    Indication:  Continuous blood pressure monitoring in hemodynamically unstable patient    Time Out:  Completed immediately prior to the start of the procedure which included verification of the correct patient, correct site and agreement on the procedure to be done.    Consent:  The indications, risks, benefits, alternatives to the procedure were explained to the patient/surrogate decision maker and their questions answered. Consent was obtained to proceed with the procedure.    Sterile Prep:  I washed/disinfected my hands prior to starting the procedure  Proceduralist(s) wore the following PPE throughout the procedure: hat, mask, sterile gown, & sterile gloves  Everyone in the room wore a mask during the procedure  A broad field sterile drape was used to cover the site  Chlorhexidine was utilized to prep the site and allowed to dry completely    Location:  Right femoral artery    Ultrasound Guidance  Yes    Anesthetic Used  Lidocaine    Sterile Technique Maintained Throughout Procedure:  Yes    Description/Findings:  Bright red pulsatile blood return noted  Appropriate waveform visualized    Estimated Blood Loss:  None    Complications:  No apparent complications    Proceduralist:   I personally performed the procedure documented as signed by this procedure note.     Assistant(s):  Not applicable    Supervision:  No supervision required    Electronically signed by Herbert Whittaker MD on 4/6/2025 at 2:20 PM

## 2025-04-07 ENCOUNTER — APPOINTMENT (OUTPATIENT)
Dept: GENERAL RADIOLOGY | Age: 75
DRG: 189 | End: 2025-04-07
Payer: MEDICARE

## 2025-04-07 LAB
ALBUMIN SERPL-MCNC: 3 G/DL (ref 3.5–5.2)
ALP SERPL-CCNC: 91 U/L (ref 35–104)
ALT SERPL-CCNC: 21 U/L (ref 0–32)
ANION GAP SERPL CALCULATED.3IONS-SCNC: 16 MMOL/L (ref 7–16)
ARM BAND NUMBER: NORMAL
AST SERPL-CCNC: 22 U/L (ref 0–31)
BASOPHILS # BLD: 0 K/UL (ref 0–0.2)
BASOPHILS NFR BLD: 0 % (ref 0–2)
BILIRUB SERPL-MCNC: 0.5 MG/DL (ref 0–1.2)
BLOOD BANK BLOOD PRODUCT EXPIRATION DATE: NORMAL
BLOOD BANK DISPENSE STATUS: NORMAL
BLOOD BANK ISBT PRODUCT BLOOD TYPE: 6200
BLOOD BANK PRODUCT CODE: NORMAL
BLOOD BANK UNIT TYPE AND RH: NORMAL
BPU ID: NORMAL
BUN SERPL-MCNC: 33 MG/DL (ref 6–23)
CALCIUM SERPL-MCNC: 8.2 MG/DL (ref 8.6–10.2)
CHLORIDE SERPL-SCNC: 98 MMOL/L (ref 98–107)
CO2 SERPL-SCNC: 29 MMOL/L (ref 22–29)
COMPONENT: NORMAL
CREAT SERPL-MCNC: 1.3 MG/DL (ref 0.5–1)
EKG ATRIAL RATE: 100 BPM
EKG P AXIS: 67 DEGREES
EKG P-R INTERVAL: 144 MS
EKG Q-T INTERVAL: 362 MS
EKG QRS DURATION: 122 MS
EKG QTC CALCULATION (BAZETT): 466 MS
EKG R AXIS: -28 DEGREES
EKG T AXIS: 94 DEGREES
EKG VENTRICULAR RATE: 100 BPM
EOSINOPHIL # BLD: 0 K/UL (ref 0.05–0.5)
EOSINOPHILS RELATIVE PERCENT: 0 % (ref 0–6)
ERYTHROCYTE [DISTWIDTH] IN BLOOD BY AUTOMATED COUNT: 17.7 % (ref 11.5–15)
GFR, ESTIMATED: 43 ML/MIN/1.73M2
GLUCOSE SERPL-MCNC: 139 MG/DL (ref 74–99)
HCT VFR BLD AUTO: 21.9 % (ref 34–48)
HGB BLD-MCNC: 7.2 G/DL (ref 11.5–15.5)
LYMPHOCYTES NFR BLD: 1.92 K/UL (ref 1.5–4)
LYMPHOCYTES RELATIVE PERCENT: 28 % (ref 20–42)
MAGNESIUM SERPL-MCNC: 2.2 MG/DL (ref 1.6–2.6)
MCH RBC QN AUTO: 35.1 PG (ref 26–35)
MCHC RBC AUTO-ENTMCNC: 32.9 G/DL (ref 32–34.5)
MCV RBC AUTO: 106.8 FL (ref 80–99.9)
MONOCYTES NFR BLD: 0.3 K/UL (ref 0.1–0.95)
MONOCYTES NFR BLD: 4 % (ref 2–12)
NEUTROPHILS NFR BLD: 68 % (ref 43–80)
NEUTS SEG NFR BLD: 4.68 K/UL (ref 1.8–7.3)
PHOSPHATE SERPL-MCNC: 3.4 MG/DL (ref 2.5–4.5)
PLATELET # BLD AUTO: 48 K/UL (ref 130–450)
PLATELET CONFIRMATION: NORMAL
PMV BLD AUTO: 10.2 FL (ref 7–12)
POTASSIUM SERPL-SCNC: 3.8 MMOL/L (ref 3.5–5)
PROT SERPL-MCNC: 4.7 G/DL (ref 6.4–8.3)
RBC # BLD AUTO: 2.05 M/UL (ref 3.5–5.5)
RBC # BLD: ABNORMAL 10*6/UL
SODIUM SERPL-SCNC: 143 MMOL/L (ref 132–146)
TRANSFUSION STATUS: NORMAL
UNIT DIVISION: 0
UNIT ISSUE DATE/TIME: NORMAL
WBC OTHER # BLD: 6.9 K/UL (ref 4.5–11.5)

## 2025-04-07 PROCEDURE — 71045 X-RAY EXAM CHEST 1 VIEW: CPT

## 2025-04-07 PROCEDURE — 6370000000 HC RX 637 (ALT 250 FOR IP)

## 2025-04-07 PROCEDURE — 6360000002 HC RX W HCPCS

## 2025-04-07 PROCEDURE — 93010 ELECTROCARDIOGRAM REPORT: CPT | Performed by: INTERNAL MEDICINE

## 2025-04-07 PROCEDURE — 2580000003 HC RX 258: Performed by: STUDENT IN AN ORGANIZED HEALTH CARE EDUCATION/TRAINING PROGRAM

## 2025-04-07 PROCEDURE — 2500000003 HC RX 250 WO HCPCS

## 2025-04-07 PROCEDURE — 6360000002 HC RX W HCPCS: Performed by: STUDENT IN AN ORGANIZED HEALTH CARE EDUCATION/TRAINING PROGRAM

## 2025-04-07 PROCEDURE — 36592 COLLECT BLOOD FROM PICC: CPT

## 2025-04-07 PROCEDURE — 2700000000 HC OXYGEN THERAPY PER DAY

## 2025-04-07 PROCEDURE — 51798 US URINE CAPACITY MEASURE: CPT

## 2025-04-07 PROCEDURE — 2000000000 HC ICU R&B

## 2025-04-07 PROCEDURE — 85025 COMPLETE CBC W/AUTO DIFF WBC: CPT

## 2025-04-07 PROCEDURE — 99291 CRITICAL CARE FIRST HOUR: CPT | Performed by: INTERNAL MEDICINE

## 2025-04-07 PROCEDURE — 80053 COMPREHEN METABOLIC PANEL: CPT

## 2025-04-07 PROCEDURE — 94660 CPAP INITIATION&MGMT: CPT

## 2025-04-07 PROCEDURE — 99232 SBSQ HOSP IP/OBS MODERATE 35: CPT | Performed by: INTERNAL MEDICINE

## 2025-04-07 PROCEDURE — 51701 INSERT BLADDER CATHETER: CPT

## 2025-04-07 PROCEDURE — 84100 ASSAY OF PHOSPHORUS: CPT

## 2025-04-07 PROCEDURE — 83735 ASSAY OF MAGNESIUM: CPT

## 2025-04-07 PROCEDURE — 6370000000 HC RX 637 (ALT 250 FOR IP): Performed by: INTERNAL MEDICINE

## 2025-04-07 PROCEDURE — 2580000003 HC RX 258

## 2025-04-07 RX ORDER — HALOPERIDOL 5 MG/ML
1 INJECTION INTRAMUSCULAR ONCE
Status: COMPLETED | OUTPATIENT
Start: 2025-04-07 | End: 2025-04-07

## 2025-04-07 RX ORDER — DOCUSATE SODIUM 50 MG/5ML
100 LIQUID ORAL DAILY
Status: DISCONTINUED | OUTPATIENT
Start: 2025-04-07 | End: 2025-04-16 | Stop reason: HOSPADM

## 2025-04-07 RX ORDER — HALOPERIDOL 5 MG/ML
2 INJECTION INTRAMUSCULAR ONCE
Status: COMPLETED | OUTPATIENT
Start: 2025-04-07 | End: 2025-04-07

## 2025-04-07 RX ORDER — MIDODRINE HYDROCHLORIDE 5 MG/1
10 TABLET ORAL
Status: DISCONTINUED | OUTPATIENT
Start: 2025-04-07 | End: 2025-04-14

## 2025-04-07 RX ADMIN — HALOPERIDOL LACTATE 1 MG: 5 INJECTION, SOLUTION INTRAMUSCULAR at 20:52

## 2025-04-07 RX ADMIN — VALPROIC ACID 250 MG: 250 SOLUTION ORAL at 20:43

## 2025-04-07 RX ADMIN — VALPROIC ACID 250 MG: 250 SOLUTION ORAL at 09:36

## 2025-04-07 RX ADMIN — WATER 100 MG: 1 INJECTION INTRAMUSCULAR; INTRAVENOUS; SUBCUTANEOUS at 20:56

## 2025-04-07 RX ADMIN — SODIUM CHLORIDE, PRESERVATIVE FREE 10 ML: 5 INJECTION INTRAVENOUS at 09:35

## 2025-04-07 RX ADMIN — Medication 1000 MG: at 20:31

## 2025-04-07 RX ADMIN — QUETIAPINE FUMARATE 400 MG: 200 TABLET ORAL at 20:24

## 2025-04-07 RX ADMIN — SODIUM CHLORIDE, PRESERVATIVE FREE 10 ML: 5 INJECTION INTRAVENOUS at 20:40

## 2025-04-07 RX ADMIN — HALOPERIDOL LACTATE 2 MG: 5 INJECTION, SOLUTION INTRAMUSCULAR at 09:34

## 2025-04-07 RX ADMIN — DOCUSATE SODIUM 100 MG: 50 LIQUID ORAL at 09:36

## 2025-04-07 RX ADMIN — PIPERACILLIN AND TAZOBACTAM 4500 MG: 4; .5 INJECTION, POWDER, LYOPHILIZED, FOR SOLUTION INTRAVENOUS at 09:44

## 2025-04-07 RX ADMIN — WATER 100 MG: 1 INJECTION INTRAMUSCULAR; INTRAVENOUS; SUBCUTANEOUS at 05:40

## 2025-04-07 RX ADMIN — ACETAMINOPHEN 650 MG: 325 TABLET ORAL at 09:36

## 2025-04-07 RX ADMIN — LEVOTHYROXINE SODIUM 100 MCG: 0.1 TABLET ORAL at 05:40

## 2025-04-07 RX ADMIN — ATORVASTATIN CALCIUM 10 MG: 10 TABLET, FILM COATED ORAL at 20:51

## 2025-04-07 RX ADMIN — PIPERACILLIN AND TAZOBACTAM 4500 MG: 4; .5 INJECTION, POWDER, LYOPHILIZED, FOR SOLUTION INTRAVENOUS at 01:27

## 2025-04-07 RX ADMIN — WATER 100 MG: 1 INJECTION INTRAMUSCULAR; INTRAVENOUS; SUBCUTANEOUS at 13:45

## 2025-04-07 RX ADMIN — SODIUM CHLORIDE, PRESERVATIVE FREE 10 ML: 5 INJECTION INTRAVENOUS at 13:45

## 2025-04-07 RX ADMIN — HALOPERIDOL LACTATE 1 MG: 5 INJECTION, SOLUTION INTRAMUSCULAR at 18:09

## 2025-04-07 RX ADMIN — MIDODRINE HYDROCHLORIDE 10 MG: 10 TABLET ORAL at 16:51

## 2025-04-07 RX ADMIN — ESCITALOPRAM OXALATE 10 MG: 10 TABLET ORAL at 20:50

## 2025-04-07 RX ADMIN — Medication 1 TABLET: at 09:35

## 2025-04-07 RX ADMIN — EZETIMIBE 10 MG: 10 TABLET ORAL at 09:36

## 2025-04-07 RX ADMIN — PIPERACILLIN AND TAZOBACTAM 4500 MG: 4; .5 INJECTION, POWDER, LYOPHILIZED, FOR SOLUTION INTRAVENOUS at 18:12

## 2025-04-07 RX ADMIN — SODIUM CHLORIDE, PRESERVATIVE FREE 40 MG: 5 INJECTION INTRAVENOUS at 09:35

## 2025-04-07 ASSESSMENT — PAIN SCALES - WONG BAKER: WONGBAKER_NUMERICALRESPONSE: NO HURT

## 2025-04-07 ASSESSMENT — PAIN SCALES - GENERAL
PAINLEVEL_OUTOF10: 0

## 2025-04-08 LAB
ALBUMIN SERPL-MCNC: 2.8 G/DL (ref 3.5–5.2)
ALP SERPL-CCNC: 83 U/L (ref 35–104)
ALT SERPL-CCNC: 19 U/L (ref 0–32)
ANION GAP SERPL CALCULATED.3IONS-SCNC: 13 MMOL/L (ref 7–16)
ANION GAP SERPL CALCULATED.3IONS-SCNC: 13 MMOL/L (ref 7–16)
ANION GAP SERPL CALCULATED.3IONS-SCNC: 14 MMOL/L (ref 7–16)
AST SERPL-CCNC: 21 U/L (ref 0–31)
BASOPHILS # BLD: 0 K/UL (ref 0–0.2)
BASOPHILS NFR BLD: 0 % (ref 0–2)
BILIRUB SERPL-MCNC: 0.4 MG/DL (ref 0–1.2)
BUN SERPL-MCNC: 37 MG/DL (ref 6–23)
BUN SERPL-MCNC: 38 MG/DL (ref 6–23)
BUN SERPL-MCNC: 40 MG/DL (ref 6–23)
CA-I BLD-SCNC: 1.13 MMOL/L (ref 1.15–1.33)
CALCIUM SERPL-MCNC: 8.2 MG/DL (ref 8.6–10.2)
CALCIUM SERPL-MCNC: 8.7 MG/DL (ref 8.6–10.2)
CALCIUM SERPL-MCNC: 8.8 MG/DL (ref 8.6–10.2)
CHLORIDE SERPL-SCNC: 100 MMOL/L (ref 98–107)
CHLORIDE SERPL-SCNC: 103 MMOL/L (ref 98–107)
CHLORIDE SERPL-SCNC: 99 MMOL/L (ref 98–107)
CO2 SERPL-SCNC: 29 MMOL/L (ref 22–29)
CO2 SERPL-SCNC: 29 MMOL/L (ref 22–29)
CO2 SERPL-SCNC: 30 MMOL/L (ref 22–29)
CREAT SERPL-MCNC: 1.4 MG/DL (ref 0.5–1)
CREAT SERPL-MCNC: 1.4 MG/DL (ref 0.5–1)
CREAT SERPL-MCNC: 1.5 MG/DL (ref 0.5–1)
EOSINOPHIL # BLD: 0 K/UL (ref 0.05–0.5)
EOSINOPHILS RELATIVE PERCENT: 0 % (ref 0–6)
ERYTHROCYTE [DISTWIDTH] IN BLOOD BY AUTOMATED COUNT: 18.1 % (ref 11.5–15)
GFR, ESTIMATED: 38 ML/MIN/1.73M2
GFR, ESTIMATED: 40 ML/MIN/1.73M2
GFR, ESTIMATED: 41 ML/MIN/1.73M2
GLUCOSE SERPL-MCNC: 147 MG/DL (ref 74–99)
GLUCOSE SERPL-MCNC: 161 MG/DL (ref 74–99)
GLUCOSE SERPL-MCNC: 166 MG/DL (ref 74–99)
HCT VFR BLD AUTO: 19.5 % (ref 34–48)
HCT VFR BLD AUTO: 24.9 % (ref 34–48)
HGB BLD-MCNC: 6.3 G/DL (ref 11.5–15.5)
HGB BLD-MCNC: 8.2 G/DL (ref 11.5–15.5)
LYMPHOCYTES NFR BLD: 1.75 K/UL (ref 1.5–4)
LYMPHOCYTES RELATIVE PERCENT: 21 % (ref 20–42)
MAGNESIUM SERPL-MCNC: 1.9 MG/DL (ref 1.6–2.6)
MAGNESIUM SERPL-MCNC: 2.2 MG/DL (ref 1.6–2.6)
MAGNESIUM SERPL-MCNC: 2.2 MG/DL (ref 1.6–2.6)
MCH RBC QN AUTO: 35.4 PG (ref 26–35)
MCHC RBC AUTO-ENTMCNC: 32.3 G/DL (ref 32–34.5)
MCV RBC AUTO: 109.6 FL (ref 80–99.9)
MONOCYTES NFR BLD: 0.07 K/UL (ref 0.1–0.95)
MONOCYTES NFR BLD: 1 % (ref 2–12)
NEUTROPHILS NFR BLD: 78 % (ref 43–80)
NEUTS SEG NFR BLD: 6.57 K/UL (ref 1.8–7.3)
PHOSPHATE SERPL-MCNC: 3.9 MG/DL (ref 2.5–4.5)
PLATELET # BLD AUTO: 46 K/UL (ref 130–450)
PLATELET CONFIRMATION: NORMAL
PMV BLD AUTO: 10 FL (ref 7–12)
POTASSIUM SERPL-SCNC: 3.2 MMOL/L (ref 3.5–5)
POTASSIUM SERPL-SCNC: 3.4 MMOL/L (ref 3.5–5)
POTASSIUM SERPL-SCNC: 4 MMOL/L (ref 3.5–5)
PROT SERPL-MCNC: 4.4 G/DL (ref 6.4–8.3)
RBC # BLD AUTO: 1.78 M/UL (ref 3.5–5.5)
RBC # BLD: ABNORMAL 10*6/UL
SODIUM SERPL-SCNC: 141 MMOL/L (ref 132–146)
SODIUM SERPL-SCNC: 143 MMOL/L (ref 132–146)
SODIUM SERPL-SCNC: 146 MMOL/L (ref 132–146)
WBC OTHER # BLD: 8.4 K/UL (ref 4.5–11.5)

## 2025-04-08 PROCEDURE — 6360000002 HC RX W HCPCS

## 2025-04-08 PROCEDURE — 99233 SBSQ HOSP IP/OBS HIGH 50: CPT | Performed by: INTERNAL MEDICINE

## 2025-04-08 PROCEDURE — 2580000003 HC RX 258: Performed by: STUDENT IN AN ORGANIZED HEALTH CARE EDUCATION/TRAINING PROGRAM

## 2025-04-08 PROCEDURE — 85014 HEMATOCRIT: CPT

## 2025-04-08 PROCEDURE — 6370000000 HC RX 637 (ALT 250 FOR IP)

## 2025-04-08 PROCEDURE — 83735 ASSAY OF MAGNESIUM: CPT

## 2025-04-08 PROCEDURE — 2060000000 HC ICU INTERMEDIATE R&B

## 2025-04-08 PROCEDURE — 99232 SBSQ HOSP IP/OBS MODERATE 35: CPT | Performed by: INTERNAL MEDICINE

## 2025-04-08 PROCEDURE — 86850 RBC ANTIBODY SCREEN: CPT

## 2025-04-08 PROCEDURE — 36430 TRANSFUSION BLD/BLD COMPNT: CPT

## 2025-04-08 PROCEDURE — 6360000002 HC RX W HCPCS: Performed by: STUDENT IN AN ORGANIZED HEALTH CARE EDUCATION/TRAINING PROGRAM

## 2025-04-08 PROCEDURE — 2700000000 HC OXYGEN THERAPY PER DAY

## 2025-04-08 PROCEDURE — 30243N1 TRANSFUSION OF NONAUTOLOGOUS RED BLOOD CELLS INTO CENTRAL VEIN, PERCUTANEOUS APPROACH: ICD-10-PCS | Performed by: STUDENT IN AN ORGANIZED HEALTH CARE EDUCATION/TRAINING PROGRAM

## 2025-04-08 PROCEDURE — 86900 BLOOD TYPING SEROLOGIC ABO: CPT

## 2025-04-08 PROCEDURE — 84100 ASSAY OF PHOSPHORUS: CPT

## 2025-04-08 PROCEDURE — 2580000003 HC RX 258

## 2025-04-08 PROCEDURE — 2500000003 HC RX 250 WO HCPCS

## 2025-04-08 PROCEDURE — 86901 BLOOD TYPING SEROLOGIC RH(D): CPT

## 2025-04-08 PROCEDURE — 85018 HEMOGLOBIN: CPT

## 2025-04-08 PROCEDURE — 6370000000 HC RX 637 (ALT 250 FOR IP): Performed by: INTERNAL MEDICINE

## 2025-04-08 PROCEDURE — 85025 COMPLETE CBC W/AUTO DIFF WBC: CPT

## 2025-04-08 PROCEDURE — 80048 BASIC METABOLIC PNL TOTAL CA: CPT

## 2025-04-08 PROCEDURE — P9016 RBC LEUKOCYTES REDUCED: HCPCS

## 2025-04-08 PROCEDURE — 80053 COMPREHEN METABOLIC PANEL: CPT

## 2025-04-08 PROCEDURE — 86923 COMPATIBILITY TEST ELECTRIC: CPT

## 2025-04-08 PROCEDURE — 82330 ASSAY OF CALCIUM: CPT

## 2025-04-08 RX ORDER — SODIUM CHLORIDE 9 MG/ML
INJECTION, SOLUTION INTRAVENOUS PRN
Status: DISCONTINUED | OUTPATIENT
Start: 2025-04-08 | End: 2025-04-14 | Stop reason: SDUPTHER

## 2025-04-08 RX ORDER — MAGNESIUM SULFATE 1 G/100ML
1000 INJECTION INTRAVENOUS ONCE
Status: COMPLETED | OUTPATIENT
Start: 2025-04-08 | End: 2025-04-08

## 2025-04-08 RX ORDER — POTASSIUM CHLORIDE 29.8 MG/ML
40 INJECTION INTRAVENOUS ONCE
Status: COMPLETED | OUTPATIENT
Start: 2025-04-08 | End: 2025-04-08

## 2025-04-08 RX ORDER — CALCIUM GLUCONATE 20 MG/ML
2000 INJECTION, SOLUTION INTRAVENOUS ONCE
Status: COMPLETED | OUTPATIENT
Start: 2025-04-08 | End: 2025-04-08

## 2025-04-08 RX ORDER — POTASSIUM CHLORIDE 29.8 MG/ML
20 INJECTION INTRAVENOUS
Status: DISCONTINUED | OUTPATIENT
Start: 2025-04-08 | End: 2025-04-08

## 2025-04-08 RX ORDER — HALOPERIDOL 5 MG/ML
1 INJECTION INTRAMUSCULAR ONCE
Status: DISCONTINUED | OUTPATIENT
Start: 2025-04-08 | End: 2025-04-16 | Stop reason: HOSPADM

## 2025-04-08 RX ADMIN — CALCIUM GLUCONATE 2000 MG: 20 INJECTION, SOLUTION INTRAVENOUS at 01:54

## 2025-04-08 RX ADMIN — SODIUM CHLORIDE, PRESERVATIVE FREE 10 ML: 5 INJECTION INTRAVENOUS at 07:42

## 2025-04-08 RX ADMIN — ESCITALOPRAM OXALATE 10 MG: 10 TABLET ORAL at 21:09

## 2025-04-08 RX ADMIN — SODIUM CHLORIDE, PRESERVATIVE FREE 10 ML: 5 INJECTION INTRAVENOUS at 21:13

## 2025-04-08 RX ADMIN — PIPERACILLIN AND TAZOBACTAM 4500 MG: 4; .5 INJECTION, POWDER, LYOPHILIZED, FOR SOLUTION INTRAVENOUS at 17:43

## 2025-04-08 RX ADMIN — QUETIAPINE FUMARATE 400 MG: 200 TABLET ORAL at 23:48

## 2025-04-08 RX ADMIN — POTASSIUM CHLORIDE 40 MEQ: 29.8 INJECTION, SOLUTION INTRAVENOUS at 01:48

## 2025-04-08 RX ADMIN — SODIUM CHLORIDE, PRESERVATIVE FREE 40 MG: 5 INJECTION INTRAVENOUS at 07:41

## 2025-04-08 RX ADMIN — Medication 1000 MG: at 23:49

## 2025-04-08 RX ADMIN — PIPERACILLIN AND TAZOBACTAM 4500 MG: 4; .5 INJECTION, POWDER, LYOPHILIZED, FOR SOLUTION INTRAVENOUS at 02:28

## 2025-04-08 RX ADMIN — MIDODRINE HYDROCHLORIDE 10 MG: 10 TABLET ORAL at 16:43

## 2025-04-08 RX ADMIN — VALPROIC ACID 250 MG: 250 SOLUTION ORAL at 07:41

## 2025-04-08 RX ADMIN — ATORVASTATIN CALCIUM 10 MG: 10 TABLET, FILM COATED ORAL at 21:09

## 2025-04-08 RX ADMIN — POTASSIUM BICARBONATE 40 MEQ: 782 TABLET, EFFERVESCENT ORAL at 12:30

## 2025-04-08 RX ADMIN — MIDODRINE HYDROCHLORIDE 10 MG: 10 TABLET ORAL at 12:30

## 2025-04-08 RX ADMIN — VALPROIC ACID 250 MG: 250 SOLUTION ORAL at 21:09

## 2025-04-08 RX ADMIN — WATER 100 MG: 1 INJECTION INTRAMUSCULAR; INTRAVENOUS; SUBCUTANEOUS at 04:23

## 2025-04-08 RX ADMIN — MAGNESIUM SULFATE HEPTAHYDRATE 1000 MG: 1 INJECTION, SOLUTION INTRAVENOUS at 01:45

## 2025-04-08 RX ADMIN — EZETIMIBE 10 MG: 10 TABLET ORAL at 07:41

## 2025-04-08 RX ADMIN — Medication 1 TABLET: at 07:41

## 2025-04-08 RX ADMIN — PIPERACILLIN AND TAZOBACTAM 4500 MG: 4; .5 INJECTION, POWDER, LYOPHILIZED, FOR SOLUTION INTRAVENOUS at 09:58

## 2025-04-08 RX ADMIN — WATER 50 MG: 1 INJECTION INTRAMUSCULAR; INTRAVENOUS; SUBCUTANEOUS at 16:41

## 2025-04-08 RX ADMIN — Medication 2 MCG/MIN: at 05:51

## 2025-04-08 RX ADMIN — MIDODRINE HYDROCHLORIDE 10 MG: 10 TABLET ORAL at 07:41

## 2025-04-08 RX ADMIN — LEVOTHYROXINE SODIUM 100 MCG: 0.1 TABLET ORAL at 06:03

## 2025-04-08 RX ADMIN — DOCUSATE SODIUM 100 MG: 50 LIQUID ORAL at 07:40

## 2025-04-08 ASSESSMENT — PAIN SCALES - GENERAL
PAINLEVEL_OUTOF10: 0
PAINLEVEL_OUTOF10: 1
PAINLEVEL_OUTOF10: 0
PAINLEVEL_OUTOF10: 0

## 2025-04-08 ASSESSMENT — PAIN SCALES - WONG BAKER
WONGBAKER_NUMERICALRESPONSE: NO HURT
WONGBAKER_NUMERICALRESPONSE: NO HURT

## 2025-04-08 NOTE — PROGRESS NOTES
Physician Progress Note      PATIENT:               HERNANDEZ VUONG  CSN #:                  [id number]  :                       1950  ADMIT DATE:       3/19/2025 9:56 PM  DISCH DATE:        3/28/2025 1:24 PM  RESPONDING  PROVIDER #:        Chacho Woo MD          QUERY TEXT:    Pt admitted with Pneumonia and had shock documented. As per the previous query   dated 25, \"SEPSIS\" was ruled out per the response by Dr. Woo.    If possible, please document in progress notes and discharge summary further   specificity regarding the type of shock:    The medical record reflects the following:  Risk Factors: Pneumonia,  Clinical Indicators: Previous query response removed Sepsis.  Pt. has Septid   Shock documented on progress notes dated 25 by Dr Goodman, and on the   H&P. Presents for AMS and hypotensive with left sided facial drop. BP: 77/32,   68/30, 64/27 in the ER with HR: 89, 98,99,    Treatment: Levophed, 1 L of NS n the ER, 1 l of LR, Zosyn IV in the Er,   Central Line placed  Options provided:  -- Cardiogenic Shock  -- Obstructive Shock  -- Hypovolemic Shock  -- Hypotension without shock  -- Other - I will add my own diagnosis  -- Disagree - Not applicable / Not valid  -- Disagree - Clinically unable to determine / Unknown  -- Refer to Clinical Documentation Reviewer    PROVIDER RESPONSE TEXT:    This patient is in hypovolemic shock.    Query created by: Katheryn Gonzales on 3/31/2025 12:22 PM      Electronically signed by:  Chacho Woo MD 2025 10:01 AM

## 2025-04-09 ENCOUNTER — APPOINTMENT (OUTPATIENT)
Dept: GENERAL RADIOLOGY | Age: 75
DRG: 189 | End: 2025-04-09
Payer: MEDICARE

## 2025-04-09 LAB
ABO/RH: NORMAL
ALBUMIN SERPL-MCNC: 2.9 G/DL (ref 3.5–5.2)
ALP SERPL-CCNC: 83 U/L (ref 35–104)
ALT SERPL-CCNC: 19 U/L (ref 0–32)
ANION GAP SERPL CALCULATED.3IONS-SCNC: 13 MMOL/L (ref 7–16)
ANTIBODY SCREEN: NEGATIVE
ARM BAND NUMBER: NORMAL
AST SERPL-CCNC: 26 U/L (ref 0–31)
BASOPHILS # BLD: 0.02 K/UL (ref 0–0.2)
BASOPHILS NFR BLD: 0 % (ref 0–2)
BILIRUB SERPL-MCNC: 0.5 MG/DL (ref 0–1.2)
BLOOD BANK BLOOD PRODUCT EXPIRATION DATE: NORMAL
BLOOD BANK DISPENSE STATUS: NORMAL
BLOOD BANK ISBT PRODUCT BLOOD TYPE: 5100
BLOOD BANK PRODUCT CODE: NORMAL
BLOOD BANK SAMPLE EXPIRATION: NORMAL
BLOOD BANK UNIT TYPE AND RH: NORMAL
BNP SERPL-MCNC: ABNORMAL PG/ML (ref 0–125)
BPU ID: NORMAL
BUN SERPL-MCNC: 52 MG/DL (ref 6–23)
CALCIUM SERPL-MCNC: 8.6 MG/DL (ref 8.6–10.2)
CHLORIDE SERPL-SCNC: 102 MMOL/L (ref 98–107)
CO2 SERPL-SCNC: 28 MMOL/L (ref 22–29)
COMPONENT: NORMAL
CREAT SERPL-MCNC: 1.5 MG/DL (ref 0.5–1)
CROSSMATCH RESULT: NORMAL
EOSINOPHIL # BLD: 0.01 K/UL (ref 0.05–0.5)
EOSINOPHILS RELATIVE PERCENT: 0 % (ref 0–6)
ERYTHROCYTE [DISTWIDTH] IN BLOOD BY AUTOMATED COUNT: 19.8 % (ref 11.5–15)
GFR, ESTIMATED: 38 ML/MIN/1.73M2
GLUCOSE SERPL-MCNC: 121 MG/DL (ref 74–99)
HCT VFR BLD AUTO: 23.8 % (ref 34–48)
HGB BLD-MCNC: 8.1 G/DL (ref 11.5–15.5)
IMM GRANULOCYTES # BLD AUTO: 0.09 K/UL (ref 0–0.58)
IMM GRANULOCYTES NFR BLD: 1 % (ref 0–5)
LYMPHOCYTES NFR BLD: 3.58 K/UL (ref 1.5–4)
LYMPHOCYTES RELATIVE PERCENT: 32 % (ref 20–42)
MAGNESIUM SERPL-MCNC: 1.9 MG/DL (ref 1.6–2.6)
MCH RBC QN AUTO: 36.5 PG (ref 26–35)
MCHC RBC AUTO-ENTMCNC: 34 G/DL (ref 32–34.5)
MCV RBC AUTO: 107.2 FL (ref 80–99.9)
MICROORGANISM SPEC CULT: NORMAL
MONOCYTES NFR BLD: 0.82 K/UL (ref 0.1–0.95)
MONOCYTES NFR BLD: 7 % (ref 2–12)
NEUTROPHILS NFR BLD: 60 % (ref 43–80)
NEUTS SEG NFR BLD: 6.84 K/UL (ref 1.8–7.3)
PHOSPHATE SERPL-MCNC: 3.5 MG/DL (ref 2.5–4.5)
PLATELET # BLD AUTO: 51 K/UL (ref 130–450)
PLATELET CONFIRMATION: NORMAL
PMV BLD AUTO: 10.7 FL (ref 7–12)
POTASSIUM SERPL-SCNC: 3.6 MMOL/L (ref 3.5–5)
PROCALCITONIN SERPL-MCNC: 0.27 NG/ML (ref 0–0.08)
PROT SERPL-MCNC: 4.6 G/DL (ref 6.4–8.3)
RBC # BLD AUTO: 2.22 M/UL (ref 3.5–5.5)
SERVICE CMNT-IMP: NORMAL
SODIUM SERPL-SCNC: 143 MMOL/L (ref 132–146)
SPECIMEN DESCRIPTION: NORMAL
TRANSFUSION STATUS: NORMAL
UNIT DIVISION: 0
UNIT ISSUE DATE/TIME: NORMAL
WBC OTHER # BLD: 11.4 K/UL (ref 4.5–11.5)

## 2025-04-09 PROCEDURE — P9047 ALBUMIN (HUMAN), 25%, 50ML: HCPCS | Performed by: INTERNAL MEDICINE

## 2025-04-09 PROCEDURE — 6370000000 HC RX 637 (ALT 250 FOR IP)

## 2025-04-09 PROCEDURE — 6370000000 HC RX 637 (ALT 250 FOR IP): Performed by: INTERNAL MEDICINE

## 2025-04-09 PROCEDURE — 2060000000 HC ICU INTERMEDIATE R&B

## 2025-04-09 PROCEDURE — 99233 SBSQ HOSP IP/OBS HIGH 50: CPT | Performed by: INTERNAL MEDICINE

## 2025-04-09 PROCEDURE — 80053 COMPREHEN METABOLIC PANEL: CPT

## 2025-04-09 PROCEDURE — 83880 ASSAY OF NATRIURETIC PEPTIDE: CPT

## 2025-04-09 PROCEDURE — 94660 CPAP INITIATION&MGMT: CPT

## 2025-04-09 PROCEDURE — 84100 ASSAY OF PHOSPHORUS: CPT

## 2025-04-09 PROCEDURE — 85025 COMPLETE CBC W/AUTO DIFF WBC: CPT

## 2025-04-09 PROCEDURE — 2580000003 HC RX 258

## 2025-04-09 PROCEDURE — 71045 X-RAY EXAM CHEST 1 VIEW: CPT

## 2025-04-09 PROCEDURE — 6360000002 HC RX W HCPCS

## 2025-04-09 PROCEDURE — 84145 PROCALCITONIN (PCT): CPT

## 2025-04-09 PROCEDURE — 2500000003 HC RX 250 WO HCPCS

## 2025-04-09 PROCEDURE — 83735 ASSAY OF MAGNESIUM: CPT

## 2025-04-09 PROCEDURE — 2700000000 HC OXYGEN THERAPY PER DAY

## 2025-04-09 PROCEDURE — 94640 AIRWAY INHALATION TREATMENT: CPT

## 2025-04-09 PROCEDURE — 51798 US URINE CAPACITY MEASURE: CPT

## 2025-04-09 PROCEDURE — 36415 COLL VENOUS BLD VENIPUNCTURE: CPT

## 2025-04-09 PROCEDURE — 6360000002 HC RX W HCPCS: Performed by: INTERNAL MEDICINE

## 2025-04-09 RX ORDER — METOPROLOL SUCCINATE 25 MG/1
12.5 TABLET, EXTENDED RELEASE ORAL DAILY
Status: DISCONTINUED | OUTPATIENT
Start: 2025-04-09 | End: 2025-04-14

## 2025-04-09 RX ORDER — ALBUMIN (HUMAN) 12.5 G/50ML
25 SOLUTION INTRAVENOUS ONCE
Status: COMPLETED | OUTPATIENT
Start: 2025-04-09 | End: 2025-04-09

## 2025-04-09 RX ORDER — IPRATROPIUM BROMIDE AND ALBUTEROL SULFATE 2.5; .5 MG/3ML; MG/3ML
1 SOLUTION RESPIRATORY (INHALATION)
Status: DISCONTINUED | OUTPATIENT
Start: 2025-04-09 | End: 2025-04-16 | Stop reason: HOSPADM

## 2025-04-09 RX ORDER — BUMETANIDE 0.25 MG/ML
1 INJECTION, SOLUTION INTRAMUSCULAR; INTRAVENOUS ONCE
Status: COMPLETED | OUTPATIENT
Start: 2025-04-09 | End: 2025-04-09

## 2025-04-09 RX ADMIN — BUMETANIDE 1 MG: 0.25 INJECTION INTRAMUSCULAR; INTRAVENOUS at 15:09

## 2025-04-09 RX ADMIN — LEVOTHYROXINE SODIUM 100 MCG: 0.1 TABLET ORAL at 05:16

## 2025-04-09 RX ADMIN — VALPROIC ACID 250 MG: 250 SOLUTION ORAL at 09:31

## 2025-04-09 RX ADMIN — IPRATROPIUM BROMIDE AND ALBUTEROL SULFATE 1 DOSE: 2.5; .5 SOLUTION RESPIRATORY (INHALATION) at 20:48

## 2025-04-09 RX ADMIN — PIPERACILLIN AND TAZOBACTAM 4500 MG: 4; .5 INJECTION, POWDER, LYOPHILIZED, FOR SOLUTION INTRAVENOUS at 11:28

## 2025-04-09 RX ADMIN — EZETIMIBE 10 MG: 10 TABLET ORAL at 09:30

## 2025-04-09 RX ADMIN — MIDODRINE HYDROCHLORIDE 10 MG: 10 TABLET ORAL at 12:00

## 2025-04-09 RX ADMIN — VALPROIC ACID 250 MG: 250 SOLUTION ORAL at 19:49

## 2025-04-09 RX ADMIN — Medication 1000 MG: at 19:49

## 2025-04-09 RX ADMIN — ATORVASTATIN CALCIUM 10 MG: 10 TABLET, FILM COATED ORAL at 19:49

## 2025-04-09 RX ADMIN — IPRATROPIUM BROMIDE AND ALBUTEROL SULFATE 1 DOSE: 2.5; .5 SOLUTION RESPIRATORY (INHALATION) at 12:52

## 2025-04-09 RX ADMIN — SODIUM CHLORIDE, PRESERVATIVE FREE 10 ML: 5 INJECTION INTRAVENOUS at 09:29

## 2025-04-09 RX ADMIN — ESCITALOPRAM OXALATE 10 MG: 10 TABLET ORAL at 19:49

## 2025-04-09 RX ADMIN — WATER 50 MG: 1 INJECTION INTRAMUSCULAR; INTRAVENOUS; SUBCUTANEOUS at 03:16

## 2025-04-09 RX ADMIN — PIPERACILLIN AND TAZOBACTAM 4500 MG: 4; .5 INJECTION, POWDER, LYOPHILIZED, FOR SOLUTION INTRAVENOUS at 18:16

## 2025-04-09 RX ADMIN — QUETIAPINE FUMARATE 400 MG: 200 TABLET ORAL at 19:49

## 2025-04-09 RX ADMIN — SODIUM CHLORIDE, PRESERVATIVE FREE 10 ML: 5 INJECTION INTRAVENOUS at 09:28

## 2025-04-09 RX ADMIN — IPRATROPIUM BROMIDE AND ALBUTEROL SULFATE 1 DOSE: 2.5; .5 SOLUTION RESPIRATORY (INHALATION) at 15:50

## 2025-04-09 RX ADMIN — SODIUM CHLORIDE, PRESERVATIVE FREE 10 ML: 5 INJECTION INTRAVENOUS at 19:50

## 2025-04-09 RX ADMIN — METOPROLOL SUCCINATE 12.5 MG: 25 TABLET, EXTENDED RELEASE ORAL at 12:00

## 2025-04-09 RX ADMIN — ALBUMIN (HUMAN) 25 G: 0.25 INJECTION, SOLUTION INTRAVENOUS at 13:50

## 2025-04-09 RX ADMIN — MIDODRINE HYDROCHLORIDE 10 MG: 10 TABLET ORAL at 18:12

## 2025-04-09 RX ADMIN — SODIUM CHLORIDE, PRESERVATIVE FREE 40 MG: 5 INJECTION INTRAVENOUS at 09:30

## 2025-04-09 RX ADMIN — DOCUSATE SODIUM 100 MG: 50 LIQUID ORAL at 09:29

## 2025-04-09 RX ADMIN — SODIUM CHLORIDE, PRESERVATIVE FREE 10 ML: 5 INJECTION INTRAVENOUS at 19:49

## 2025-04-09 RX ADMIN — PIPERACILLIN AND TAZOBACTAM 4500 MG: 4; .5 INJECTION, POWDER, LYOPHILIZED, FOR SOLUTION INTRAVENOUS at 03:13

## 2025-04-09 RX ADMIN — MIDODRINE HYDROCHLORIDE 10 MG: 10 TABLET ORAL at 09:29

## 2025-04-09 RX ADMIN — Medication 1 TABLET: at 09:29

## 2025-04-10 ENCOUNTER — APPOINTMENT (OUTPATIENT)
Dept: GENERAL RADIOLOGY | Age: 75
DRG: 189 | End: 2025-04-10
Payer: MEDICARE

## 2025-04-10 PROBLEM — Z51.5 PALLIATIVE CARE ENCOUNTER: Status: ACTIVE | Noted: 2025-04-10

## 2025-04-10 LAB
AADO2: 574 MMHG
ALBUMIN SERPL-MCNC: 2.9 G/DL (ref 3.5–5.2)
ALP SERPL-CCNC: 76 U/L (ref 35–104)
ALT SERPL-CCNC: 18 U/L (ref 0–32)
ANION GAP SERPL CALCULATED.3IONS-SCNC: 15 MMOL/L (ref 7–16)
AST SERPL-CCNC: 27 U/L (ref 0–31)
B.E.: 8.4 MMOL/L (ref -3–3)
BASOPHILS # BLD: 0 K/UL (ref 0–0.2)
BASOPHILS NFR BLD: 0 % (ref 0–2)
BILIRUB SERPL-MCNC: 0.5 MG/DL (ref 0–1.2)
BUN SERPL-MCNC: 60 MG/DL (ref 6–23)
CALCIUM SERPL-MCNC: 8.4 MG/DL (ref 8.6–10.2)
CHLORIDE SERPL-SCNC: 103 MMOL/L (ref 98–107)
CHLORIDE UR-SCNC: <20 MMOL/L
CO2 SERPL-SCNC: 28 MMOL/L (ref 22–29)
COHB: 0.7 % (ref 0–1.5)
CREAT SERPL-MCNC: 1.6 MG/DL (ref 0.5–1)
CRITICAL: ABNORMAL
DATE ANALYZED: ABNORMAL
DATE OF COLLECTION: ABNORMAL
EOSINOPHIL # BLD: 0 K/UL (ref 0.05–0.5)
EOSINOPHILS RELATIVE PERCENT: 0 % (ref 0–6)
ERYTHROCYTE [DISTWIDTH] IN BLOOD BY AUTOMATED COUNT: 18.9 % (ref 11.5–15)
FIO2: 95 %
GFR, ESTIMATED: 35 ML/MIN/1.73M2
GLUCOSE SERPL-MCNC: 127 MG/DL (ref 74–99)
HCO3: 33.3 MMOL/L (ref 22–26)
HCT VFR BLD AUTO: 23.7 % (ref 34–48)
HGB BLD-MCNC: 8.1 G/DL (ref 11.5–15.5)
HHB: 11.8 % (ref 0–5)
LAB: ABNORMAL
LYMPHOCYTES NFR BLD: 3.32 K/UL (ref 1.5–4)
LYMPHOCYTES RELATIVE PERCENT: 25 % (ref 20–42)
Lab: 1330
MAGNESIUM SERPL-MCNC: 1.8 MG/DL (ref 1.6–2.6)
MCH RBC QN AUTO: 37.3 PG (ref 26–35)
MCHC RBC AUTO-ENTMCNC: 34.2 G/DL (ref 32–34.5)
MCV RBC AUTO: 109.2 FL (ref 80–99.9)
METHB: 0.5 % (ref 0–1.5)
MICROORGANISM SPEC CULT: NORMAL
MODE: ABNORMAL
MONOCYTES NFR BLD: 0.71 K/UL (ref 0.1–0.95)
MONOCYTES NFR BLD: 5 % (ref 2–12)
NEUTROPHILS NFR BLD: 70 % (ref 43–80)
NEUTS SEG NFR BLD: 9.47 K/UL (ref 1.8–7.3)
O2 SATURATION: 88.1 % (ref 92–98.5)
O2HB: 87 % (ref 94–97)
OPERATOR ID: 405
OSMOLALITY UR: 554 MOSM/KG (ref 300–900)
PATIENT TEMP: 37 C
PCO2: 48.4 MMHG (ref 35–45)
PFO2: 0.57 MMHG/%
PH BLOOD GAS: 7.46 (ref 7.35–7.45)
PHOSPHATE SERPL-MCNC: 3.4 MG/DL (ref 2.5–4.5)
PLATELET # BLD AUTO: 53 K/UL (ref 130–450)
PLATELET CONFIRMATION: NORMAL
PMV BLD AUTO: 9.5 FL (ref 7–12)
PO2: 54.4 MMHG (ref 75–100)
POTASSIUM SERPL-SCNC: 4.5 MMOL/L (ref 3.5–5)
PROCALCITONIN SERPL-MCNC: 0.38 NG/ML (ref 0–0.08)
PROT SERPL-MCNC: 4.8 G/DL (ref 6.4–8.3)
RBC # BLD AUTO: 2.17 M/UL (ref 3.5–5.5)
RBC # BLD: ABNORMAL 10*6/UL
RI(T): 10.55
SERVICE CMNT-IMP: NORMAL
SODIUM SERPL-SCNC: 146 MMOL/L (ref 132–146)
SODIUM UR-SCNC: <20 MMOL/L
SOURCE, BLOOD GAS: ABNORMAL
SPECIMEN DESCRIPTION: NORMAL
THB: 8.9 G/DL (ref 11.5–16.5)
TIME ANALYZED: 1337
WBC OTHER # BLD: 13.5 K/UL (ref 4.5–11.5)

## 2025-04-10 PROCEDURE — 2700000000 HC OXYGEN THERAPY PER DAY

## 2025-04-10 PROCEDURE — 2580000003 HC RX 258

## 2025-04-10 PROCEDURE — 84300 ASSAY OF URINE SODIUM: CPT

## 2025-04-10 PROCEDURE — 6370000000 HC RX 637 (ALT 250 FOR IP)

## 2025-04-10 PROCEDURE — 97530 THERAPEUTIC ACTIVITIES: CPT

## 2025-04-10 PROCEDURE — 2500000003 HC RX 250 WO HCPCS

## 2025-04-10 PROCEDURE — 6360000002 HC RX W HCPCS

## 2025-04-10 PROCEDURE — 83735 ASSAY OF MAGNESIUM: CPT

## 2025-04-10 PROCEDURE — 2060000000 HC ICU INTERMEDIATE R&B

## 2025-04-10 PROCEDURE — 94640 AIRWAY INHALATION TREATMENT: CPT

## 2025-04-10 PROCEDURE — 97161 PT EVAL LOW COMPLEX 20 MIN: CPT

## 2025-04-10 PROCEDURE — 97165 OT EVAL LOW COMPLEX 30 MIN: CPT

## 2025-04-10 PROCEDURE — 94660 CPAP INITIATION&MGMT: CPT

## 2025-04-10 PROCEDURE — 99233 SBSQ HOSP IP/OBS HIGH 50: CPT | Performed by: INTERNAL MEDICINE

## 2025-04-10 PROCEDURE — 85025 COMPLETE CBC W/AUTO DIFF WBC: CPT

## 2025-04-10 PROCEDURE — 6370000000 HC RX 637 (ALT 250 FOR IP): Performed by: INTERNAL MEDICINE

## 2025-04-10 PROCEDURE — 99222 1ST HOSP IP/OBS MODERATE 55: CPT | Performed by: NURSE PRACTITIONER

## 2025-04-10 PROCEDURE — 97535 SELF CARE MNGMENT TRAINING: CPT

## 2025-04-10 PROCEDURE — 80053 COMPREHEN METABOLIC PANEL: CPT

## 2025-04-10 PROCEDURE — 84145 PROCALCITONIN (PCT): CPT

## 2025-04-10 PROCEDURE — 71045 X-RAY EXAM CHEST 1 VIEW: CPT

## 2025-04-10 PROCEDURE — 83935 ASSAY OF URINE OSMOLALITY: CPT

## 2025-04-10 PROCEDURE — 99232 SBSQ HOSP IP/OBS MODERATE 35: CPT | Performed by: INTERNAL MEDICINE

## 2025-04-10 PROCEDURE — 82805 BLOOD GASES W/O2 SATURATION: CPT

## 2025-04-10 PROCEDURE — 36415 COLL VENOUS BLD VENIPUNCTURE: CPT

## 2025-04-10 PROCEDURE — 84100 ASSAY OF PHOSPHORUS: CPT

## 2025-04-10 PROCEDURE — 82436 ASSAY OF URINE CHLORIDE: CPT

## 2025-04-10 RX ORDER — SODIUM CHLORIDE, SODIUM LACTATE, POTASSIUM CHLORIDE, CALCIUM CHLORIDE 600; 310; 30; 20 MG/100ML; MG/100ML; MG/100ML; MG/100ML
INJECTION, SOLUTION INTRAVENOUS CONTINUOUS
Status: DISCONTINUED | OUTPATIENT
Start: 2025-04-10 | End: 2025-04-11

## 2025-04-10 RX ADMIN — IPRATROPIUM BROMIDE AND ALBUTEROL SULFATE 1 DOSE: 2.5; .5 SOLUTION RESPIRATORY (INHALATION) at 09:17

## 2025-04-10 RX ADMIN — SODIUM CHLORIDE, SODIUM LACTATE, POTASSIUM CHLORIDE, AND CALCIUM CHLORIDE: .6; .31; .03; .02 INJECTION, SOLUTION INTRAVENOUS at 18:30

## 2025-04-10 RX ADMIN — DOCUSATE SODIUM 100 MG: 50 LIQUID ORAL at 08:44

## 2025-04-10 RX ADMIN — MIDODRINE HYDROCHLORIDE 10 MG: 10 TABLET ORAL at 08:44

## 2025-04-10 RX ADMIN — IPRATROPIUM BROMIDE AND ALBUTEROL SULFATE 1 DOSE: 2.5; .5 SOLUTION RESPIRATORY (INHALATION) at 16:27

## 2025-04-10 RX ADMIN — VALPROIC ACID 250 MG: 250 SOLUTION ORAL at 08:44

## 2025-04-10 RX ADMIN — Medication 1 TABLET: at 08:45

## 2025-04-10 RX ADMIN — ESCITALOPRAM OXALATE 10 MG: 10 TABLET ORAL at 23:49

## 2025-04-10 RX ADMIN — MIDODRINE HYDROCHLORIDE 10 MG: 10 TABLET ORAL at 18:26

## 2025-04-10 RX ADMIN — MIDODRINE HYDROCHLORIDE 10 MG: 10 TABLET ORAL at 13:59

## 2025-04-10 RX ADMIN — VALPROIC ACID 250 MG: 250 SOLUTION ORAL at 23:54

## 2025-04-10 RX ADMIN — SODIUM CHLORIDE, PRESERVATIVE FREE 10 ML: 5 INJECTION INTRAVENOUS at 08:46

## 2025-04-10 RX ADMIN — QUETIAPINE FUMARATE 400 MG: 200 TABLET ORAL at 23:52

## 2025-04-10 RX ADMIN — SODIUM CHLORIDE, PRESERVATIVE FREE 10 ML: 5 INJECTION INTRAVENOUS at 08:45

## 2025-04-10 RX ADMIN — IPRATROPIUM BROMIDE AND ALBUTEROL SULFATE 1 DOSE: 2.5; .5 SOLUTION RESPIRATORY (INHALATION) at 19:45

## 2025-04-10 RX ADMIN — PIPERACILLIN AND TAZOBACTAM 4500 MG: 4; .5 INJECTION, POWDER, LYOPHILIZED, FOR SOLUTION INTRAVENOUS at 10:49

## 2025-04-10 RX ADMIN — METOPROLOL SUCCINATE 12.5 MG: 25 TABLET, EXTENDED RELEASE ORAL at 08:45

## 2025-04-10 RX ADMIN — ATORVASTATIN CALCIUM 10 MG: 10 TABLET, FILM COATED ORAL at 23:48

## 2025-04-10 RX ADMIN — PIPERACILLIN AND TAZOBACTAM 4500 MG: 4; .5 INJECTION, POWDER, LYOPHILIZED, FOR SOLUTION INTRAVENOUS at 01:04

## 2025-04-10 RX ADMIN — SODIUM CHLORIDE, PRESERVATIVE FREE 40 MG: 5 INJECTION INTRAVENOUS at 08:45

## 2025-04-10 RX ADMIN — EZETIMIBE 10 MG: 10 TABLET ORAL at 08:45

## 2025-04-10 RX ADMIN — IPRATROPIUM BROMIDE AND ALBUTEROL SULFATE 1 DOSE: 2.5; .5 SOLUTION RESPIRATORY (INHALATION) at 12:16

## 2025-04-11 ENCOUNTER — APPOINTMENT (OUTPATIENT)
Dept: CT IMAGING | Age: 75
DRG: 189 | End: 2025-04-11
Payer: MEDICARE

## 2025-04-11 PROBLEM — Z71.89 GOALS OF CARE, COUNSELING/DISCUSSION: Status: ACTIVE | Noted: 2025-04-11

## 2025-04-11 LAB
AADO2: 432.4 MMHG
ALBUMIN SERPL-MCNC: 2.9 G/DL (ref 3.5–5.2)
ALP SERPL-CCNC: 80 U/L (ref 35–104)
ALT SERPL-CCNC: 37 U/L (ref 0–32)
ANION GAP SERPL CALCULATED.3IONS-SCNC: 12 MMOL/L (ref 7–16)
AST SERPL-CCNC: 55 U/L (ref 0–31)
B PARAP IS1001 DNA NPH QL NAA+NON-PROBE: NOT DETECTED
B PERT DNA SPEC QL NAA+PROBE: NOT DETECTED
B.E.: 7.8 MMOL/L (ref -3–3)
BASOPHILS # BLD: 0 K/UL (ref 0–0.2)
BASOPHILS NFR BLD: 0 % (ref 0–2)
BILIRUB SERPL-MCNC: 0.6 MG/DL (ref 0–1.2)
BUN SERPL-MCNC: 63 MG/DL (ref 6–23)
C PNEUM DNA NPH QL NAA+NON-PROBE: NOT DETECTED
CALCIUM SERPL-MCNC: 8.9 MG/DL (ref 8.6–10.2)
CHLORIDE SERPL-SCNC: 102 MMOL/L (ref 98–107)
CO2 SERPL-SCNC: 31 MMOL/L (ref 22–29)
COHB: 0.7 % (ref 0–1.5)
CREAT SERPL-MCNC: 1.5 MG/DL (ref 0.5–1)
CRITICAL: ABNORMAL
DATE ANALYZED: ABNORMAL
DATE OF COLLECTION: ABNORMAL
EKG ATRIAL RATE: 94 BPM
EKG P AXIS: 57 DEGREES
EKG P-R INTERVAL: 142 MS
EKG Q-T INTERVAL: 362 MS
EKG QRS DURATION: 136 MS
EKG QTC CALCULATION (BAZETT): 452 MS
EKG R AXIS: 0 DEGREES
EKG T AXIS: 98 DEGREES
EKG VENTRICULAR RATE: 94 BPM
EOSINOPHIL # BLD: 0.02 K/UL (ref 0.05–0.5)
EOSINOPHILS RELATIVE PERCENT: 0 % (ref 0–6)
ERYTHROCYTE [DISTWIDTH] IN BLOOD BY AUTOMATED COUNT: 18.3 % (ref 11.5–15)
FIO2: 80 %
FLUAV RNA NPH QL NAA+NON-PROBE: NOT DETECTED
FLUBV RNA NPH QL NAA+NON-PROBE: NOT DETECTED
GFR, ESTIMATED: 37 ML/MIN/1.73M2
GLUCOSE SERPL-MCNC: 120 MG/DL (ref 74–99)
HADV DNA NPH QL NAA+NON-PROBE: NOT DETECTED
HCO3: 33.5 MMOL/L (ref 22–26)
HCOV 229E RNA NPH QL NAA+NON-PROBE: NOT DETECTED
HCOV HKU1 RNA NPH QL NAA+NON-PROBE: NOT DETECTED
HCOV NL63 RNA NPH QL NAA+NON-PROBE: NOT DETECTED
HCOV OC43 RNA NPH QL NAA+NON-PROBE: NOT DETECTED
HCT VFR BLD AUTO: 21.4 % (ref 34–48)
HGB BLD-MCNC: 7.4 G/DL (ref 11.5–15.5)
HHB: 4.6 % (ref 0–5)
HMPV RNA NPH QL NAA+NON-PROBE: NOT DETECTED
HPIV1 RNA NPH QL NAA+NON-PROBE: NOT DETECTED
HPIV2 RNA NPH QL NAA+NON-PROBE: NOT DETECTED
HPIV3 RNA NPH QL NAA+NON-PROBE: NOT DETECTED
HPIV4 RNA NPH QL NAA+NON-PROBE: NOT DETECTED
IMM GRANULOCYTES # BLD AUTO: 0.12 K/UL (ref 0–0.58)
IMM GRANULOCYTES NFR BLD: 1 % (ref 0–5)
LAB: ABNORMAL
LYMPHOCYTES NFR BLD: 2.79 K/UL (ref 1.5–4)
LYMPHOCYTES RELATIVE PERCENT: 32 % (ref 20–42)
Lab: 1130
M PNEUMO DNA NPH QL NAA+NON-PROBE: NOT DETECTED
MAGNESIUM SERPL-MCNC: 1.8 MG/DL (ref 1.6–2.6)
MCH RBC QN AUTO: 37.9 PG (ref 26–35)
MCHC RBC AUTO-ENTMCNC: 34.6 G/DL (ref 32–34.5)
MCV RBC AUTO: 109.7 FL (ref 80–99.9)
METHB: 0.8 % (ref 0–1.5)
MODE: ABNORMAL
MONOCYTES NFR BLD: 0.55 K/UL (ref 0.1–0.95)
MONOCYTES NFR BLD: 6 % (ref 2–12)
NEUTROPHILS NFR BLD: 60 % (ref 43–80)
NEUTS SEG NFR BLD: 5.18 K/UL (ref 1.8–7.3)
O2 SATURATION: 95.3 % (ref 92–98.5)
O2HB: 93.9 % (ref 94–97)
OPERATOR ID: ABNORMAL
PATIENT TEMP: 37 C
PCO2: 55 MMHG (ref 35–45)
PEEP/CPAP: 5 CMH2O
PFO2: 1 MMHG/%
PH BLOOD GAS: 7.4 (ref 7.35–7.45)
PHOSPHATE SERPL-MCNC: 3.6 MG/DL (ref 2.5–4.5)
PLATELET # BLD AUTO: 50 K/UL (ref 130–450)
PLATELET CONFIRMATION: NORMAL
PMV BLD AUTO: 10.6 FL (ref 7–12)
PO2: 80.3 MMHG (ref 75–100)
POTASSIUM SERPL-SCNC: 3.9 MMOL/L (ref 3.5–5)
PROT SERPL-MCNC: 5.1 G/DL (ref 6.4–8.3)
RBC # BLD AUTO: 1.95 M/UL (ref 3.5–5.5)
RI(T): 5.38
RR MECHANICAL: 18 B/MIN
RSV RNA NPH QL NAA+NON-PROBE: NOT DETECTED
RV+EV RNA NPH QL NAA+NON-PROBE: NOT DETECTED
SARS-COV-2 RNA NPH QL NAA+NON-PROBE: NOT DETECTED
SODIUM SERPL-SCNC: 145 MMOL/L (ref 132–146)
SOURCE, BLOOD GAS: ABNORMAL
SPECIMEN DESCRIPTION: NORMAL
THB: 8.2 G/DL (ref 11.5–16.5)
TIME ANALYZED: 1135
VT MECHANICAL: 450 ML
WBC OTHER # BLD: 8.7 K/UL (ref 4.5–11.5)

## 2025-04-11 PROCEDURE — 71250 CT THORAX DX C-: CPT

## 2025-04-11 PROCEDURE — 2500000003 HC RX 250 WO HCPCS

## 2025-04-11 PROCEDURE — 99231 SBSQ HOSP IP/OBS SF/LOW 25: CPT | Performed by: PHYSICIAN ASSISTANT

## 2025-04-11 PROCEDURE — 6360000002 HC RX W HCPCS

## 2025-04-11 PROCEDURE — 94660 CPAP INITIATION&MGMT: CPT

## 2025-04-11 PROCEDURE — 82805 BLOOD GASES W/O2 SATURATION: CPT

## 2025-04-11 PROCEDURE — 2700000000 HC OXYGEN THERAPY PER DAY

## 2025-04-11 PROCEDURE — 6370000000 HC RX 637 (ALT 250 FOR IP): Performed by: INTERNAL MEDICINE

## 2025-04-11 PROCEDURE — 2060000000 HC ICU INTERMEDIATE R&B

## 2025-04-11 PROCEDURE — 36600 WITHDRAWAL OF ARTERIAL BLOOD: CPT

## 2025-04-11 PROCEDURE — 85025 COMPLETE CBC W/AUTO DIFF WBC: CPT

## 2025-04-11 PROCEDURE — 94640 AIRWAY INHALATION TREATMENT: CPT

## 2025-04-11 PROCEDURE — 36415 COLL VENOUS BLD VENIPUNCTURE: CPT

## 2025-04-11 PROCEDURE — 6370000000 HC RX 637 (ALT 250 FOR IP)

## 2025-04-11 PROCEDURE — 83735 ASSAY OF MAGNESIUM: CPT

## 2025-04-11 PROCEDURE — 2580000003 HC RX 258

## 2025-04-11 PROCEDURE — 99232 SBSQ HOSP IP/OBS MODERATE 35: CPT | Performed by: INTERNAL MEDICINE

## 2025-04-11 PROCEDURE — 99233 SBSQ HOSP IP/OBS HIGH 50: CPT | Performed by: INTERNAL MEDICINE

## 2025-04-11 PROCEDURE — 80053 COMPREHEN METABOLIC PANEL: CPT

## 2025-04-11 PROCEDURE — 0202U NFCT DS 22 TRGT SARS-COV-2: CPT

## 2025-04-11 PROCEDURE — 84100 ASSAY OF PHOSPHORUS: CPT

## 2025-04-11 RX ADMIN — VALPROIC ACID 250 MG: 250 SOLUTION ORAL at 11:47

## 2025-04-11 RX ADMIN — SODIUM CHLORIDE, PRESERVATIVE FREE 10 ML: 5 INJECTION INTRAVENOUS at 00:05

## 2025-04-11 RX ADMIN — METOPROLOL SUCCINATE 12.5 MG: 25 TABLET, EXTENDED RELEASE ORAL at 11:47

## 2025-04-11 RX ADMIN — IPRATROPIUM BROMIDE AND ALBUTEROL SULFATE 1 DOSE: 2.5; .5 SOLUTION RESPIRATORY (INHALATION) at 15:40

## 2025-04-11 RX ADMIN — ATORVASTATIN CALCIUM 10 MG: 10 TABLET, FILM COATED ORAL at 21:00

## 2025-04-11 RX ADMIN — Medication 1 TABLET: at 11:52

## 2025-04-11 RX ADMIN — VALPROIC ACID 250 MG: 250 SOLUTION ORAL at 20:57

## 2025-04-11 RX ADMIN — EZETIMIBE 10 MG: 10 TABLET ORAL at 11:52

## 2025-04-11 RX ADMIN — QUETIAPINE FUMARATE 400 MG: 200 TABLET ORAL at 21:01

## 2025-04-11 RX ADMIN — SODIUM CHLORIDE, PRESERVATIVE FREE 40 MG: 5 INJECTION INTRAVENOUS at 11:46

## 2025-04-11 RX ADMIN — SODIUM CHLORIDE, PRESERVATIVE FREE 10 ML: 5 INJECTION INTRAVENOUS at 20:59

## 2025-04-11 RX ADMIN — IPRATROPIUM BROMIDE AND ALBUTEROL SULFATE 1 DOSE: 2.5; .5 SOLUTION RESPIRATORY (INHALATION) at 18:13

## 2025-04-11 RX ADMIN — Medication 1000 MG: at 21:01

## 2025-04-11 RX ADMIN — SODIUM CHLORIDE, PRESERVATIVE FREE 10 ML: 5 INJECTION INTRAVENOUS at 11:39

## 2025-04-11 RX ADMIN — IPRATROPIUM BROMIDE AND ALBUTEROL SULFATE 1 DOSE: 2.5; .5 SOLUTION RESPIRATORY (INHALATION) at 11:56

## 2025-04-11 RX ADMIN — IPRATROPIUM BROMIDE AND ALBUTEROL SULFATE 1 DOSE: 2.5; .5 SOLUTION RESPIRATORY (INHALATION) at 08:09

## 2025-04-11 RX ADMIN — ESCITALOPRAM OXALATE 10 MG: 10 TABLET ORAL at 21:00

## 2025-04-11 ASSESSMENT — PAIN SCALES - GENERAL: PAINLEVEL_OUTOF10: 0

## 2025-04-12 ENCOUNTER — APPOINTMENT (OUTPATIENT)
Dept: GENERAL RADIOLOGY | Age: 75
DRG: 189 | End: 2025-04-12
Payer: MEDICARE

## 2025-04-12 LAB
ALBUMIN SERPL-MCNC: 3 G/DL (ref 3.5–5.2)
ALP SERPL-CCNC: 78 U/L (ref 35–104)
ALT SERPL-CCNC: 34 U/L (ref 0–32)
ANION GAP SERPL CALCULATED.3IONS-SCNC: 13 MMOL/L (ref 7–16)
ANION GAP SERPL CALCULATED.3IONS-SCNC: 13 MMOL/L (ref 7–16)
AST SERPL-CCNC: 56 U/L (ref 0–31)
BASOPHILS # BLD: 0 K/UL (ref 0–0.2)
BASOPHILS NFR BLD: 0 % (ref 0–2)
BILIRUB SERPL-MCNC: 0.6 MG/DL (ref 0–1.2)
BUN SERPL-MCNC: 67 MG/DL (ref 6–23)
BUN SERPL-MCNC: 69 MG/DL (ref 6–23)
CALCIUM SERPL-MCNC: 9.1 MG/DL (ref 8.6–10.2)
CALCIUM SERPL-MCNC: 9.2 MG/DL (ref 8.6–10.2)
CHLORIDE SERPL-SCNC: 103 MMOL/L (ref 98–107)
CHLORIDE SERPL-SCNC: 103 MMOL/L (ref 98–107)
CO2 SERPL-SCNC: 28 MMOL/L (ref 22–29)
CO2 SERPL-SCNC: 32 MMOL/L (ref 22–29)
CREAT SERPL-MCNC: 1.4 MG/DL (ref 0.5–1)
CREAT SERPL-MCNC: 1.4 MG/DL (ref 0.5–1)
EOSINOPHIL # BLD: 0.01 K/UL (ref 0.05–0.5)
EOSINOPHILS RELATIVE PERCENT: 0 % (ref 0–6)
ERYTHROCYTE [DISTWIDTH] IN BLOOD BY AUTOMATED COUNT: 18 % (ref 11.5–15)
FIO2: 80
GFR, ESTIMATED: 40 ML/MIN/1.73M2
GFR, ESTIMATED: 41 ML/MIN/1.73M2
GLUCOSE BLD-MCNC: 237 MG/DL (ref 74–99)
GLUCOSE SERPL-MCNC: 119 MG/DL (ref 74–99)
GLUCOSE SERPL-MCNC: 193 MG/DL (ref 74–99)
HCT VFR BLD AUTO: 20.4 % (ref 34–48)
HGB BLD-MCNC: 6.2 G/DL (ref 11.5–15.5)
IMM GRANULOCYTES # BLD AUTO: 0.05 K/UL (ref 0–0.58)
IMM GRANULOCYTES NFR BLD: 1 % (ref 0–5)
LYMPHOCYTES NFR BLD: 1.51 K/UL (ref 1.5–4)
LYMPHOCYTES RELATIVE PERCENT: 29 % (ref 20–42)
MAGNESIUM SERPL-MCNC: 2 MG/DL (ref 1.6–2.6)
MCH RBC QN AUTO: 32.8 PG (ref 26–35)
MCHC RBC AUTO-ENTMCNC: 30.4 G/DL (ref 32–34.5)
MCV RBC AUTO: 107.9 FL (ref 80–99.9)
MODE: ABNORMAL
MONOCYTES NFR BLD: 0.37 K/UL (ref 0.1–0.95)
MONOCYTES NFR BLD: 7 % (ref 2–12)
NEUTROPHILS NFR BLD: 63 % (ref 43–80)
NEUTS SEG NFR BLD: 3.27 K/UL (ref 1.8–7.3)
O2 DELIVERY DEVICE: ABNORMAL
PATIENT TEMP: 37
PEEP: 5
PHOSPHATE SERPL-MCNC: 3.3 MG/DL (ref 2.5–4.5)
PLATELET # BLD AUTO: 40 K/UL (ref 130–450)
PLATELET CONFIRMATION: NORMAL
PMV BLD AUTO: 10.8 FL (ref 7–12)
POC HCO3: 31.3 MMOL/L (ref 22–26)
POC O2 SATURATION: 99.5 % (ref 92–98.5)
POC PCO2 TEMP: 49.8 MM HG
POC PCO2: 49.8 MM HG (ref 35–45)
POC PH TEMP: 7.41
POC PH: 7.41 (ref 7.35–7.45)
POC PO2 TEMP: 167.5 MM HG
POC PO2: 167.5 MM HG (ref 60–80)
POSITIVE BASE EXCESS, ART: 6.2 MMOL/L (ref 0–3)
POTASSIUM SERPL-SCNC: 4.5 MMOL/L (ref 3.5–5)
POTASSIUM SERPL-SCNC: 5.4 MMOL/L (ref 3.5–5)
PROT SERPL-MCNC: 5.4 G/DL (ref 6.4–8.3)
RBC # BLD AUTO: 1.89 M/UL (ref 3.5–5.5)
SAMPLE SITE: ABNORMAL
SET RATE, POC: 18
SODIUM SERPL-SCNC: 144 MMOL/L (ref 132–146)
SODIUM SERPL-SCNC: 148 MMOL/L (ref 132–146)
TIDAL VOLUME: 450
WBC OTHER # BLD: 5.2 K/UL (ref 4.5–11.5)

## 2025-04-12 PROCEDURE — 94660 CPAP INITIATION&MGMT: CPT

## 2025-04-12 PROCEDURE — 2500000003 HC RX 250 WO HCPCS

## 2025-04-12 PROCEDURE — 99232 SBSQ HOSP IP/OBS MODERATE 35: CPT | Performed by: INTERNAL MEDICINE

## 2025-04-12 PROCEDURE — 85025 COMPLETE CBC W/AUTO DIFF WBC: CPT

## 2025-04-12 PROCEDURE — 6370000000 HC RX 637 (ALT 250 FOR IP)

## 2025-04-12 PROCEDURE — 36415 COLL VENOUS BLD VENIPUNCTURE: CPT

## 2025-04-12 PROCEDURE — 84100 ASSAY OF PHOSPHORUS: CPT

## 2025-04-12 PROCEDURE — 2700000000 HC OXYGEN THERAPY PER DAY

## 2025-04-12 PROCEDURE — 71045 X-RAY EXAM CHEST 1 VIEW: CPT

## 2025-04-12 PROCEDURE — 2580000003 HC RX 258

## 2025-04-12 PROCEDURE — P9016 RBC LEUKOCYTES REDUCED: HCPCS

## 2025-04-12 PROCEDURE — 36430 TRANSFUSION BLD/BLD COMPNT: CPT

## 2025-04-12 PROCEDURE — 6360000002 HC RX W HCPCS

## 2025-04-12 PROCEDURE — 86923 COMPATIBILITY TEST ELECTRIC: CPT

## 2025-04-12 PROCEDURE — 80048 BASIC METABOLIC PNL TOTAL CA: CPT

## 2025-04-12 PROCEDURE — 82962 GLUCOSE BLOOD TEST: CPT

## 2025-04-12 PROCEDURE — 30233N1 TRANSFUSION OF NONAUTOLOGOUS RED BLOOD CELLS INTO PERIPHERAL VEIN, PERCUTANEOUS APPROACH: ICD-10-PCS | Performed by: STUDENT IN AN ORGANIZED HEALTH CARE EDUCATION/TRAINING PROGRAM

## 2025-04-12 PROCEDURE — 86901 BLOOD TYPING SEROLOGIC RH(D): CPT

## 2025-04-12 PROCEDURE — 82803 BLOOD GASES ANY COMBINATION: CPT

## 2025-04-12 PROCEDURE — 94640 AIRWAY INHALATION TREATMENT: CPT

## 2025-04-12 PROCEDURE — 6360000002 HC RX W HCPCS: Performed by: INTERNAL MEDICINE

## 2025-04-12 PROCEDURE — 6370000000 HC RX 637 (ALT 250 FOR IP): Performed by: INTERNAL MEDICINE

## 2025-04-12 PROCEDURE — 86850 RBC ANTIBODY SCREEN: CPT

## 2025-04-12 PROCEDURE — 86900 BLOOD TYPING SEROLOGIC ABO: CPT

## 2025-04-12 PROCEDURE — 2060000000 HC ICU INTERMEDIATE R&B

## 2025-04-12 PROCEDURE — 83735 ASSAY OF MAGNESIUM: CPT

## 2025-04-12 PROCEDURE — 80053 COMPREHEN METABOLIC PANEL: CPT

## 2025-04-12 RX ORDER — FUROSEMIDE 10 MG/ML
40 INJECTION INTRAMUSCULAR; INTRAVENOUS ONCE
Status: COMPLETED | OUTPATIENT
Start: 2025-04-12 | End: 2025-04-12

## 2025-04-12 RX ORDER — SODIUM CHLORIDE 9 MG/ML
INJECTION, SOLUTION INTRAVENOUS PRN
Status: DISCONTINUED | OUTPATIENT
Start: 2025-04-12 | End: 2025-04-14 | Stop reason: SDUPTHER

## 2025-04-12 RX ADMIN — SODIUM CHLORIDE, PRESERVATIVE FREE 40 MG: 5 INJECTION INTRAVENOUS at 09:33

## 2025-04-12 RX ADMIN — VALPROIC ACID 250 MG: 250 SOLUTION ORAL at 09:33

## 2025-04-12 RX ADMIN — MIDODRINE HYDROCHLORIDE 10 MG: 10 TABLET ORAL at 17:31

## 2025-04-12 RX ADMIN — EZETIMIBE 10 MG: 10 TABLET ORAL at 09:34

## 2025-04-12 RX ADMIN — ATORVASTATIN CALCIUM 10 MG: 10 TABLET, FILM COATED ORAL at 22:00

## 2025-04-12 RX ADMIN — FUROSEMIDE 40 MG: 10 INJECTION, SOLUTION INTRAMUSCULAR; INTRAVENOUS at 12:44

## 2025-04-12 RX ADMIN — IPRATROPIUM BROMIDE AND ALBUTEROL SULFATE 1 DOSE: 2.5; .5 SOLUTION RESPIRATORY (INHALATION) at 09:08

## 2025-04-12 RX ADMIN — IPRATROPIUM BROMIDE AND ALBUTEROL SULFATE 1 DOSE: 2.5; .5 SOLUTION RESPIRATORY (INHALATION) at 15:33

## 2025-04-12 RX ADMIN — DOCUSATE SODIUM 100 MG: 50 LIQUID ORAL at 09:33

## 2025-04-12 RX ADMIN — VALPROIC ACID 250 MG: 250 SOLUTION ORAL at 21:59

## 2025-04-12 RX ADMIN — SODIUM CHLORIDE, PRESERVATIVE FREE 10 ML: 5 INJECTION INTRAVENOUS at 21:59

## 2025-04-12 RX ADMIN — MIDODRINE HYDROCHLORIDE 10 MG: 10 TABLET ORAL at 12:44

## 2025-04-12 RX ADMIN — SODIUM CHLORIDE, PRESERVATIVE FREE 10 ML: 5 INJECTION INTRAVENOUS at 09:35

## 2025-04-12 RX ADMIN — QUETIAPINE FUMARATE 400 MG: 200 TABLET ORAL at 22:00

## 2025-04-12 RX ADMIN — Medication 1000 MG: at 22:00

## 2025-04-12 RX ADMIN — LEVOTHYROXINE SODIUM 100 MCG: 0.1 TABLET ORAL at 06:31

## 2025-04-12 RX ADMIN — IPRATROPIUM BROMIDE AND ALBUTEROL SULFATE 1 DOSE: 2.5; .5 SOLUTION RESPIRATORY (INHALATION) at 12:14

## 2025-04-12 RX ADMIN — ESCITALOPRAM OXALATE 10 MG: 10 TABLET ORAL at 22:00

## 2025-04-12 RX ADMIN — MIDODRINE HYDROCHLORIDE 10 MG: 10 TABLET ORAL at 09:33

## 2025-04-12 RX ADMIN — Medication 1 TABLET: at 09:34

## 2025-04-12 RX ADMIN — SODIUM CHLORIDE, PRESERVATIVE FREE 10 ML: 5 INJECTION INTRAVENOUS at 12:45

## 2025-04-12 RX ADMIN — IPRATROPIUM BROMIDE AND ALBUTEROL SULFATE 1 DOSE: 2.5; .5 SOLUTION RESPIRATORY (INHALATION) at 19:29

## 2025-04-12 RX ADMIN — METOPROLOL SUCCINATE 12.5 MG: 25 TABLET, EXTENDED RELEASE ORAL at 09:34

## 2025-04-12 ASSESSMENT — PAIN SCALES - GENERAL
PAINLEVEL_OUTOF10: 0

## 2025-04-12 ASSESSMENT — PAIN SCALES - WONG BAKER
WONGBAKER_NUMERICALRESPONSE: NO HURT
WONGBAKER_NUMERICALRESPONSE: NO HURT

## 2025-04-13 LAB
ABO/RH: NORMAL
ALBUMIN SERPL-MCNC: 2.9 G/DL (ref 3.5–5.2)
ALP SERPL-CCNC: 84 U/L (ref 35–104)
ALT SERPL-CCNC: 29 U/L (ref 0–32)
ANION GAP SERPL CALCULATED.3IONS-SCNC: 14 MMOL/L (ref 7–16)
ANTIBODY SCREEN: NEGATIVE
ARM BAND NUMBER: NORMAL
AST SERPL-CCNC: 30 U/L (ref 0–31)
BASOPHILS # BLD: 0 K/UL (ref 0–0.2)
BASOPHILS NFR BLD: 0 % (ref 0–2)
BILIRUB SERPL-MCNC: 0.7 MG/DL (ref 0–1.2)
BLOOD BANK BLOOD PRODUCT EXPIRATION DATE: NORMAL
BLOOD BANK DISPENSE STATUS: NORMAL
BLOOD BANK ISBT PRODUCT BLOOD TYPE: 5100
BLOOD BANK PRODUCT CODE: NORMAL
BLOOD BANK SAMPLE EXPIRATION: NORMAL
BLOOD BANK UNIT TYPE AND RH: NORMAL
BPU ID: NORMAL
BUN SERPL-MCNC: 67 MG/DL (ref 6–23)
CALCIUM SERPL-MCNC: 8.8 MG/DL (ref 8.6–10.2)
CHLORIDE SERPL-SCNC: 105 MMOL/L (ref 98–107)
CO2 SERPL-SCNC: 31 MMOL/L (ref 22–29)
COMPONENT: NORMAL
CREAT SERPL-MCNC: 1.4 MG/DL (ref 0.5–1)
CROSSMATCH RESULT: NORMAL
EOSINOPHIL # BLD: 0.06 K/UL (ref 0.05–0.5)
EOSINOPHILS RELATIVE PERCENT: 1 % (ref 0–6)
ERYTHROCYTE [DISTWIDTH] IN BLOOD BY AUTOMATED COUNT: 22.6 % (ref 11.5–15)
GFR, ESTIMATED: 39 ML/MIN/1.73M2
GLUCOSE SERPL-MCNC: 133 MG/DL (ref 74–99)
HCT VFR BLD AUTO: 24.9 % (ref 34–48)
HCT VFR BLD AUTO: 26.1 % (ref 34–48)
HGB BLD-MCNC: 8 G/DL (ref 11.5–15.5)
HGB BLD-MCNC: 8.1 G/DL (ref 11.5–15.5)
LYMPHOCYTES NFR BLD: 1.23 K/UL (ref 1.5–4)
LYMPHOCYTES RELATIVE PERCENT: 18 % (ref 20–42)
MAGNESIUM SERPL-MCNC: 2 MG/DL (ref 1.6–2.6)
MCH RBC QN AUTO: 35.1 PG (ref 26–35)
MCHC RBC AUTO-ENTMCNC: 32.5 G/DL (ref 32–34.5)
MCV RBC AUTO: 107.8 FL (ref 80–99.9)
METAMYELOCYTES ABSOLUTE COUNT: 0.06 K/UL (ref 0–0.12)
METAMYELOCYTES: 1 % (ref 0–1)
MONOCYTES NFR BLD: 0.31 K/UL (ref 0.1–0.95)
MONOCYTES NFR BLD: 5 % (ref 2–12)
NEUTROPHILS NFR BLD: 76 % (ref 43–80)
NEUTS SEG NFR BLD: 5.15 K/UL (ref 1.8–7.3)
PHOSPHATE SERPL-MCNC: 3.2 MG/DL (ref 2.5–4.5)
PLATELET # BLD AUTO: 50 K/UL (ref 130–450)
PLATELET CONFIRMATION: NORMAL
PMV BLD AUTO: 10.8 FL (ref 7–12)
POTASSIUM SERPL-SCNC: 4.3 MMOL/L (ref 3.5–5)
PROT SERPL-MCNC: 5.1 G/DL (ref 6.4–8.3)
RBC # BLD AUTO: 2.31 M/UL (ref 3.5–5.5)
RBC # BLD: ABNORMAL 10*6/UL
SODIUM SERPL-SCNC: 150 MMOL/L (ref 132–146)
TRANSFUSION STATUS: NORMAL
UNIT DIVISION: 0
UNIT ISSUE DATE/TIME: NORMAL
WBC OTHER # BLD: 6.8 K/UL (ref 4.5–11.5)

## 2025-04-13 PROCEDURE — 6370000000 HC RX 637 (ALT 250 FOR IP)

## 2025-04-13 PROCEDURE — 2500000003 HC RX 250 WO HCPCS

## 2025-04-13 PROCEDURE — 85025 COMPLETE CBC W/AUTO DIFF WBC: CPT

## 2025-04-13 PROCEDURE — 80053 COMPREHEN METABOLIC PANEL: CPT

## 2025-04-13 PROCEDURE — 84100 ASSAY OF PHOSPHORUS: CPT

## 2025-04-13 PROCEDURE — 6370000000 HC RX 637 (ALT 250 FOR IP): Performed by: INTERNAL MEDICINE

## 2025-04-13 PROCEDURE — 2060000000 HC ICU INTERMEDIATE R&B

## 2025-04-13 PROCEDURE — 99232 SBSQ HOSP IP/OBS MODERATE 35: CPT | Performed by: INTERNAL MEDICINE

## 2025-04-13 PROCEDURE — 94640 AIRWAY INHALATION TREATMENT: CPT

## 2025-04-13 PROCEDURE — 85014 HEMATOCRIT: CPT

## 2025-04-13 PROCEDURE — 36415 COLL VENOUS BLD VENIPUNCTURE: CPT

## 2025-04-13 PROCEDURE — 85018 HEMOGLOBIN: CPT

## 2025-04-13 PROCEDURE — 94660 CPAP INITIATION&MGMT: CPT

## 2025-04-13 PROCEDURE — 2700000000 HC OXYGEN THERAPY PER DAY

## 2025-04-13 PROCEDURE — 6360000002 HC RX W HCPCS

## 2025-04-13 PROCEDURE — 83735 ASSAY OF MAGNESIUM: CPT

## 2025-04-13 RX ADMIN — SODIUM CHLORIDE, PRESERVATIVE FREE 10 ML: 5 INJECTION INTRAVENOUS at 20:47

## 2025-04-13 RX ADMIN — ATORVASTATIN CALCIUM 10 MG: 10 TABLET, FILM COATED ORAL at 20:47

## 2025-04-13 RX ADMIN — EZETIMIBE 10 MG: 10 TABLET ORAL at 08:40

## 2025-04-13 RX ADMIN — MIDODRINE HYDROCHLORIDE 10 MG: 10 TABLET ORAL at 11:58

## 2025-04-13 RX ADMIN — MIDODRINE HYDROCHLORIDE 10 MG: 10 TABLET ORAL at 08:39

## 2025-04-13 RX ADMIN — QUETIAPINE FUMARATE 400 MG: 200 TABLET ORAL at 20:49

## 2025-04-13 RX ADMIN — MIDODRINE HYDROCHLORIDE 10 MG: 10 TABLET ORAL at 16:41

## 2025-04-13 RX ADMIN — Medication 1000 MG: at 20:49

## 2025-04-13 RX ADMIN — SODIUM CHLORIDE, PRESERVATIVE FREE 10 ML: 5 INJECTION INTRAVENOUS at 08:41

## 2025-04-13 RX ADMIN — ESCITALOPRAM OXALATE 10 MG: 10 TABLET ORAL at 20:47

## 2025-04-13 RX ADMIN — IPRATROPIUM BROMIDE AND ALBUTEROL SULFATE 1 DOSE: 2.5; .5 SOLUTION RESPIRATORY (INHALATION) at 14:20

## 2025-04-13 RX ADMIN — IPRATROPIUM BROMIDE AND ALBUTEROL SULFATE 1 DOSE: 2.5; .5 SOLUTION RESPIRATORY (INHALATION) at 11:09

## 2025-04-13 RX ADMIN — VALPROIC ACID 250 MG: 250 SOLUTION ORAL at 20:47

## 2025-04-13 RX ADMIN — Medication 1 TABLET: at 08:40

## 2025-04-13 RX ADMIN — IPRATROPIUM BROMIDE AND ALBUTEROL SULFATE 1 DOSE: 2.5; .5 SOLUTION RESPIRATORY (INHALATION) at 08:09

## 2025-04-13 RX ADMIN — IPRATROPIUM BROMIDE AND ALBUTEROL SULFATE 1 DOSE: 2.5; .5 SOLUTION RESPIRATORY (INHALATION) at 18:08

## 2025-04-13 RX ADMIN — VALPROIC ACID 250 MG: 250 SOLUTION ORAL at 08:39

## 2025-04-13 RX ADMIN — SODIUM CHLORIDE, PRESERVATIVE FREE 40 MG: 5 INJECTION INTRAVENOUS at 08:40

## 2025-04-13 RX ADMIN — LEVOTHYROXINE SODIUM 100 MCG: 0.1 TABLET ORAL at 06:00

## 2025-04-13 RX ADMIN — METOPROLOL SUCCINATE 12.5 MG: 25 TABLET, EXTENDED RELEASE ORAL at 08:40

## 2025-04-13 RX ADMIN — DOCUSATE SODIUM 100 MG: 50 LIQUID ORAL at 08:39

## 2025-04-13 ASSESSMENT — PAIN SCALES - GENERAL
PAINLEVEL_OUTOF10: 0

## 2025-04-14 PROBLEM — I50.9 CONGESTIVE HEART FAILURE (HCC): Status: ACTIVE | Noted: 2025-04-14

## 2025-04-14 LAB
ALBUMIN SERPL-MCNC: 3.2 G/DL (ref 3.5–5.2)
ALP SERPL-CCNC: 89 U/L (ref 35–104)
ALT SERPL-CCNC: 27 U/L (ref 0–32)
ANION GAP SERPL CALCULATED.3IONS-SCNC: 13 MMOL/L (ref 7–16)
AST SERPL-CCNC: 31 U/L (ref 0–31)
BASOPHILS # BLD: 0 K/UL (ref 0–0.2)
BASOPHILS NFR BLD: 0 % (ref 0–2)
BILIRUB SERPL-MCNC: 0.8 MG/DL (ref 0–1.2)
BUN SERPL-MCNC: 65 MG/DL (ref 6–23)
CALCIUM SERPL-MCNC: 9.1 MG/DL (ref 8.6–10.2)
CHLORIDE SERPL-SCNC: 107 MMOL/L (ref 98–107)
CO2 SERPL-SCNC: 33 MMOL/L (ref 22–29)
CREAT SERPL-MCNC: 1.3 MG/DL (ref 0.5–1)
EOSINOPHIL # BLD: 0.21 K/UL (ref 0.05–0.5)
EOSINOPHILS RELATIVE PERCENT: 3 % (ref 0–6)
ERYTHROCYTE [DISTWIDTH] IN BLOOD BY AUTOMATED COUNT: 22.5 % (ref 11.5–15)
GFR, ESTIMATED: 44 ML/MIN/1.73M2
GLUCOSE SERPL-MCNC: 127 MG/DL (ref 74–99)
HCT VFR BLD AUTO: 26.5 % (ref 34–48)
HGB BLD-MCNC: 8.8 G/DL (ref 11.5–15.5)
LYMPHOCYTES NFR BLD: 1.99 K/UL (ref 1.5–4)
LYMPHOCYTES RELATIVE PERCENT: 25 % (ref 20–42)
MAGNESIUM SERPL-MCNC: 1.8 MG/DL (ref 1.6–2.6)
MCH RBC QN AUTO: 36.4 PG (ref 26–35)
MCHC RBC AUTO-ENTMCNC: 33.2 G/DL (ref 32–34.5)
MCV RBC AUTO: 109.5 FL (ref 80–99.9)
MONOCYTES NFR BLD: 0.55 K/UL (ref 0.1–0.95)
MONOCYTES NFR BLD: 7 % (ref 2–12)
MYELOCYTES ABSOLUTE COUNT: 0.07 K/UL
MYELOCYTES: 1 %
NEUTROPHILS NFR BLD: 64 % (ref 43–80)
NEUTS SEG NFR BLD: 5.08 K/UL (ref 1.8–7.3)
PHOSPHATE SERPL-MCNC: 2.6 MG/DL (ref 2.5–4.5)
PLATELET # BLD AUTO: 52 K/UL (ref 130–450)
PLATELET CONFIRMATION: NORMAL
PMV BLD AUTO: 11.6 FL (ref 7–12)
POTASSIUM SERPL-SCNC: 4.4 MMOL/L (ref 3.5–5)
PROT SERPL-MCNC: 5.1 G/DL (ref 6.4–8.3)
RBC # BLD AUTO: 2.42 M/UL (ref 3.5–5.5)
SODIUM SERPL-SCNC: 153 MMOL/L (ref 132–146)
TSH SERPL DL<=0.05 MIU/L-ACNC: 3.46 UIU/ML (ref 0.27–4.2)
WBC OTHER # BLD: 7.9 K/UL (ref 4.5–11.5)

## 2025-04-14 PROCEDURE — 2700000000 HC OXYGEN THERAPY PER DAY

## 2025-04-14 PROCEDURE — 6370000000 HC RX 637 (ALT 250 FOR IP)

## 2025-04-14 PROCEDURE — 94640 AIRWAY INHALATION TREATMENT: CPT

## 2025-04-14 PROCEDURE — 2580000003 HC RX 258: Performed by: INTERNAL MEDICINE

## 2025-04-14 PROCEDURE — 2060000000 HC ICU INTERMEDIATE R&B

## 2025-04-14 PROCEDURE — 6370000000 HC RX 637 (ALT 250 FOR IP): Performed by: INTERNAL MEDICINE

## 2025-04-14 PROCEDURE — 6360000002 HC RX W HCPCS: Performed by: STUDENT IN AN ORGANIZED HEALTH CARE EDUCATION/TRAINING PROGRAM

## 2025-04-14 PROCEDURE — 2500000003 HC RX 250 WO HCPCS

## 2025-04-14 PROCEDURE — 6360000002 HC RX W HCPCS

## 2025-04-14 PROCEDURE — APPSS60 APP SPLIT SHARED TIME 46-60 MINUTES

## 2025-04-14 PROCEDURE — 99233 SBSQ HOSP IP/OBS HIGH 50: CPT | Performed by: STUDENT IN AN ORGANIZED HEALTH CARE EDUCATION/TRAINING PROGRAM

## 2025-04-14 PROCEDURE — 36415 COLL VENOUS BLD VENIPUNCTURE: CPT

## 2025-04-14 PROCEDURE — 99231 SBSQ HOSP IP/OBS SF/LOW 25: CPT | Performed by: NURSE PRACTITIONER

## 2025-04-14 PROCEDURE — 83735 ASSAY OF MAGNESIUM: CPT

## 2025-04-14 PROCEDURE — 84100 ASSAY OF PHOSPHORUS: CPT

## 2025-04-14 PROCEDURE — 80053 COMPREHEN METABOLIC PANEL: CPT

## 2025-04-14 PROCEDURE — 84443 ASSAY THYROID STIM HORMONE: CPT

## 2025-04-14 PROCEDURE — 94660 CPAP INITIATION&MGMT: CPT

## 2025-04-14 PROCEDURE — 99223 1ST HOSP IP/OBS HIGH 75: CPT | Performed by: INTERNAL MEDICINE

## 2025-04-14 PROCEDURE — 2580000003 HC RX 258

## 2025-04-14 PROCEDURE — 85025 COMPLETE CBC W/AUTO DIFF WBC: CPT

## 2025-04-14 RX ORDER — DEXTROSE MONOHYDRATE 50 MG/ML
INJECTION, SOLUTION INTRAVENOUS CONTINUOUS
Status: DISCONTINUED | OUTPATIENT
Start: 2025-04-14 | End: 2025-04-16 | Stop reason: HOSPADM

## 2025-04-14 RX ORDER — FUROSEMIDE 10 MG/ML
40 INJECTION INTRAMUSCULAR; INTRAVENOUS ONCE
Status: COMPLETED | OUTPATIENT
Start: 2025-04-14 | End: 2025-04-14

## 2025-04-14 RX ORDER — METOPROLOL SUCCINATE 25 MG/1
25 TABLET, EXTENDED RELEASE ORAL DAILY
Status: DISCONTINUED | OUTPATIENT
Start: 2025-04-15 | End: 2025-04-16 | Stop reason: HOSPADM

## 2025-04-14 RX ORDER — METOPROLOL SUCCINATE 25 MG/1
12.5 TABLET, EXTENDED RELEASE ORAL ONCE
Status: COMPLETED | OUTPATIENT
Start: 2025-04-14 | End: 2025-04-14

## 2025-04-14 RX ADMIN — IPRATROPIUM BROMIDE AND ALBUTEROL SULFATE 1 DOSE: 2.5; .5 SOLUTION RESPIRATORY (INHALATION) at 12:04

## 2025-04-14 RX ADMIN — VALPROIC ACID 250 MG: 250 SOLUTION ORAL at 08:34

## 2025-04-14 RX ADMIN — SODIUM CHLORIDE, PRESERVATIVE FREE 10 ML: 5 INJECTION INTRAVENOUS at 08:35

## 2025-04-14 RX ADMIN — ESCITALOPRAM OXALATE 10 MG: 10 TABLET ORAL at 20:23

## 2025-04-14 RX ADMIN — DEXTROSE: 5 SOLUTION INTRAVENOUS at 08:10

## 2025-04-14 RX ADMIN — ATORVASTATIN CALCIUM 10 MG: 10 TABLET, FILM COATED ORAL at 20:23

## 2025-04-14 RX ADMIN — SODIUM CHLORIDE, PRESERVATIVE FREE 40 MG: 5 INJECTION INTRAVENOUS at 08:34

## 2025-04-14 RX ADMIN — Medication 1 TABLET: at 08:34

## 2025-04-14 RX ADMIN — IPRATROPIUM BROMIDE AND ALBUTEROL SULFATE 1 DOSE: 2.5; .5 SOLUTION RESPIRATORY (INHALATION) at 09:09

## 2025-04-14 RX ADMIN — DOCUSATE SODIUM 100 MG: 50 LIQUID ORAL at 08:34

## 2025-04-14 RX ADMIN — METOPROLOL SUCCINATE 12.5 MG: 25 TABLET, EXTENDED RELEASE ORAL at 08:34

## 2025-04-14 RX ADMIN — QUETIAPINE FUMARATE 400 MG: 200 TABLET ORAL at 20:23

## 2025-04-14 RX ADMIN — FUROSEMIDE 40 MG: 10 INJECTION, SOLUTION INTRAMUSCULAR; INTRAVENOUS at 11:34

## 2025-04-14 RX ADMIN — EZETIMIBE 10 MG: 10 TABLET ORAL at 08:34

## 2025-04-14 RX ADMIN — VALPROIC ACID 250 MG: 250 SOLUTION ORAL at 20:23

## 2025-04-14 RX ADMIN — IPRATROPIUM BROMIDE AND ALBUTEROL SULFATE 1 DOSE: 2.5; .5 SOLUTION RESPIRATORY (INHALATION) at 16:45

## 2025-04-14 RX ADMIN — LEVOTHYROXINE SODIUM 100 MCG: 0.1 TABLET ORAL at 05:41

## 2025-04-14 RX ADMIN — Medication 1000 MG: at 20:23

## 2025-04-14 RX ADMIN — IPRATROPIUM BROMIDE AND ALBUTEROL SULFATE 1 DOSE: 2.5; .5 SOLUTION RESPIRATORY (INHALATION) at 18:22

## 2025-04-14 RX ADMIN — METOPROLOL SUCCINATE 12.5 MG: 25 TABLET, EXTENDED RELEASE ORAL at 17:25

## 2025-04-14 ASSESSMENT — PAIN SCALES - GENERAL
PAINLEVEL_OUTOF10: 0

## 2025-04-14 NOTE — CONSULTS
[facility]    PULMONARY/CRITICAL CARE CONSULTATION NOTE    Patient: Tenisha Bruce  MRN: [id number]  : 1950    Encounter Date: 3/30/2025  Encounter Time: 10:20 AM     Date of Admission: .3/29/2025  3:40 PM    Consulting Physician:  Primary Care Physician:      Julio Porter DO     None     None    PROBLEM LIST:  Patient Active Problem List   Diagnosis    Acute hypoxemic respiratory failure (HCC)    Acute kidney injury    Pancytopenia (HCC)    Influenza    Nondisplaced fracture of neck of left femur (HCC)    Left hip pain    Hypothyroid    Closed fracture of neck of left femur (HCC)    Subarachnoid hemorrhage (HCC)    Sepsis with acute renal failure and septic shock (HCC)    Acute metabolic encephalopathy    Hypokalemia    Macrocytic anemia    Thrombocytopenia    Stenosis of both internal carotid arteries    Elevated troponin    Pneumonia of left lower lobe due to infectious organism    Mixed hyperlipidemia    Septic shock (HCC)    Acute hypoxic respiratory failure (HCC)     Reason for Consultation: Pleural Effusion     HPI:   Tenisha Bruce is a 74 y.o. female with past medical history noted for above that presented to hospital for shortness of breath.  Pulmonary has been consulted for shortness of breath and B/L pleural effusions.    Patient also has known medical history of Anxiety, Corneal scarring, Fetal alcohol syndrome, Hypercholesteremia, Hypothyroidism, and Onychomycosis.      Patient presents to the ED from nursing facility where she was found to be hypoxic.  She does not wear oxygen at baseline.  Patient was just discharged from the hospital earlier today where she was admitted for septic shock and acute metabolic encephalopathy and new onset heart failure.  Blood gases are positive for hypercapnia, CO2 65.1, CTA pulmonary revealed cardiomegaly with interstitial pulmonary edema pattern and moderate right and left large pleural effusions that may be loculated on the left with 
[facility]  Internal Medicine Residency Program  CONSULT NOTE  MICU    Patient:  Tenisha Bruce 74 y.o. female MRN: [id number]     Date of Service: 4/4/2025    Hospital Day: 7      Chief complaint: Shortness of breath  History of Present Illness   The patient is a 74 y.o. female with past medical history of fetal alcohol syndrome, hypothyroidism, hypercholesterolemia, anxiety, developmental disability, was admitted to hospital on 3/29/2025 from nursing facility with shortness of breath.  History obtained from chart review as patient has developmental disability and unable to provide any meaningful history.  Patient was recently discharged from hospital and was treated for septic shock and acute metabolic encephalopathy, diagnosed with heart failure.  Patient readmitted the same day she was discharged with concern for hypoxia.  In ED, patient had elevated troponin of 291, proBNP 52,616.  Respiratory panel was negative.  Platelet count was 55,000, chest x-ray showed left pleural effusion with cardiomegaly.  ABG showed respiratory acidosis with pH 7.305, pCO2 65.1, bicarb 31.7.  CT head without contrast showed no acute intracranial abnormality.  CTA pulmonary with contrast and CT abdomen pelvis with contrast showed no evidence of pulmonary embolism.  Cardiomegaly with interstitial pulmonary edema pattern and moderate right and large left pleural effusions, may be loculated on the left with adjacent atelectasis.  Diffuse body wall edema.  Moderate colonic stool burden with gaseous distention of the transverse colon and moderate rectosigmoid colonic stool burden without evidence of perforation or abscess.  Patient was started on cefepime and vancomycin.  During the course of hospitalization, TSH 5.53, free T40.4.  Platelet count trended down throughout hospitalization.  Hematology was consulted.  Patient was diuresed.  Heparin drip was started for elevated troponin and cardiology was consulted.  
[facility] CONSULT NOTE    Reason for Consult: HAP   Requested by: Sandra Stewart MD      Chief complaint: Shortness of breath    History Obtained From: EMR    HISTORY OFPRESENT ILLNESS              The patient is a 74 y.o. female with history of fetal alcohol syndrome, developmental delay, chronic respiratory failure, left femoral neck fracture s/p ORIF in 08/2023, subarachnoid hemorrhage sustained from a fall in 12/2023, previously admitted from 03/19-03/28 for septic shock secondary to left lower lobe pneumonia for which she received cefepime, doxycycline, piperacillin-tazobactam, vancomycin, readmitted on 03/29 with shortness of breath found to be hypoxemic with large left and mild right pleural effusions prompting bedside chest tube placement on 03/30.  Pleural fluid and blood cultures showed no growth.  Urine Streptococcus pneumoniae antigen and Mycoplasma pneumoniae IgM were positive.  Respiratory pathogen PCR panel was negative.  Hospital course was complicated by shock.  Chest tube was removed on 04/05.  Repeat chest x-ray on 04/09 showed interval progression of extensive bilateral patchy opacities. She received vancomycin from 03/29-04/04, azithromycin and cefepime from 03/30-04/04, amoxicillin-clavulanate from 0/403-04/04, piperacillin-tazobactam since 04/04.  ID service was subsequently consulted for further recommendations.    Past Medical History  Past Medical History:   Diagnosis Date    Anxiety     Corneal scarring     Fetal alcohol syndrome     Fetal alcohol syndrome     Hypercholesteremia     Hypothyroidism     Onychomycosis     Schizoaffective disorder (HCC)        Current Facility-Administered Medications   Medication Dose Route Frequency Provider Last Rate Last Admin    ipratropium 0.5 mg-albuterol 2.5 mg (DUONEB) nebulizer solution 1 Dose  1 Dose Inhalation Q4H WA RT Sandra Stewart MD   1 Dose at 04/10/25 0917    metoprolol succinate (TOPROL XL) extended release tablet 12.5 mg  12.5 mg 
failure  Pneumonia  HFrEF  Bilateral pleural effusions  Valvular heart disease-moderate mitral regurgitation  DARIO  Thrombocytopenia  Fetal alcohol syndrome/developmental delay  Schizoaffective disorder      Palliative Care Encounter / Counseling Regarding Goals of Care  Please see detailed goals of care discussion as below  At this time, Tenisha Bruce, Does Not have capacity for medical decision-making.  Capacity is time limited and situation/question specific  During encounter Analia (legal guardian) was surrogate medical decision-maker  Outcome of goals of care meeting:   Await return call from guardian to discuss goals of care.  Continue current medical management.   Code status Full Code  Advanced Directives:   Legal guardianship document available for review in [facility]  Surrogate/Legal NOK:  Analia Justice (legal guardian [employer]): [phone]      Spiritual assessment: no spiritual distress identified  Bereavement and grief: to be determined  Referrals to: none today    SUBJECTIVE:     Current medical issues leading to Palliative Medicine involvement include   Active Hospital Problems    Diagnosis Date Noted    Acute hypoxic respiratory failure [J96.01] 03/29/2025    Acute respiratory failure with hypoxia and hypercapnia [J96.01, J96.02] 02/06/2020       Details of Conversation:    Chart reviewed.  Patient was seen and examined at the bedside with no family present.  She has a TeleSitter.  She is lying in bed awake and in no apparent distress.  She is on Airvo 95% FiO2.  She is picking at her gown and her heart monitor leads.  She is unable to participate in meaningful conversation and becomes agitated when I was speaking with her and when I went to listen to her lungs.  Spoke with bedside RN.    Call placed to patient's guardian to discuss goals of care and CODE STATUS moving forward.  No answer and voicemail was left to return call to palliative care.  We will follow.    Review of Systems   Unable to perform 
Code    Family History: Noncontributory secondary to age     REVIEW OF SYSTEMS:  See HPI       PHYSICAL EXAM:   BP (!) 104/50   Pulse 81   Temp 98.8 °F (37.1 °C) (Axillary)   Resp 18   Ht 1.651 m (5' 5\")   Wt 60.8 kg (134 lb)   SpO2 100%   BMI 22.30 kg/m²   CONST:  Well developed, well nourished elderly  female who appears of stated age.  On BiPAP.  Confused, and does not follow commands.   HEENT:   Head- Normocephalic, atraumatic   Eyes- Conjunctivae pink, anicteric  Throat- Oral mucosa pink and moist  Neck-  No stridor, trachea midline, no jugular venous distention. No carotid bruit.    CHEST: Chest symmetrical. No accessory muscle use or intercostal retractions  RESPIRATORY: Lung sounds -coarse breath sounds  CARDIOVASCULAR:     Heart Ausculation- Regular rate and rhythm, no murmur. No s3, s4 or rub   PV: No lower extremity edema. No varicosities. Pedal pulses palpable, no clubbing or cyanosis   ABDOMEN: Soft, non-tender to light palpation. Bowel sounds present. No palpable masses; no abdominal bruit  MS: Good muscle strength and tone. No atrophy or abnormal movements.   : Deferred  SKIN: Warm and dry no statis dermatitis or ulcers   NEURO / PSYCH: Confused and does not follow command.  Moves all extremities spontaneously.       DATA:    ECG 3/29/2025, reviewed by me: Sinus rhythm with occasional PVCs with IVCD/left bundle branch block pattern  Tele strips: SR 80-90's    Diagnostic:    CXR 3/29/2025: L pleural effusion, cardiomegaly.    CT Head 3/29/2025: No acute intercranial abnormality.    CTA Chest/Abdomen/Pelvis 3/29/2025: No evidence of pulmonary embolism. Cardiomegaly with interstitial pulmonary edema pattern and moderate right and large left pleural effusions may be loculated on the left with adjacent atelectasis.  Diffuse body wall edema anasarca appearance. Moderate colonic stool burden with gaseous distension of the transverse colon and moderate rectosigmoid colonic stool burden 
with history of left-sided chest tube insertion  Moderate-severe LV systolic dysfunction (EF 30 to 35%) on echo 3/20/2025 with moderate MR  Hypernatremia  Acute kidney injury/prerenal azotemia  Protein calorie malnutrition/hypoalbuminemia  Anemia with history of packed RBC transfusions  Thrombocytopenia with history of platelet transfusion  A dentulous.  History of dysphagia.  Poor p.o. intake.  History of left frontal subarachnoid hemorrhage status post mechanical fall 12/2023    PLAN:   Fluid management as per nephrology.  Consider starting Lasix 40 mg p.o. daily prior to discharge (if patient is taking adequate p.o.)  Increase Toprol-XL   Discontinue midodrine  Check TSH.  Adjust thyroid replacement therapy accordingly  Consider resuming lisinopril and spironolactone upon discharge as renal function improves  No advanced cardiac testing planned.  Patient is not a candidate for any invasive diagnostic or therapeutic cardiac interventions.  Rest as per the primary service and other consultants  Cardiology will sign off      I spent 75 minutes completing this encounter. Total time included the following:  Independently interviewing the patient (HPI, ROS, PMH, PSH, FMH, SH, allergies and medications).  Independently performing a medical appropriate examination  Ordering medications, tests and/or procedures  Formulating the assessment/plan and reviewing the rationale for the above recommendations  Reviewing available records, results of all previously ordered testing/procedures and current problem list  Counseling/educating the patient  Coordinating care with other healthcare professionals  Documenting clinical information in the patient's electronic health records    Electronically signed by Arin Santos MD on 4/14/2025 at 3:17 PM         
position of the left pigtail small caliber chest tube.   No sign of pneumothorax or pleural effusion on the left.   2. Stable mild patchy infiltrate in the right superior perihilar lung which   could be a mild right upper lobe pneumonia.   3. Stable mild pulmonary fibrosis.   4. Stable mild cardiomegaly.         XR CHEST PORTABLE   Final Result   Bilateral atelectasis/pneumonia, unchanged.         XR CHEST PORTABLE   Final Result   1.  Stable position of pigtail catheter left mid thorax.  No pneumothorax   seen.  No large pleural effusion.      2.  Slightly improved aeration of the bilateral lungs particularly in the   right lower lung with persistent faint interstitial opacities and slightly   more confluent opacity in the right upper lung.      3.  Background lung hyperinflation with coarsened interstitial markings.   Atherosclerotic disease and cardiomegaly.  Central pulmonary vascular   congestion.         XR CHEST PORTABLE   Final Result   1. Trace left pneumothorax. Left-sided chest tube.   2. Right greater than left interstitial and airspace opacities. Trace   bilateral pleural effusions/thickening.         US THORACENTESIS Which side should the procedure be performed? Left   Final Result      CT ABDOMEN PELVIS W IV CONTRAST Additional Contrast? None   Final Result   1. No evidence of pulmonary embolism.   2. Cardiomegaly with interstitial pulmonary edema pattern and moderate right   and large left pleural effusions may be loculated on the left with adjacent   atelectasis.   3. Diffuse body wall edema anasarca appearance.   4. Moderate colonic stool burden with gaseous distension of the transverse   colon and moderate rectosigmoid colonic stool burden without evidence for   perforation or abscess. Consideration for ileus pattern.         CT HEAD WO CONTRAST   Final Result   No acute intracranial abnormality.         CTA PULMONARY W CONTRAST   Final Result   1. No evidence of pulmonary embolism.   2. 
exam?->CRC FINDINGS: CT HEAD: BRAIN/VENTRICLES:  No mass effect, edema or hemorrhage is seen.  Small chronic infarction noted in the left parietal lobe.  Mild-to-moderate cerebral volume loss.  Mild chronic microvascular ischemic changes.  No hydrocephalus or extra-axial fluid. ORBITS: The visualized portion of the orbits demonstrate no acute abnormality. SINUSES:  The visualized paranasal sinuses and mastoid air cells demonstrate no acute abnormality. SOFT TISSUES/SKULL: No acute abnormality of the visualized skull or soft tissues. CTA NECK: AORTIC ARCH/ARCH VESSELS: No dissection or arterial injury.  No significant stenosis of the brachiocephalic or subclavian arteries. CAROTID ARTERIES: Prominent calcified atherosclerosis along the right carotid bulb resulting in a maximal stenosis of approximately 70% in the proximal right ICA (axial series 305, image 119).  Prominent calcified atherosclerosis in the left carotid bulb results in 90% stenosis at the origin of the left internal carotid artery (axial series 305, image 104). VERTEBRAL ARTERIES: Mildly stenotic calcified atherosclerotic plaque at the origin of the left vertebral artery.  The remainder is widely patent.  No significant stenosis in the right vertebral artery. SOFT TISSUES: The lung apices are clear.  No cervical or superior mediastinal lymphadenopathy.  The larynx and pharynx are unremarkable.  No acute abnormality of the salivary and thyroid glands. BONES: No acute osseous abnormality. CTA HEAD: ANTERIOR CIRCULATION: Moderate calcified atherosclerotic plaque in the cavernous segments of the internal carotid arteries result in mild stenoses. No significant stenosis identified in the bilateral anterior or middle cerebral arteries. POSTERIOR CIRCULATION: No significant stenosis of the basilar or posterior cerebral arteries. No aneurysm. OTHER: No dural venous sinus thrombosis on this non-dedicated study.     CT HEAD: 1. No acute intracranial abnormality.

## 2025-04-15 LAB
ALBUMIN SERPL-MCNC: 2.9 G/DL (ref 3.5–5.2)
ALP SERPL-CCNC: 85 U/L (ref 35–104)
ALT SERPL-CCNC: 25 U/L (ref 0–32)
ANION GAP SERPL CALCULATED.3IONS-SCNC: 14 MMOL/L (ref 7–16)
AST SERPL-CCNC: 36 U/L (ref 0–31)
BASOPHILS # BLD: 0 K/UL (ref 0–0.2)
BASOPHILS NFR BLD: 0 % (ref 0–2)
BILIRUB SERPL-MCNC: 0.9 MG/DL (ref 0–1.2)
BUN SERPL-MCNC: 57 MG/DL (ref 6–23)
CALCIUM SERPL-MCNC: 8.6 MG/DL (ref 8.6–10.2)
CHLORIDE SERPL-SCNC: 102 MMOL/L (ref 98–107)
CO2 SERPL-SCNC: 32 MMOL/L (ref 22–29)
CREAT SERPL-MCNC: 1.2 MG/DL (ref 0.5–1)
EOSINOPHIL # BLD: 0.14 K/UL (ref 0.05–0.5)
EOSINOPHILS RELATIVE PERCENT: 2 % (ref 0–6)
ERYTHROCYTE [DISTWIDTH] IN BLOOD BY AUTOMATED COUNT: 21.7 % (ref 11.5–15)
GFR, ESTIMATED: 47 ML/MIN/1.73M2
GLUCOSE BLD-MCNC: 167 MG/DL (ref 74–99)
GLUCOSE SERPL-MCNC: 128 MG/DL (ref 74–99)
HCT VFR BLD AUTO: 22.9 % (ref 34–48)
HGB BLD-MCNC: 7.4 G/DL (ref 11.5–15.5)
LYMPHOCYTES NFR BLD: 1.99 K/UL (ref 1.5–4)
LYMPHOCYTES RELATIVE PERCENT: 26 % (ref 20–42)
MAGNESIUM SERPL-MCNC: 1.8 MG/DL (ref 1.6–2.6)
MCH RBC QN AUTO: 35.2 PG (ref 26–35)
MCHC RBC AUTO-ENTMCNC: 32.3 G/DL (ref 32–34.5)
MCV RBC AUTO: 109 FL (ref 80–99.9)
MICROORGANISM SPEC CULT: NORMAL
MICROORGANISM/AGENT SPEC: NORMAL
MONOCYTES NFR BLD: 0.41 K/UL (ref 0.1–0.95)
MONOCYTES NFR BLD: 5 % (ref 2–12)
NEUTROPHILS NFR BLD: 67 % (ref 43–80)
NEUTS SEG NFR BLD: 5.16 K/UL (ref 1.8–7.3)
PHOSPHATE SERPL-MCNC: 2.7 MG/DL (ref 2.5–4.5)
PLATELET CONFIRMATION: NORMAL
PLATELET, FLUORESCENCE: 37 K/UL (ref 130–450)
PMV BLD AUTO: 11.5 FL (ref 7–12)
POTASSIUM SERPL-SCNC: 4.5 MMOL/L (ref 3.5–5)
PROT SERPL-MCNC: 4.6 G/DL (ref 6.4–8.3)
RBC # BLD AUTO: 2.1 M/UL (ref 3.5–5.5)
RBC # BLD: ABNORMAL 10*6/UL
SERVICE CMNT-IMP: NORMAL
SODIUM SERPL-SCNC: 148 MMOL/L (ref 132–146)
SPECIMEN DESCRIPTION: NORMAL
WBC OTHER # BLD: 7.7 K/UL (ref 4.5–11.5)

## 2025-04-15 PROCEDURE — 2500000003 HC RX 250 WO HCPCS

## 2025-04-15 PROCEDURE — 36415 COLL VENOUS BLD VENIPUNCTURE: CPT

## 2025-04-15 PROCEDURE — 84100 ASSAY OF PHOSPHORUS: CPT

## 2025-04-15 PROCEDURE — 99233 SBSQ HOSP IP/OBS HIGH 50: CPT | Performed by: INTERNAL MEDICINE

## 2025-04-15 PROCEDURE — 6370000000 HC RX 637 (ALT 250 FOR IP)

## 2025-04-15 PROCEDURE — 2060000000 HC ICU INTERMEDIATE R&B

## 2025-04-15 PROCEDURE — 99233 SBSQ HOSP IP/OBS HIGH 50: CPT | Performed by: STUDENT IN AN ORGANIZED HEALTH CARE EDUCATION/TRAINING PROGRAM

## 2025-04-15 PROCEDURE — 6370000000 HC RX 637 (ALT 250 FOR IP): Performed by: INTERNAL MEDICINE

## 2025-04-15 PROCEDURE — 6360000002 HC RX W HCPCS: Performed by: STUDENT IN AN ORGANIZED HEALTH CARE EDUCATION/TRAINING PROGRAM

## 2025-04-15 PROCEDURE — 82962 GLUCOSE BLOOD TEST: CPT

## 2025-04-15 PROCEDURE — 2700000000 HC OXYGEN THERAPY PER DAY

## 2025-04-15 PROCEDURE — 2580000003 HC RX 258

## 2025-04-15 PROCEDURE — 85025 COMPLETE CBC W/AUTO DIFF WBC: CPT

## 2025-04-15 PROCEDURE — 6360000002 HC RX W HCPCS: Performed by: NURSE PRACTITIONER

## 2025-04-15 PROCEDURE — 80053 COMPREHEN METABOLIC PANEL: CPT

## 2025-04-15 PROCEDURE — 94640 AIRWAY INHALATION TREATMENT: CPT

## 2025-04-15 PROCEDURE — 83735 ASSAY OF MAGNESIUM: CPT

## 2025-04-15 PROCEDURE — 94660 CPAP INITIATION&MGMT: CPT

## 2025-04-15 PROCEDURE — 6360000002 HC RX W HCPCS

## 2025-04-15 RX ORDER — ENOXAPARIN SODIUM 100 MG/ML
40 INJECTION SUBCUTANEOUS DAILY
Status: DISCONTINUED | OUTPATIENT
Start: 2025-04-15 | End: 2025-04-16 | Stop reason: HOSPADM

## 2025-04-15 RX ORDER — ACETYLCYSTEINE 200 MG/ML
600 SOLUTION ORAL; RESPIRATORY (INHALATION)
Status: DISCONTINUED | OUTPATIENT
Start: 2025-04-15 | End: 2025-04-16 | Stop reason: HOSPADM

## 2025-04-15 RX ADMIN — VALPROIC ACID 250 MG: 250 SOLUTION ORAL at 20:43

## 2025-04-15 RX ADMIN — DOCUSATE SODIUM 100 MG: 50 LIQUID ORAL at 09:41

## 2025-04-15 RX ADMIN — ATORVASTATIN CALCIUM 10 MG: 10 TABLET, FILM COATED ORAL at 20:44

## 2025-04-15 RX ADMIN — Medication 1 TABLET: at 09:43

## 2025-04-15 RX ADMIN — ESCITALOPRAM OXALATE 10 MG: 10 TABLET ORAL at 20:43

## 2025-04-15 RX ADMIN — IPRATROPIUM BROMIDE AND ALBUTEROL SULFATE 1 DOSE: 2.5; .5 SOLUTION RESPIRATORY (INHALATION) at 17:36

## 2025-04-15 RX ADMIN — SODIUM CHLORIDE, PRESERVATIVE FREE 10 ML: 5 INJECTION INTRAVENOUS at 09:41

## 2025-04-15 RX ADMIN — ENOXAPARIN SODIUM 40 MG: 100 INJECTION SUBCUTANEOUS at 14:09

## 2025-04-15 RX ADMIN — EZETIMIBE 10 MG: 10 TABLET ORAL at 09:42

## 2025-04-15 RX ADMIN — Medication 1000 MG: at 20:44

## 2025-04-15 RX ADMIN — IPRATROPIUM BROMIDE AND ALBUTEROL SULFATE 1 DOSE: 2.5; .5 SOLUTION RESPIRATORY (INHALATION) at 09:12

## 2025-04-15 RX ADMIN — SODIUM CHLORIDE, PRESERVATIVE FREE 40 MG: 5 INJECTION INTRAVENOUS at 09:41

## 2025-04-15 RX ADMIN — IPRATROPIUM BROMIDE AND ALBUTEROL SULFATE 1 DOSE: 2.5; .5 SOLUTION RESPIRATORY (INHALATION) at 21:06

## 2025-04-15 RX ADMIN — VALPROIC ACID 250 MG: 250 SOLUTION ORAL at 09:41

## 2025-04-15 RX ADMIN — METOPROLOL SUCCINATE 25 MG: 25 TABLET, EXTENDED RELEASE ORAL at 09:42

## 2025-04-15 RX ADMIN — QUETIAPINE FUMARATE 400 MG: 200 TABLET ORAL at 20:43

## 2025-04-15 RX ADMIN — LEVOTHYROXINE SODIUM 100 MCG: 0.1 TABLET ORAL at 05:30

## 2025-04-15 RX ADMIN — ACETYLCYSTEINE 600 MG: 200 INHALANT RESPIRATORY (INHALATION) at 21:06

## 2025-04-15 RX ADMIN — IPRATROPIUM BROMIDE AND ALBUTEROL SULFATE 1 DOSE: 2.5; .5 SOLUTION RESPIRATORY (INHALATION) at 12:42

## 2025-04-16 VITALS
WEIGHT: 148.81 LBS | OXYGEN SATURATION: 98 % | RESPIRATION RATE: 19 BRPM | TEMPERATURE: 97.4 F | SYSTOLIC BLOOD PRESSURE: 123 MMHG | BODY MASS INDEX: 24.79 KG/M2 | HEIGHT: 65 IN | DIASTOLIC BLOOD PRESSURE: 64 MMHG | HEART RATE: 82 BPM

## 2025-04-16 LAB
ALBUMIN SERPL-MCNC: 2.7 G/DL (ref 3.5–5.2)
ALP SERPL-CCNC: 82 U/L (ref 35–104)
ALT SERPL-CCNC: 25 U/L (ref 0–35)
ANION GAP SERPL CALCULATED.3IONS-SCNC: 7 MMOL/L (ref 7–16)
AST SERPL-CCNC: 42 U/L (ref 0–35)
BASOPHILS # BLD: 0 K/UL (ref 0–0.2)
BASOPHILS NFR BLD: 0 % (ref 0–2)
BILIRUB SERPL-MCNC: 0.8 MG/DL (ref 0–1.2)
BUN SERPL-MCNC: 53 MG/DL (ref 8–23)
CALCIUM SERPL-MCNC: 8.5 MG/DL (ref 8.8–10.2)
CHLORIDE SERPL-SCNC: 100 MMOL/L (ref 98–107)
CO2 SERPL-SCNC: 36 MMOL/L (ref 22–29)
CREAT SERPL-MCNC: 1.1 MG/DL (ref 0.5–1)
EOSINOPHIL # BLD: 0.29 K/UL (ref 0.05–0.5)
EOSINOPHILS RELATIVE PERCENT: 4 % (ref 0–6)
ERYTHROCYTE [DISTWIDTH] IN BLOOD BY AUTOMATED COUNT: 20.8 % (ref 11.5–15)
GFR, ESTIMATED: 54 ML/MIN/1.73M2
GLUCOSE BLD-MCNC: 160 MG/DL (ref 74–99)
GLUCOSE SERPL-MCNC: 152 MG/DL (ref 74–99)
HCT VFR BLD AUTO: 22.8 % (ref 34–48)
HGB BLD-MCNC: 7.1 G/DL (ref 11.5–15.5)
LYMPHOCYTES NFR BLD: 1.38 K/UL (ref 1.5–4)
LYMPHOCYTES RELATIVE PERCENT: 21 % (ref 20–42)
MAGNESIUM SERPL-MCNC: 1.8 MG/DL (ref 1.6–2.4)
MCH RBC QN AUTO: 34 PG (ref 26–35)
MCHC RBC AUTO-ENTMCNC: 31.1 G/DL (ref 32–34.5)
MCV RBC AUTO: 109.1 FL (ref 80–99.9)
MONOCYTES NFR BLD: 0.46 K/UL (ref 0.1–0.95)
MONOCYTES NFR BLD: 7 % (ref 2–12)
NEUTROPHILS NFR BLD: 67 % (ref 43–80)
NEUTS SEG NFR BLD: 4.37 K/UL (ref 1.8–7.3)
PHOSPHATE SERPL-MCNC: 2.4 MG/DL (ref 2.5–4.5)
PLATELET # BLD AUTO: 23 K/UL (ref 130–450)
PLATELET CONFIRMATION: NORMAL
PMV BLD AUTO: 10.9 FL (ref 7–12)
POTASSIUM SERPL-SCNC: 4.3 MMOL/L (ref 3.4–4.5)
PROT SERPL-MCNC: 4.4 G/DL (ref 6.4–8.3)
RBC # BLD AUTO: 2.09 M/UL (ref 3.5–5.5)
RBC # BLD: ABNORMAL 10*6/UL
SODIUM SERPL-SCNC: 143 MMOL/L (ref 136–145)
WBC OTHER # BLD: 6.5 K/UL (ref 4.5–11.5)

## 2025-04-16 PROCEDURE — 6370000000 HC RX 637 (ALT 250 FOR IP)

## 2025-04-16 PROCEDURE — 6360000002 HC RX W HCPCS

## 2025-04-16 PROCEDURE — 85025 COMPLETE CBC W/AUTO DIFF WBC: CPT

## 2025-04-16 PROCEDURE — 6360000002 HC RX W HCPCS: Performed by: STUDENT IN AN ORGANIZED HEALTH CARE EDUCATION/TRAINING PROGRAM

## 2025-04-16 PROCEDURE — 36415 COLL VENOUS BLD VENIPUNCTURE: CPT

## 2025-04-16 PROCEDURE — 2500000003 HC RX 250 WO HCPCS

## 2025-04-16 PROCEDURE — 84100 ASSAY OF PHOSPHORUS: CPT

## 2025-04-16 PROCEDURE — 83735 ASSAY OF MAGNESIUM: CPT

## 2025-04-16 PROCEDURE — 82962 GLUCOSE BLOOD TEST: CPT

## 2025-04-16 PROCEDURE — 80053 COMPREHEN METABOLIC PANEL: CPT

## 2025-04-16 PROCEDURE — 99239 HOSP IP/OBS DSCHRG MGMT >30: CPT | Performed by: STUDENT IN AN ORGANIZED HEALTH CARE EDUCATION/TRAINING PROGRAM

## 2025-04-16 PROCEDURE — 6370000000 HC RX 637 (ALT 250 FOR IP): Performed by: INTERNAL MEDICINE

## 2025-04-16 PROCEDURE — 2700000000 HC OXYGEN THERAPY PER DAY

## 2025-04-16 PROCEDURE — 94640 AIRWAY INHALATION TREATMENT: CPT

## 2025-04-16 PROCEDURE — 94669 MECHANICAL CHEST WALL OSCILL: CPT

## 2025-04-16 PROCEDURE — 94660 CPAP INITIATION&MGMT: CPT

## 2025-04-16 RX ORDER — VALPROIC ACID 250 MG/5ML
250 SOLUTION ORAL 2 TIMES DAILY
Qty: 300 ML | Refills: 0 | Status: SHIPPED | OUTPATIENT
Start: 2025-04-16

## 2025-04-16 RX ADMIN — EZETIMIBE 10 MG: 10 TABLET ORAL at 09:36

## 2025-04-16 RX ADMIN — ACETYLCYSTEINE 600 MG: 200 INHALANT RESPIRATORY (INHALATION) at 07:43

## 2025-04-16 RX ADMIN — Medication 1 TABLET: at 10:17

## 2025-04-16 RX ADMIN — IPRATROPIUM BROMIDE AND ALBUTEROL SULFATE 1 DOSE: 2.5; .5 SOLUTION RESPIRATORY (INHALATION) at 07:43

## 2025-04-16 RX ADMIN — IPRATROPIUM BROMIDE AND ALBUTEROL SULFATE 1 DOSE: 2.5; .5 SOLUTION RESPIRATORY (INHALATION) at 16:20

## 2025-04-16 RX ADMIN — METOPROLOL SUCCINATE 25 MG: 25 TABLET, EXTENDED RELEASE ORAL at 08:25

## 2025-04-16 RX ADMIN — ENOXAPARIN SODIUM 40 MG: 100 INJECTION SUBCUTANEOUS at 09:36

## 2025-04-16 RX ADMIN — LEVOTHYROXINE SODIUM 100 MCG: 0.1 TABLET ORAL at 06:04

## 2025-04-16 RX ADMIN — SODIUM CHLORIDE, PRESERVATIVE FREE 40 MG: 5 INJECTION INTRAVENOUS at 09:36

## 2025-04-16 RX ADMIN — SODIUM CHLORIDE, PRESERVATIVE FREE 10 ML: 5 INJECTION INTRAVENOUS at 10:17

## 2025-04-16 RX ADMIN — IPRATROPIUM BROMIDE AND ALBUTEROL SULFATE 1 DOSE: 2.5; .5 SOLUTION RESPIRATORY (INHALATION) at 12:09

## 2025-04-16 RX ADMIN — DOCUSATE SODIUM 100 MG: 50 LIQUID ORAL at 09:36

## 2025-04-16 RX ADMIN — VALPROIC ACID 250 MG: 250 SOLUTION ORAL at 09:36

## 2025-04-16 ASSESSMENT — PAIN SCALES - WONG BAKER: WONGBAKER_NUMERICALRESPONSE: NO HURT

## 2025-04-16 ASSESSMENT — PAIN SCALES - GENERAL
PAINLEVEL_OUTOF10: 0
PAINLEVEL_OUTOF10: 0

## 2025-04-16 NOTE — DISCHARGE SUMMARY
[facility] Hospitalist Physician Discharge Summary       [facility] at [facility]  [address]  [phone]          Activity level: As tolerated     Dispo: Extended care facility with hospice care    Condition on discharge: Stable     Patient ID:  Tenisha Bruce  [id number]  74 y.o.  1950    Admit date: 3/29/2025    Discharge date and time:  4/16/2025  3:38 PM    Admission Diagnoses: Principal Problem:    Acute hypoxic respiratory failure  Active Problems:    Acute respiratory failure with hypoxia and hypercapnia    Palliative care encounter    Goals of care, counseling/discussion    Congestive heart failure (HCC)  Resolved Problems:    * No resolved hospital problems. *      Discharge Diagnoses: Principal Problem:    Acute hypoxic respiratory failure  Active Problems:    Acute respiratory failure with hypoxia and hypercapnia    Palliative care encounter    Goals of care, counseling/discussion    Congestive heart failure (HCC)  Resolved Problems:    * No resolved hospital problems. *      Consults:  IP CONSULT TO INTERNAL MEDICINE  IP CONSULT TO PULMONOLOGY  IP CONSULT TO PHARMACY  IP CONSULT TO CARDIOLOGY  IP CONSULT TO ONCOLOGY  IP CONSULT TO VASCULAR ACCESS TEAM  IP CONSULT TO NEPHROLOGY  IP CONSULT TO PALLIATIVE CARE  IP CONSULT TO INFECTIOUS DISEASES  IP CONSULT TO HOSPICE  IP CONSULT TO CARDIOLOGY    Procedures: None    Hospital Course:   Patient Tenisha Bruce is a 74 y.o. presented with Acute hypoxic respiratory failure [J96.01]    A 74-year-old female was admitted for evaluation of acute hypoxemic and hypercapnic respiratory failure due to acute on chronic HFrEF with bilateral pleural effusion, DARIO and hypotension.  Patient was evaluated by cardiology and nephrology.  Patient has CODE STATUS changed by [employer] for DNR CC and she will be discharged today with extended-care facility with hospice care.    Discharge Exam:  General Appearance: alert and oriented to

## 2025-04-16 NOTE — CARE COORDINATION
4/10/25 Update CM Note. Pt admitted 3/30/25. On O2 95% FiO2/60L. 97% SpO2, will continue to wean. IV Zosyn. BNP >70, 000 yesterday. Pt is long term bed hold from [facility] and current plan is to return. Ambulance form/envelope on chart. Referral sent to [facility] to see if pt meets LTAC criteria   Alejandra CHAUDHARY RN-BC  [phone] Addendum: Per Liaison pt accepted at [facility].  PS message to attending.  OK for DC today if ok with consulting Najma MORTON per nephrology, Charge RN verifying with others. [facility] notified (Mateo castellon for Analia) and ok with transfer.        
4/11/25 Update CM Note Pt admitted 3/30/25 resp failure. Airvo 85% FiO2/60L. SpO2 100%. IVF LR 63cc/hr. DC plan [facility] return to [facility] Pt accepted at [facility] yesterday, pulmonary did not feel pt was ready for DC and is to re-eval today. If for any reason pt does not go to [facility] at DC pt will return to [facility] where she is long term bed hold. Ambulance form/envelope (with [place] on it) on chart.  [facility] has legal guardianship and needs notified of discharge destination one plan is finalized.   Alejandra GÓMEZN RN-BC  [phone] Per Liaison from [facility] they can accept once FiO2 60-70%      
4/14/25 Update CM Note Pt admitted 3/30/25 resp failure.  Airvo, 60%FiO2, 50L.  99% SpO. Pt code status 2 DNR-CCA per 2 Dr Signoff over weekend. Hospice Consult placed to [facility], they are reaching out to Legal Guardian. DC plan [facility] vs return to long term care at [facility] vs Hospice.  Alejandra CHAUDHARY RN-BC  [phone]      
4/15/25 Update CM Note Pt admitted 3/30/25 resp failure.  Airvo, 70%FiO2, 50L SpO2 91%. Current code status DNR-CCA. Spoke with [facility] legal guardian Mateo castellon for Analia, forms obtained for  to complete to request to change to DNR-CC. After completed forms will be faxed to [facility] medical Committee to review. Unable to provide timeline for determination to be made. DC plan TBD pending code status. DC plan [facility] vs return to long term care at [facility] vs Hospice ([facility] following).   Alejandra CHAUDHARY RN-BC  [phone] Addendum: [facility] forms completed and faxed  
4/16/25 Update CM Note Pt admitted 3/30/25 resp failure. Airvo 60%FiO2 50L/NC. IVF 75cc/hr.  Received approval for DNR-CC with recommendation for Hospice care from [facility]. Documents on chart. Liaison for [facility] notified.  Awaiting plan for Hospice care.   Alejandra CHAUDHARY RN-BC  [phone] Addendum: Plan for pt to go to [facility] under hospice care.  Equipment set up to be completed by 5pm.  [facility]  called for transport, earliest available is 7pm pickup.  Hospice and facility liaison, charge and bedside RNs, and BUCKY Helms notified.      
4/1:  Update CM Note:  Pt presented to the ER for acute hypoxic respiratory failure with mycoplasma pneumonia from [facility].  Pt is on 2L/NC at 96%, IV Zithromax til 4/5, Iv Lasix &  Iv Maxipime til 4/6.  Pulmonary & Hem/Onc are following.  Pt is in ISO-Droplet for Mycoplasma.  Pt has a left chest tube to 20 cm of suction.  Pt has a .  Pt is a long-term bed hold & can return once medically stable.  CM spoke rep from [facility] & the plan is to return once medically cleared.  KAREN. Ambulance form completed & placed in envelope in soft chart.  Sw/CM will continue to follow.  Electronically signed by Candace Khanna RN on 4/1/2025 at 12:47 PM    
4/2:  Update CM Note: Pt presented to the ER for acute hypoxic respiratory failure with mycoplasma pneumonia from [facility]. Pt is on 2L/NC at 99%, IV Zithromax til 4/5, Iv Lasix & Iv Maxipime til 4/6. Pulmonary & Hem/Onc are following. Pt is in ISO-Droplet for Mycoplasma. Pt has a left chest tube to 20 cm of suction. Pt has a CANDIE. Cm reached out to the facility if pt has NIV at facility.  Pt is a long-term bed hold & can return once medically stable. CM spoke rep from [facility] & the plan is to return once medically cleared. KAREN. Ambulance form completed & placed in envelope in soft chart. Sw/CM will continue to follow.  Electronically signed by Candace Khanna RN on 4/2/2025 at 11:55 AM    
4/3:  Pt presented to the ER for acute hypoxic respiratory failure with mycoplasma pneumonia from [facility]. Pt is on 1L/NC at 98%, IV Zithromax til 4/5 & Iv Lasix.  Pulmonary & Hem/Onc are following. Pt is in ISO-Droplet for Mycoplasma. Pt has a left chest tube to 20 cm of suction. Pt has a . CM was advise that pt doesn't have a NIV at the facility - will need clarification from Pulmonary.  Pt is a long-term bed hold & can return once medically stable. CM did verify dc plan with rep from [facility].  KAREN. Ambulance form completed & placed in envelope in soft chart. Sw/CM will continue to follow.  Electronically signed by Candace Khanna RN on 4/3/2025 at 1:20 PM     
4/4:  Update CM Note:  Pt presented to the ER for acute hypoxic respiratory failure with mycoplasma pneumonia from [facility]. Pt was transfer to MICU.  Pt is on 7L/NC at at 93%, Iv Vancocin X 1, Iv Levophed, Iv Zosyn & Iv Zithromax til 4/5.  Pulmonary & Hem/Onc are following. Pt is in ISO-Droplet for Mycoplasma. Pt has a left chest tube to 20 cm of suction. Pt has a . CM was advise that pt doesn't have a NIV at the facility - will need clarification from Pulmonary.  Pt is a long-term bed hold & can return once medically stable. CM did verify dc plan with rep from [facility].  KAREN. Ambulance form completed & placed in envelope in soft chart. Sw/RIGO will continue to follow.  Electronically signed by Candace Khanna RN on 4/4/2025 at 3:33 PM     
4/9/25 Update CM Note. Pt admitted 3/30/25, transferred out of ICU last evening. Remains on O2 9L HFNC. IV Bumex, IV Zosyn. Nephrology consulted for DARIO. Pt is long term bed hold from [facility] and can return at any time. Destination/KAREN prviously updated.Ambulance form/envelope previously placed on chart  Alejandra CHAUDHARY RN-BC  [phone]      
CM update: RN-CM notified of hospice referral. VM left for [facility] legal guardian Analia Justice. Awaiting a return call.     09:12 [facility] guardian returned phone call and prefers a referral to [facility]. Referral placed in portal. AF      Dalia GÓMEZN, RN  Case Management   [phone]    
Care Coordination: LOS 8 Days. Tx from 65 on 4/4/25. Continues on levophed, zosyn 2 ltr nc.  Per previous CM, LTC bed hold [facility]. Has legal guardian.  Ambulance and milan completed at admission. Will continue to follow for transition of care needs    Electronically signed by Bree Rosenberg RN on 4/7/2025 at 3:41 PM   
Chart reviewed for transition of care at discharge. Admitted with acute hypoxic respiratory failure with mycoplasma pneumonia. History of anxiety and fetal alcohol syndrome. She had a thoracentesis with Dr Rosales, and a left chest tube was placed. She is currently on 2 L O2 at 96%. Per rounds, 100 ml was last out put. Patient is a re-admit. Patient is from [facility]. Call to liaison to update. She will not need anything to return. Transport form is in the envelope on the soft chart. Destination and KAREN updated. Message left with guardian Cynthia Avila to call regarding discharge planning. CM will follow.  Electronically signed by José Miguel Brito RN on 3/31/2025 at 2:56 PM    Case Management Assessment  Initial Evaluation    Date/Time of Evaluation: 3/31/2025 3:01 PM  Assessment Completed by: José Miguel Brito RN    If patient is discharged prior to next notation, then this note serves as note for discharge by case management.    Patient Name: Tenisha Bruce                   YOB: 1950  Diagnosis: Acute hypoxic respiratory failure [J96.01]                   Date / Time: 3/29/2025  3:40 PM    Patient Admission Status: Inpatient   Readmission Risk (Low < 19, Mod (19-27), High > 27): Readmission Risk Score: 20.2    Current PCP: Julio Porter, DO  PCP verified by CM? Yes    Chart Reviewed: Yes      History Provided by:    Patient Orientation: Unable to Assess    Patient Cognition: Other (see comment) (History of fetal alcohol syndrome)    Hospitalization in the last 30 days (Readmission):  Yes    If yes, Readmission Assessment in CM Navigator will be completed.    Advance Directives:      Code Status: Full Code   Patient's Primary Decision Maker is: Named in Scanned ACP Document    Primary Decision Maker: Analia Avila - Legal Guardian, Legal Guardian - [phone]    Discharge Planning:    Patient lives with: Other (Comment) Type of Home: Skilled Nursing Facility  Primary Care Giver: 
[id number]

## 2025-04-16 NOTE — DISCHARGE INSTR - COC
Continuity of Care Form    Patient Name: Tenisha Bruce   :  1950  MRN:  [id number]    Admit date:  3/29/2025  Discharge date:  ***    Code Status Order: DNR-CC   Advance Directives:     Admitting Physician:  Matthew Dominguez MD  PCP: Julio Porter DO    Discharging Nurse: ***  Discharging Hospital Unit/Room#: 7403/7403-A  Discharging Unit Phone Number: ***    Emergency Contact:   Extended Emergency Contact Information  Primary Emergency Contact: Analia Avila  Address: [facility]           [address] [place] [address] [facility]  Home Phone: [phone]  Work Phone: [phone]  Relation: Legal Guardian  Secondary Emergency Contact: LUCI ORTIZ  Address: [employer]           [address] [place] [address],  [facility] [place] [address] [place]  Work Phone: [phone]  Relation: Other  Preferred language: English   needed? No    Past Surgical History:  Past Surgical History:   Procedure Laterality Date    HIP FRACTURE SURGERY Left 2023    OPEN REDUCTION INTERNAL FIXATION LEFT HIP performed by Ramu Hale DO at [facility] OR       Immunization History:     There is no immunization history on file for this patient.    Active Problems:  Patient Active Problem List   Diagnosis Code    Acute respiratory failure with hypoxia and hypercapnia J96.01, J96.02    Acute kidney injury N17.9    Pancytopenia D61.818    Influenza J11.1    Nondisplaced fracture of neck of left femur S72.002A    Left hip pain M25.552    Hypothyroid E03.9    Closed fracture of neck of left femur S72.002A    Subarachnoid hemorrhage (HCC) I60.9    Sepsis with acute renal failure and septic shock (HCC) A41.9, R65.21, N17.9    Acute metabolic encephalopathy G93.41    Hypokalemia E87.6    Macrocytic anemia D53.9    Thrombocytopenia D69.6    Stenosis of both internal carotid arteries I65.23    Elevated troponin R79.89    Pneumonia of left lower lobe due to

## 2025-04-16 NOTE — PLAN OF CARE
Problem: Discharge Planning  Goal: Discharge to home or other facility with appropriate resources  4/5/2025 2157 by José Miguel Feng RN  Outcome: Not Progressing  Flowsheets (Taken 4/5/2025 2000)  Discharge to home or other facility with appropriate resources: Identify discharge learning needs (meds, wound care, etc)  4/5/2025 0813 by Jimmy Menchaca RN  Outcome: Progressing     Problem: Safety - Medical Restraint  Goal: Remains free of injury from restraints (Restraint for Interference with Medical Device)  Description: INTERVENTIONS:  1. Determine that other, less restrictive measures have been tried or would not be effective before applying the restraint  2. Evaluate the patient's condition at the time of restraint application  3. Inform patient/family regarding the reason for restraint  4. Q2H: Monitor safety, psychosocial status, comfort, nutrition and hydration  4/5/2025 2157 by José Miguel Feng RN  Outcome: Not Progressing  Flowsheets  Taken 4/5/2025 2000 by José Miguel Feng RN  Remains free of injury from restraints (restraint for interference with medical device):   Determine that other, less restrictive measures have been tried or would not be effective before applying the restraint   Every 2 hours: Monitor safety, psychosocial status, comfort, nutrition and hydration  Taken 4/5/2025 1800 by Jimmy Menchaca RN  Remains free of injury from restraints (restraint for interference with medical device): Determine that other, less restrictive measures have been tried or would not be effective before applying the restraint  Taken 4/5/2025 1718 by Jimmy Menchaca RN  Remains free of injury from restraints (restraint for interference with medical device): Determine that other, less restrictive measures have been tried or would not be effective before applying the restraint  Taken 4/5/2025 1600 by Jimmy Menchaca RN  Remains free of injury from restraints (restraint for interference with medical 
  Problem: Discharge Planning  Goal: Discharge to home or other facility with appropriate resources  4/8/2025 2335 by Leidy Barger RN  Flowsheets  Taken 4/8/2025 2335 by Leidy Barger RN  Discharge to home or other facility with appropriate resources:   Identify barriers to discharge with patient and caregiver   Identify discharge learning needs (meds, wound care, etc)  Taken 4/8/2025 1515 by Marika Hathaway RN  Discharge to home or other facility with appropriate resources: Identify barriers to discharge with patient and caregiver     Problem: Skin/Tissue Integrity  Goal: Skin integrity remains intact  Description: 1.  Monitor for areas of redness and/or skin breakdown  2.  Assess vascular access sites hourly  3.  Every 4-6 hours minimum:  Change oxygen saturation probe site  4.  Every 4-6 hours:  If on nasal continuous positive airway pressure, respiratory therapy assess nares and determine need for appliance change or resting period  4/8/2025 2335 by Leidy Barger RN  Flowsheets  Taken 4/8/2025 2335 by Leidy Barger RN  Skin Integrity Remains Intact:   Monitor for areas of redness and/or skin breakdown   Assess vascular access sites hourly   Every 4-6 hours minimum:  Change oxygen saturation probe site  Taken 4/8/2025 2334 by Leidy Barger RN  Skin Integrity Remains Intact: Monitor for areas of redness and/or skin breakdown  Taken 4/8/2025 1515 by Marika Hathaway RN  Skin Integrity Remains Intact:   Monitor for areas of redness and/or skin breakdown   Assess vascular access sites hourly     Problem: ABCDS Injury Assessment  Goal: Absence of physical injury  4/8/2025 2335 by Leidy Barger RN  Flowsheets (Taken 4/4/2025 1329 by Susi High, RN)  Absence of Physical Injury: Implement safety measures based on patient assessment  4/8/2025 1012 by Devika Huston RN  Outcome: Progressing     Problem: Decision Making  Goal: Pt/Family able to effectively weigh alternatives 
  Problem: Safety - Adult  Goal: Free from fall injury  4/1/2025 1356 by Diana Verdin RN  Outcome: Progressing  4/1/2025 0311 by Manda Bustos RN  Outcome: Progressing     Problem: Discharge Planning  Goal: Discharge to home or other facility with appropriate resources  4/1/2025 0311 by Manda Bustos RN  Outcome: Progressing     Problem: Skin/Tissue Integrity  Goal: Skin integrity remains intact  Description: 1.  Monitor for areas of redness and/or skin breakdown  2.  Assess vascular access sites hourly  3.  Every 4-6 hours minimum:  Change oxygen saturation probe site  4.  Every 4-6 hours:  If on nasal continuous positive airway pressure, respiratory therapy assess nares and determine need for appliance change or resting period  4/1/2025 1356 by Diana Verdin RN  Outcome: Progressing  4/1/2025 0311 by Manda Bustos RN  Outcome: Progressing     Problem: Discharge Planning  Goal: Discharge to home or other facility with appropriate resources  4/1/2025 0311 by Manda Bustos RN  Outcome: Progressing     
  Problem: Safety - Adult  Goal: Free from fall injury  4/1/2025 2351 by Manda Bustos RN  Outcome: Progressing  4/1/2025 1356 by Diana Verdin RN  Outcome: Progressing     Problem: Discharge Planning  Goal: Discharge to home or other facility with appropriate resources  Outcome: Progressing  Flowsheets (Taken 4/1/2025 1000 by Diana Verdin RN)  Discharge to home or other facility with appropriate resources:   Identify barriers to discharge with patient and caregiver   Arrange for needed discharge resources and transportation as appropriate   Identify discharge learning needs (meds, wound care, etc)     Problem: Skin/Tissue Integrity  Goal: Skin integrity remains intact  Description: 1.  Monitor for areas of redness and/or skin breakdown  2.  Assess vascular access sites hourly  3.  Every 4-6 hours minimum:  Change oxygen saturation probe site  4.  Every 4-6 hours:  If on nasal continuous positive airway pressure, respiratory therapy assess nares and determine need for appliance change or resting period  4/1/2025 2351 by Manda Bustos RN  Outcome: Progressing  4/1/2025 1356 by Diana Verdin RN  Outcome: Progressing  Flowsheets (Taken 4/1/2025 1000)  Skin Integrity Remains Intact: Monitor for areas of redness and/or skin breakdown     Problem: ABCDS Injury Assessment  Goal: Absence of physical injury  Outcome: Progressing     Problem: Pain  Goal: Verbalizes/displays adequate comfort level or baseline comfort level  Outcome: Progressing     Problem: Decision Making  Goal: Pt/Family able to effectively weigh alternatives and participate in decision making related to treatment and care  Description: INTERVENTIONS:  1. Determine when there are differences between patient's view, family's view, and healthcare provider's view of condition  2. Facilitate patient and family articulation of goals for care  3. Help patient and family identify pros/cons of alternative solutions  4. Provide information as requested 
  Problem: Safety - Adult  Goal: Free from fall injury  4/11/2025 0136 by Keli Valdez RN  Outcome: Progressing     Problem: Discharge Planning  Goal: Discharge to home or other facility with appropriate resources  4/11/2025 0136 by Keli Valdez RN  Outcome: Progressing     Problem: Skin/Tissue Integrity  Goal: Skin integrity remains intact  Description: 1.  Monitor for areas of redness and/or skin breakdown  2.  Assess vascular access sites hourly  3.  Every 4-6 hours minimum:  Change oxygen saturation probe site  4.  Every 4-6 hours:  If on nasal continuous positive airway pressure, respiratory therapy assess nares and determine need for appliance change or resting period  4/11/2025 0136 by Keli Valdez RN  Outcome: Progressing     Problem: ABCDS Injury Assessment  Goal: Absence of physical injury  4/11/2025 0136 by Keli Valdez RN  Outcome: Progressing     Problem: Pain  Goal: Verbalizes/displays adequate comfort level or baseline comfort level  4/11/2025 0136 by Keli Valdez RN  Outcome: Progressing     Problem: Confusion  Goal: Confusion, delirium, dementia, or psychosis is improved or at baseline  Description: INTERVENTIONS:  1. Assess for possible contributors to thought disturbance, including medications, impaired vision or hearing, underlying metabolic abnormalities, dehydration, psychiatric diagnoses, and notify attending [facility]  2. [place] high risk fall precautions, as indicated  3. Provide frequent short contacts to provide reality reorientation, refocusing and direction  4. Decrease environmental stimuli, including noise as appropriate  5. Monitor and intervene to maintain adequate nutrition, hydration, elimination, sleep and activity  6. If unable to ensure safety without constant attention obtain sitter and review sitter guidelines with assigned personnel  7. Initiate Psychosocial CNS and Spiritual Care consult, as indicated  4/11/2025 0136 by Courtney 
  Problem: Safety - Adult  Goal: Free from fall injury  4/12/2025 2253 by Marichuy Kenyon RN  Outcome: Progressing  4/12/2025 1722 by Darby Mena RN  Outcome: Progressing     Problem: Discharge Planning  Goal: Discharge to home or other facility with appropriate resources  4/12/2025 2253 by Marichuy Kenyon RN  Outcome: Progressing  4/12/2025 1722 by Darby Mena RN  Outcome: Progressing  Flowsheets (Taken 4/12/2025 0900)  Discharge to home or other facility with appropriate resources: Identify barriers to discharge with patient and caregiver     Problem: Skin/Tissue Integrity  Goal: Skin integrity remains intact  Description: 1.  Monitor for areas of redness and/or skin breakdown  2.  Assess vascular access sites hourly  3.  Every 4-6 hours minimum:  Change oxygen saturation probe site  4.  Every 4-6 hours:  If on nasal continuous positive airway pressure, respiratory therapy assess nares and determine need for appliance change or resting period  4/12/2025 2253 by Marichuy Kenyon RN  Outcome: Progressing  Flowsheets (Taken 4/12/2025 1945)  Skin Integrity Remains Intact: Monitor for areas of redness and/or skin breakdown  4/12/2025 1722 by Darby Mena RN  Outcome: Progressing  Flowsheets  Taken 4/12/2025 1721  Skin Integrity Remains Intact: Monitor for areas of redness and/or skin breakdown  Taken 4/12/2025 0900  Skin Integrity Remains Intact: Monitor for areas of redness and/or skin breakdown     Problem: ABCDS Injury Assessment  Goal: Absence of physical injury  4/12/2025 2253 by Marichuy Kenyon RN  Outcome: Progressing  Flowsheets (Taken 4/12/2025 1949)  Absence of Physical Injury: Implement safety measures based on patient assessment  4/12/2025 1722 by Darby Mena, RN  Outcome: Progressing     Problem: Pain  Goal: Verbalizes/displays adequate comfort level or baseline comfort level  4/12/2025 2253 by Marichuy Kenyon RN  Outcome: Progressing  4/12/2025 1722 by Darby Mena RN  Outcome: Progressing     Problem: 
  Problem: Safety - Adult  Goal: Free from fall injury  4/13/2025 1034 by Isabel Medley RN  Outcome: Progressing  4/12/2025 2253 by Marichuy Kenyon RN  Outcome: Progressing     Problem: Discharge Planning  Goal: Discharge to home or other facility with appropriate resources  4/13/2025 1034 by Isabel Medley RN  Outcome: Progressing  4/12/2025 2253 by Marichuy Kenyon RN  Outcome: Progressing     Problem: Skin/Tissue Integrity  Goal: Skin integrity remains intact  Description: 1.  Monitor for areas of redness and/or skin breakdown  2.  Assess vascular access sites hourly  3.  Every 4-6 hours minimum:  Change oxygen saturation probe site  4.  Every 4-6 hours:  If on nasal continuous positive airway pressure, respiratory therapy assess nares and determine need for appliance change or resting period  4/13/2025 1034 by Isabel Medley RN  Outcome: Progressing  4/12/2025 2253 by Marichuy Kenyon RN  Outcome: Progressing  Flowsheets (Taken 4/12/2025 1945)  Skin Integrity Remains Intact: Monitor for areas of redness and/or skin breakdown     Problem: ABCDS Injury Assessment  Goal: Absence of physical injury  4/13/2025 1034 by Isabel Medley RN  Outcome: Progressing  4/12/2025 2253 by Marichuy Kenyon RN  Outcome: Progressing  Flowsheets (Taken 4/12/2025 1949)  Absence of Physical Injury: Implement safety measures based on patient assessment     Problem: Pain  Goal: Verbalizes/displays adequate comfort level or baseline comfort level  4/13/2025 1034 by Isabel Medley RN  Outcome: Progressing  4/12/2025 2253 by Marichuy Kenyon RN  Outcome: Progressing     
  Problem: Safety - Adult  Goal: Free from fall injury  4/13/2025 2209 by Felipe Riley RN  Outcome: Progressing  Flowsheets (Taken 4/9/2025 0800 by Marika Hathaway, RN)  Free From Fall Injury: Instruct family/caregiver on patient safety  4/13/2025 1034 by Isabel Medley RN  Outcome: Progressing     Problem: Discharge Planning  Goal: Discharge to home or other facility with appropriate resources  4/13/2025 2209 by Felipe Riley RN  Outcome: Progressing  Flowsheets (Taken 4/12/2025 0900 by Darby Mena, RN)  Discharge to home or other facility with appropriate resources: Identify barriers to discharge with patient and caregiver  4/13/2025 1034 by Isabel Medley RN  Outcome: Progressing     Problem: Skin/Tissue Integrity  Goal: Skin integrity remains intact  Description: 1.  Monitor for areas of redness and/or skin breakdown  2.  Assess vascular access sites hourly  3.  Every 4-6 hours minimum:  Change oxygen saturation probe site  4.  Every 4-6 hours:  If on nasal continuous positive airway pressure, respiratory therapy assess nares and determine need for appliance change or resting period  4/13/2025 2209 by Felipe Riley RN  Outcome: Progressing  Flowsheets (Taken 4/12/2025 1945 by Marichuy Kenyon, RN)  Skin Integrity Remains Intact: Monitor for areas of redness and/or skin breakdown  4/13/2025 1034 by Isabel Medley RN  Outcome: Progressing     Problem: ABCDS Injury Assessment  Goal: Absence of physical injury  4/13/2025 2209 by Felipe Riley RN  Outcome: Progressing  Flowsheets (Taken 4/12/2025 1949 by Marichuy Kenyon, RN)  Absence of Physical Injury: Implement safety measures based on patient assessment  4/13/2025 1034 by Isabel Medley RN  Outcome: Progressing     Problem: Pain  Goal: Verbalizes/displays adequate comfort level or baseline comfort level  4/13/2025 2209 by Felipe Riley RN  Outcome: Progressing  Flowsheets (Taken 4/6/2025 1643 by Jimmy Menchaca, RN)  Verbalizes/displays adequate comfort level or baseline 
  Problem: Safety - Adult  Goal: Free from fall injury  4/15/2025 1046 by Carla Freed RN  Outcome: Progressing  4/14/2025 2233 by Felipe Riley RN  Outcome: Progressing  Flowsheets (Taken 4/9/2025 0800 by Marika Hathaway, RN)  Free From Fall Injury: Instruct family/caregiver on patient safety     Problem: Skin/Tissue Integrity  Goal: Skin integrity remains intact  Description: 1.  Monitor for areas of redness and/or skin breakdown  2.  Assess vascular access sites hourly  3.  Every 4-6 hours minimum:  Change oxygen saturation probe site  4.  Every 4-6 hours:  If on nasal continuous positive airway pressure, respiratory therapy assess nares and determine need for appliance change or resting period  4/15/2025 1046 by Carla Freed RN  Outcome: Progressing  4/14/2025 2233 by Felipe Riley RN  Outcome: Progressing  Flowsheets (Taken 4/12/2025 1945 by Marichuy Kenyon, RN)  Skin Integrity Remains Intact: Monitor for areas of redness and/or skin breakdown     Problem: ABCDS Injury Assessment  Goal: Absence of physical injury  4/15/2025 1046 by Carla Freed RN  Outcome: Progressing  4/14/2025 2233 by Felipe Riley RN  Outcome: Progressing  Flowsheets (Taken 4/12/2025 1949 by Marichuy Kenyon, RN)  Absence of Physical Injury: Implement safety measures based on patient assessment     Problem: Pain  Goal: Verbalizes/displays adequate comfort level or baseline comfort level  4/15/2025 1046 by Carla Freed RN  Outcome: Progressing  4/14/2025 2233 by Felipe Riley RN  Outcome: Progressing  Flowsheets (Taken 4/6/2025 1643 by Jimmy Menchaca, RN)  Verbalizes/displays adequate comfort level or baseline comfort level: Encourage patient to monitor pain and request assistance     
  Problem: Safety - Adult  Goal: Free from fall injury  4/15/2025 2307 by Felipe Riley RN  Outcome: Progressing  Flowsheets (Taken 4/9/2025 0800 by Marika Hathaway, RN)  Free From Fall Injury: Instruct family/caregiver on patient safety  4/15/2025 1046 by Carla Freed RN  Outcome: Progressing     Problem: Discharge Planning  Goal: Discharge to home or other facility with appropriate resources  Outcome: Progressing  Flowsheets (Taken 4/12/2025 0900 by Darby Mena, RN)  Discharge to home or other facility with appropriate resources: Identify barriers to discharge with patient and caregiver     Problem: Skin/Tissue Integrity  Goal: Skin integrity remains intact  Description: 1.  Monitor for areas of redness and/or skin breakdown  2.  Assess vascular access sites hourly  3.  Every 4-6 hours minimum:  Change oxygen saturation probe site  4.  Every 4-6 hours:  If on nasal continuous positive airway pressure, respiratory therapy assess nares and determine need for appliance change or resting period  4/15/2025 2307 by Felipe Riley RN  Outcome: Progressing  Flowsheets (Taken 4/12/2025 1945 by Marichuy Kenyon, RN)  Skin Integrity Remains Intact: Monitor for areas of redness and/or skin breakdown  4/15/2025 1046 by Carla Freed RN  Outcome: Progressing     Problem: ABCDS Injury Assessment  Goal: Absence of physical injury  4/15/2025 2307 by Felipe Riley RN  Outcome: Progressing  Flowsheets (Taken 4/12/2025 1949 by Marichuy Kenyon, RN)  Absence of Physical Injury: Implement safety measures based on patient assessment  4/15/2025 1046 by Carla Freed RN  Outcome: Progressing     Problem: Pain  Goal: Verbalizes/displays adequate comfort level or baseline comfort level  4/15/2025 2307 by Felipe Riley RN  Outcome: Progressing  Flowsheets (Taken 4/6/2025 1643 by Jimmy Menchaca, RN)  Verbalizes/displays adequate comfort level or baseline comfort level: Encourage patient to monitor pain and request 
  Problem: Safety - Adult  Goal: Free from fall injury  4/16/2025 1038 by Isabel Medley RN  Outcome: Progressing  4/15/2025 2307 by Felipe Riley RN  Outcome: Progressing  Flowsheets (Taken 4/9/2025 0800 by Marika Hathaway, RN)  Free From Fall Injury: Instruct family/caregiver on patient safety     Problem: Discharge Planning  Goal: Discharge to home or other facility with appropriate resources  4/16/2025 1038 by Isabel Medley RN  Outcome: Progressing  4/15/2025 2307 by Felipe Riley RN  Outcome: Progressing  Flowsheets (Taken 4/12/2025 0900 by Darby Mena, RN)  Discharge to home or other facility with appropriate resources: Identify barriers to discharge with patient and caregiver     Problem: Skin/Tissue Integrity  Goal: Skin integrity remains intact  Description: 1.  Monitor for areas of redness and/or skin breakdown  2.  Assess vascular access sites hourly  3.  Every 4-6 hours minimum:  Change oxygen saturation probe site  4.  Every 4-6 hours:  If on nasal continuous positive airway pressure, respiratory therapy assess nares and determine need for appliance change or resting period  4/16/2025 1038 by Isabel Medley RN  Outcome: Progressing  4/15/2025 2307 by Felipe Riley RN  Outcome: Progressing  Flowsheets (Taken 4/12/2025 1945 by Marichuy Kenyon, RN)  Skin Integrity Remains Intact: Monitor for areas of redness and/or skin breakdown     Problem: ABCDS Injury Assessment  Goal: Absence of physical injury  4/16/2025 1038 by Isabel Medley RN  Outcome: Progressing  4/15/2025 2307 by Felipe Riley RN  Outcome: Progressing  Flowsheets (Taken 4/12/2025 1949 by Marichuy Kenyon, RN)  Absence of Physical Injury: Implement safety measures based on patient assessment     Problem: Pain  Goal: Verbalizes/displays adequate comfort level or baseline comfort level  4/16/2025 1038 by Isabel Medley RN  Outcome: Progressing  4/15/2025 2307 by Felipe Riley RN  Outcome: Progressing  Flowsheets (Taken 4/6/2025 1643 by Jimmy Menchaca 
  Problem: Safety - Adult  Goal: Free from fall injury  4/16/2025 1740 by Isabel Medley RN  Outcome: Adequate for Discharge  4/16/2025 1038 by Isabel Medley RN  Outcome: Progressing     Problem: Discharge Planning  Goal: Discharge to home or other facility with appropriate resources  4/16/2025 1740 by Isabel Medley RN  Outcome: Adequate for Discharge  4/16/2025 1038 by Isabel Medley RN  Outcome: Progressing     Problem: Skin/Tissue Integrity  Goal: Skin integrity remains intact  Description: 1.  Monitor for areas of redness and/or skin breakdown  2.  Assess vascular access sites hourly  3.  Every 4-6 hours minimum:  Change oxygen saturation probe site  4.  Every 4-6 hours:  If on nasal continuous positive airway pressure, respiratory therapy assess nares and determine need for appliance change or resting period  4/16/2025 1740 by Isabel Medley RN  Outcome: Adequate for Discharge  4/16/2025 1038 by Isabel Medley RN  Outcome: Progressing     Problem: ABCDS Injury Assessment  Goal: Absence of physical injury  4/16/2025 1740 by Isabel Medley RN  Outcome: Adequate for Discharge  4/16/2025 1038 by Isabel Medley RN  Outcome: Progressing     Problem: Pain  Goal: Verbalizes/displays adequate comfort level or baseline comfort level  4/16/2025 1740 by Isabel Medley RN  Outcome: Adequate for Discharge  4/16/2025 1038 by Isabel Medley RN  Outcome: Progressing     Problem: Decision Making  Goal: Pt/Family able to effectively weigh alternatives and participate in decision making related to treatment and care  Description: INTERVENTIONS:  1. Determine when there are differences between patient's view, family's view, and healthcare provider's view of condition  2. Facilitate patient and family articulation of goals for care  3. Help patient and family identify pros/cons of alternative solutions  4. Provide information as requested by patient/family  5. Respect patient/family right to receive or not to receive information  6. Serve as a 
  Problem: Safety - Adult  Goal: Free from fall injury  4/2/2025 2224 by Manda Bustos RN  Outcome: Progressing  4/2/2025 1409 by Diana Verdin RN  Outcome: Progressing     Problem: Discharge Planning  Goal: Discharge to home or other facility with appropriate resources  4/2/2025 2224 by Manda Bustos RN  Outcome: Progressing  4/2/2025 1409 by Diana Verdin RN  Outcome: Progressing     Problem: Skin/Tissue Integrity  Goal: Skin integrity remains intact  Description: 1.  Monitor for areas of redness and/or skin breakdown  2.  Assess vascular access sites hourly  3.  Every 4-6 hours minimum:  Change oxygen saturation probe site  4.  Every 4-6 hours:  If on nasal continuous positive airway pressure, respiratory therapy assess nares and determine need for appliance change or resting period  4/2/2025 2224 by Manda Bustos RN  Outcome: Progressing  4/2/2025 1409 by Diana Verdin RN  Outcome: Progressing     Problem: ABCDS Injury Assessment  Goal: Absence of physical injury  4/2/2025 2224 by Manda Bustos RN  Outcome: Progressing  4/2/2025 1409 by Diana Verdin RN  Outcome: Progressing     Problem: Pain  Goal: Verbalizes/displays adequate comfort level or baseline comfort level  Outcome: Progressing     Problem: Decision Making  Goal: Pt/Family able to effectively weigh alternatives and participate in decision making related to treatment and care  Description: INTERVENTIONS:  1. Determine when there are differences between patient's view, family's view, and healthcare provider's view of condition  2. Facilitate patient and family articulation of goals for care  3. Help patient and family identify pros/cons of alternative solutions  4. Provide information as requested by patient/family  5. Respect patient/family right to receive or not to receive information  6. Serve as a liaison between patient and family and health care team  7. Initiate Consults from Ethics, Palliative Care or initiate Family Care 
  Problem: Safety - Adult  Goal: Free from fall injury  4/5/2025 0813 by Jimmy Menchaca RN  Outcome: Progressing  4/5/2025 0300 by Vicky Haque RN  Outcome: Progressing     Problem: Discharge Planning  Goal: Discharge to home or other facility with appropriate resources  Outcome: Progressing     Problem: Skin/Tissue Integrity  Goal: Skin integrity remains intact  Description: 1.  Monitor for areas of redness and/or skin breakdown  2.  Assess vascular access sites hourly  3.  Every 4-6 hours minimum:  Change oxygen saturation probe site  4.  Every 4-6 hours:  If on nasal continuous positive airway pressure, respiratory therapy assess nares and determine need for appliance change or resting period  4/5/2025 0813 by Jimmy Menchaca RN  Outcome: Progressing  Flowsheets (Taken 4/5/2025 0812)  Skin Integrity Remains Intact: Monitor for areas of redness and/or skin breakdown  4/5/2025 0300 by Vicky Haque RN  Outcome: Progressing     Problem: ABCDS Injury Assessment  Goal: Absence of physical injury  4/5/2025 0813 by Jimmy Menchaca RN  Outcome: Progressing  4/5/2025 0300 by Vicky Haque RN  Outcome: Progressing     Problem: Pain  Goal: Verbalizes/displays adequate comfort level or baseline comfort level  4/5/2025 0813 by Jimmy Menchaca RN  Outcome: Progressing  4/5/2025 0300 by Vicky Haque RN  Outcome: Progressing     Problem: Decision Making  Goal: Pt/Family able to effectively weigh alternatives and participate in decision making related to treatment and care  Description: INTERVENTIONS:  1. Determine when there are differences between patient's view, family's view, and healthcare provider's view of condition  2. Facilitate patient and family articulation of goals for care  3. Help patient and family identify pros/cons of alternative solutions  4. Provide information as requested by patient/family  5. Respect patient/family right to receive or not to receive information  6. 
  Problem: Safety - Adult  Goal: Free from fall injury  4/6/2025 0955 by Jimmy Menchaca RN  Outcome: Progressing  4/5/2025 2157 by José Miguel Feng RN  Outcome: Progressing     Problem: Discharge Planning  Goal: Discharge to home or other facility with appropriate resources  4/6/2025 0955 by Jimmy Menchaca RN  Outcome: Progressing  4/5/2025 2157 by José Miguel Feng RN  Outcome: Not Progressing  Flowsheets (Taken 4/5/2025 2000)  Discharge to home or other facility with appropriate resources: Identify discharge learning needs (meds, wound care, etc)     Problem: Skin/Tissue Integrity  Goal: Skin integrity remains intact  Description: 1.  Monitor for areas of redness and/or skin breakdown  2.  Assess vascular access sites hourly  3.  Every 4-6 hours minimum:  Change oxygen saturation probe site  4.  Every 4-6 hours:  If on nasal continuous positive airway pressure, respiratory therapy assess nares and determine need for appliance change or resting period  4/6/2025 0955 by Jimmy Menchaca RN  Outcome: Progressing  Flowsheets (Taken 4/6/2025 0953)  Skin Integrity Remains Intact: Monitor for areas of redness and/or skin breakdown  4/5/2025 2157 by José Miguel Feng RN  Outcome: Progressing  Flowsheets (Taken 4/5/2025 0815 by Jimmy Menchaca RN)  Skin Integrity Remains Intact: Monitor for areas of redness and/or skin breakdown     Problem: ABCDS Injury Assessment  Goal: Absence of physical injury  4/6/2025 0955 by Jimmy Menchaca RN  Outcome: Progressing  4/5/2025 2157 by José Miguel Feng RN  Outcome: Progressing     Problem: Pain  Goal: Verbalizes/displays adequate comfort level or baseline comfort level  4/6/2025 0955 by Jimmy Menchaca RN  Outcome: Progressing  4/5/2025 2157 by José Miguel Feng RN  Outcome: Progressing     Problem: Decision Making  Goal: Pt/Family able to effectively weigh alternatives and participate in decision making related to treatment and care  Description: 
  Problem: Safety - Adult  Goal: Free from fall injury  4/8/2025 1012 by Devika Huston RN  Outcome: Progressing  4/7/2025 2246 by Vicky Haque RN  Outcome: Progressing     Problem: Discharge Planning  Goal: Discharge to home or other facility with appropriate resources  Outcome: Progressing     Problem: Skin/Tissue Integrity  Goal: Skin integrity remains intact  Description: 1.  Monitor for areas of redness and/or skin breakdown  2.  Assess vascular access sites hourly  3.  Every 4-6 hours minimum:  Change oxygen saturation probe site  4.  Every 4-6 hours:  If on nasal continuous positive airway pressure, respiratory therapy assess nares and determine need for appliance change or resting period  4/8/2025 1012 by Devika Huston RN  Outcome: Progressing  4/7/2025 2246 by Vicky Haque RN  Outcome: Progressing     Problem: ABCDS Injury Assessment  Goal: Absence of physical injury  4/8/2025 1012 by Devika Huston RN  Outcome: Progressing  4/7/2025 2246 by Vicky Haque RN  Outcome: Progressing     Problem: Pain  Goal: Verbalizes/displays adequate comfort level or baseline comfort level  Outcome: Progressing     Problem: Decision Making  Goal: Pt/Family able to effectively weigh alternatives and participate in decision making related to treatment and care  Description: INTERVENTIONS:  1. Determine when there are differences between patient's view, family's view, and healthcare provider's view of condition  2. Facilitate patient and family articulation of goals for care  3. Help patient and family identify pros/cons of alternative solutions  4. Provide information as requested by patient/family  5. Respect patient/family right to receive or not to receive information  6. Serve as a liaison between patient and family and health care team  7. Initiate Consults from Ethics, Palliative Care or initiate Family Care Conference as is appropriate  Outcome: Progressing     Problem: Confusion  Goal: 
  Problem: Safety - Adult  Goal: Free from fall injury  Outcome: Progressing     Problem: Discharge Planning  Goal: Discharge to home or other facility with appropriate resources  Outcome: Progressing     Problem: Skin/Tissue Integrity  Goal: Skin integrity remains intact  Description: 1.  Monitor for areas of redness and/or skin breakdown  2.  Assess vascular access sites hourly  3.  Every 4-6 hours minimum:  Change oxygen saturation probe site  4.  Every 4-6 hours:  If on nasal continuous positive airway pressure, respiratory therapy assess nares and determine need for appliance change or resting period  Outcome: Progressing     Problem: ABCDS Injury Assessment  Goal: Absence of physical injury  Outcome: Progressing     
  Problem: Safety - Adult  Goal: Free from fall injury  Outcome: Progressing     Problem: Discharge Planning  Goal: Discharge to home or other facility with appropriate resources  Outcome: Progressing     Problem: Skin/Tissue Integrity  Goal: Skin integrity remains intact  Description: 1.  Monitor for areas of redness and/or skin breakdown  2.  Assess vascular access sites hourly  3.  Every 4-6 hours minimum:  Change oxygen saturation probe site  4.  Every 4-6 hours:  If on nasal continuous positive airway pressure, respiratory therapy assess nares and determine need for appliance change or resting period  Outcome: Progressing     Problem: ABCDS Injury Assessment  Goal: Absence of physical injury  Outcome: Progressing     Problem: Pain  Goal: Verbalizes/displays adequate comfort level or baseline comfort level  Outcome: Progressing     Problem: Decision Making  Goal: Pt/Family able to effectively weigh alternatives and participate in decision making related to treatment and care  Description: INTERVENTIONS:  1. Determine when there are differences between patient's view, family's view, and healthcare provider's view of condition  2. Facilitate patient and family articulation of goals for care  3. Help patient and family identify pros/cons of alternative solutions  4. Provide information as requested by patient/family  5. Respect patient/family right to receive or not to receive information  6. Serve as a liaison between patient and family and health care team  7. Initiate Consults from Ethics, Palliative Care or initiate Family Care Conference as is appropriate  Outcome: Progressing     Problem: Confusion  Goal: Confusion, delirium, dementia, or psychosis is improved or at baseline  Description: INTERVENTIONS:  1. Assess for possible contributors to thought disturbance, including medications, impaired vision or hearing, underlying metabolic abnormalities, dehydration, psychiatric diagnoses, and notify attending 
  Problem: Safety - Adult  Goal: Free from fall injury  Outcome: Progressing     Problem: Discharge Planning  Goal: Discharge to home or other facility with appropriate resources  Outcome: Progressing  Flowsheets (Taken 4/10/2025 0800)  Discharge to home or other facility with appropriate resources: Identify barriers to discharge with patient and caregiver     Problem: Skin/Tissue Integrity  Goal: Skin integrity remains intact  Description: 1.  Monitor for areas of redness and/or skin breakdown  2.  Assess vascular access sites hourly  3.  Every 4-6 hours minimum:  Change oxygen saturation probe site  4.  Every 4-6 hours:  If on nasal continuous positive airway pressure, respiratory therapy assess nares and determine need for appliance change or resting period  Outcome: Progressing  Flowsheets (Taken 4/10/2025 0800)  Skin Integrity Remains Intact: Monitor for areas of redness and/or skin breakdown     Problem: ABCDS Injury Assessment  Goal: Absence of physical injury  Outcome: Progressing     Problem: Pain  Goal: Verbalizes/displays adequate comfort level or baseline comfort level  Outcome: Progressing     Problem: Decision Making  Goal: Pt/Family able to effectively weigh alternatives and participate in decision making related to treatment and care  Description: INTERVENTIONS:  1. Determine when there are differences between patient's view, family's view, and healthcare provider's view of condition  2. Facilitate patient and family articulation of goals for care  3. Help patient and family identify pros/cons of alternative solutions  4. Provide information as requested by patient/family  5. Respect patient/family right to receive or not to receive information  6. Serve as a liaison between patient and family and health care team  7. Initiate Consults from Ethics, Palliative Care or initiate Family Care Conference as is appropriate  Outcome: Progressing  Flowsheets (Taken 4/10/2025 0800)  Patient/family able to 
  Problem: Safety - Adult  Goal: Free from fall injury  Outcome: Progressing  Flowsheets (Taken 4/9/2025 0800 by Marika Hathaway, RN)  Free From Fall Injury: Instruct family/caregiver on patient safety     Problem: Discharge Planning  Goal: Discharge to home or other facility with appropriate resources  Outcome: Progressing  Flowsheets (Taken 4/12/2025 0900 by Darby Mena, RN)  Discharge to home or other facility with appropriate resources: Identify barriers to discharge with patient and caregiver     Problem: Skin/Tissue Integrity  Goal: Skin integrity remains intact  Description: 1.  Monitor for areas of redness and/or skin breakdown  2.  Assess vascular access sites hourly  3.  Every 4-6 hours minimum:  Change oxygen saturation probe site  4.  Every 4-6 hours:  If on nasal continuous positive airway pressure, respiratory therapy assess nares and determine need for appliance change or resting period  Outcome: Progressing  Flowsheets (Taken 4/12/2025 1945 by Marichuy Kenyon, RN)  Skin Integrity Remains Intact: Monitor for areas of redness and/or skin breakdown     Problem: ABCDS Injury Assessment  Goal: Absence of physical injury  Outcome: Progressing  Flowsheets (Taken 4/12/2025 1949 by Marichuy Kenyon, RN)  Absence of Physical Injury: Implement safety measures based on patient assessment     Problem: Pain  Goal: Verbalizes/displays adequate comfort level or baseline comfort level  Outcome: Progressing  Flowsheets (Taken 4/6/2025 1643 by Jimmy Menchaca, RN)  Verbalizes/displays adequate comfort level or baseline comfort level: Encourage patient to monitor pain and request assistance     Problem: Decision Making  Goal: Pt/Family able to effectively weigh alternatives and participate in decision making related to treatment and care  Description: INTERVENTIONS:  1. Determine when there are differences between patient's view, family's view, and healthcare provider's view of condition  2. Facilitate patient and family 
  Problem: Safety - Adult  Goal: Free from fall injury  Outcome: Progressing  Flowsheets (Taken 4/9/2025 0800)  Free From Fall Injury: Instruct family/caregiver on patient safety     Problem: Discharge Planning  Goal: Discharge to home or other facility with appropriate resources  4/9/2025 1058 by Marika Hathaway RN  Outcome: Progressing  Flowsheets (Taken 4/9/2025 0800)  Discharge to home or other facility with appropriate resources: Identify barriers to discharge with patient and caregiver  4/8/2025 2335 by Leidy Barger RN  Flowsheets  Taken 4/8/2025 2335 by Leidy Barger RN  Discharge to home or other facility with appropriate resources:   Identify barriers to discharge with patient and caregiver   Identify discharge learning needs (meds, wound care, etc)  Taken 4/8/2025 1515 by Marika Hathaway RN  Discharge to home or other facility with appropriate resources: Identify barriers to discharge with patient and caregiver     Problem: Skin/Tissue Integrity  Goal: Skin integrity remains intact  Description: 1.  Monitor for areas of redness and/or skin breakdown  2.  Assess vascular access sites hourly  3.  Every 4-6 hours minimum:  Change oxygen saturation probe site  4.  Every 4-6 hours:  If on nasal continuous positive airway pressure, respiratory therapy assess nares and determine need for appliance change or resting period  4/9/2025 1058 by Marika Hathaway RN  Outcome: Progressing  Flowsheets  Taken 4/9/2025 0800 by Marika Hathaway RN  Skin Integrity Remains Intact: Monitor for areas of redness and/or skin breakdown  Taken 4/9/2025 0000 by Leidy Barger RN  Skin Integrity Remains Intact: Monitor for areas of redness and/or skin breakdown  4/8/2025 2335 by Leidy Barger RN  Flowsheets  Taken 4/8/2025 2335 by Leidy Barger RN  Skin Integrity Remains Intact:   Monitor for areas of redness and/or skin breakdown   Assess vascular access sites hourly   Every 4-6 hours minimum: 
  Problem: Safety - Medical Restraint  Goal: Remains free of injury from restraints (Restraint for Interference with Medical Device)  Description: INTERVENTIONS:  1. Determine that other, less restrictive measures have been tried or would not be effective before applying the restraint  2. Evaluate the patient's condition at the time of restraint application  3. Inform patient/family regarding the reason for restraint  4. Q2H: Monitor safety, psychosocial status, comfort, nutrition and hydration  4/7/2025 0811 by Devika Huston RN  Outcome: Completed  Flowsheets (Taken 4/6/2025 2000 by Brianna Francois RN)  Remains free of injury from restraints (restraint for interference with medical device): Every 2 hours: Monitor safety, psychosocial status, comfort, nutrition and hydration  4/6/2025 1941 by Brianna Francois RN  Outcome: Progressing  Flowsheets  Taken 4/6/2025 1800 by Jimmy Menchaca RN  Remains free of injury from restraints (restraint for interference with medical device): Determine that other, less restrictive measures have been tried or would not be effective before applying the restraint  Taken 4/6/2025 1600 by Jimmy Menchaca RN  Remains free of injury from restraints (restraint for interference with medical device): Determine that other, less restrictive measures have been tried or would not be effective before applying the restraint  Taken 4/6/2025 1400 by Jimmy Menchaca RN  Remains free of injury from restraints (restraint for interference with medical device): Determine that other, less restrictive measures have been tried or would not be effective before applying the restraint  Taken 4/6/2025 1200 by Jimmy Menchaca RN  Remains free of injury from restraints (restraint for interference with medical device): Every 2 hours: Monitor safety, psychosocial status, comfort, nutrition and hydration  Taken 4/6/2025 1000 by Jimmy Menchaca RN  Remains free of injury from restraints (restraint for 
Patient has only one peripheral IV access. Levophed is running through the line. With low platelet we are waiting for the cross match result to come and platelet transfusion before putting arterial live and Central line. IV team failed to put additional IV access.     I also ordered fluid bolus and IV acetazolamide but could not be given due to lack of IV access.     In order for the platelet to be transfused , the levophed will need to be held. Ordered to transfuse platelet , holding the levophed and cycle blood pressure every 5 minutes while platelet is being transfused and put the patient back on levophed if MAP<60.    Patient awake and doing well during the Platelet transfusion.       
Patient is in Bilateral Wrist Restraints. When Released Patient is combative and pulls at lines and Tubes.  Problem: Confusion  Goal: Confusion, delirium, dementia, or psychosis is improved or at baseline  Description: INTERVENTIONS:  1. Assess for possible contributors to thought disturbance, including medications, impaired vision or hearing, underlying metabolic abnormalities, dehydration, psychiatric diagnoses, and notify attending LIP  2. [place] high risk fall precautions, as indicated  3. Provide frequent short contacts to provide reality reorientation, refocusing and direction  4. Decrease environmental stimuli, including noise as appropriate  5. Monitor and intervene to maintain adequate nutrition, hydration, elimination, sleep and activity  6. If unable to ensure safety without constant attention obtain sitter and review sitter guidelines with assigned personnel  7. Initiate Psychosocial CNS and Spiritual Care consult, as indicated  Outcome: Not Progressing     Problem: Safety - Adult  Goal: Free from fall injury  Outcome: Progressing     Problem: Skin/Tissue Integrity  Goal: Skin integrity remains intact  Description: 1.  Monitor for areas of redness and/or skin breakdown  2.  Assess vascular access sites hourly  3.  Every 4-6 hours minimum:  Change oxygen saturation probe site  4.  Every 4-6 hours:  If on nasal continuous positive airway pressure, respiratory therapy assess nares and determine need for appliance change or resting period  Outcome: Progressing     Problem: ABCDS Injury Assessment  Goal: Absence of physical injury  Outcome: Progressing     Problem: Respiratory - Adult  Goal: Achieves optimal ventilation and oxygenation  Outcome: Progressing     Problem: Skin/Tissue Integrity - Adult  Goal: Skin integrity remains intact  Description: 1.  Monitor for areas of redness and/or skin breakdown  2.  Assess vascular access sites hourly  3.  Every 4-6 hours minimum:  Change oxygen saturation probe 
medications, impaired vision or hearing, underlying metabolic abnormalities, dehydration, psychiatric diagnoses, and notify attending LIP  2. [place] high risk fall precautions, as indicated  3. Provide frequent short contacts to provide reality reorientation, refocusing and direction  4. Decrease environmental stimuli, including noise as appropriate  5. Monitor and intervene to maintain adequate nutrition, hydration, elimination, sleep and activity  6. If unable to ensure safety without constant attention obtain sitter and review sitter guidelines with assigned personnel  7. Initiate Psychosocial CNS and Spiritual Care consult, as indicated  Outcome: Progressing     Problem: Behavior  Goal: Pt/Family maintain appropriate behavior and adhere to behavioral management agreement, if implemented  Description: INTERVENTIONS:  1. Assess patient/family's coping skills and  non-compliant behavior (including use of illegal substances)  2. Notify security of behavior or suspected illegal substances which indicate the need for search of the family and/or belongings  3. Encourage verbalization of thoughts and concerns in a socially appropriate manner  4. Utilize positive, consistent limit setting strategies supporting safety of patient, staff and others  5. Encourage participation in the decision making process about the behavioral management agreement  6. If a visitor's behavior poses a threat to safety call refer to organization policy.  7. Initiate consult with , Psychosocial CNS, Spiritual Care as appropriate  Outcome: Progressing     Problem: Neurosensory - Adult  Goal: Achieves stable or improved neurological status  Outcome: Progressing  Goal: Achieves maximal functionality and self care  Outcome: Progressing     Problem: Respiratory - Adult  Goal: Achieves optimal ventilation and oxygenation  Outcome: Progressing  Flowsheets (Taken 3/30/2025 0238 by Belia Joshi RN)  Achieves optimal ventilation and 
to receive information  6. Serve as a liaison between patient and family and health care team  7. Initiate Consults from Ethics, Palliative Care or initiate Family Care Conference as is appropriate  3/30/2025 1732 by Alejandra Mccarthy RN  Outcome: Progressing     Problem: Confusion  Goal: Confusion, delirium, dementia, or psychosis is improved or at baseline  Description: INTERVENTIONS:  1. Assess for possible contributors to thought disturbance, including medications, impaired vision or hearing, underlying metabolic abnormalities, dehydration, psychiatric diagnoses, and notify attending LIP  2. [place] high risk fall precautions, as indicated  3. Provide frequent short contacts to provide reality reorientation, refocusing and direction  4. Decrease environmental stimuli, including noise as appropriate  5. Monitor and intervene to maintain adequate nutrition, hydration, elimination, sleep and activity  6. If unable to ensure safety without constant attention obtain sitter and review sitter guidelines with assigned personnel  7. Initiate Psychosocial CNS and Spiritual Care consult, as indicated  3/30/2025 1732 by Alejandra Mccarthy RN  Outcome: Progressing     Problem: Behavior  Goal: Pt/Family maintain appropriate behavior and adhere to behavioral management agreement, if implemented  Description: INTERVENTIONS:  1. Assess patient/family's coping skills and  non-compliant behavior (including use of illegal substances)  2. Notify security of behavior or suspected illegal substances which indicate the need for search of the family and/or belongings  3. Encourage verbalization of thoughts and concerns in a socially appropriate manner  4. Utilize positive, consistent limit setting strategies supporting safety of patient, staff and others  5. Encourage participation in the decision making process about the behavioral management agreement  6. If a visitor's behavior poses a threat to safety call refer to 
retention  Recent Flowsheet Documentation  Taken 4/11/2025 2000 by Marichuy Kenyon RN  Absence of urinary retention:   Assess patient’s ability to void and empty bladder   Monitor intake/output and perform bladder scan as needed     Problem: Chronic Conditions and Co-morbidities  Goal: Patient's chronic conditions and co-morbidity symptoms are monitored and maintained or improved  Recent Flowsheet Documentation  Taken 4/11/2025 2000 by Marichuy Kenyon RN  Care Plan - Patient's Chronic Conditions and Co-Morbidity Symptoms are Monitored and Maintained or Improved: Monitor and assess patient's chronic conditions and comorbid symptoms for stability, deterioration, or improvement     Problem: Cardiovascular - Adult  Goal: Maintains optimal cardiac output and hemodynamic stability  Recent Flowsheet Documentation  Taken 4/11/2025 2000 by Marichuy Kenyon RN  Maintains optimal cardiac output and hemodynamic stability: Monitor blood pressure and heart rate  Goal: Absence of cardiac dysrhythmias or at baseline  Recent Flowsheet Documentation  Taken 4/11/2025 2000 by Marichuy Kenyon RN  Absence of cardiac dysrhythmias or at baseline: Monitor cardiac rate and rhythm     Problem: Infection - Adult  Goal: Absence of infection at discharge  Recent Flowsheet Documentation  Taken 4/11/2025 2000 by Marichuy Kenyon RN  Absence of infection at discharge: Assess and monitor for signs and symptoms of infection  Taken 4/11/2025 1950 by Marichuy Kenyon RN  Absence of infection at discharge: Assess and monitor for signs and symptoms of infection     
status     Problem: Respiratory - Adult  Goal: Achieves optimal ventilation and oxygenation  Outcome: Progressing     Problem: Skin/Tissue Integrity - Adult  Goal: Skin integrity remains intact  Description: 1.  Monitor for areas of redness and/or skin breakdown  2.  Assess vascular access sites hourly  3.  Every 4-6 hours minimum:  Change oxygen saturation probe site  4.  Every 4-6 hours:  If on nasal continuous positive airway pressure, respiratory therapy assess nares and determine need for appliance change or resting period  Outcome: Progressing  Flowsheets (Taken 4/3/2025 2015)  Skin Integrity Remains Intact: Monitor for areas of redness and/or skin breakdown     Problem: Musculoskeletal - Adult  Goal: Return mobility to safest level of function  Outcome: Progressing  Flowsheets (Taken 4/3/2025 2015)  Return Mobility to Safest Level of Function: Assess patient stability and activity tolerance for standing, transferring and ambulating with or without assistive devices     Problem: Genitourinary - Adult  Goal: Absence of urinary retention  Outcome: Progressing  Flowsheets (Taken 4/3/2025 2015)  Absence of urinary retention: Assess patient’s ability to void and empty bladder     
for appliance change or resting period  3/31/2025 0323 by Sascha Lemus RN  Outcome: Progressing  3/30/2025 1732 by Alejandra Mccarthy RN  Outcome: Progressing  Flowsheets (Taken 3/30/2025 1731)  Skin Integrity Remains Intact: Monitor for areas of redness and/or skin breakdown     Problem: Musculoskeletal - Adult  Goal: Return mobility to safest level of function  3/31/2025 0323 by Sascha Lemus RN  Outcome: Progressing  3/30/2025 1732 by Alejandra Mccarthy RN  Outcome: Progressing  Goal: Return ADL status to a safe level of function  3/31/2025 0323 by Sascha Lemus RN  Outcome: Progressing  3/30/2025 1732 by Alejandra Mccarthy RN  Outcome: Progressing     Problem: Genitourinary - Adult  Goal: Absence of urinary retention  3/31/2025 0323 by Sascha Lemus RN  Outcome: Progressing  3/30/2025 1732 by Alejandra Mccarthy RN  Outcome: Progressing     Problem: Gastrointestinal - Adult  Goal: Maintains or returns to baseline bowel function  3/31/2025 0323 by Sascha Lemus RN  Outcome: Progressing  3/30/2025 1732 by Alejandra Mccarthy RN  Outcome: Progressing  Goal: Maintains adequate nutritional intake  3/31/2025 0323 by Sascha Lemus RN  Outcome: Progressing  3/30/2025 1732 by Alejandra Mccatrhy RN  Outcome: Progressing     Problem: Chronic Conditions and Co-morbidities  Goal: Patient's chronic conditions and co-morbidity symptoms are monitored and maintained or improved  3/31/2025 0323 by Sascha Lemus RN  Outcome: Progressing  3/30/2025 1732 by Alejandra Mccarthy RN  Outcome: Progressing     
at the time of restraint application  3. Inform patient/family regarding the reason for restraint  4. Q2H: Monitor safety, psychosocial status, comfort, nutrition and hydration  Outcome: Progressing     
appliance change or resting period  4/5/2025 0300 by Vicky Haque RN  Outcome: Progressing  4/4/2025 1601 by Susi High RN  Outcome: Progressing  4/4/2025 1600 by Susi High RN  Outcome: Progressing  Flowsheets (Taken 4/4/2025 1329)  Skin Integrity Remains Intact:   Monitor for areas of redness and/or skin breakdown   Assess vascular access sites hourly   Every 4-6 hours minimum:  Change oxygen saturation probe site   Every 4-6 hours:  If on nasal continuous positive airway pressure, assess nares and determine need for appliance change or resting period   Turn and reposition as indicated   Assess need for specialty bed     Problem: Musculoskeletal - Adult  Goal: Return mobility to safest level of function  4/4/2025 1601 by Susi High RN  Outcome: Progressing  4/4/2025 1600 by Susi High RN  Outcome: Progressing  Flowsheets (Taken 4/4/2025 1329)  Return Mobility to Safest Level of Function:   Assess patient stability and activity tolerance for standing, transferring and ambulating with or without assistive devices   Assist with transfers and ambulation using safe patient handling equipment as needed   Obtain physical therapy/occupational therapy consults as needed   Ensure adequate protection for wounds/incisions during mobilization   Apply continuous passive motion per provider or physical therapy orders to increase flexion toward goal  Goal: Return ADL status to a safe level of function  4/4/2025 1601 by Susi High RN  Outcome: Progressing  4/4/2025 1600 by Susi High RN  Outcome: Progressing  Flowsheets (Taken 4/4/2025 1329)  Return ADL Status to a Safe Level of Function:   Administer medication as ordered   Assess activities of daily living deficits and provide assistive devices as needed   Obtain physical therapy/occupational therapy consults as needed     Problem: Genitourinary - Adult  Goal: Absence of urinary retention  4/5/2025 0300 by Vicky Haque RN  Outcome: 
Progressing  Flowsheets (Taken 4/4/2025 1320)  Absence of cardiac dysrhythmias or at baseline:   Monitor cardiac rate and rhythm   Assess for signs of decreased cardiac output   Administer antiarrhythmia medication and electrolyte replacement as ordered     Problem: Infection - Adult  Goal: Absence of infection at discharge  Outcome: Progressing  Flowsheets (Taken 4/4/2025 1329)  Absence of infection at discharge:   Assess and monitor for signs and symptoms of infection   Monitor lab/diagnostic results   Monitor all insertion sites i.e., indwelling lines, tubes and drains   Monitor endotracheal (as able) and nasal secretions for changes in amount and color   [place] appropriate cooling/warming therapies per order

## 2025-04-18 NOTE — PROGRESS NOTES
[facility] Hospitalist Progress Note    Admitting Date and Time: 3/29/2025  3:40 PM  Admit Dx: Acute hypoxic respiratory failure [J96.01]  Patient admitted on 3/29/2025      Tenisha Bruce is a 74 y.o. female patient of Julio Porter DO with history of fetal alcohol syndrome, hypothyroidism, hypercholesterolemia, anxiety presented to [facility] on 3/29/2025  from nursing home due to concern of hypoxia.  Patient was just discharged on the same day from the hospital after she was treated for septic shock and acute metabolic encephalopathy and heart failure.      While in the ER patient found to have hypercapnia with CO2 of 65.1, CTA pulmonary revealed cardiomegaly with interstitial pulmonary edema pattern with moderate right and left large pleural effusion concerning for loculated left pleural effusion.  Patient troponin were elevated.  Patient was initially placed on BiPAP and started on IV Lasix.  Pulmonology and cardiology were consulted.  Patient was eval by cardiology, not a candidate for any invasive assessment, continue medical management.  Heparin drip was discontinued.  Patient had pigtail catheter placement on 3/30 for large left-sided pleural effusion.     Patient became hypotensive on/4/25 needing transfer to ICU for pressor support.      Subjective:  Patient is being followed for Acute hypoxic respiratory failure [J96.01]   Patient seen and examined.  Events reviewed.  Transferred out of the ICU.  On 9 L high flow nasal cannula this morning, weaned down to 7 L  Awake and alert, eating lunch.  Calm    ROS: denies fever, chills, cp, sob, n/v, HA unless stated above.      [START ON 4/10/2025] hydrocortisone sodium succinate PF (SOLU-CORTEF) 50 mg in sterile water 2 mL injection  50 mg IntraVENous Daily    ipratropium 0.5 mg-albuterol 2.5 mg  1 Dose Inhalation Q4H WA RT    metoprolol succinate  12.5 mg Oral Daily    albumin human 25%  25 g IntraVENous Once    bumetanide  1 mg 
       [facility] Hospitalist Progress Note    Admitting Date and Time: 3/29/2025  3:40 PM  Admit Dx: Acute hypoxic respiratory failure [J96.01]  Patient admitted on 3/29/2025      Tenisha Bruce is a 74 y.o. female patient of Julio Porter DO with history of fetal alcohol syndrome, hypothyroidism, hypercholesterolemia, anxiety presented to [facility] on 3/29/2025  from nursing home due to concern of hypoxia.  Patient was just discharged on the same day from the hospital after she was treated for septic shock and acute metabolic encephalopathy and heart failure.      While in the ER patient found to have hypercapnia with CO2 of 65.1, CTA pulmonary revealed cardiomegaly with interstitial pulmonary edema pattern with moderate right and left large pleural effusion concerning for loculated left pleural effusion.  Patient troponin were elevated.  Patient was initially placed on BiPAP and started on IV Lasix.  Pulmonology and cardiology were consulted.  Patient was eval by cardiology, not a candidate for any invasive assessment, continue medical management.  Heparin drip was discontinued.  Patient had pigtail catheter placement on 3/30 for large left-sided pleural effusion.     Patient became hypotensive on/4/25 needing transfer to ICU for pressor support.      Subjective:  Patient is being followed for Acute hypoxic respiratory failure [J96.01]   Patient seen and examined.  Events reviewed.  Off Levophed, in restraints.  Transfuse 1 unit for low hemoglobin this morning    ROS: denies fever, chills, cp, sob, n/v, HA unless stated above.      hydrocortisone sodium succinate PF (SOLU-CORTEF) 100 mg in sterile water 2 mL injection  100 mg IntraVENous Q12H    docusate sodium  100 mg Oral Daily    midodrine  10 mg Oral TID     piperacillin-tazobactam  4,500 mg IntraVENous Q8H    sodium chloride flush  5-40 mL IntraVENous 2 times per day    sodium chloride  500 mL IntraVENous Once    valproic acid  
       [facility] Hospitalist Progress Note    Admitting Date and Time: 3/29/2025  3:40 PM  Admit Dx: Acute hypoxic respiratory failure [J96.01]  Patient admitted on 3/29/2025      Tenisha Bruce is a 74 y.o. female patient of Julio Porter DO with history of fetal alcohol syndrome, hypothyroidism, hypercholesterolemia, anxiety presented to [facility] on 3/29/2025  from nursing home due to concern of hypoxia.  Patient was just discharged on the same day from the hospital after she was treated for septic shock and acute metabolic encephalopathy and heart failure.      While in the ER patient found to have hypercapnia with CO2 of 65.1, CTA pulmonary revealed cardiomegaly with interstitial pulmonary edema pattern with moderate right and left large pleural effusion concerning for loculated left pleural effusion.  Patient troponin were elevated.  Patient was initially placed on BiPAP and started on IV Lasix.  Pulmonology and cardiology were consulted.  Patient was eval by cardiology, not a candidate for any invasive assessment, continue medical management.  Heparin drip was discontinued.  Patient had pigtail catheter placement on 3/30 for large left-sided pleural effusion.     Patient became hypotensive on/4/25 needing transfer to ICU for pressor support.      Subjective:  Patient is being followed for Acute hypoxic respiratory failure [J96.01]   Patient seen and examined.  Events reviewed.  On Airvo    ROS: denies fever, chills, cp, sob, n/v, HA unless stated above.      ipratropium 0.5 mg-albuterol 2.5 mg  1 Dose Inhalation Q4H WA RT    metoprolol succinate  12.5 mg Oral Daily    haloperidol lactate  1 mg IntraVENous Once    docusate sodium  100 mg Oral Daily    midodrine  10 mg Oral TID WC    piperacillin-tazobactam  4,500 mg IntraVENous Q8H    sodium chloride flush  5-40 mL IntraVENous 2 times per day    sodium chloride  500 mL IntraVENous Once    valproic acid  250 mg Oral BID    
       [facility] Hospitalist Progress Note    Admitting Date and Time: 3/29/2025  3:40 PM  Admit Dx: Acute hypoxic respiratory failure [J96.01]    Subjective:  Patient is being followed for Acute hypoxic respiratory failure [J96.01]     No acute events overnight.  Patient looks lethargic and she is on high flow nasal cannula with 60%.    ROS: denies fever, chills, cp, sob, n/v, HA unless stated above.      ipratropium 0.5 mg-albuterol 2.5 mg  1 Dose Inhalation Q4H WA RT    metoprolol succinate  12.5 mg Oral Daily    haloperidol lactate  1 mg IntraVENous Once    docusate sodium  100 mg Oral Daily    midodrine  10 mg Oral TID WC    sodium chloride flush  5-40 mL IntraVENous 2 times per day    valproic acid  250 mg Oral BID    pantoprazole (PROTONIX) 40 mg in sodium chloride (PF) 0.9 % 10 mL injection  40 mg IntraVENous Daily    atorvastatin  10 mg Oral Nightly    vitamin B and C  1 tablet Oral Daily    escitalopram  10 mg Oral Nightly    ezetimibe  10 mg Oral Daily    levothyroxine  100 mcg Oral QAM AC    [Held by provider] lisinopril  2.5 mg Oral Daily    niacin  1,000 mg Oral Nightly    QUEtiapine  400 mg Oral Nightly    [Held by provider] spironolactone  25 mg Oral Daily     sodium chloride flush, 5-40 mL, PRN  polyethylene glycol, 17 g, Daily PRN  sodium chloride, , PRN  ondansetron, 4 mg, Q8H PRN   Or  ondansetron, 4 mg, Q6H PRN  magnesium hydroxide, 30 mL, Daily PRN  acetaminophen, 650 mg, Q6H PRN   Or  acetaminophen, 650 mg, Q6H PRN  potassium chloride, 40 mEq, PRN   Or  potassium alternative oral replacement, 40 mEq, PRN   Or  potassium chloride, 10 mEq, PRN  magnesium sulfate, 2,000 mg, PRN         Objective:    /85   Pulse (!) 116   Temp 98 °F (36.7 °C) (Temporal)   Resp 26   Ht 1.651 m (5' 5\")   Wt 66.6 kg (146 lb 13.2 oz)   SpO2 90%   BMI 24.43 kg/m²     General Appearance: Patient looks lethargic and time and in no acute distress  Skin: warm and dry  Head: normocephalic and 
       [facility] Hospitalist Progress Note    Admitting Date and Time: 3/29/2025  3:40 PM  Admit Dx: Acute hypoxic respiratory failure [J96.01]    Subjective:  Patient is being followed for Acute hypoxic respiratory failure [J96.01]     No acute events overnight.  Patient looks lethargic and she is on high flow nasal cannula with 50 L on 70%.  [employer] approved CODE STATUS to DNR CC and recommends for hospice.    ROS: denies fever, chills, cp, sob, n/v, HA unless stated above.      enoxaparin  40 mg SubCUTAneous Daily    acetylcysteine  600 mg Inhalation BID RT    metoprolol succinate  25 mg Oral Daily    ipratropium 0.5 mg-albuterol 2.5 mg  1 Dose Inhalation Q4H WA RT    haloperidol lactate  1 mg IntraVENous Once    docusate sodium  100 mg Oral Daily    sodium chloride flush  5-40 mL IntraVENous 2 times per day    valproic acid  250 mg Oral BID    pantoprazole (PROTONIX) 40 mg in sodium chloride (PF) 0.9 % 10 mL injection  40 mg IntraVENous Daily    atorvastatin  10 mg Oral Nightly    vitamin B and C  1 tablet Oral Daily    escitalopram  10 mg Oral Nightly    ezetimibe  10 mg Oral Daily    levothyroxine  100 mcg Oral QAM AC    [Held by provider] lisinopril  2.5 mg Oral Daily    niacin  1,000 mg Oral Nightly    QUEtiapine  400 mg Oral Nightly    [Held by provider] spironolactone  25 mg Oral Daily     sodium chloride flush, 5-40 mL, PRN  polyethylene glycol, 17 g, Daily PRN  sodium chloride, , PRN  ondansetron, 4 mg, Q8H PRN   Or  ondansetron, 4 mg, Q6H PRN  magnesium hydroxide, 30 mL, Daily PRN  acetaminophen, 650 mg, Q6H PRN   Or  acetaminophen, 650 mg, Q6H PRN  potassium chloride, 40 mEq, PRN   Or  potassium alternative oral replacement, 40 mEq, PRN   Or  potassium chloride, 10 mEq, PRN  magnesium sulfate, 2,000 mg, PRN         Objective:    BP (!) 107/54   Pulse 100   Temp 97.3 °F (36.3 °C) (Temporal)   Resp 24   Ht 1.651 m (5' 5\")   Wt 67.5 kg (148 lb 13 oz)   SpO2 94%   BMI 24.76 kg/m²     General 
       [facility] Hospitalist Progress Note    Admitting Date and Time: 3/29/2025  3:40 PM  Admit Dx: Acute hypoxic respiratory failure [J96.01]  Patient admitted on 3/29/2025      Tenisha Bruce is a 74 y.o. female patient of Julio Porter DO with history of fetal alcohol syndrome, hypothyroidism, hypercholesterolemia, anxiety presented to [facility] on 3/29/2025  from nursing home due to concern of hypoxia.  Patient was just discharged on the same day from the hospital after she was treated for septic shock and acute metabolic encephalopathy and heart failure.      While in the ER patient found to have hypercapnia with CO2 of 65.1, CTA pulmonary revealed cardiomegaly with interstitial pulmonary edema pattern with moderate right and left large pleural effusion concerning for loculated left pleural effusion.  Patient troponin were elevated.  Patient was initially placed on BiPAP and started on IV Lasix.  Pulmonology and cardiology were consulted.  Patient was eval by cardiology, not a candidate for any invasive assessment, continue medical management.  Heparin drip was discontinued.  Patient had pigtail catheter placement on 3/30 for large left-sided pleural effusion.     Patient became hypotensive on/4/25 needing transfer to ICU for pressor support.      Subjective:  Patient is being followed for Acute hypoxic respiratory failure [J96.01]   Patient seen and examined.  Events reviewed.  On Airvo    ROS: denies fever, chills, cp, sob, n/v, HA unless stated above.      ipratropium 0.5 mg-albuterol 2.5 mg  1 Dose Inhalation Q4H WA RT    metoprolol succinate  12.5 mg Oral Daily    haloperidol lactate  1 mg IntraVENous Once    docusate sodium  100 mg Oral Daily    midodrine  10 mg Oral TID WC    sodium chloride flush  5-40 mL IntraVENous 2 times per day    sodium chloride  500 mL IntraVENous Once    valproic acid  250 mg Oral BID    pantoprazole (PROTONIX) 40 mg in sodium chloride (PF) 0.9 % 10 
      HOSPITALIST PROGRESS NOTE    Patient's name: Tenisha Bruce  : 1950  Admission date: 3/29/2025  Date of service: 2025   Primary care physician: Julio Porter DO    Assessment   Patient admitted on 3/29/2025     Tenisha Bruce is a 74 y.o. female patient of Julio Porter DO with history of fetal alcohol syndrome, hypothyroidism, hypercholesterolemia, anxiety presented to [facility] on 3/29/2025  from nursing home due to concern of hypoxia.  Patient was just discharged on the same day from the hospital after she was treated for septic shock and acute metabolic encephalopathy and heart failure.     While in the ER patient found to have hypercapnia with CO2 of 65.1, CTA pulmonary revealed cardiomegaly with interstitial pulmonary edema pattern with moderate right and left large pleural effusion concerning for loculated left pleural effusion.  Patient troponin were elevated.  Patient was initially placed on BiPAP and started on IV Lasix.  Pulmonology and cardiology were consulted.  Patient was eval by cardiology, not a candidate for any invasive assessment, continue medical management.  Heparin drip was discontinued.  Patient had pigtail catheter placement on 3/30 for large left-sided pleural effusion.    Acute hypoxic hypercapnic respiratory failure  Mycoplasma pneumonia  Streptococcal pneumonia  Continue empiric cefepime  Also started on azithromycin for mycoplasma pneumonia  Continue IV diuresis  Patient has pigtail catheter in place, pulmonology following    Elevated troponin  HFrEF acute on chronic  Continue IV diuretics  Echo from 3/20/2025 showing moderate to severe LV systolic dysfunction with a EF 35%  Evaluated by cardiology, no acute intervention    Thrombocytopenia  Being followed by oncology.  MDS not ordered, workup deferred due to poor performance status at this point.  Outpatient follow-up advised    Chronic medical 
      HOSPITALIST PROGRESS NOTE    Patient's name: Tenisha Bruce  : 1950  Admission date: 3/29/2025  Date of service: 2025   Primary care physician: Julio Porter DO    Assessment   Patient admitted on 3/29/2025     Tenisha Bruce is a 74 y.o. female patient of Julio Porter DO with history of fetal alcohol syndrome, hypothyroidism, hypercholesterolemia, anxiety presented to [facility] on 3/29/2025  from nursing home due to concern of hypoxia.  Patient was just discharged on the same day from the hospital after she was treated for septic shock and acute metabolic encephalopathy and heart failure.     While in the ER patient found to have hypercapnia with CO2 of 65.1, CTA pulmonary revealed cardiomegaly with interstitial pulmonary edema pattern with moderate right and left large pleural effusion concerning for loculated left pleural effusion.  Patient troponin were elevated.  Patient was initially placed on BiPAP and started on IV Lasix.  Pulmonology and cardiology were consulted.  Patient was eval by cardiology, not a candidate for any invasive assessment, continue medical management.  Heparin drip was discontinued.  Patient had pigtail catheter placement on 3/30 for large left-sided pleural effusion.    Patient became hypotensive on needing transfer to ICU for pressor support.    Shock  Unclear etiology, probably cardiogenic versus septic  Patient with moderate to severe LV systolic dysfunction with EF of 35%  Was on IV diuretics, holding diuretics, metoprolol and antihypertensives in the setting of low blood pressures  Given IV fluid bolus, patient will be transferred to ICU.  Now on Levophed.     Acute hypoxic hypercapnic respiratory failure  Mycoplasma pneumonia  Streptococcal pneumonia  Treated with IV cefepime for 5 days, was changed to oral Augmentin but restarted on Zosyn after patient became hypotensive.  Completed azithromycin for mycoplasma  Continue IV 
      HOSPITALIST PROGRESS NOTE    Patient's name: Tenisha Bruce  : 1950  Admission date: 3/29/2025  Date of service: 2025   Primary care physician: Julio Porter DO    Assessment   Patient admitted on 3/29/2025     Tenisha Bruce is a 74 y.o. female patient of Julio Porter DO with history of fetal alcohol syndrome, hypothyroidism, hypercholesterolemia, anxiety presented to [facility] on 3/29/2025  from nursing home due to concern of hypoxia.  Patient was just discharged on the same day from the hospital after she was treated for septic shock and acute metabolic encephalopathy and heart failure.     While in the ER patient found to have hypercapnia with CO2 of 65.1, CTA pulmonary revealed cardiomegaly with interstitial pulmonary edema pattern with moderate right and left large pleural effusion concerning for loculated left pleural effusion.  Patient troponin were elevated.  Patient was initially placed on BiPAP and started on IV Lasix.  Pulmonology and cardiology were consulted.  Patient was eval by cardiology, not a candidate for any invasive assessment, continue medical management.  Heparin drip was discontinued.  Patient had pigtail catheter placement on 3/30 for large left-sided pleural effusion.    Acute hypoxic hypercapnic respiratory failure  Mycoplasma pneumonia  Streptococcal pneumonia  Continue empiric cefepime  Also started on azithromycin for mycoplasma pneumonia  Continue IV diuresis  Patient has pigtail catheter in place, pulmonology following    Elevated troponin  HFrEF acute on chronic  Continue IV diuretics  Echo from 3/20/2025 showing moderate to severe LV systolic dysfunction with a EF 35%  Evaluated by cardiology, no acute intervention    Thrombocytopenia  Being followed by oncology.  MDS not ordered, workup deferred due to poor performance status at this point.  Outpatient follow-up advised    Chronic medical 
      HOSPITALIST PROGRESS NOTE    Patient's name: Tenisha Bruce  : 1950  Admission date: 3/29/2025  Date of service: 2025   Primary care physician: Julio Porter DO    Assessment   Patient admitted on 3/29/2025     Tenisha Bruce is a 74 y.o. female patient of Julio Porter DO with history of fetal alcohol syndrome, hypothyroidism, hypercholesterolemia, anxiety presented to [facility] on 3/29/2025  from nursing home due to concern of hypoxia.  Patient was just discharged on the same day from the hospital after she was treated for septic shock and acute metabolic encephalopathy and heart failure.     While in the ER patient found to have hypercapnia with CO2 of 65.1, CTA pulmonary revealed cardiomegaly with interstitial pulmonary edema pattern with moderate right and left large pleural effusion concerning for loculated left pleural effusion.  Patient troponin were elevated.  Patient was initially placed on BiPAP and started on IV Lasix.  Pulmonology and cardiology were consulted.  Patient was eval by cardiology, not a candidate for any invasive assessment, continue medical management.  Heparin drip was discontinued.  Patient had pigtail catheter placement on 3/30 for large left-sided pleural effusion.    Patient became hypotensive on needing transfer to ICU for pressor support.    Shock  Unclear etiology, probably cardiogenic versus septic  Patient with moderate to severe LV systolic dysfunction with EF of 35%  Was on IV diuretics, holding diuretics, metoprolol and antihypertensives in the setting of low blood pressures  Given IV fluid bolus, transferred to ICU.    Remains on Levophed.  Cultures with no growth    Acute hypoxic hypercapnic respiratory failure  Mycoplasma pneumonia  Streptococcal pneumonia  Treated with IV cefepime for 5 days, was changed to oral Augmentin but restarted on Zosyn after patient became hypotensive.  Completed azithromycin for 
      HOSPITALIST PROGRESS NOTE    Patient's name: Tenisha Bruce  : 1950  Admission date: 3/29/2025  Date of service: 3/31/2025   Primary care physician: Julio Porter DO    Assessment   Patient admitted on 3/29/2025     Tenisha Bruce is a 74 y.o. female patient of Julio Porter DO with history of fetal alcohol syndrome, hypothyroidism, hypercholesterolemia, anxiety presented to [facility] on 3/29/2025  from nursing home due to concern of hypoxia.  Patient was just discharged on the same day from the hospital after she was treated for septic shock and acute metabolic encephalopathy and heart failure.     While in the ER patient found to have hypercapnia with CO2 of 65.1, CTA pulmonary revealed cardiomegaly with interstitial pulmonary edema pattern with moderate right and left large pleural effusion concerning for loculated left pleural effusion.  Patient troponin were elevated.  Patient was initially placed on BiPAP and started on IV Lasix.  Pulmonology and cardiology were consulted.  Patient was eval by cardiology, not a candidate for any invasive assessment, continue medical management.  Heparin drip was discontinued.  Patient had pigtail catheter placement on 3/30 for large left-sided pleural effusion.    Acute hypoxic hypercapnic respiratory failure  Continue empiric cefepime and vancomycin  Continue IV diuresis  Patient has pigtail catheter in place, pulmonology following    Elevated troponin  HFrEF acute on chronic  Continue IV diuretics  Echo from 3/20/2025 showing moderate to severe LV systolic dysfunction with a EF 35%  Evaluated by cardiology, no acute intervention    Thrombocytopenia  Being followed by oncology.  MDS not ordered, workup deferred due to poor performance status at this point.  Outpatient follow-up advised    Chronic medical problems  Hypothyroidism  Cholesterolemia  Developmental delay  Chronic microcytic anemia  Continue home meds as 
      HOSPITALIST PROGRESS NOTE    Patient's name: Tenisha Bruce  : 1950  Admission date: 3/29/2025  Date of service: 4/3/2025   Primary care physician: Julio Porter DO    Assessment   Patient admitted on 3/29/2025     Tenisha Bruce is a 74 y.o. female patient of Julio Porter DO with history of fetal alcohol syndrome, hypothyroidism, hypercholesterolemia, anxiety presented to [facility] on 3/29/2025  from nursing home due to concern of hypoxia.  Patient was just discharged on the same day from the hospital after she was treated for septic shock and acute metabolic encephalopathy and heart failure.     While in the ER patient found to have hypercapnia with CO2 of 65.1, CTA pulmonary revealed cardiomegaly with interstitial pulmonary edema pattern with moderate right and left large pleural effusion concerning for loculated left pleural effusion.  Patient troponin were elevated.  Patient was initially placed on BiPAP and started on IV Lasix.  Pulmonology and cardiology were consulted.  Patient was eval by cardiology, not a candidate for any invasive assessment, continue medical management.  Heparin drip was discontinued.  Patient had pigtail catheter placement on 3/30 for large left-sided pleural effusion.    Acute hypoxic hypercapnic respiratory failure  Mycoplasma pneumonia  Streptococcal pneumonia  Treated with IV cefepime for 5 days, will change to oral Augmentin for 3 more days.  Also started on azithromycin for mycoplasma pneumonia  Continue IV diuresis  Patient has pigtail catheter in place, pulmonology following    Elevated troponin  HFrEF acute on chronic  Continue IV diuretics  Echo from 3/20/2025 showing moderate to severe LV systolic dysfunction with a EF 35%  Evaluated by cardiology, no acute intervention    Thrombocytopenia  Being followed by oncology.  MDS not ordered, workup deferred due to poor performance status at this point.  Outpatient follow-up 
    [facility]  PULMONARY/CRITICAL CARE PROGRESS NOTE    Patient: Tenisha Bruce  MRN: [id number]  : 1950    Encounter Date: 2025  Encounter Time: 9:41 AM     Date of Admission: .3/29/2025  3:40 PM    Consulting Physician:  Primary Care Physician:     Julio Porter DO     None     None    Reason for Consultation: Dyspnea     Problem List:  Patient Active Problem List   Diagnosis    Acute respiratory failure with hypoxia and hypercapnia    Acute kidney injury    Pancytopenia    Influenza    Nondisplaced fracture of neck of left femur    Left hip pain    Hypothyroid    Closed fracture of neck of left femur    Subarachnoid hemorrhage (HCC)    Sepsis with acute renal failure and septic shock (HCC)    Acute metabolic encephalopathy    Hypokalemia    Macrocytic anemia    Thrombocytopenia    Stenosis of both internal carotid arteries    Elevated troponin    Pneumonia of left lower lobe due to infectious organism    Mixed hyperlipidemia    Septic shock (HCC)    Acute hypoxic respiratory failure    Palliative care encounter    Goals of care, counseling/discussion     SUBJECTIVE: Patient seen and examined.  Overnight the patient had no shortness of breath, cough, increased work of breathing.    HOME MEDICATIONS:  Prior to Admission medications    Medication Sig Start Date End Date Taking? Authorizing Provider   lisinopril (PRINIVIL;ZESTRIL) 2.5 MG tablet Take 1 tablet by mouth daily 3/29/25   Titi England MD   metoprolol succinate (TOPROL XL) 25 MG extended release tablet Take 1 tablet by mouth daily 3/29/25   Titi England MD   furosemide (LASIX) 20 MG tablet Take 1 tablet by mouth daily 3/29/25   Titi England MD   spironolactone (ALDACTONE) 25 MG tablet Take 1 tablet by mouth daily 3/29/25   Titi England MD   pantoprazole (PROTONIX) 40 MG tablet Take 1 tablet by mouth daily    ProviderYina MD   b complex vitamins capsule Take 1 capsule by mouth daily    Yina Meza MD 
    [facility]  PULMONARY/CRITICAL CARE PROGRESS NOTE    Patient: Tenisha Bruce  MRN: [id number]  : 1950    Encounter Date: 2025  Encounter Time: 11:58 AM     Date of Admission: .3/29/2025  3:40 PM    Consulting Physician:  Primary Care Physician:     Julio Porter DO     None     None    Reason for Consultation: Pleural Effusion     Problem List:  Patient Active Problem List   Diagnosis    Acute respiratory failure with hypoxia and hypercapnia    Acute kidney injury    Pancytopenia    Influenza    Nondisplaced fracture of neck of left femur    Left hip pain    Hypothyroid    Closed fracture of neck of left femur    Subarachnoid hemorrhage (HCC)    Sepsis with acute renal failure and septic shock (HCC)    Acute metabolic encephalopathy    Hypokalemia    Macrocytic anemia    Thrombocytopenia    Stenosis of both internal carotid arteries    Elevated troponin    Pneumonia of left lower lobe due to infectious organism    Mixed hyperlipidemia    Septic shock (HCC)    Acute hypoxic respiratory failure     SUBJECTIVE: Patient seen and examined.  Overnight the patient had no shortness of breath, cough, increased work of breathing.    HOME MEDICATIONS:  Prior to Admission medications    Medication Sig Start Date End Date Taking? Authorizing Provider   lisinopril (PRINIVIL;ZESTRIL) 2.5 MG tablet Take 1 tablet by mouth daily 3/29/25   Titi England MD   metoprolol succinate (TOPROL XL) 25 MG extended release tablet Take 1 tablet by mouth daily 3/29/25   Titi England MD   furosemide (LASIX) 20 MG tablet Take 1 tablet by mouth daily 3/29/25   Titi England MD   spironolactone (ALDACTONE) 25 MG tablet Take 1 tablet by mouth daily 3/29/25   Titi England MD   pantoprazole (PROTONIX) 40 MG tablet Take 1 tablet by mouth daily    Yina Meza MD   b complex vitamins capsule Take 1 capsule by mouth daily    Yina Meza MD   atorvastatin (LIPITOR) 10 MG tablet Take 1 tablet by mouth 
    [facility]  PULMONARY/CRITICAL CARE PROGRESS NOTE    Patient: Tenisha Bruce  MRN: [id number]  : 1950    Encounter Date: 4/3/2025  Encounter Time: 2:10 PM     Date of Admission: .3/29/2025  3:40 PM    Consulting Physician:  Primary Care Physician:     Julio Porter DO     None     None    Reason for Consultation: Pleural Effusion     Problem List:  Patient Active Problem List   Diagnosis    Acute respiratory failure with hypoxia and hypercapnia    Acute kidney injury    Pancytopenia    Influenza    Nondisplaced fracture of neck of left femur    Left hip pain    Hypothyroid    Closed fracture of neck of left femur    Subarachnoid hemorrhage (HCC)    Sepsis with acute renal failure and septic shock (HCC)    Acute metabolic encephalopathy    Hypokalemia    Macrocytic anemia    Thrombocytopenia    Stenosis of both internal carotid arteries    Elevated troponin    Pneumonia of left lower lobe due to infectious organism    Mixed hyperlipidemia    Septic shock (HCC)    Acute hypoxic respiratory failure     SUBJECTIVE: Patient seen and examined.  Overnight the patient had no shortness of breath, cough, increased work of breathing.    HOME MEDICATIONS:  Prior to Admission medications    Medication Sig Start Date End Date Taking? Authorizing Provider   lisinopril (PRINIVIL;ZESTRIL) 2.5 MG tablet Take 1 tablet by mouth daily 3/29/25   Titi England MD   metoprolol succinate (TOPROL XL) 25 MG extended release tablet Take 1 tablet by mouth daily 3/29/25   Titi England MD   furosemide (LASIX) 20 MG tablet Take 1 tablet by mouth daily 3/29/25   Titi England MD   spironolactone (ALDACTONE) 25 MG tablet Take 1 tablet by mouth daily 3/29/25   Titi England MD   pantoprazole (PROTONIX) 40 MG tablet Take 1 tablet by mouth daily    Yina Meza MD   b complex vitamins capsule Take 1 capsule by mouth daily    Yina Meza MD   atorvastatin (LIPITOR) 10 MG tablet Take 1 tablet by mouth 
    [facility]  PULMONARY/CRITICAL CARE PROGRESS NOTE    Patient: Tenisha Bruce  MRN: [id number]  : 1950    Encounter Date: 3/31/2025  Encounter Time: 3:33 PM     Date of Admission: .3/29/2025  3:40 PM    Consulting Physician:  Primary Care Physician:     Julio Porter DO     None     None    Reason for Consultation: Pleural Effusion     Problem List:  Patient Active Problem List   Diagnosis    Acute respiratory failure with hypoxia and hypercapnia (HCC)    Acute kidney injury    Pancytopenia (HCC)    Influenza    Nondisplaced fracture of neck of left femur (HCC)    Left hip pain    Hypothyroid    Closed fracture of neck of left femur (HCC)    Subarachnoid hemorrhage (HCC)    Sepsis with acute renal failure and septic shock (HCC)    Acute metabolic encephalopathy    Hypokalemia    Macrocytic anemia    Thrombocytopenia    Stenosis of both internal carotid arteries    Elevated troponin    Pneumonia of left lower lobe due to infectious organism    Mixed hyperlipidemia    Septic shock (HCC)    Acute hypoxic respiratory failure (HCC)       SUBJECTIVE: Patient seen and examined.  Overnight the patient had no shortness of breath, cough, increased work of breathing.    HOME MEDICATIONS:  Prior to Admission medications    Medication Sig Start Date End Date Taking? Authorizing Provider   lisinopril (PRINIVIL;ZESTRIL) 2.5 MG tablet Take 1 tablet by mouth daily 3/29/25   Titi England MD   metoprolol succinate (TOPROL XL) 25 MG extended release tablet Take 1 tablet by mouth daily 3/29/25   Titi England MD   furosemide (LASIX) 20 MG tablet Take 1 tablet by mouth daily 3/29/25   Titi England MD   spironolactone (ALDACTONE) 25 MG tablet Take 1 tablet by mouth daily 3/29/25   Titi England MD   pantoprazole (PROTONIX) 40 MG tablet Take 1 tablet by mouth daily    ProviderYina MD   b complex vitamins capsule Take 1 capsule by mouth daily    Yina Meza MD   atorvastatin (LIPITOR) 10 MG 
    [facility]  PULMONARY/CRITICAL CARE PROGRESS NOTE    Patient: Tenisha Bruce  MRN: [id number]  : 1950    Encounter Date: 2025  Encounter Time: 1:41 PM     Date of Admission: .3/29/2025  3:40 PM    Consulting Physician:  Primary Care Physician:     Julio Porter DO     None     None    Reason for Consultation: Pleural Effusion     Problem List:  Patient Active Problem List   Diagnosis    Acute respiratory failure with hypoxia and hypercapnia    Acute kidney injury    Pancytopenia    Influenza    Nondisplaced fracture of neck of left femur    Left hip pain    Hypothyroid    Closed fracture of neck of left femur    Subarachnoid hemorrhage (HCC)    Sepsis with acute renal failure and septic shock (HCC)    Acute metabolic encephalopathy    Hypokalemia    Macrocytic anemia    Thrombocytopenia    Stenosis of both internal carotid arteries    Elevated troponin    Pneumonia of left lower lobe due to infectious organism    Mixed hyperlipidemia    Septic shock (HCC)    Acute hypoxic respiratory failure     SUBJECTIVE: Patient seen and examined.  Overnight the patient had no shortness of breath, cough, increased work of breathing.    HOME MEDICATIONS:  Prior to Admission medications    Medication Sig Start Date End Date Taking? Authorizing Provider   lisinopril (PRINIVIL;ZESTRIL) 2.5 MG tablet Take 1 tablet by mouth daily 3/29/25   Titi England MD   metoprolol succinate (TOPROL XL) 25 MG extended release tablet Take 1 tablet by mouth daily 3/29/25   Titi England MD   furosemide (LASIX) 20 MG tablet Take 1 tablet by mouth daily 3/29/25   Titi England MD   spironolactone (ALDACTONE) 25 MG tablet Take 1 tablet by mouth daily 3/29/25   Titi England MD   pantoprazole (PROTONIX) 40 MG tablet Take 1 tablet by mouth daily    Yina Meza MD   b complex vitamins capsule Take 1 capsule by mouth daily    Yina Meza MD   atorvastatin (LIPITOR) 10 MG tablet Take 1 tablet by mouth 
    [facility]  PULMONARY/CRITICAL CARE PROGRESS NOTE    Patient: Tenisha Bruce  MRN: [id number]  : 1950    Encounter Date: 2025  Encounter Time: 10:09 AM     Date of Admission: .3/29/2025  3:40 PM    Consulting Physician:  Primary Care Physician:     Julio Porter DO     None     None    Reason for Consultation: Dyspnea     Problem List:  Patient Active Problem List   Diagnosis    Acute respiratory failure with hypoxia and hypercapnia    Acute kidney injury    Pancytopenia    Influenza    Nondisplaced fracture of neck of left femur    Left hip pain    Hypothyroid    Closed fracture of neck of left femur    Subarachnoid hemorrhage (HCC)    Sepsis with acute renal failure and septic shock (HCC)    Acute metabolic encephalopathy    Hypokalemia    Macrocytic anemia    Thrombocytopenia    Stenosis of both internal carotid arteries    Elevated troponin    Pneumonia of left lower lobe due to infectious organism    Mixed hyperlipidemia    Septic shock (HCC)    Acute hypoxic respiratory failure    Palliative care encounter    Goals of care, counseling/discussion     SUBJECTIVE: Patient seen and examined.  Overnight the patient had no shortness of breath, cough, increased work of breathing.    HOME MEDICATIONS:  Prior to Admission medications    Medication Sig Start Date End Date Taking? Authorizing Provider   lisinopril (PRINIVIL;ZESTRIL) 2.5 MG tablet Take 1 tablet by mouth daily 3/29/25   Titi England MD   metoprolol succinate (TOPROL XL) 25 MG extended release tablet Take 1 tablet by mouth daily 3/29/25   Titi England MD   furosemide (LASIX) 20 MG tablet Take 1 tablet by mouth daily 3/29/25   Titi England MD   spironolactone (ALDACTONE) 25 MG tablet Take 1 tablet by mouth daily 3/29/25   Titi England MD   pantoprazole (PROTONIX) 40 MG tablet Take 1 tablet by mouth daily    ProviderYina MD   b complex vitamins capsule Take 1 capsule by mouth daily    Yina Meza MD 
    PCP: Julio Porter DO    Date of Service: Pt seen/examined on 3/30/2025     Chief Complaint:  had concerns including Shortness of Breath (Pt sent in from [facility] due to sob. EMS gave 1 duoneb pta. Pt was 84 % on a simple face mask at facility.).    History of Present Illness:    Ms. Tenisha Bruce, a 74 y.o. year old female  who  has a past medical history of Anxiety, Corneal scarring, Fetal alcohol syndrome, Hypercholesteremia, Hypothyroidism, and Onychomycosis.     Patient presents to the ED from nursing facility where she was found to be hypoxic.  She does not wear oxygen at baseline.  Patient was just discharged from the hospital earlier today where she was admitted for septic shock and acute metabolic encephalopathy and new onset heart failure.  Blood gases are positive for hypercapnia, CO2 65.1, CTA pulmonary revealed cardiomegaly with interstitial pulmonary edema pattern and moderate right and left large pleural effusions that may be loculated on the left with adjacent atelectasis, along with diffuse body wall edema anasarca.  Troponin ordered to 291>370>362.  On exam patient is alert he does not respond appropriately she does have a history of developmental delay, unsure of her baseline mentation.  Lung sounds are diminished, she does not appear to be in any distress at this time, remains on BiPAP.  Patient will be admitted for further evaluation and treatment        ER COURSE:  No electrolyte abnormalities on chemistry sodium 145 potassium 4.9, BUN 32 creatinine 1.0  WBC 6.5 hemoglobin 9.1 creatinine 29.2    Chest x-ray-large left pleural effusion    Patient was placed on BiPAP  40 mg IV Lasix was given  2 g cefepime and 1250 mg of vancomycin given  Heparin drip initiated for acute coronary syndrome    PREVIOUS HOSPITALIZATION:  Admission from 3/19-28/2025 reviewed.   Hospital Course:   Patient Tenisha Bruce is a 74 y.o. presented with Acute kidney injury [N17.9]  Severe sepsis with septic 
    [facility]  PULMONARY/CRITICAL CARE PROGRESS NOTE    Patient: Tenisha Bruce  MRN: [id number]  : 1950    Encounter Date: 2025  Encounter Time: 11:50 AM     Date of Admission: .3/29/2025  3:40 PM    Consulting Physician:  Primary Care Physician:     Julio Porter DO     None     None    Reason for Consultation: Pleural Effusion     Problem List:  Patient Active Problem List   Diagnosis    Acute respiratory failure with hypoxia and hypercapnia    Acute kidney injury    Pancytopenia    Influenza    Nondisplaced fracture of neck of left femur    Left hip pain    Hypothyroid    Closed fracture of neck of left femur    Subarachnoid hemorrhage (HCC)    Sepsis with acute renal failure and septic shock (HCC)    Acute metabolic encephalopathy    Hypokalemia    Macrocytic anemia    Thrombocytopenia    Stenosis of both internal carotid arteries    Elevated troponin    Pneumonia of left lower lobe due to infectious organism    Mixed hyperlipidemia    Septic shock (HCC)    Acute hypoxic respiratory failure     SUBJECTIVE: Patient seen and examined.  Overnight the patient had no shortness of breath, cough, increased work of breathing.    Patient had transient hypotension 2025 with transfer down to ICU level of care.    HOME MEDICATIONS:  Prior to Admission medications    Medication Sig Start Date End Date Taking? Authorizing Provider   lisinopril (PRINIVIL;ZESTRIL) 2.5 MG tablet Take 1 tablet by mouth daily 3/29/25   Titi England MD   metoprolol succinate (TOPROL XL) 25 MG extended release tablet Take 1 tablet by mouth daily 3/29/25   Titi England MD   furosemide (LASIX) 20 MG tablet Take 1 tablet by mouth daily 3/29/25   Titi England MD   spironolactone (ALDACTONE) 25 MG tablet Take 1 tablet by mouth daily 3/29/25   Titi England MD   pantoprazole (PROTONIX) 40 MG tablet Take 1 tablet by mouth daily    Provider, MD Yina   b complex vitamins capsule Take 1 capsule by mouth daily    
   03/30/25 0730   Oxygen Therapy/Pulse Ox   Blood Gas  Performed? Yes   $ABG $Arterial Puncture   Artem's Test #1 Pos   Site #1 Right Radial   Site Prepped #1 Yes   Number of Attempts #1 1   Pressure Held #1 Yes   Complications #1 None   Post-procedure #1 Standard   Specimen Status #1 To lab   How Tolerated? Tolerated well       
   04/11/25 2305   NIV Type   NIV Started/Stopped On   Equipment Type V60   Mode AVAPS   Mask Type Full face mask   Mask Size Small   Assessment   Respirations 22   SpO2 96 %   Level of Consciousness 1   Comfort Level Good   Using Accessory Muscles No   Mask Compliance Good   Skin Assessment Clean, dry, & intact   Skin Protection for O2 Device Yes   Orientation Middle   Location Nose   Intervention(s) Skin Barrier   Settings/Measurements   PIP Observed 17 cm H20   CPAP/EPAP 5 cmH2O   IPAP Min 16 cmH2O   IPAP Max 22 cmH2O   Vt (Set, mL) 450 mL   Vt (Measured) 358 mL   Rate Ordered 18   Insp Rise Time (%) 3 %   FiO2  80 %   I Time/ I Time % 0.8 s   Minute Volume (L/min) 7.8 Liters   Mask Leak (lpm) 23 lpm   Patient's Home Machine No   Alarm Settings   Alarms On Y       
   04/12/25 0909   Oxygen Therapy/Pulse Ox   $Oxygen $Daily Charge   O2 Device Heated high flow cannula   O2 Flow Rate (L/min) (S)  50 L/min   FiO2  (S)  70 %       
   04/12/25 0910   NIV Type   NIV Started/Stopped (S)  Off       
   04/14/25 2143   NIV Type   NIV Started/Stopped On   Equipment Type v60   Mode AVAPS   Mask Type Full face mask  (Non-vented mask. Exhalation port open.)   Mask Size Small   Assessment   Level of Consciousness 0   Comfort Level Good   Using Accessory Muscles No   Mask Compliance Good   Skin Assessment Clean, dry, & intact   Skin Protection for O2 Device Yes   Orientation Middle   Location Nose   Intervention(s) Skin Barrier   Settings/Measurements   CPAP/EPAP 5 cmH2O   IPAP Min 10 cmH2O   IPAP Max 20 cmH2O   Vt (Set, mL) 450 mL   Vt (Measured) 485 mL   Rate Ordered 18   FiO2  65 %   I Time/ I Time % 0.8 s   Minute Volume (L/min) 11.6 Liters   Mask Leak (lpm) 27 lpm   Patient's Home Machine No   Alarm Settings   Alarms On Y   Low Pressure (cmH2O) 6 cmH2O   High Pressure (cmH2O) 30 cmH2O   RR Low (bpm) 16   RR High (bpm) 40 br/min     Date: 4/14/2025    Time: 9:45 PM    Patient Placed On BIPAP/CPAP/ Non-Invasive Ventilation?  Yes    If no must comment.  Facial area red/color change? No           If YES are Blister/Lesion present?No   If yes must notify nursing staff  BIPAP/CPAP skin barrier?  Yes    Skin barrier type:mepilexlite       Comments:        Geovanna Arevalo RCP  
  [facility]  Department of Internal Medicine  Division of Pulmonary, Critical Care and Sleep Medicine  Progress Note      Raul Smith, APRN-CNP  Izabela Mendiolachristianaster, APRN-CNP      Subjective:  Patient seen and examined. Had issues with hypoxemia over night   Now on airvo 95% FIO2  CXR yesterday showed worsening congestion    OBJECTIVE:     PHYSICAL EXAM:   VITALS:   Vitals:    04/10/25 0823 04/10/25 0917 04/10/25 1100 04/10/25 1216   BP: 130/74  133/78    Pulse: (!) 122  (!) 119    Resp: 18  24    Temp: 99 °F (37.2 °C)  97.5 °F (36.4 °C)    TempSrc:   Temporal    SpO2: 93% 97% 94% 91%   Weight:       Height:            Intake/Output Summary (Last 24 hours) at 4/10/2025 1302  Last data filed at 4/10/2025 0823  Gross per 24 hour   Intake 723.99 ml   Output 688 ml   Net 35.99 ml        CONSTITUTIONAL:   Alert   SKIN:     No rash, No suspicious lesions, No skin discoloration  HEENT:     EOMI, MMM, No thrush  NECK:    No bruits, No JVP appreciated  CV:      Sinus,  No murmur, No rubs, No gallops  PULMONARY:   Coarse breath sounds bilaterally,  No Wheezing    No noted egophony  ABDOMEN:     Soft, non-tender. BS normal. No R/R/G  EXT:    No deformities .  No clubbing.       + lower extremity edema, No venous stasis  PULSE:   Appears equal and palpable.  PSYCHIATRIC:  Seems appropriate, No acute psychosis  MS:    No fractures, No gross weakness  NEUROLOGIC:   Alert, does not follow commands     DATA: IMAGING & TESTING:     LABORATORY TESTS:      PRO-BNP:   Lab Results   Component Value Date    PROBNP >70,000 (H) 04/09/2025    PROBNP 31,522 (H) 04/04/2025      ABGs:   Lab Results   Component Value Date/Time    PH 7.528 04/04/2025 01:17 AM    PO2 71.1 04/04/2025 01:17 AM    PCO2 49.5 04/04/2025 01:17 AM     Hemoglobin A1C: No components found for: 
  [facility] Quality Flow/Interdisciplinary Rounds Progress Note        Quality Flow Rounds held on April 6, 2025    Disciplines Attending:  Nursing Unit Leadership    Tenisha Bruce was admitted on 3/29/2025  3:40 PM    Anticipated Discharge Date:  Expected Discharge Date: 04/03/25    Disposition:       Gamal Score:  Gamal Scale Score: 13    [facility] RISK OF UNPLANNED READMISSION 2.0             23.3 Total Score        Discussed patient goal for the day, patient clinical progression, and barriers to discharge.  The following Goal(s) of the Day/Commitment(s) have been identified: Monitor blood pressure wean norepinephrine drip as tolerated.      Jimmy Menchaca RN  April 6, 2025       
  P Quality Flow/Interdisciplinary Rounds Progress Note        Quality Flow Rounds held on April 5, 2025    Disciplines Attending:  Nursing Unit Leadership    Tenisha Bruce was admitted on 3/29/2025  3:40 PM    Anticipated Discharge Date:  Expected Discharge Date: 04/03/25    Disposition:       Gamal Score:  Gamal Scale Score: 13    [facility] RISK OF UNPLANNED READMISSION 2.0             23.9 Total Score        Discussed patient goal for the day, patient clinical progression, and barriers to discharge.  The following Goal(s) of the Day/Commitment(s) have been identified: Monitor vitals signs keep within normal limits;monitor labs treat as needed       Jimmy Menchaca RN  April 5, 2025       
  Palliative Care Department  [phone]  Palliative Care Progress Note  Provider Patsy Zhang, APRN - CNP    Tenisha Bruce  [id number]  Hospital Day: 17  Date of Initial Consult: Sandra Stewart MD  Referring Provider: 4/9/2025  Palliative Medicine was consulted for assistance with: Goals of care     HPI:   Tenisha Bruce is a 74 y.o. with a past medical history of schizoaffective disorder, hypothyroidism, hypercholesterolemia, fetal alcohol syndrome, anxiety who was admitted on 3/29/2025 from [facility] with a CHIEF COMPLAINT of shortness of breath.  Patient was hospitalized 3/20/2025 - 3/28/2025 with sepsis and new onset heart failure.  Patient was found to be toxic at nursing facility after discharge.  She was hypoxic and hypercapnic.  CTA showed interstitial pulmonary edema, moderate right and left large pleural effusions that may be loculated.  She was placed on BiPAP support and admitted to the hospital for further management.  She had an elevated troponin and cardiology was consulted with no HFrEF.  Most recent echocardiogram showed EF 30 to 35% moderate mitral regurgitation.  She was not a candidate for invasive diagnostic or therapeutic cardiac interventions has been treated medically.  She was also treated for pneumonia.  Pulmonology placed a left pigtail chest tube on 3/30/2025 with 1740 cc removed.  She was also found to have thrombocytopenia and hematology oncology consulted and deferred workup for MDS due to poor performance status.  Patient was transferred to MICU 4/5/2025 with hypotension requiring vasopressor support and was able to transfer back out of the unit on 4/8/2025.  proBNP increased to greater than 70,000 and patient was given IV diuresis.  Nephrology has been consulted due to worsening DARIO.  Palliative care was consulted for goals of care discussion.    ASSESSMENT/PLAN:     Pertinent Hospital Diagnoses     Acute hypoxic and hypercapnic respiratory 
  Palliative Care Department  [phone]  Palliative Care Progress Note  Provider Sanjana Alfonso PA-C     Tenisha Bruce  [id number]  Hospital Day: 14  Date of Initial Consult: 4/9/2025   Referring Provider: Sandra Stewart MD   Palliative Medicine was consulted for assistance with: goals of care    HPI:   Tenisha Bruce is a 74 y.o. with a past medical history of schizoaffective disorder, hypothyroidism, hypercholesterolemia, fetal alcohol syndrome, anxiety who was admitted on 3/29/2025 from [facility] with a CHIEF COMPLAINT of shortness of breath.  Patient was hospitalized 3/20/2025 - 3/28/2025 with sepsis and new onset heart failure.  Patient was found to be toxic at nursing facility after discharge.  She was hypoxic and hypercapnic.  CTA showed interstitial pulmonary edema, moderate right and left large pleural effusions that may be loculated.  She was placed on BiPAP support and admitted to the hospital for further management.  She had an elevated troponin and cardiology was consulted with no HFrEF.  Most recent echocardiogram showed EF 30 to 35% moderate mitral regurgitation.  She was not a candidate for invasive diagnostic or therapeutic cardiac interventions has been treated medically.  She was also treated for pneumonia.  Pulmonology placed a left pigtail chest tube on 3/30/2025 with 1740 cc removed.  She was also found to have thrombocytopenia and hematology oncology consulted and deferred workup for MDS due to poor performance status.  Patient was transferred to MICU 4/5/2025 with hypotension requiring vasopressor support and was able to transfer back out of the unit on 4/8/2025.  proBNP increased to greater than 70,000 and patient was given IV diuresis.  Nephrology has been consulted due to worsening DARIO.  Palliative care was consulted for goals of care discussion.     ASSESSMENT/PLAN:     Pertinent Hospital Diagnoses     Acute hypoxic and hypercapnic respiratory 
  Physician Progress Note      PATIENT:               HERNANDEZ VUONG  CSN #:                  [id number]  :                       1950  ADMIT DATE:       3/29/2025 3:40 PM  DISCH DATE:        2025 6:36 PM  RESPONDING  PROVIDER #:        Valerie Tan MD          QUERY TEXT:    Sepsis is documented in the medical record per Yolande Malagon APRN - CNP,   Omar Dockery MD  3/29/25 HP. Please provide additional clinical   indicators supportive of your documentation. Or please document if the   diagnosis of sepsis has been ruled out after study.    The clinical indicators include:  -Hx- acute hypoxemic and hypercapnic respiratory failure due to acute on   chronic HFrEF with bilateral pleural effusion, DARIO and hypotension.  -Clinical Indicators- ED-\"She is not in acute distress.\" -HP- \"No electrolyte   abnormalities on chemistry sodium 145 potassium 4.9, BUN 32 creatinine 1.0,   WBC 6.5 hemoglobin 9.1 creatinine 29.2. 138/87, 98.4, 69, 18, 95%. Cxr- large   left pleural effusion, placed on Bipap, Heparin drip initiated for acute   coronary syndrome, 40mg IV lasix, 2 g cefepime and 1250 mg of vancomycin   given. Metabolic encephalopathy due to septic shock: Patient cannot answer   most of the question, antibiotic completed and steroid.\" (Yolande Malagon APRN - CNP, Omar Dockery MD ). DC summ- \"admit  -Tx-consults, monitoring, labs, abx, cvc, chest tube  Options provided:  -- Sepsis present as evidenced by, Please document evidence.  -- Sepsis was ruled out after study  -- Other - I will add my own diagnosis  -- Disagree - Not applicable / Not valid  -- Disagree - Clinically unable to determine / Unknown  -- Refer to Clinical Documentation Reviewer    PROVIDER RESPONSE TEXT:    Sepsis was ruled out after study.    Query created by: Layne Baker on 2025 10:52 AM      Electronically signed by:  Valerie Tan MD 2025 10:56 AM          
  [facility]  Department of Internal Medicine  Division of Pulmonary, Critical Care and Sleep Medicine  Progress Note      Raul KRISTI Amayakeenan Pressley DO Herbert Smith, APRN-CNP  Izabela Vahe, APRN-CNP        Subjective:  Patient seen and examined.    Remains on Airvo, 60% FIO2  No new complaints     OBJECTIVE:     PHYSICAL EXAM:   VITALS:   Vitals:    04/16/25 0610 04/16/25 0729 04/16/25 0744 04/16/25 0930   BP:  (!) 107/54  116/62   Pulse: (!) 110 89 100    Resp: 20 21 24    Temp:  97.3 °F (36.3 °C)     TempSrc:  Temporal     SpO2: 94% 93% 94%    Weight:       Height:            Intake/Output Summary (Last 24 hours) at 4/16/2025 1025  Last data filed at 4/16/2025 1001  Gross per 24 hour   Intake 2113.97 ml   Output 1800 ml   Net 313.97 ml        CONSTITUTIONAL:   Alert, confused, in no acute distress   SKIN:     No rash, No suspicious lesions, No skin discoloration  HEENT:     EOMI, MMM, No thrush  NECK:    No bruits, No JVP appreciated  CV:      Sinus,  No murmur, No rubs, No gallops  PULMONARY:   Coarse breath sounds bilaterally,  + Rales       No noted egophony  ABDOMEN:     Soft, non-tender. BS normal. No R/R/G  EXT:    No deformities .  No clubbing.       no lower extremity edema, No venous stasis  PULSE:   Appears equal and palpable.  PSYCHIATRIC:  Seems appropriate, No acute psychosis  MS:    No fractures, No gross weakness  NEUROLOGIC:   The clinical assessment is non-focal     DATA: IMAGING & TESTING:     LABORATORY TESTS:        PRO-BNP:   Lab Results   Component Value Date    PROBNP >70,000 (H) 04/09/2025    PROBNP 31,522 (H) 04/04/2025      ABGs:   Lab Results   Component Value Date/Time    PH 7.403 04/11/2025 11:30 AM    PO2 80.3 04/11/2025 11:30 AM    PCO2 55.0 04/11/2025 11:30 AM     Hemoglobin A1C: No components found for: 
4 Eyes Skin Assessment     NAME:  Tenisha Bruce  YOB: 1950  MEDICAL RECORD NUMBER:  [id number]    The patient is being assessed for  Admission    I agree that at least one RN has performed a thorough Head to Toe Skin Assessment on the patient. ALL assessment sites listed below have been assessed.      Areas assessed by both nurses:    Head, Face, Ears, Shoulders, Back, Chest, Arms, Elbows, Hands, Sacrum. Buttock, Coccyx, Ischium, Legs. Feet and Heels, and Under Medical Devices         Does the Patient have a Wound? No noted wound(s)       Gamal Prevention initiated by RN: Yes  Wound Care Orders initiated by RN: No    Pressure Injury (Stage 3,4, Unstageable, DTI, NWPT, and Complex wounds) if present, place Wound referral order by RN under : No. Patient has red, blanchable heels and bilateral buttocks.    New Ostomies, if present place, Ostomy referral order under : No     Nurse 1 eSignature: Electronically signed by Susi High RN on 4/4/25 at 2:21 PM EDT    **SHARE this note so that the co-signing nurse can place an eSignature**    Nurse 2 eSignature: Electronically signed by Veronica Grimm RN on 4/4/25 at 3:24 PM EDT   
4 Eyes Skin Assessment     NAME:  Tenisha Bruce  YOB: 1950  MEDICAL RECORD NUMBER:  [id number]    The patient is being assessed for  Admission    I agree that at least one RN has performed a thorough Head to Toe Skin Assessment on the patient. ALL assessment sites listed below have been assessed.      Areas assessed by both nurses:    Head, Face, Ears, Shoulders, Back, Chest, Arms, Elbows, Hands, Sacrum. Buttock, Coccyx, Ischium, Legs. Feet and Heels, and Under Medical Devices         Does the Patient have a Wound? No noted wound(s)       Gamal Prevention initiated by RN: Yes  Wound Care Orders initiated by RN: No    Pressure Injury (Stage 3,4, Unstageable, DTI, NWPT, and Complex wounds) if present, place Wound referral order by RN under : No    New Ostomies, if present place, Ostomy referral order under : No     Nurse 1 eSignature: Electronically signed by Belia Joshi RN on 3/30/25 at 4:33 AM EDT    **SHARE this note so that the co-signing nurse can place an eSignature**    Nurse 2 eSignature: Electronically signed by Leeanne Petersen RN on 3/30/25 at 4:49 AM EDT    
4 Eyes Skin Assessment     NAME:  Tenisha Bruce  YOB: 1950  MEDICAL RECORD NUMBER:  [id number]    The patient is being assessed for  Transfer to New Unit    I agree that at least one RN has performed a thorough Head to Toe Skin Assessment on the patient. ALL assessment sites listed below have been assessed.      Areas assessed by both nurses:    Head, Face, Ears, Shoulders, Back, Chest, Arms, Elbows, Hands, Sacrum. Buttock, Coccyx, Ischium, Legs. Feet and Heels, and Under Medical Devices         Does the Patient have a Wound? No noted wound(s)       Gamal Prevention initiated by RN: Yes  Wound Care Orders initiated by RN: No    Pressure Injury (Stage 3,4, Unstageable, DTI, NWPT, and Complex wounds) if present, place Wound referral order by RN under : No    New Ostomies, if present place, Ostomy referral order under : No     Nurse 1 eSignature: Electronically signed by Marika Hathaway RN on 4/8/25 at 4:04 PM EDT    **SHARE this note so that the co-signing nurse can place an eSignature**    Nurse 2 eSignature: Electronically signed by [place] Coronel RN on 4/8/25 at 4:07 PM EDT    
Antibiotic Extended Infusion Policy     This patient is on medication that requires renal, weight, and/or indication dose adjustment.      Date Body Weight IBW  Adjusted BW SCr  CrCl Dialysis status BMI   3/29/2025   Ideal body weight: 57 kg (125 lb 10.6 oz)  Adjusted ideal body weight: 58.5 kg (129 lb 0.2 oz) Serum creatinine: 1 mg/dL 03/29/25 1617  Estimated creatinine clearance: 45 mL/min N/a There is no height or weight on file to calculate BMI.       Pharmacy has dose-adjusted the following medication(s):    Ordered Medication: Cefepime 1000mg q8h     Order Changed/converted to: Cefepime 2000mg q12h    These changes were made per protocol according to the [facility]   Automatic Extended Infusion Dose Adjustment Policy.     *Please note this dose may need readjusted if patient's condition changes.    Please contact pharmacy with any questions regarding these changes.    Raul Alex RPH  3/29/2025  9:08 PM    
[facility]  MD Rigoberto Lewis MD Ali Hassan, MD Lisa Kniska, CNP   Maria Teresa Prater, KELLY Muro, ADRIANNA  Progress Note    4/11/2025    SUBJECTIVE:   4/10: Sitting up in bed, in no acute distress. She is on HHFNC, FiO2 100 %. Does not seem to be in acute distress. She is eating a little bit when assisted by nursing staff. Urine output minimal with diuresis yesterday.     4/11: Seen while sitting up in bed, awakens easily to voice. Remains on heated high flow nasal canula, FiO2 90 %. She ate very little off of her lunch tray. She does not voice any complaints for me.      PROBLEM LIST:    Principal Problem:    Acute hypoxic respiratory failure  Active Problems:    Acute respiratory failure with hypoxia and hypercapnia    Palliative care encounter    Goals of care, counseling/discussion  Resolved Problems:    * No resolved hospital problems. *         DIET:    ADULT DIET; Dysphagia - Pureed; Low Sodium (2 gm)  ADULT ORAL NUTRITION SUPPLEMENT; Lunch, Dinner; Frozen Oral Supplement     MEDS (scheduled):    ipratropium 0.5 mg-albuterol 2.5 mg  1 Dose Inhalation Q4H WA RT    metoprolol succinate  12.5 mg Oral Daily    haloperidol lactate  1 mg IntraVENous Once    docusate sodium  100 mg Oral Daily    midodrine  10 mg Oral TID WC    sodium chloride flush  5-40 mL IntraVENous 2 times per day    sodium chloride  500 mL IntraVENous Once    valproic acid  250 mg Oral BID    pantoprazole (PROTONIX) 40 mg in sodium chloride (PF) 0.9 % 10 mL injection  40 mg IntraVENous Daily    [Held by provider] furosemide  40 mg IntraVENous BID    sodium chloride flush  5-40 mL IntraVENous 2 times per day    atorvastatin  10 mg Oral Nightly    vitamin B and C  1 tablet Oral Daily    escitalopram  10 mg Oral Nightly    ezetimibe  10 mg Oral Daily    levothyroxine  100 mcg Oral QAM AC    [Held by provider] lisinopril  2.5 mg Oral Daily    niacin  1,000 mg Oral Nightly    QUEtiapine  400 mg Oral 
[facility]  MD Rigoberto Lewis MD Ali Hassan, MD Lisa Kniska, CNP   Maria Teresa Prater, KELLY Muro, ADRIANNA  Progress Note    4/12/2025    SUBJECTIVE:   4/10: Sitting up in bed, in no acute distress. She is on HHFNC, FiO2 100 %. Does not seem to be in acute distress. She is eating a little bit when assisted by nursing staff. Urine output minimal with diuresis yesterday.     4/11: Seen while sitting up in bed, awakens easily to voice. Remains on heated high flow nasal canula, FiO2 90 %. She ate very little off of her lunch tray. She does not voice any complaints for me.     4/12 \" remains on high flow nasal cannula FiO2 50% blood pressure stable good urine output     PROBLEM LIST:    Principal Problem:    Acute hypoxic respiratory failure  Active Problems:    Acute respiratory failure with hypoxia and hypercapnia    Palliative care encounter    Goals of care, counseling/discussion  Resolved Problems:    * No resolved hospital problems. *         DIET:    ADULT DIET; Dysphagia - Pureed; Low Sodium (2 gm)  ADULT ORAL NUTRITION SUPPLEMENT; Lunch, Dinner; Frozen Oral Supplement     MEDS (scheduled):    ipratropium 0.5 mg-albuterol 2.5 mg  1 Dose Inhalation Q4H WA RT    metoprolol succinate  12.5 mg Oral Daily    haloperidol lactate  1 mg IntraVENous Once    docusate sodium  100 mg Oral Daily    midodrine  10 mg Oral TID WC    sodium chloride flush  5-40 mL IntraVENous 2 times per day    sodium chloride  500 mL IntraVENous Once    valproic acid  250 mg Oral BID    pantoprazole (PROTONIX) 40 mg in sodium chloride (PF) 0.9 % 10 mL injection  40 mg IntraVENous Daily    [Held by provider] furosemide  40 mg IntraVENous BID    sodium chloride flush  5-40 mL IntraVENous 2 times per day    atorvastatin  10 mg Oral Nightly    vitamin B and C  1 tablet Oral Daily    escitalopram  10 mg Oral Nightly    ezetimibe  10 mg Oral Daily    levothyroxine  100 mcg Oral QAM AC    [Held by 
[facility]  MD Rigoberto Lewis MD Ali Hassan, MD Lisa Kniska, CNP   Maria Teresa Prater, KELLY Muro, ADRIANNA  Progress Note    4/13/2025    SUBJECTIVE:   4/10: Sitting up in bed, in no acute distress. She is on HHFNC, FiO2 100 %. Does not seem to be in acute distress. She is eating a little bit when assisted by nursing staff. Urine output minimal with diuresis yesterday.     4/11: Seen while sitting up in bed, awakens easily to voice. Remains on heated high flow nasal canula, FiO2 90 %. She ate very little off of her lunch tray. She does not voice any complaints for me.     4/12 \" remains on high flow nasal cannula FiO2 50% blood pressure stable good urine output    4/13: Seen in her room remains on high flow nasal cannula  Hypernatremia following Lasix which point toward intravascular volume depletion  Remains very weak and deconditioned     PROBLEM LIST:    Principal Problem:    Acute hypoxic respiratory failure  Active Problems:    Acute respiratory failure with hypoxia and hypercapnia    Palliative care encounter    Goals of care, counseling/discussion  Resolved Problems:    * No resolved hospital problems. *         DIET:    ADULT DIET; Dysphagia - Pureed; Low Sodium (2 gm)  ADULT ORAL NUTRITION SUPPLEMENT; Lunch, Dinner; Frozen Oral Supplement     MEDS (scheduled):    ipratropium 0.5 mg-albuterol 2.5 mg  1 Dose Inhalation Q4H WA RT    metoprolol succinate  12.5 mg Oral Daily    haloperidol lactate  1 mg IntraVENous Once    docusate sodium  100 mg Oral Daily    midodrine  10 mg Oral TID WC    sodium chloride flush  5-40 mL IntraVENous 2 times per day    sodium chloride  500 mL IntraVENous Once    valproic acid  250 mg Oral BID    pantoprazole (PROTONIX) 40 mg in sodium chloride (PF) 0.9 % 10 mL injection  40 mg IntraVENous Daily    sodium chloride flush  5-40 mL IntraVENous 2 times per day    atorvastatin  10 mg Oral Nightly    vitamin B and C  1 tablet Oral Daily 
[facility]  MD Rigoberto Lewis MD Ali Hassan, MD Lisa Kniska, CNP   Maria Teresa Prater, KELLY Muro, ADRIANNA  Progress Note    4/15/2025    SUBJECTIVE:   4/10: Sitting up in bed, in no acute distress. She is on HHFNC, FiO2 100 %. Does not seem to be in acute distress. She is eating a little bit when assisted by nursing staff. Urine output minimal with diuresis yesterday.     4/11: Seen while sitting up in bed, awakens easily to voice. Remains on heated high flow nasal canula, FiO2 90 %. She ate very little off of her lunch tray. She does not voice any complaints for me.     4/12 \" remains on high flow nasal cannula FiO2 50% blood pressure stable good urine output    4/13: Seen in her room remains on high flow nasal cannula  Hypernatremia following Lasix which point toward intravascular volume depletion  Remains very weak and deconditioned     4/14: Seen while laying in bed. Remains on heated high flow nasal canula. PO intake is poor. She denies any acute complaints for me and says she is \"fine\" repeatedly. Appears to be comfortable despite the high oxygen requirements. Tachycardic. BP stable.     4/15: Seen while sitting up in bed, in no acute distress. Answers all questions with \"fine.\" She remains on Airvo, FiO2 is 65 %. PO intake remains poor.     PROBLEM LIST:    Principal Problem:    Acute hypoxic respiratory failure  Active Problems:    Acute respiratory failure with hypoxia and hypercapnia    Palliative care encounter    Goals of care, counseling/discussion    Congestive heart failure (HCC)  Resolved Problems:    * No resolved hospital problems. *         DIET:    ADULT DIET; Dysphagia - Pureed; Low Sodium (2 gm)  ADULT ORAL NUTRITION SUPPLEMENT; Lunch, Dinner; Frozen Oral Supplement     MEDS (scheduled):    enoxaparin  40 mg SubCUTAneous Daily    acetylcysteine  600 mg Inhalation BID RT    metoprolol succinate  25 mg Oral Daily    ipratropium 0.5 mg-albuterol 
[facility]  MD Rigoberto Lewis MD Ali Hassan, MD Lisa Kniska, CNP   Maria Teresa Prater, KELLY Muro, ADRIANNA  Progress Note    4/16/2025    SUBJECTIVE:   4/10: Sitting up in bed, in no acute distress. She is on HHFNC, FiO2 100 %. Does not seem to be in acute distress. She is eating a little bit when assisted by nursing staff. Urine output minimal with diuresis yesterday.     4/11: Seen while sitting up in bed, awakens easily to voice. Remains on heated high flow nasal canula, FiO2 90 %. She ate very little off of her lunch tray. She does not voice any complaints for me.     4/12 \" remains on high flow nasal cannula FiO2 50% blood pressure stable good urine output    4/13: Seen in her room remains on high flow nasal cannula  Hypernatremia following Lasix which point toward intravascular volume depletion  Remains very weak and deconditioned     4/14: Seen while laying in bed. Remains on heated high flow nasal canula. PO intake is poor. She denies any acute complaints for me and says she is \"fine\" repeatedly. Appears to be comfortable despite the high oxygen requirements. Tachycardic. BP stable.     4/15: Seen while sitting up in bed, in no acute distress. Answers all questions with \"fine.\" She remains on Airvo, FiO2 is 65 %. PO intake remains poor.     4/16 : seen in her room ,status quo     PROBLEM LIST:    Principal Problem:    Acute hypoxic respiratory failure  Active Problems:    Acute respiratory failure with hypoxia and hypercapnia    Palliative care encounter    Goals of care, counseling/discussion    Congestive heart failure (HCC)  Resolved Problems:    * No resolved hospital problems. *         DIET:    ADULT DIET; Dysphagia - Pureed; Low Sodium (2 gm)  ADULT ORAL NUTRITION SUPPLEMENT; Lunch, Dinner; Frozen Oral Supplement     MEDS (scheduled):    enoxaparin  40 mg SubCUTAneous Daily    acetylcysteine  600 mg Inhalation BID RT    metoprolol succinate  25 mg Oral 
Attempted to Call Legal Guardian about restraints. Option to leave HIPAA compliant voicemail was not Available.   
Attempted to patient's legal guardian to inform them about the bilateral wrist restraints. Voice mail To provide a call back number was not an option. Four digit number was invalid.   
Attending Physician Attestation: Dr. Bruno Rosales    Thank you very much for allowing me to see this patient in consultation and follow up.    I personally saw, examined and provided care for the patient. Radiographs, labs and medication list were reviewed by me independently. I spoke with bedside nursing, respiratory therapists and consultants. Critical care services and times documented are independent of procedures and multidisciplinary rounds with Residents. Additionally comprehensive, multidisciplinary rounds were conducted with the MICU team. The case was discussed in detail and plans for care were established. Review of Residents documentation was conducted and revisions were made as appropriate. I agree with the the above documented information.     Current Facility-Administered Medications   Medication Dose Route Frequency Provider Last Rate Last Admin    0.9 % sodium chloride infusion   IntraVENous PRN Holli Guzman MD        hydrocortisone sodium succinate PF (SOLU-CORTEF) 100 mg in sterile water 2 mL injection  100 mg IntraVENous Q12H Cristhian Killian MD        haloperidol lactate (HALDOL) injection 1 mg  1 mg IntraVENous Once Cristhian Killian MD        potassium chloride 20 mEq/50 mL IVPB (Central Line)  20 mEq IntraVENous Q1H Cristhian Killian MD        docusate sodium (COLACE) 150 MG/15ML liquid 100 mg  100 mg Oral Daily Bruno Rosales MD   100 mg at 04/08/25 0740    midodrine (PROAMATINE) tablet 10 mg  10 mg Oral TID WC Cristhian Killian MD   10 mg at 04/08/25 1230    0.9 % sodium chloride infusion   IntraVENous PRN Cristhian Killian MD        piperacillin-tazobactam (ZOSYN) 4,500 mg in sodium chloride 0.9 % 100 mL IVPB ([id number])  4,500 mg IntraVENous Q8H Nick Lamas MD 25 mL/hr at 04/08/25 0958 4,500 mg at 04/08/25 0958    norepinephrine (LEVOPHED) 16 mg in sodium chloride 0.9 % 250 mL infusion (premix)  1-100 mcg/min IntraVENous Continuous Sol Junior DO   
Attending Physician Attestation: Dr. Bruno Rosales    Thank you very much for allowing me to see this patient in consultation and follow up.    I personally saw, examined and provided care for the patient. Radiographs, labs and medication list were reviewed by me independently. I spoke with bedside nursing, respiratory therapists and consultants. Critical care services and times documented are independent of procedures and multidisciplinary rounds with Residents. Additionally comprehensive, multidisciplinary rounds were conducted with the MICU team. The case was discussed in detail and plans for care were established. Review of Residents documentation was conducted and revisions were made as appropriate. I agree with the the above documented information.     Current Facility-Administered Medications   Medication Dose Route Frequency Provider Last Rate Last Admin    docusate sodium (COLACE) 150 MG/15ML liquid 100 mg  100 mg Oral Daily Bruno Rosales MD   100 mg at 04/07/25 0936    0.9 % sodium chloride infusion   IntraVENous PRN Cristhian Killian MD        hydrocortisone sodium succinate PF (SOLU-CORTEF) 100 mg in sterile water 2 mL injection  100 mg IntraVENous Q8H Cristhian Killian MD   100 mg at 04/07/25 1345    piperacillin-tazobactam (ZOSYN) 4,500 mg in sodium chloride 0.9 % 100 mL IVPB ([id number])  4,500 mg IntraVENous Q8H Nick Lamas MD   Stopped at 04/07/25 1335    norepinephrine (LEVOPHED) 16 mg in sodium chloride 0.9 % 250 mL infusion (premix)  1-100 mcg/min IntraVENous Continuous Sol Junior DO   Stopped at 04/07/25 1114    vancomycin (VANCOCIN) 1,000 mg in sodium chloride 0.9 % 250 mL IVPB ([id number])  1,000 mg IntraVENous Once Herbert Whittaker MD   Stopped at 04/04/25 1639    sodium chloride flush 0.9 % injection 5-40 mL  5-40 mL IntraVENous 2 times per day Cristhian Killian MD   10 mL at 04/07/25 0935    sodium chloride flush 0.9 % injection 5-40 mL  5-40 mL IntraVENous PRN Maria D, 
BP 66/40 manual. Dr. Lamas at bedside and placing orders.   
CSS LPN called for update on patient's condition.  
Called Respiratory regarding patient's SPO2 to adjust AIRVO settings.  
Cardiology consult called to Nathaniel per perfect serv patient added to census.   
Cardiology consult sent via perfect serve to NINA Mcconnell.   
Chart accessed for [facility] admission  
Chart accessed for report  
Chest tube placed to water seal per Resident.  
Chika Stewart via secure message. Patient is combative and pulling O2 off.   
Comprehensive Nutrition Assessment    Type and Reason for Visit:  Initial, LOS (ICU)    Nutrition Recommendations/Plan:     1.) Continue Current Diet  2) Start Oral Nutrition Supplement       Malnutrition Assessment:  Malnutrition Status:  At risk for malnutrition (04/07/25 1207)    Context:  Acute Illness     Findings of the 6 clinical characteristics of malnutrition:  Energy Intake:  50% or less of estimated energy requirements for 5 or more days  Weight Loss:  Unable to assess (limited wt hx within year)     Body Fat Loss:  Unable to assess (droplet iso)   Muscle Mass Loss:  Unable to assess (droplet iso)   Fluid Accumulation:  No fluid accumulation    Strength:  Not Performed    Nutrition Assessment:    Pt re-admitted same day from ECF w/ Respiratory failure after hosptialstay for Sepsis/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 Noted DARIO. PMhx CHF, Fetal alcohol syndrome/ Developmental delay, Schizoaffective disorder, Fall/ SAH, & Dysphagia. Pt currently on modified pureed diet. PO intake ~25-50% average. Will add Magic Cup BID & monitor.    Nutrition Related Findings:    Pt disoriented/ poor attention, hypotension improving weaning pressor, -I/O's 2L, trace edema, active BS, dysphagia     Wound Type: None       Current Nutrition Intake & Therapies:    Average Meal Intake: 26-50% (average)  ADULT DIET; Dysphagia - Pureed; Low Sodium (2 gm)    Anthropometric Measures:  Height: 165.1 cm (5' 5\")  Ideal Body Weight (IBW): 125 lbs (57 kg)    Admission Body Weight: 56.2 kg (123 lb 14.4 oz) (3/31 first measured)  Current Body Weight: 54.9 kg (121 lb) (4/7 bedscale), 96.8 % IBW.    Current BMI (kg/m2): 20.1  Usual Body Weight: 59 kg (130 lb) (12/2023 EMR measured wt >1  year back, limited hx within this year time frame)  % Weight Change (Calculated): -6.9 wt loss over several years, unable to assess more recent changes  BMI Categories: Underweight (BMI less than 22) age over 
Comprehensive Nutrition Assessment    Type and Reason for Visit:  Reassess    Nutrition Recommendations/Plan:     1.) Continue Current Diet  2.) Continue Oral Nutrition Supplement       Malnutrition Assessment:  Malnutrition Status:  At risk for malnutrition (04/07/25 1207)    Context:  Acute Illness     Findings of the 6 clinical characteristics of malnutrition:  Energy Intake:  50% or less of estimated energy requirements for 5 or more days  Weight Loss:  Unable to assess (limited wt hx within year)     Body Fat Loss:  Unable to assess (droplet iso)   Muscle Mass Loss:  Unable to assess (droplet iso)   Fluid Accumulation:  No fluid accumulation   Strength:  Not Performed    Nutrition Assessment:    Pt now transferred off ICU to general floor, however pending POC/ hospice consult. Initially re-admitted same day from ECF w/ Respiratory failure after hosptialstay for Sepsis/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 Noted DARIO. PMhx CHF, Fetal alcohol syndrome/ Developmental delay, Schizoaffective disorder, Fall/ SAH, & Dysphagia. Pt remains on modified pureed diet. PO intake ~25-50% average. Continue Magic Cup BID as ordered. Will follow.    Nutrition Related Findings:    Pt disoriented/ poor attention, +I/O's 5L, trace/+1 pitting edema, active BS, weakness, dysphagia, hypernatremia     Wound Type: None       Current Nutrition Intake & Therapies:    Average Meal Intake: 26-50%  Average Supplements Intake: 26-50%  ADULT DIET; Dysphagia - Pureed; Low Sodium (2 gm)  ADULT ORAL NUTRITION SUPPLEMENT; Lunch, Dinner; Frozen Oral Supplement    Anthropometric Measures:  Height: 165.1 cm (5' 5\")  Ideal Body Weight (IBW): 125 lbs (57 kg)    Admission Body Weight: 56.2 kg (123 lb 14.4 oz) (3/31 first measured)  Current Body Weight: 56.2 kg (123 lb 14.4 oz) (4/7 adm wt as CBW elevated d/t +fluids), 96.8 % IBW.    Current BMI (kg/m2): 20.6  Usual Body Weight: 59 kg (130 lb) (12/2023 EMR measured wt >1 
Date: 3/30/2025    Time: 10:15 PM    Patient Placed On BIPAP/CPAP/ Non-Invasive Ventilation?  No    If no must comment.  Facial area red/color change? No           If YES are Blister/Lesion present?No   If yes must notify nursing staff  BIPAP/CPAP skin barrier?  Yes    Skin barrier type:Liquicel      Comments:        Mateusz Avina RCP  
Dr Whittaker messaged via perfect serve to check if patient is able to be discharged from [facility]'s P[facility] to [facility] today  
Dr. Lamas notified of of repeat BP.   
Infectious disease consult called to  per id line(Adrienne) patient added to census.   
[facility]   Department of Internal Medicine   Internal Medicine Residency  MICU Progress Note    Patient:  Tenisha Bruce 74 y.o. female   MRN: [id number]       Date of Service: 4/6/2025    Allergy: Zocor [simvastatin]    Subjective     Patient was seen and examined this morning at bedside crying.   Overnight,   Still on levo 10>13  Chest tube put on water seal, then CXR today  Mag, Phos replaced        Objective       I & O - 24hr:    Intake/Output Summary (Last 24 hours) at 4/6/2025 0934  Last data filed at 4/6/2025 0504  Gross per 24 hour   Intake 3447.81 ml   Output 1730 ml   Net 1717.81 ml       Physical Exam  Vitals: BP (!) 113/53   Pulse 74   Temp 98.6 °F (37 °C) (Axillary)   Resp 20   Ht 1.651 m (5' 5\")   Wt 54.9 kg (121 lb 0.5 oz)   SpO2 98%   BMI 20.14 kg/m²     I & O - 24hr: No intake/output data recorded.   General Appearance: Awake, has baseline developmental delay, talks in words.   Head: normocephalic and atraumatic  Pulmonary/Chest: clear to auscultation bilaterally- no wheezes, rales or rhonchi, normal air movement, no respiratory distress, chest tube in water seal  Cardiovascular: normal rate, normal S1 and S2, and systolic murmur noted  Abdomen: soft, non-tender, non-distended, and bowel sounds normal  Extremities: no cyanosis, clubbing or edema  Neurologic: Patient has baseline developmental delay making orientation evaluation difficult.      Medications     Continuous Infusions:   norepinephrine 13 mcg/min (04/06/25 0751)    sodium chloride      sodium chloride      sodium chloride       Scheduled Meds:   sodium phosphate IVPB (PERIPHERAL line)  10 mmol IntraVENous Once    piperacillin-tazobactam  4,500 mg IntraVENous Q8H    vancomycin  1,000 mg IntraVENous Once    sodium chloride flush  5-40 mL IntraVENous 2 times per day    sodium chloride  500 mL IntraVENous Once    valproic acid  250 mg Oral BID    pantoprazole (PROTONIX) 40 mg in sodium chloride (PF) 0.9 % 10 mL 
Left a message with pt's legal guardian to give an update on pt's plan of care. Informed her pt is currently in restraints off levophed, and receiving blood transfusion. Call back number given if she has any questions.   
Message sent to Dr. Dow due to Na of 150.  
Message sent to Dr. Dow due to bp of 158/73 and to clarify parameters for midodrine.  
Message sent to Dr. Logan due to Dr Santos increasing metoprolol to 25mg. Wanted to clarify if 12.5mg of metoprolol would need to be given now.  
Message sent to Dr. Tan due to patients bp of 107/54 to clarify if it was okay to give metoprolol 25mg.  
Messaged Dr Dominguez with pt's BP of 104/50 manually and the pt has 40 IV Lasix due. Asked if Dr wants to give or hold. Stated to hold.   
Messaged Dr. Hidalgo via secure message. Patient's SPO2 is > 90% on 12L NC high flow. Waiting for response.   
Messaged Elidia Gregg that pt had 20 beats of Vtach. Ordered STAT Mg lab.     0622 Mg resulted at 1.6. PRN Mg replacement started  
Messaged palliative about code status papers. She had we call the legal guardian. I left message stating urgency but have not received a return call   
Messaged palliative to clarify if patient ok to be intubated if needed will her DNR CCA  
[facility]. Patient's Pro-BNP >70,000  
[facility]   Department of Internal Medicine   Internal Medicine Residency  MICU Progress Note    Patient:  Tenisha Bruce 74 y.o. female   MRN: [id number]       Date of Service: 4/7/2025    Allergy: Zocor [simvastatin]    Subjective     Patient was seen and examined this morning at bedside in no acute distress.   Overnight, no significant events          Objective       I & O - 24hr:    Intake/Output Summary (Last 24 hours) at 4/7/2025 1739  Last data filed at 4/7/2025 1619  Gross per 24 hour   Intake 1688.18 ml   Output 825 ml   Net 863.18 ml       Physical Exam  Vitals: BP (!) 132/54   Pulse 83   Temp 97.2 °F (36.2 °C) (Axillary)   Resp 13   Ht 1.651 m (5' 5\")   Wt 54.9 kg (121 lb 0.6 oz)   SpO2 96%   BMI 20.14 kg/m²     I & O - 24hr: I/O this shift:  In: 1027.2 [P.O.:900; I.V.:31.3; IV Piggyback:95.9]  Out: 325 [Urine:325]   General Appearance: Awake, has baseline developmental delay, talks in words.   Head: normocephalic and atraumatic  Pulmonary/Chest: clear to auscultation bilaterally- no wheezes, rales or rhonchi, normal air movement, no respiratory distress  Cardiovascular: normal rate, normal S1 and S2, and systolic murmur noted  Abdomen: soft, non-tender, non-distended, and bowel sounds normal  Extremities: no cyanosis, clubbing or edema  Neurologic: Patient has baseline developmental delay making orientation evaluation difficult.      Medications     Continuous Infusions:   sodium chloride      norepinephrine 4 mcg/min (04/07/25 1651)    sodium chloride      sodium chloride      sodium chloride       Scheduled Meds:   docusate sodium  100 mg Oral Daily    midodrine  10 mg Oral TID WC    hydrocortisone sodium succinate PF (SOLU-CORTEF) 100 mg in sterile water 2 mL injection  100 mg IntraVENous Q8H    piperacillin-tazobactam  4,500 mg IntraVENous Q8H    sodium chloride flush  5-40 mL IntraVENous 2 times per day    sodium chloride  500 mL IntraVENous Once    valproic acid  250 mg Oral 
Mycoplasma IgM positive 3/30/2025.  Azithromycin added.  
Not able to access. Prayer and presence ministry.  
Notified Dr. Dow due to Na 153.  
Notified Dr. Stewart of critical lab of Hgb of 6.2 via perfect serve  
Notified Elidia Gregg NP that pt passed bedside swallow. Asked if we could advance diet. Awaiting response.   
Notified NP Elidia Gregg pupils were uneven. Right is 2 round and sluggish, left eye is pin point. No facial droop present, equal movement in all extremities. New order for ABG, stat CT and patient placed back on bipap.   
Notified NP Elidia Gregg that patient BP was 64/42 manually. Patient following commands to swallow pills, and drink. No signs or symptoms of distress at this time. New order for one time dose of midodrine.   
Notified patient's legal guardian, Analia Avila, of patient's transfer to MICU to room 4424.    DERRICK KNOX RN    
Notified provider via perfectserve of sodium level of 150. Awaiting new orders  
Obtained consent from legal guardian Homa Weinberg covering for Analia Avila for chest tube placement with Dr Rosales  
Oncology consult complete with Dr. Butterfield  
Palliative Medicine Social Work     Patient Name: Tenisha Bruce    Age: 74 y.o.    Marital Status: single     Status: no    Next of Kin: Legal Guardian agency: Analia GONZALEZ is representative    Additional Support: caregiver from prior group home    Minor Children: no    Advanced Directives: Legal Guardian, copy of form is on file    Confirm Code Status: full- to be reviewed    Current Goals of Care: to be reviewed    Mental Health History: unknown    Substance Abuse:no    Indications of Abuse/Neglect: no    Financial Concerns: no    Living Situation: pt had resided at [facility] support [facility] until recent hospitalization required snf care at [facility] where she will return    Physical Care Needs Met: yes    Emotional Needs Met:yes    Assessment: 75 yo single female admitted from snf with acute hypoxic respiratory failure with mycoplasma pneumonia from [facility]. She has a history of Anxiety, Corneal scarring, Fetal alcohol syndrome, Hypercholesteremia, Hypothyroidism, and Onychomycosis. Pt is lying in bed, she is pleasant at this time but unable to have meaningful conversations. Will review with team for goals of care.      
[facility]   Department of Internal Medicine   Internal Medicine Residency  MICU Progress Note    Patient:  Tenisha Bruce 74 y.o. female   MRN: [id number]       Date of Service: 4/5/2025    Allergy: Zocor [simvastatin]    Subjective     Patient was seen and examined this morning at bedside in no acute distress.     Overnight,   Gave 1 platelets > PLT 49   Put L fem CVC, difficult to stop theh bleeding. Pt not cooperating for art line in arm. Do not want to do fem art line 2/2 bleeding.  gave 500cc bolus  Refusing PO. Meds cannot convert to IV, Can try NG in the AM  EKG similar to previous        Objective       I & O - 24hr:    Intake/Output Summary (Last 24 hours) at 4/5/2025 0906  Last data filed at 4/5/2025 0600  Gross per 24 hour   Intake 483.13 ml   Output 40 ml   Net 443.13 ml       Physical Exam  Vitals: BP (!) 99/48   Pulse 86   Temp 97.5 °F (36.4 °C) (Axillary)   Resp 15   Ht 1.651 m (5' 5\")   Wt 52 kg (114 lb 9.6 oz)   SpO2 96%   BMI 19.07 kg/m²     I & O - 24hr: No intake/output data recorded.   General Appearance: alert and has baseline developmental delay  HEENT:  Head: Normal, normocephalic, atraumatic.  Neck: supple, symmetrical, trachea midline  Lung: clear to auscultation bilaterally  Heart: regular rate and rhythm, S1, S2 normal, no murmur, click, rub or gallop  Abdomen: soft, non-tender; bowel sounds normal; no masses,  no organomegaly  Extremities:  extremities normal, atraumatic, no cyanosis or edema  Musculokeletal: No joint swelling, no muscle tenderness. ROM normal in all joints of extremities.   Neurologic: Mental status: at baseline patient has developmental delay, alert, following commands appreciated       Medications     Continuous Infusions:   norepinephrine 10 mcg/min (04/05/25 0818)    sodium chloride      sodium chloride      sodium chloride       Scheduled Meds:   magnesium sulfate  1,000 mg IntraVENous Once    piperacillin-tazobactam  4,500 mg IntraVENous 
Patient admitted to MICU with the following belongings:  None. The following belongings admitted with the patient, None, were sent home with the patient's family.   
Patient bladder scanned for 252 ml of urine; no urine output; notifying attending.  
Patient complaining of need to void. Patient bladder scanned for 168 ml. Purewick placed.   
Patient has been calm, pleasant, following commands, and easily redirected, bilateral soft restraints removed, order discontinued.     Patient care and education on safety provided.  Fall precautions in place. Telesitter in room, bed alarm on.    
Patient occasionally following commands and is alert. Restraints removed and sitter ordered. Sitter at bedside, pt redirectable at this time. Restraint order discontinued   
Patient's ID band was pushed up arm and created a bruise. Armband moved and is hanging loose on arm.   
Perfect serve sent to primary updating on soft BP.    Iraida Mcallister RN    
Pharmacy Consultation Note  (Antibiotic Dosing and Monitoring)    Initial consult date: 3/29/25  Consulting physician/provider: Manas  Drug: Vancomycin  Indication: Pneumonia (CAP)    Age/  Gender Height Weight IBW  Allergy Information   74 y.o./female         Ideal body weight: 57 kg (125 lb 10.6 oz)  Adjusted ideal body weight: 58.5 kg (129 lb 0.2 oz)   Zocor [simvastatin]      Renal Function:  Recent Labs     03/27/25  0410 03/28/25  0426 03/29/25  1617   BUN 33* 29* 32*   CREATININE 1.0 1.0 1.0       Intake/Output Summary (Last 24 hours) at 3/29/2025 2137  Last data filed at 3/29/2025 1825  Gross per 24 hour   Intake 10 ml   Output --   Net 10 ml       Vancomycin Monitoring:  Trough:  No results for input(s): \"VANCOTROUGH\" in the last 72 hours.  Random:  No results for input(s): \"VANCORANDOM\" in the last 72 hours.    Vancomycin Administration Times:  Recent vancomycin administrations                     vancomycin (VANCOCIN) 1,250 mg in sodium chloride 0.9 % 250 mL IVPB ([id number]) (mg) 1,250 mg New Bag 03/29/25 1949                    Assessment:  Patient is a 74 y.o. female who has been initiated on vancomycin  Estimated Creatinine Clearance: 45 mL/min (based on SCr of 1 mg/dL).  To dose vancomycin, pharmacy will be utilizing [employer] calculation software for goal AUC/CARL 400-600 mg/L-hr (predicted AUC/CARL = 596, Tr =15.6 mcg/mL)    Plan:  Loaded with vancomycin 1250mg IV once.  Will continue vancomycin 1250 mg IV every 24 hours  Will check vancomycin levels when appropriate  Will continue to monitor renal function   Pharmacy to follow      Gena Whitney, PharmD, 3/29/2025 9:37 PM    SEB: [phone]  SEY: [phone]  SJW: [phone]    
Pharmacy Consultation Note  (Antibiotic Dosing and Monitoring)    Initial consult date: 3/29/25  Consulting physician/provider: Manas  Drug: Vancomycin  Indication: Pneumonia (CAP)    Age/  Gender Height Weight IBW  Allergy Information   74 y.o./female 165.1 cm (5' 5\") 60.8 kg (134 lb)     Ideal body weight: 57 kg (125 lb 10.6 oz)   Zocor [simvastatin]      Renal Function:  Recent Labs     03/29/25  1617 03/30/25  0607 03/31/25  0424   BUN 32* 27* 34*   CREATININE 1.0 1.1* 1.2*       Intake/Output Summary (Last 24 hours) at 3/31/2025 0827  Last data filed at 3/31/2025 0621  Gross per 24 hour   Intake 83 ml   Output 2020 ml   Net -1937 ml       Vancomycin Monitoring:  Trough:  No results for input(s): \"VANCOTROUGH\" in the last 72 hours.  Random:    Recent Labs     03/31/25  0424   VANCORANDOM 18.2       Vancomycin Administration Times:  Recent vancomycin administrations                     vancomycin (VANCOCIN) 1,250 mg in sodium chloride 0.9 % 250 mL IVPB ([id number]) (mg) 1,250 mg New Bag 03/30/25 0958    vancomycin (VANCOCIN) 1,250 mg in sodium chloride 0.9 % 250 mL IVPB ([id number]) (mg) 1,250 mg New Bag 03/29/25 1949                    Assessment:  Patient is a 74 y.o. female who has been initiated on vancomycin  Estimated Creatinine Clearance: 37 mL/min (A) (based on SCr of 1.2 mg/dL (H)).  To dose vancomycin, pharmacy will be utilizing thesweetlink calculation software for goal AUC/CARL 400-600 mg/L-hr (predicted AUC/CARL = 596, Tr =15.6 mcg/mL)  3/31: Scr to 1.2. Random AM level = 18.2 mcg/mL (~18.5h post-dose)    Plan:  Vancomycin 750 mg IV every 24 hours  Will check vancomycin level when appropriate  Will continue to monitor renal function   Pharmacy to follow      Annette Bishop, Pharm D 3/31/2025 8:30 AM   [phone]      
Pharmacy Consultation Note  (Antibiotic Dosing and Monitoring)    Initial consult date: 3/29/25  Consulting physician/provider: Manas  Drug: Vancomycin  Indication: Pneumonia (CAP)    Age/  Gender Height Weight IBW  Allergy Information   74 y.o./female 165.1 cm (5' 5\") 60.8 kg (134 lb)     Ideal body weight: 57 kg (125 lb 10.6 oz)  Adjusted ideal body weight: 58.5 kg (129 lb)   Zocor [simvastatin]      Renal Function:  Recent Labs     03/28/25  0426 03/29/25  1617 03/30/25  0607   BUN 29* 32* 27*   CREATININE 1.0 1.0 1.1*       Intake/Output Summary (Last 24 hours) at 3/30/2025 0837  Last data filed at 3/30/2025 0806  Gross per 24 hour   Intake 325.61 ml   Output 800 ml   Net -474.39 ml       Vancomycin Monitoring:  Trough:  No results for input(s): \"VANCOTROUGH\" in the last 72 hours.  Random:  No results for input(s): \"VANCORANDOM\" in the last 72 hours.    Vancomycin Administration Times:  Recent vancomycin administrations                     vancomycin (VANCOCIN) 1,250 mg in sodium chloride 0.9 % 250 mL IVPB ([id number]) (mg) 1,250 mg New Bag 03/29/25 1949                      Assessment:  Patient is a 74 y.o. female who has been initiated on vancomycin  Estimated Creatinine Clearance: 41 mL/min (A) (based on SCr of 1.1 mg/dL (H)).  To dose vancomycin, pharmacy will be utilizing [employer] calculation software for goal AUC/CARL 400-600 mg/L-hr (predicted AUC/CARL = 596, Tr =15.6 mcg/mL)  3/30:  SCR 1.1    Plan:  Will continue vancomycin 1250 mg IV every 24 hours  Will check vancomycin level tomorrow with am labs  Will continue to monitor renal function   Pharmacy to follow      Kasi Barbosa, PharmD  PGY-1 Pharmacy Practice Resident, [phone]  3/30/2025 8:37 AM        
Pharmacy Note    This patient was ordered Astelin. Per the Pharmacy & Therapeutics Committee, this medication is non-formulary and not stocked by pharmacy for the reason indicated below. The medication can be reordered at discharge.     Medications in which risks outweigh benefits during hospitalization:           -  oral bisphosphonates         -  raloxifene (Evista)        -  SGLT2 inhibitors (ordered in the hospital for an indication other than heart failure or chronic kidney disease)    Medications that lack necessity during an acute hospital stay:        -  nasal antihistamines        -  nasal ipratropium 0.03% and 0.06%        -  nasal miacalcin        -  acyclovir topical cream/ointment orders for herpes labialis (cold sores)    
Physical Therapy  Initial Assessment     Name: Tenisha Bruce  : 1950  MRN: [id number]      Date of Service: 4/10/2025    Evaluating PT: French Patel, PT, DPT [id number]      Room #:  7403/7403-A  Diagnosis:  Acute hypoxic respiratory failure [J96.01]  PMHx/PSHx:   has a past medical history of Anxiety, Corneal scarring, Fetal alcohol syndrome, Fetal alcohol syndrome, Hypercholesteremia, Hypothyroidism, Onychomycosis, and Schizoaffective disorder (HCC).  Procedure/Surgery:  None this admission  Precautions:  Fall risk, cognition, alarms, TSM, Purewick, incontinent, airvo  Equipment Needs:  TBD    SUBJECTIVE:    Pt is a limited historian. Per chart, pt was admitted from [facility].    OBJECTIVE:   Initial Evaluation  Date: 4/10/25 Treatment Date: Short Term/ Long Term   Goals   AM-PAC 6 Clicks      Was pt agreeable to Eval/treatment? Yes     Does pt have pain? No complaints     Bed Mobility  Rolling: Max A  Supine to sit: Mod A x2  Sit to supine: Mod A x2  Scooting: NT  Rolling: Min A  Supine to sit: Min A  Sit to supine: Min A  Scooting: Min A   Transfers Sit to stand: NT  Stand to sit: NT  Stand pivot: NT    Deferred d/t poor command following and safety awareness  Sit to stand: Mod A  Stand to sit: Mod A  Stand pivot: Mod A   Ambulation   NT  TBD   Stair negotiation: ascended and descended NT  TBD   ROM BUE: Refer to OT note  BLE: Unable to formally assess d/t limited command following     Strength BUE: Refer to OT note  BLE: Unable to formally assess d/t limited command following     Balance Sitting EOB: Mod A progressing to Min A (anterior lean)  Dynamic Standing: NT  Sitting EOB: SBA  Dynamic Standing: TBD     Pt is A & O x 1 to self. Able to follow 1-step commands intermittently with cues.   Sensation: Denies numbness and tingling of extremities.  Edema: Unremarkable    Patient education  Pt educated on PT role in the acute care setting and safety with all functional 
Pulmonology consult sent via perfect serve to .  
RIGO Gómez notified that patient has a Hospice consult  
Renal consult placed via perfect serve to on call Nephrologist due to patient is not currently established with anyone at this time.  Dr Avila taking new messages.    Palliative consult placed via perfect serve.  Katherine WOOD taking messages  
[facility] Spoke with Susi, all questions answered.       Iraida Mcallister RN    
Report called to arjun all questions answered, patient put in for transport at this time. At 1417 Call placed to legal leladian to inform them of room change. Left voice message, and provided pt's new room number and phone number  
Spoke with [facility] . They faxed over code shay papers  
Spoke with Kaylin from [facility] in regards to patient's condition. She also stated that when the patient returns, they will need all the lab work and results of scans during the patient's stay in the hospital.   
[facility]   Department of Internal Medicine   Internal Medicine Residency  MICU Progress Note    Patient:  Tenisha Bruce 74 y.o. female   MRN: [id number]       Date of Service: 4/8/2025    Allergy: Zocor [simvastatin]    Subjective     Patient was seen and examined this morning at bedside in no acute distress.   Overnight, Haldol given for aggression.  Multiple PVCs on telemetry, electrolyte replaced.  Hemoglobin dropped to 6.31 unit PRBC ordered        Objective       I & O - 24hr:    Intake/Output Summary (Last 24 hours) at 4/8/2025 0750  Last data filed at 4/8/2025 0741  Gross per 24 hour   Intake 1135.4 ml   Output 475 ml   Net 660.4 ml       Physical Exam  Vitals: /60   Pulse 99   Temp 97.8 °F (36.6 °C) (Axillary)   Resp 19   Ht 1.651 m (5' 5\")   Wt 56.2 kg (124 lb)   SpO2 96%   BMI 20.63 kg/m²     I & O - 24hr: I/O this shift:  In: 90 [P.O.:90]  Out: -    General Appearance: Awake, has baseline developmental delay, talks in words.   Head: normocephalic and atraumatic  Pulmonary/Chest: clear to auscultation bilaterally- no wheezes, rales or rhonchi, normal air movement, no respiratory distress  Cardiovascular: normal rate, normal S1 and S2, and systolic murmur noted  Abdomen: soft, non-tender, non-distended, and bowel sounds normal  Extremities: no cyanosis, clubbing or edema  Neurologic: Patient has baseline developmental delay making orientation evaluation difficult.      Medications     Continuous Infusions:   sodium chloride      sodium chloride      norepinephrine Stopped (04/08/25 0556)    sodium chloride      sodium chloride      sodium chloride       Scheduled Meds:   docusate sodium  100 mg Oral Daily    midodrine  10 mg Oral TID WC    hydrocortisone sodium succinate PF (SOLU-CORTEF) 100 mg in sterile water 2 mL injection  100 mg IntraVENous Q8H    piperacillin-tazobactam  4,500 mg IntraVENous Q8H    sodium chloride flush  5-40 mL IntraVENous 2 times per day    sodium 
Stage  CI HR MAP TPRI SVI   Baseline 1.9 90 80 3330 21   Challenge        Result (%?) 2.1 85 78 3027 23   Not fluid Responsive at 9.4%  
This nurse spoke to the patient's legal guardian, Zee Villar, at [facility] over the phone. The legal guardian consented to an ART line and central line.  
Weaned O2   03/30/25 0738   NIV Type   NIV Started/Stopped On   Equipment Type v60   Mode AVAPS   Mask Type Full face mask   Mask Size Small   Assessment   SpO2 100 %   Level of Consciousness 1   Comfort Level Good   Using Accessory Muscles No   Mask Compliance Good   Skin Assessment Clean, dry, & intact   Skin Protection for O2 Device Yes   Orientation Middle   Location Nose   Intervention(s) Skin Barrier   Settings/Measurements   PIP Observed 20 cm H20   CPAP/EPAP 5 cmH2O   IPAP Min 20 cmH2O   IPAP Max 30 cmH2O   Vt (Set, mL) 450 mL   Vt (Measured) 520 mL   Rate Ordered 18   Insp Rise Time (%) 3 %   FiO2  (S)  65 %   I Time/ I Time % 0.8 s   Minute Volume (L/min) 14 Liters   Mask Leak (lpm) 20 lpm   Patient's Home Machine No   Alarm Settings   Alarms On Y   Low Pressure (cmH2O) 6 cmH2O   High Pressure (cmH2O) 30 cmH2O   Apnea (secs) 20 secs   RR Low (bpm) 14   RR High (bpm) 40 br/min       
chronic  Continue IV diuretics  Echo from 3/20/2025 showing moderate to severe LV systolic dysfunction with a EF 35%  Evaluated by cardiology, no acute intervention    Thrombocytopenia  Being followed by oncology.  MDS not ordered, workup deferred due to poor performance status at this point.  Outpatient follow-up advised    Chronic medical problems  Hypothyroidism  Cholesterolemia  Developmental delay  Chronic microcytic anemia  Continue home meds as ordered    Follow labs   DVT prophylaxis No VTE Prophylaxis Needed  Please see orders for further management and care.  Discharge plan: Has chest tube, will be transferred to ICU    Raza Steven was seen and examined at bedside.  Patient has chest tube in place, has MRDD.  Was more lethargic and drowsy this morning, blood pressure is running low since last night.  Received midodrine overnight but no significant improvement.  Given 1 L of IV fluid bolus.  Will transfer to ICU.      Review of Systems  All systems reviewed and negative except mentioned above.       Objective  Most Recent Recorded Vitals  BP (!) 87/62   Pulse 69   Temp 97.3 °F (36.3 °C) (Oral)   Resp 16   Ht 1.651 m (5' 5\")   Wt 52 kg (114 lb 9.6 oz)   SpO2 100%   BMI 19.07 kg/m²     General appearance: delerious No apparent distress, appears stated age and cooperative.  HEENT: Normal cephalic, atraumatic without obvious deformity. Pupils equal, round, and reactive to light.  Extra ocular muscles intact. Conjunctivae/corneas clear.  Neck: Supple, with full range of motion. No jugular venous distention. Trachea midline.  Respiratory:  lungs clear to auscultation; without wheezes, rales or rhonchi; on Nasal cannula  Cardiovascular:  regular rate and rhythm; normal S1, S2; no murmurs, rubs, clicks or gallops; peripheral edema PRESENT/ABSENT: Absent   Abdomen:  soft, non-tender, non-distended  Musculoskeletal: No clubbing, cyanosis, edema of bilateral lower extremities. Brisk capillary refill. 
recommendations for increased independence, safety, and fall prevention  * Patient/Family education to increase follow through with safety techniques and functional independence  * Recommendation of environmental modifications for increased safety with functional transfers/mobility and ADLs  * Cognitive retraining/development of therapeutic activities to improve problem solving, judgement, memory, and attention for increased safety/participation in ADL/IADL tasks  * Therapeutic exercise to improve motor endurance, ROM, and functional strength for ADLs/functional transfers  * Therapeutic activities to facilitate/challenge dynamic balance, stand tolerance for increased safety and independence with ADLs  * Therapeutic activities to facilitate gross/fine motor skills for increased independence with ADLs  * Neuro-muscular re-education: facilitation of righting/equilibrium reactions, midline orientation, scapular stability/mobility, normalization of muscle tone, and facilitation of volitional active controled movement  * Positioning to improve skin integrity, interaction with environment and functional independence    Pt poor historian.  Home Living: Per chart, pt admitted from [facility].    Prior Level of Function: ? with ADLs;  ? with IADLs. ? for functional mobility.   Driving: No  Occupation: None reported    Pain Level: Pt c/o 0/10 pain & no signs/symptoms this session; therapist facilitated repositioning techniques.  Cognition: A&O: 1/4 (grossly to self); Follows 1 step directions w/ max verbal/tactile cues for command following. Pt follows ~25% of commands. Simple speech (1-2 words).   Memory:  Difficult to formally assess 2/2 limited command following.   Sequencing:  poor   Problem solving:  poor   Judgement/safety: poor     Functional Assessment:  AM-PAC Daily Activity Raw Score: 8/24   Initial Eval Status  Date: 4/10/25 Treatment Status  Date: STGs = LTGs  Time frame: 10-14 days 
anemia  Chronic hyperlipidemia  Developmental delay    VTE prophylaxis: Enoxaparin.    NOTE: This report was transcribed using voice recognition software. Every effort was made to ensure accuracy; however, inadvertent computerized transcription errors may be present.  Electronically signed by Valerie Tan MD on 4/15/2025 at 8:41 AM    
bowel sounds, no masses or organomegaly  Extremities: no cyanosis, no clubbing and no edema  Neurologic: no cranial nerve deficit and speech normal        Recent Labs     04/05/25  0410 04/06/25  0403 04/07/25  0407    138 143   K 3.9 4.0 3.8   CL 94* 96* 98   CO2 31* 27 29   BUN 43* 36* 33*   CREATININE 1.6* 1.4* 1.3*   GLUCOSE 118* 122* 139*   CALCIUM 7.8* 8.1* 8.2*       Recent Labs     04/05/25  0410 04/06/25  0403 04/07/25  0407   WBC 13.7* 8.2 6.9   RBC 2.33* 2.05* 2.05*   HGB 8.3* 7.5* 7.2*   HCT 24.7* 22.2* 21.9*   .0* 108.3* 106.8*   MCH 35.6* 36.6* 35.1*   MCHC 33.6 33.8 32.9   RDW 18.6* 18.1* 17.7*   PLT 50* 36* 48*   MPV 9.9 10.4 10.2       Labs and imaging reviewed    Assessment:    Principal Problem:    Acute hypoxic respiratory failure  Active Problems:    Acute respiratory failure with hypoxia and hypercapnia  Resolved Problems:    * No resolved hospital problems. *      Plan:  Shock  Unclear etiology, probably cardiogenic versus septic  Patient with moderate to severe LV systolic dysfunction with EF of 35%  Was on IV diuretics, holding diuretics, metoprolol and antihypertensives in the setting of low blood pressures  Given IV fluid bolus, transferred to ICU.    Remains on Levophed.  Cultures with no growth     Acute hypoxic hypercapnic respiratory failure  Mycoplasma pneumonia  Streptococcal pneumonia  Treated with IV cefepime for 5 days, was changed to oral Augmentin but restarted on Zosyn after patient became hypotensive.  Completed azithromycin for mycoplasma  Continue IV diuresis  Patient has pigtail catheter in place, pulmonology following     Elevated troponin  HFrEF acute on chronic  Continue IV diuretics  Echo from 3/20/2025 showing moderate to severe LV systolic dysfunction with a EF 35%  Evaluated by cardiology, no acute intervention     Thrombocytopenia  Being followed by oncology.  MDS not ordered, workup deferred due to poor performance status at this point.  Outpatient 
cachectic   HEENT: NC/AT, EOMI, sclera and conjunctiva are clear and anicteric, mucus membranes moist  Neck: Trachea midline, no JVD  Cardiovascular: S1, S2 regular rhythm, no murmur,or rub  Respiratory:  Few scattered crackles bilaterally, no wheeze  Gastrointestinal:  Soft, nontender, nondistended, NABS  Ext: no edema, feet warm  Skin: dry, no rash  Neuro: awake, alert, interactive      DATA:    Recent Labs     04/12/25  1203 04/13/25  0110 04/13/25  0459 04/14/25  0545   WBC 5.2  --  6.8 7.9   HGB 6.2* 8.0* 8.1* 8.8*   HCT 20.4* 26.1* 24.9* 26.5*   .9*  --  107.8* 109.5*   PLT 40*  --  50* 52*     Recent Labs     04/12/25  0422 04/12/25  1637 04/13/25  0459 04/14/25  0545    148* 150* 153*   K 5.4* 4.5 4.3 4.4    103 105 107   CO2 28 32* 31* 33*   MG 2.0  --  2.0 1.8   PHOS 3.3  --  3.2 2.6   BUN 69* 67* 67* 65*   CREATININE 1.4* 1.4* 1.4* 1.3*   ALT 34*  --  29 27   AST 56*  --  30 31   BILITOT 0.6  --  0.7 0.8   ALKPHOS 78  --  84 89       No results found for: \"LABPROT\"    Assessment  Acute kidney injury in the multifactorial setting of contrast (pulmonary CTA and CT abd/pelvis), daily diuresis, poor oral intake, dysphagia, edentulous, sepsis, hypotension, constipation, acute heart failure and pneumonia.  Shock-cardiogenic/hypovolemia/septic  Acute hypoxic hypercapnic respiratory failure  Anemia  HFrEF     Urine studies are prerenal    Cr around the same and steady over the last few days 1.3 mg/dL  CT of the chest noted bilateral pleural effusion  Na creeping up 153 mmol/L   Hospice has been consulted     Plan  D5W at 75 cc/hr as her hypernatremia is worsening   Agree with hold on lisinopril and aldactone  Hold further IV Lasix in light of hyper natremia  Encourage oral intake  Avoid nephrotoxins  Continue supportive renal care    Electronically signed by DIAMOND Diaz CNP on 4/14/2025 at 3:27 PM    NOTE: This report was transcribed using voice recognition software. Every effort was 
deficit and speech normal        Recent Labs     04/10/25  0652 04/11/25  0710 04/12/25  0422    145 144   K 4.5 3.9 5.4*    102 103   CO2 28 31* 28   BUN 60* 63* 69*   CREATININE 1.6* 1.5* 1.4*   GLUCOSE 127* 120* 119*   CALCIUM 8.4* 8.9 9.2       Recent Labs     04/10/25  0652 04/11/25  0710   WBC 13.5* 8.7   RBC 2.17* 1.95*   HGB 8.1* 7.4*   HCT 23.7* 21.4*   .2* 109.7*   MCH 37.3* 37.9*   MCHC 34.2 34.6*   RDW 18.9* 18.3*   PLT 53* 50*   MPV 9.5 10.6       Labs and imaging reviewed    Assessment:    Principal Problem:    Acute hypoxic respiratory failure  Active Problems:    Acute respiratory failure with hypoxia and hypercapnia    Palliative care encounter    Goals of care, counseling/discussion  Resolved Problems:    * No resolved hospital problems. *      Plan:  Shock  Unclear etiology, probably cardiogenic versus septic  Patient with moderate to severe LV systolic dysfunction with EF of 35%  Was on IV diuretics, holding diuretics, metoprolol and antihypertensives in the setting of low blood pressures  off Levophed, started on midodrine, weaning steroids.  Cultures with no growth     Acute hypoxic hypercapnic respiratory failure  Mycoplasma pneumonia  Streptococcal pneumonia  Leukocytosis   Treated with IV cefepime for 5 days, was changed to oral Augmentin but restarted on Zosyn after patient became hypotensive, continuing Zosyn for now.  Completed azithromycin for mycoplasma  diuresis on hold due to hypotension/Dario  DuoNeb  Encourage incentive spirometry  Pulmonology following  ID following, monitoring off antibiotics  On Airvo today   BNP >70,000  Nephrology following, appreciate recommendations.  CT chest pending     Elevated troponin  HFrEF acute on chronic  IV diuresis on hold due to hypotension/DARIO  Echo from 3/20/2025 showing moderate to severe LV systolic dysfunction with a EF 35%  Evaluated by cardiology, no acute intervention  Elevated heart rate, resume metoprolol at lower dose   
interactive      DATA:    Recent Labs     04/08/25  0411 04/08/25  1112 04/09/25  0637 04/10/25  0652   WBC 8.4  --  11.4 13.5*   HGB 6.3* 8.2* 8.1* 8.1*   HCT 19.5* 24.9* 23.8* 23.7*   .6*  --  107.2* 109.2*   PLT 46*  --  51* 53*     Recent Labs     04/08/25  0411 04/08/25  1112 04/09/25  0637 04/10/25  0652    146 143 146   K 4.0 3.4* 3.6 4.5   CL 99 103 102 103   CO2 29 30* 28 28   MG 2.2 2.2 1.9 1.8   PHOS 3.9  --  3.5 3.4   BUN 38* 37* 52* 60*   CREATININE 1.4* 1.4* 1.5* 1.6*   ALT 19  --  19 18   AST 21  --  26 27   BILITOT 0.4  --  0.5 0.5   ALKPHOS 83  --  83 76       No results found for: \"LABPROT\"    Assessment  Acute kidney injury in the multifactorial setting of contrast (pulmonary CTA and CT abd/pelvis), daily diuresis, poor oral intake, dysphagia, edentulous, sepsis, hypotension, constipation, acute heart failure and pneumonia.  Shock-cardiogenic/hypovolemia/septic  Acute hypoxic hypercapnic respiratory failure  Anemia  HFrEF     Urine studies are prerenal    Lungs are clear on examination, physical exam findings are not consistent with chest xray findings   Clinically dry     Plan  LR at 63 cc/hr   Agree with hold on lisinopril and aldactone  Agree with hold on daily diuretics for now  Encourage oral intake  Monitor labs  Monitor I & O  Monitor BP  Avoid nephrotoxins  Continue supportive renal care    Electronically signed by DIAMOND Diaz CNP on 4/10/2025 at 5:12 PM    NOTE: This report was transcribed using voice recognition software. Every effort was made to ensure accuracy; however, inadvertent transcription errors may be present.  
intervention  Elevated heart rate, resume metoprolol at lower dose     Ryan  Hypernatremia  Nephrology following, appreciate recommendations.   Monitor renal function closely  Holding aldactone and lisinopril, diuresis   Recovered from shock, off pressors.   Sodium 150 today    Thrombocytopenia  Being followed by oncology.  MDS not ordered, workup deferred due to poor performance status at this point.  Outpatient follow-up advised    Anemia, continue to monitor and transfuse if less than 7.  Hemoglobin 6.2 yesterday and received 1 unit, hemoglobin 8.1 today     Chronic medical problems  Hypothyroidism  Cholesterolemia  Developmental delay  Chronic microcytic anemia  Continue home meds as ordered      Code Status: DNR-CCA  Palliative care consulted  Two-physician sign off for CODE STATUS change  Hospice consulted    Dispo: pending clinical course.       Total time 35 minutes spent reviewing chart notes, reviewing treatment plans and prognosis with the patient/caregiver, and documenting in the chart.        NOTE: This report was transcribed using voice recognition software. Every effort was made to ensure accuracy; however, inadvertent computerized transcription errors may be present.  Electronically signed by Sandra Stewart MD on 4/13/2025 at 11:07 AM    
effusions are identified There is interstitial prominence within the lungs concerning for underlying pulmonary edema or fluid overload The contour of the mediastinum heart size are within the normal range.     1. Reduction of the left pneumothorax. 2. Interstitial prominence within the lungs concerning for underlying pulmonary edema or fluid overload.     XR CHEST PORTABLE  Result Date: 4/5/2025  EXAMINATION: ONE XRAY VIEW OF THE CHEST 4/5/2025 7:52 am COMPARISON: 04/04/2025 HISTORY: ORDERING SYSTEM PROVIDED HISTORY: pleural effusions, left pigtail chest tube placement TECHNOLOGIST PROVIDED HISTORY: Reason for exam:->pleural effusions, left pigtail chest tube placement FINDINGS: Single frontal view of the chest demonstrates a persistent left-sided pigtail chest catheter remain in place.  There are no focal alveolar infiltrates or effusions.  There is atherosclerotic change of the aorta with mild cardiomegaly present.     1. Persistent left-sided pigtail chest catheter. 2. No focal alveolar infiltrates or effusions. 3. Mild cardiomegaly.     XR CHEST PORTABLE  Result Date: 4/4/2025  EXAMINATION: ONE XRAY VIEW OF THE CHEST 4/4/2025 10:08 pm COMPARISON: Comparison studies of April 1st through April 4 same-day performed early at 06:26 a.m.. HISTORY: ORDERING SYSTEM PROVIDED HISTORY: checking pig tail position TECHNOLOGIST PROVIDED HISTORY: Reason for exam:->checking pig tail position FINDINGS: Present study done on April 4 at 22:05 p.m.. There is a pigtail left-sided chest.  Position of the catheter is similar as observed previously accounting different positioning, patient is rotated to the right. There are skin fold artifacts which obscures detail.  No conspicuous pneumothorax is being presently identified, can be separate from the skin fold artifacts. Heart is upper borderline size. There is slight prominence of perihilar vessels. Minimal increased densities seen in the left lower base.  There is no left-sided 
ORDERING SYSTEM PROVIDED HISTORY: AMS TECHNOLOGIST PROVIDED HISTORY: Reason for exam:->AMS Has a \"code stroke\" or \"stroke alert\" been called?->No Decision Support Exception - unselect if not a suspected or confirmed emergency medical condition->Emergency Medical Condition (MA) What reading provider will be dictating this exam?->CRC FINDINGS: BRAIN/VENTRICLES: There is no acute intracranial hemorrhage, mass effect or midline shift.  No abnormal extra-axial fluid collection.  The gray-white differentiation is maintained without evidence of an acute infarct.  There is no evidence of hydrocephalus. ORBITS: The visualized portion of the orbits demonstrate no acute abnormality. SINUSES: The visualized paranasal sinuses and mastoid air cells demonstrate no acute abnormality. SOFT TISSUES/SKULL:  No acute abnormality of the visualized skull or soft tissues.     No acute intracranial abnormality.     XR CHEST PORTABLE  Result Date: 3/29/2025  EXAMINATION: ONE XRAY VIEW OF THE CHEST 3/29/2025 5:16 pm COMPARISON: 03/19/2025 HISTORY: ORDERING SYSTEM PROVIDED HISTORY: Sepsis TECHNOLOGIST PROVIDED HISTORY: Reason for exam:->Sepsis FINDINGS: The lungs are without acute focal process.  Left pleural effusion.  No pneumothorax.  Cardiomegaly.  The osseous structures are without acute process.     Left pleural effusion. Cardiomegaly.     Assessment:   Acute on chronic hypoxic respiratory failure requiring HHFNC and NIV  Acute on chronic HFrEF  Bilateral pleural effusions - s/p  left 8 Fr pigtail chest tube placement 3/30/87955-04/6/2025  Cultures negative  Cytology negative   Subsegmental Atelectasis   Community Acquired Pneumonia - Strep antigen positive   Mycoplasma pneumoniae  Moderate to severe LV systolic dysfunction (EF 30 to 35%) on echo 3/20/2025  Moderate MR on echo 3/20/2025  Severe thrombocytopenia  Myelodysplastic Syndrome   Anasarca  Status post treatment for left lower lobe pneumonia/septic shock earlier this month  Acute 
reconstruction, and/or weight based adjustment of the mA/kV was utilized to reduce the radiation dose to as low as reasonably achievable.; CTA of the neck was performed with the administration of intravenous contrast. Multiplanar reformatted images are provided for review. MIP images are provided for review. Stenosis of the internal carotid arteries measured using NASCET criteria. Automated exposure control, iterative reconstruction, and/or weight based adjustment of the mA/kV was utilized to reduce the radiation dose to as low as reasonably achievable. Noncontrast CT of the head with reconstructed 2-D images are also provided for review. COMPARISON: CT head without contrast, 05/06/2024. HISTORY: ORDERING SYSTEM PROVIDED HISTORY: ams TECHNOLOGIST PROVIDED HISTORY: Reason for exam:->ams Has a \"code stroke\" or \"stroke alert\" been called?-> What reading provider will be dictating this exam?->CRC; ORDERING SYSTEM PROVIDED HISTORY: ams TECHNOLOGIST PROVIDED HISTORY: Reason for exam:->ams Has a \"code stroke\" or \"stroke alert\" been called?->Yes Decision Support Exception - unselect if not a suspected or confirmed emergency medical condition->Emergency Medical Condition (MA) What reading provider will be dictating this exam?->CRC; ORDERING SYSTEM PROVIDED HISTORY: ams TECHNOLOGIST PROVIDED HISTORY: Reason for exam:->ams Has a \"code stroke\" or \"stroke alert\" been called?->Yes What reading provider will be dictating this exam?->CRC FINDINGS: CT HEAD: BRAIN/VENTRICLES:  No mass effect, edema or hemorrhage is seen.  Small chronic infarction noted in the left parietal lobe.  Mild-to-moderate cerebral volume loss.  Mild chronic microvascular ischemic changes.  No hydrocephalus or extra-axial fluid. ORBITS: The visualized portion of the orbits demonstrate no acute abnormality. SINUSES:  The visualized paranasal sinuses and mastoid air cells demonstrate no acute abnormality. SOFT TISSUES/SKULL: No acute abnormality of the 
based adjustment of the mA/kV was utilized to reduce the radiation dose to as low as reasonably achievable. Noncontrast CT of the head with reconstructed 2-D images are also provided for review. COMPARISON: CT head without contrast, 05/06/2024. HISTORY: ORDERING SYSTEM PROVIDED HISTORY: ams TECHNOLOGIST PROVIDED HISTORY: Reason for exam:->ams Has a \"code stroke\" or \"stroke alert\" been called?-> What reading provider will be dictating this exam?->CRC; ORDERING SYSTEM PROVIDED HISTORY: ams TECHNOLOGIST PROVIDED HISTORY: Reason for exam:->ams Has a \"code stroke\" or \"stroke alert\" been called?->Yes Decision Support Exception - unselect if not a suspected or confirmed emergency medical condition->Emergency Medical Condition (MA) What reading provider will be dictating this exam?->CRC; ORDERING SYSTEM PROVIDED HISTORY: ams TECHNOLOGIST PROVIDED HISTORY: Reason for exam:->ams Has a \"code stroke\" or \"stroke alert\" been called?->Yes What reading provider will be dictating this exam?->CRC FINDINGS: CT HEAD: BRAIN/VENTRICLES:  No mass effect, edema or hemorrhage is seen.  Small chronic infarction noted in the left parietal lobe.  Mild-to-moderate cerebral volume loss.  Mild chronic microvascular ischemic changes.  No hydrocephalus or extra-axial fluid. ORBITS: The visualized portion of the orbits demonstrate no acute abnormality. SINUSES:  The visualized paranasal sinuses and mastoid air cells demonstrate no acute abnormality. SOFT TISSUES/SKULL: No acute abnormality of the visualized skull or soft tissues. CTA NECK: AORTIC ARCH/ARCH VESSELS: No dissection or arterial injury.  No significant stenosis of the brachiocephalic or subclavian arteries. CAROTID ARTERIES: Prominent calcified atherosclerosis along the right carotid bulb resulting in a maximal stenosis of approximately 70% in the proximal right ICA (axial series 305, image 119).  Prominent calcified atherosclerosis in the left carotid bulb results in 90% stenosis at the

## 2025-04-19 PROBLEM — R79.89 ELEVATED TROPONIN: Status: RESOLVED | Noted: 2025-03-20 | Resolved: 2025-04-19

## 2025-04-20 LAB
MICROORGANISM SPEC CULT: NORMAL
MICROORGANISM/AGENT SPEC: NORMAL
SERVICE CMNT-IMP: NORMAL
SPECIMEN DESCRIPTION: NORMAL

## 2025-04-29 LAB
MICROORGANISM SPEC CULT: NORMAL
MICROORGANISM SPEC CULT: NORMAL
MICROORGANISM/AGENT SPEC: NORMAL
MICROORGANISM/AGENT SPEC: NORMAL
SERVICE CMNT-IMP: NORMAL
SERVICE CMNT-IMP: NORMAL
SPECIMEN DESCRIPTION: NORMAL
SPECIMEN DESCRIPTION: NORMAL

## 2025-05-13 LAB
MICROORGANISM SPEC CULT: NORMAL
MICROORGANISM/AGENT SPEC: NORMAL
SERVICE CMNT-IMP: NORMAL
SPECIMEN DESCRIPTION: NORMAL

## (undated) DEVICE — C-ARM: Brand: UNBRANDED

## (undated) DEVICE — 3M™ IOBAN™ 2 ANTIMICROBIAL INCISE DRAPE 6640EZ: Brand: IOBAN™ 2

## (undated) DEVICE — PACK PROCEDURE SURG GEN CUST

## (undated) DEVICE — COVER HNDL LT DISP

## (undated) DEVICE — BIT DRL L413MM DIA4.3MM FOR FEM NK SYSTEN

## (undated) DEVICE — ELECTRODE PT RET AD L9FT HI MOIST COND ADH HYDRGEL CORDED

## (undated) DEVICE — PADDING UNDERCAST W6INXL4YD WYTEX 6 PER BG

## (undated) DEVICE — BLADE,STAINLESS-STEEL,10,STRL,DISPOSABLE: Brand: MEDLINE

## (undated) DEVICE — Device: Brand: COVER, PERINEAL POST, 12 PK

## (undated) DEVICE — TUBING, SUCTION, 9/32" X 10', STRAIGHT: Brand: MEDLINE

## (undated) DEVICE — SPONGE LAP W18XL18IN WHT COT 4 PLY FLD STRUNG RADPQ DISP ST 2 PER PACK

## (undated) DEVICE — C-ARMOR C-ARM EQUIPMENT COVERS CLEAR STERILE UNIVERSAL FIT 12 PER CASE: Brand: C-ARMOR

## (undated) DEVICE — DRESSING HYDROFIBER AQUACEL AG ADVANTAGE 3.5X6 IN

## (undated) DEVICE — APPLICATOR PREP 26ML 0.7% IOD POVACRYLEX 74% ISO ALC ST

## (undated) DEVICE — 3M™ COBAN™ NL STERILE NON-LATEX SELF-ADHERENT WRAP, 2084S, 4 IN X 5 YD (10 CM X 4,5 M), 18 ROLLS/CASE: Brand: 3M™ COBAN™

## (undated) DEVICE — GOWN,SIRUS,POLYRNF,BRTHSLV,XL,30/CS: Brand: MEDLINE

## (undated) DEVICE — SYRINGE IRRIG 60ML SFT PLIABLE BLB EZ TO GRP 1 HND USE W/

## (undated) DEVICE — Device

## (undated) DEVICE — Device: Brand: PROTECTORS, LEG SPAR BALL JOINT, 12/PR

## (undated) DEVICE — 4-PORT MANIFOLD: Brand: NEPTUNE 2

## (undated) DEVICE — TOWEL,OR,DSP,ST,BLUE,STD,6/PK,12PK/CS: Brand: MEDLINE

## (undated) DEVICE — DOUBLE BASIN SET: Brand: MEDLINE INDUSTRIES, INC.

## (undated) DEVICE — SOLUTION IRRIG 1000ML 0.9% SOD CHL USP POUR PLAS BTL

## (undated) DEVICE — GUIDEWIRE ORTH L400MM DIA3.2MM FOR TFN

## (undated) DEVICE — GLOVE ORANGE PI 8 1/2   MSG9085

## (undated) DEVICE — PAD PERINL POST FOAM DISPOSABLE FOR AMSCO SURG TBL

## (undated) DEVICE — DRAPE ISOLATN PT ST W/POCKET

## (undated) DEVICE — GLOVE SURG SZ 85 L12IN FNGR ORTHO 126MIL CRM LTX FREE

## (undated) DEVICE — PADDING,UNDERCAST,COTTON, 4"X4YD STERILE: Brand: MEDLINE